# Patient Record
Sex: FEMALE | Race: WHITE | NOT HISPANIC OR LATINO | URBAN - METROPOLITAN AREA
[De-identification: names, ages, dates, MRNs, and addresses within clinical notes are randomized per-mention and may not be internally consistent; named-entity substitution may affect disease eponyms.]

---

## 2017-01-03 ENCOUNTER — HOSPITAL ENCOUNTER (OUTPATIENT)
Dept: PREADMISSION TESTING | Facility: HOSPITAL | Age: 57
Discharge: HOME | End: 2017-01-03
Attending: PHYSICAL MEDICINE & REHABILITATION
Payer: COMMERCIAL

## 2017-01-03 VITALS
DIASTOLIC BLOOD PRESSURE: 84 MMHG | OXYGEN SATURATION: 98 % | HEART RATE: 62 BPM | SYSTOLIC BLOOD PRESSURE: 123 MMHG | RESPIRATION RATE: 16 BRPM | HEIGHT: 63 IN | BODY MASS INDEX: 19.18 KG/M2 | TEMPERATURE: 97 F | WEIGHT: 108.25 LBS

## 2017-01-03 DIAGNOSIS — Z01.818 PRE-OP TESTING: ICD-10-CM

## 2017-01-03 DIAGNOSIS — M48.02 CERVICAL SPINAL STENOSIS: Primary | ICD-10-CM

## 2017-01-03 LAB
BASOPHILS # BLD AUTO: 0.03 K/UL
BASOPHILS NFR BLD: 0.6 %
DIFFERENTIAL METHOD: ABNORMAL
EOSINOPHIL # BLD AUTO: 0 K/UL
EOSINOPHIL NFR BLD: 0.8 %
ERYTHROCYTE [DISTWIDTH] IN BLOOD BY AUTOMATED COUNT: 13.4 %
HCT VFR BLD AUTO: 37.2 %
HGB BLD-MCNC: 12.4 G/DL
LYMPHOCYTES # BLD AUTO: 1.8 K/UL
LYMPHOCYTES NFR BLD: 35 %
MCH RBC QN AUTO: 30 PG
MCHC RBC AUTO-ENTMCNC: 33.3 %
MCV RBC AUTO: 90 FL
MONOCYTES # BLD AUTO: 0.5 K/UL
MONOCYTES NFR BLD: 8.7 %
NEUTROPHILS # BLD AUTO: 2.8 K/UL
NEUTROPHILS NFR BLD: 54.9 %
PLATELET # BLD AUTO: 270 K/UL
PMV BLD AUTO: 8.8 FL
RBC # BLD AUTO: 4.13 M/UL
WBC # BLD AUTO: 5.17 K/UL

## 2017-01-03 PROCEDURE — 36415 COLL VENOUS BLD VENIPUNCTURE: CPT

## 2017-01-03 PROCEDURE — 85025 COMPLETE CBC W/AUTO DIFF WBC: CPT

## 2017-01-03 NOTE — IP AVS SNAPSHOT
[place] [address] [place] [address]  Phone: [phone]           Patient Discharge Instructions    Our goal is to set you up for success. This packet includes information on your condition, medications, and your home care. It will help you to care for yourself so you don't get sicker.     Please ask your nurse if you have any questions.        There are many details to remember when preparing for your surgery. Here is what you will need to do, please ask your nurse if there are more specific instructions and if you have any questions:    1. 24 hours before procedure Do not smoke or drink alcoholic beverages 24 hours prior to your procedure    2. Eating before procedure Do not eat or drink anything 8 hours before your procedure - this includes gum, mints, and candy.     3. Day of procedure Please remove all jewelry for the procedure. If you wear contact lenses, dentures, hearing aids or glasses, bring a container to put them in during your surgery and give to a family member for safekeeping.  If your doctor has scheduled you for an overnight stay, bring a small overnight bag with any personal items that you need.    4. After procedure Make arrangements in advance for transportation home by a responsible adult. It is not safe to drive a vehicle during the 24 hours following surgery.     PLEASE NOTE: You may be contacted the day before your surgery to confirm your surgery date and arrival time. The Surgery schedule has many variables which may affect the time of your surgery case. Family members should be available if your surgery time changes.                ** Verify the list of medication(s) below is accurate and up to date. Carry this with you in case of emergency. If your medications have changed, please notify your healthcare provider.             Medication List      TAKE these medications        Additional Info                      buPROPion 150 MG TB24 tablet   Commonly  known as:  WELLBUTRIN XL   Refills:  0   Dose:  150 mg    Instructions:  Take 150 mg by mouth 2 (two) times daily.     Begin Date    AM    Noon    PM    Bedtime       meloxicam 15 MG tablet   Commonly known as:  MOBIC   Refills:  0   Dose:  15 mg    Instructions:  Take 15 mg by mouth once daily. Instructed to stop until after procedure 1-9-17.     Begin Date    AM    Noon    PM    Bedtime       sertraline 100 MG tablet   Commonly known as:  ZOLOFT   Refills:  0   Dose:  100 mg    Instructions:  Take 100 mg by mouth once daily.     Begin Date    AM    Noon    PM    Bedtime                  Please bring to all follow up appointments:    1. A copy of your discharge instructions.  2. All medicines you are currently taking in their original bottles.  3. Identification and insurance card.    Please arrive 15 minutes ahead of scheduled appointment time.    Please call 24 hours in advance if you must reschedule your appointment and/or time.        Your Future Surgeries/Procedures     Jan 09, 2017   Surgery with Gutierrez Estevez MD   [facility] ([facility])    [address] [place], [place] [address] [id number] [phone]                  Discharge Instructions         Your surgery is scheduled for _Monday Jan. 9, 2017__.    Call [phone] between 2 p.m. and 5 p.m. on   __Friday__ to find out your arrival time for the day of your surgery.      Please report to SAME DAY SURGERY UNIT on the 2nd FLOOR at _______ a.m.  Use front door entrance. The doors open at 0530 am.        INSTRUCTIONS IMPORTANT!!!  ¨ Do not eat or drink after 12 midnight-including water. OK to brush teeth, no   gum, candy or mints!    ¨ Take only these medicines with a small swallow of water-morning of surgery.  May take Wellbutrin and Zoloft am of surgery with swallow of water.      __x__  Prep instructions   SHOWER     __x__  Please shower using Hibiclens soap the night before AND  the morning of  your surgery/procedure. Do not use  Hibiclens on your face or genitals         x      If your surgery is around your belly button (Navel) be sure to wash inside your  belly button also. Rinse hibiclens off completely.  __x__  No shaving of procedural area at least 4-5 days before surgery due to  increased risk of skin irritation and/or possible infection.  __x__  Do not wear makeup, including mascara. WEARING EYE MAKEUP MAY LEAD TO SERIOUS EYE INJURY during surgery.  __x__  No powder, lotions or creams to surgical area.  __x__  You may wear only deodorant on the day of surgery.  __x__  Please remove all jewelry, including piercings and leave at home.  __x__  No money or valuables needed. Please leave at home.  You may bring your           cell phone.  ____  Please bring any documents given by your doctor.  __x__  If going home the same day, arrange for a ride home. You will not be able to   drive if Anesthesia was used.  ____  Children under 18 years require a parent / guardian present the entire time   they are in surgery / recovery.  __x__  Wear loose fitting clothing. Allow for dressings, bandages.  __x__  Stop Aspirin, Ibuprofen, Motrin, Meloxicam and Aleve at least 3-5 days before surgery, unless otherwise instructed by your doctor, or the nurse.        x      You MAY use Tylenol/acetaminophen until day of surgery.  ____  If you take diabetic medication, do not take am of surgery unless instructed by   Doctor.  _x___  Call MD for temperature above 101 degrees.        _x___ Stop taking any Fish Oil supplement or any Vitamins that contain Vitamin  E at least 5 days prior to surgery.          I have read or had read and explained to me, and understand the above information.  Additional comments or instructions:Please call   [phone] if you have any questions regarding the instructions above.                 Admission Information     Date & Time Provider Department [facility]    1/3/2017  4:00 PM Gutierrez Estevez MD [facility] [id number]     "  Care Providers     Provider Role Specialty Primary office phone    Gutierrez Estevez MD Attending Provider Pain Medicine [phone]      Your Vitals Were     BP Pulse Temp Resp Height Weight    123/84 (BP Location: Left arm, Patient Position: Sitting, BP Method: Automatic) 62 97.3 °F (36.3 °C) (Oral) 16 5' 3" (1.6 m) 49.1 kg (108 lb 3.9 oz)    SpO2 BMI             98% 19.17 kg/m2         Recent Lab Values     No lab values to display.      Allergies as of 1/3/2017     No Known Allergies      [facility]     [facility] Nurse Care [facility] - 24/7 Assistance  Unless otherwise directed by your provider, please contact [facility], our nurse care line that is available for 24/7 assistance.     Registered nurses in the [facility] provide clinical advisement, health education, appointment booking, and other advisory services.  Call for this free service at [phone].        Advance Directives     An advance directive is a document which, in the event you are no longer able to make decisions for yourself, tells your healthcare team what kind of treatment you do or do not want to receive, or who you would like to make those decisions for you.  If you do not currently have an advance directive, [facility] encourages you to create one.  For more information call:  [phone]), [phone] ([phone]),  or log on to [email or web address].        Smoking Cessation     If you would like to quit smoking:   You may be eligible for free services if you are a [place] resident and started smoking cigarettes before September 1, 1988.  Call the [employer]oking [employer]essation [employer] ([employer]) toll free at (750) 034-3301 or (719) 644-7433.   Call 7-820-QUIT-NOW if you do not meet the above criteria.            Language Assistance Services     ATTENTION: Language assistance services are available, free of charge. Please call [phone].      ATENCIÓN: Si habla español, tiene a bolton disposición servicios " andieos de asistencia lingüística. Don barton [phone].     ROSALES Ý: N?u b?n nói Ti?ng Vi?t, có các d?ch v? h? tr? ngôn ng? mi?n phí dành cho b?n. G?i s? [phone].         [facility] complies with applicable Federal civil rights laws and does not discriminate on the basis of race, color, national origin, age, disability, or sex.

## 2017-01-03 NOTE — IP AVS SNAPSHOT
[address] [place] [address]  Phone: [phone]           I have received a copy of my After Visit Summary and discharge instructions from [facility].    INSTRUCTIONS RECEIVED AND UNDERSTOOD BY:                     Patient/Patient Representative: ________________________________________________________________     Date/Time: ________________________________________________________________                     Instructions Given By: ________________________________________________________________     Date/Time: ________________________________________________________________

## 2017-01-03 NOTE — PLAN OF CARE
Pre-operative instructions, medication directives and pain scales reviewed with patient. All questions the patient had  were answered. Re-assurance about surgical procedure and day of surgery routine given as needed. The patient verbalized understanding of the pre-op instructions.

## 2017-01-03 NOTE — DISCHARGE INSTRUCTIONS
Your surgery is scheduled for _Monday Jan. 9, 2017__.    Call [phone] between 2 p.m. and 5 p.m. on   __Friday__ to find out your arrival time for the day of your surgery.      Please report to SAME DAY SURGERY UNIT on the 2nd FLOOR at _______ a.m.  Use front door entrance. The doors open at 0530 am.        INSTRUCTIONS IMPORTANT!!!  ¨ Do not eat or drink after 12 midnight-including water. OK to brush teeth, no   gum, candy or mints!    ¨ Take only these medicines with a small swallow of water-morning of surgery.  May take Wellbutrin and Zoloft am of surgery with swallow of water.      __x__  Prep instructions   SHOWER     __x__  Please shower using Hibiclens soap the night before AND  the morning of  your surgery/procedure. Do not use Hibiclens on your face or genitals         x      If your surgery is around your belly button (Navel) be sure to wash inside your  belly button also. Rinse hibiclens off completely.  __x__  No shaving of procedural area at least 4-5 days before surgery due to  increased risk of skin irritation and/or possible infection.  __x__  Do not wear makeup, including mascara. WEARING EYE MAKEUP MAY LEAD TO SERIOUS EYE INJURY during surgery.  __x__  No powder, lotions or creams to surgical area.  __x__  You may wear only deodorant on the day of surgery.  __x__  Please remove all jewelry, including piercings and leave at home.  __x__  No money or valuables needed. Please leave at home.  You may bring your           cell phone.  ____  Please bring any documents given by your doctor.  __x__  If going home the same day, arrange for a ride home. You will not be able to   drive if Anesthesia was used.  ____  Children under 18 years require a parent / guardian present the entire time   they are in surgery / recovery.  __x__  Wear loose fitting clothing. Allow for dressings, bandages.  __x__  Stop Aspirin, Ibuprofen, Motrin, Meloxicam and Aleve at least 3-5 days before surgery, unless otherwise  instructed by your doctor, or the nurse.        x      You MAY use Tylenol/acetaminophen until day of surgery.  ____  If you take diabetic medication, do not take am of surgery unless instructed by   Doctor.  _x___  Call MD for temperature above 101 degrees.        _x___ Stop taking any Fish Oil supplement or any Vitamins that contain Vitamin  E at least 5 days prior to surgery.          I have read or had read and explained to me, and understand the above information.  Additional comments or instructions:Please call   [phone] if you have any questions regarding the instructions above.

## 2017-01-08 ENCOUNTER — ANESTHESIA EVENT (OUTPATIENT)
Dept: SURGERY | Facility: HOSPITAL | Age: 57
End: 2017-01-08
Payer: COMMERCIAL

## 2017-01-09 ENCOUNTER — ANESTHESIA (OUTPATIENT)
Dept: SURGERY | Facility: HOSPITAL | Age: 57
End: 2017-01-09
Payer: COMMERCIAL

## 2017-01-09 PROBLEM — M48.02 CERVICAL SPINAL STENOSIS: Status: ACTIVE | Noted: 2017-01-09

## 2017-01-09 PROCEDURE — D9220A PRA ANESTHESIA: Mod: CRNA,,, | Performed by: REGISTERED NURSE

## 2017-01-09 PROCEDURE — 63600175 PHARM REV CODE 636 W HCPCS: Performed by: REGISTERED NURSE

## 2017-01-09 PROCEDURE — 25000003 PHARM REV CODE 250: Performed by: REGISTERED NURSE

## 2017-01-09 PROCEDURE — D9220A PRA ANESTHESIA: Mod: ANES,,, | Performed by: ANESTHESIOLOGY

## 2017-01-09 RX ORDER — LIDOCAINE HCL/PF 100 MG/5ML
SYRINGE (ML) INTRAVENOUS
Status: DISCONTINUED | OUTPATIENT
Start: 2017-01-09 | End: 2017-01-09

## 2017-01-09 RX ORDER — PROPOFOL 10 MG/ML
VIAL (ML) INTRAVENOUS
Status: DISCONTINUED | OUTPATIENT
Start: 2017-01-09 | End: 2017-01-09

## 2017-01-09 RX ORDER — MIDAZOLAM HYDROCHLORIDE 1 MG/ML
INJECTION, SOLUTION INTRAMUSCULAR; INTRAVENOUS
Status: DISCONTINUED | OUTPATIENT
Start: 2017-01-09 | End: 2017-01-09

## 2017-01-09 RX ADMIN — LIDOCAINE HYDROCHLORIDE 100 MG: 20 INJECTION, SOLUTION INTRAVENOUS at 11:01

## 2017-01-09 RX ADMIN — PROPOFOL 50 MG: 10 INJECTION, EMULSION INTRAVENOUS at 11:01

## 2017-01-09 RX ADMIN — PROPOFOL 20 MG: 10 INJECTION, EMULSION INTRAVENOUS at 11:01

## 2017-01-09 RX ADMIN — MIDAZOLAM HYDROCHLORIDE 2 MG: 1 INJECTION, SOLUTION INTRAMUSCULAR; INTRAVENOUS at 11:01

## 2017-01-09 NOTE — ANESTHESIA PREPROCEDURE EVALUATION
01/09/2017  Marilee Townsend is a 56 y.o., female.    [facility] Anesthesia Evaluation    I have reviewed the Patient Summary Reports.     I have reviewed the Medications.     Review of Systems  Anesthesia Hx:  No problems with previous Anesthesia Denies Hx of Anesthetic complications (POnausea)  History of prior surgery of interest to airway management or planning: Denies Family Hx of Anesthesia complications.   Denies Personal Hx of Anesthesia complications.   Social:  Non-Smoker    Cardiovascular:  Cardiovascular Normal Exercise tolerance: good     Pulmonary:  Pulmonary Normal    Renal/:  Renal/ Normal     Hepatic/GI:  Hepatic/GI Normal    Neurological:   Denies CVA. Neuromuscular Disease,  Denies Seizures. Fibromyalgia  Tingling pain shooting down right arm from neck  Chronic Pain Syndrome   Endocrine:  Endocrine Normal    Psych:   Psychiatric History depression          Physical Exam  General:  Well nourished    Airway/Jaw/Neck:  Airway Findings: Mouth Opening: Normal Tongue: Normal  General Airway Assessment: Adult, Average  Mallampati: II  TM Distance: Normal, at least 6 cm  Jaw/Neck Findings:  Neck ROM: Normal ROM  Good mouth opening  M2  Denies loose dentition  FROM    Dental:  Dental Findings: In tact   Chest/Lungs:  Chest/Lungs Findings: Normal Respiratory Rate, Clear to auscultation     Heart/Vascular:  Heart Findings: Rate: Normal  Rhythm: Regular Rhythm  Sounds: Normal        Mental Status:  Mental Status Findings: Normal        Anesthesia Plan  Type of Anesthesia, risks & benefits discussed:  Anesthesia Type:  MAC  Patient's Preference:   Intra-op Monitoring Plan:   Intra-op Monitoring Plan Comments:   Post Op Pain Control Plan:   Post Op Pain Control Plan Comments:   Induction:   IV  Beta Blocker:  Patient is not currently on a Beta-Blocker (No further documentation required).       Informed  Consent: Patient understands risks and agrees with Anesthesia plan.  Questions answered. Anesthesia consent signed with patient.  ASA Score: 2     Day of Surgery Review of History & Physical:    H&P update referred to the provider.         Ready For Surgery From Anesthesia Perspective.

## 2017-01-09 NOTE — ANESTHESIA POSTPROCEDURE EVALUATION
"Anesthesia Post Evaluation    Patient: Marilee Townsend    Procedure(s) Performed: Procedure(s) (LRB):  INJECTION-STEROID-EPIDURAL-INTERLAMINAR C7-T1  (C-ARM) (N/A)    Final Anesthesia Type: MAC  Patient location during evaluation: [facility]  Patient participation: Yes- Able to Participate  Level of consciousness: awake  Post-procedure vital signs: reviewed and stable  Pain management: adequate  Airway patency: patent  PONV status at discharge: No PONV  Anesthetic complications: no      Cardiovascular status: stable  Respiratory status: unassisted  Hydration status: euvolemic  Follow-up not needed.        Visit Vitals    /71    Pulse (!) 56    Temp 36.5 °C (97.7 °F) (Oral)    Resp 14    Ht 5' 3" (1.6 m)    Wt 49.1 kg (108 lb 3.9 oz)    SpO2 99%    Breastfeeding No    BMI 19.17 kg/m2       Pain/Danielle Score: Presence of Pain: complains of pain/discomfort (1/9/2017  9:55 AM)      "

## 2017-01-09 NOTE — TRANSFER OF CARE
"Anesthesia Transfer of Care Note    Patient: Marilee Townsend    Procedure(s) Performed: Procedure(s) (LRB):  INJECTION-STEROID-EPIDURAL-INTERLAMINAR C7-T1  (C-ARM) (N/A)    Patient location: PACU    Anesthesia Type: MAC    Transport from OR: Transported from OR on room air with adequate spontaneous ventilation    Post pain: adequate analgesia    Post assessment: tolerated procedure well    Post vital signs: stable    Level of consciousness: sedated    Nausea/Vomiting: no nausea/vomiting    Complications: none          Last vitals:   Visit Vitals    /71    Pulse (!) 56    Temp 36.5 °C (97.7 °F) (Oral)    Resp 14    Ht 5' 3" (1.6 m)    Wt 49.1 kg (108 lb 3.9 oz)    SpO2 99%    Breastfeeding No    BMI 19.17 kg/m2     "

## 2017-02-10 ENCOUNTER — HOSPITAL ENCOUNTER (EMERGENCY)
Facility: HOSPITAL | Age: 57
Discharge: HOME | End: 2017-02-10
Attending: EMERGENCY MEDICINE
Payer: COMMERCIAL

## 2017-02-10 VITALS
HEART RATE: 86 BPM | DIASTOLIC BLOOD PRESSURE: 77 MMHG | HEIGHT: 63 IN | OXYGEN SATURATION: 95 % | SYSTOLIC BLOOD PRESSURE: 131 MMHG | WEIGHT: 108 LBS | BODY MASS INDEX: 19.14 KG/M2 | TEMPERATURE: 98 F | RESPIRATION RATE: 18 BRPM

## 2017-02-10 DIAGNOSIS — K62.3 RECTAL PROLAPSE: Primary | ICD-10-CM

## 2017-02-10 PROCEDURE — 45900 REDUCTION OF RECTAL PROLAPSE: CPT

## 2017-02-10 PROCEDURE — 99284 EMERGENCY DEPT VISIT MOD MDM: CPT | Mod: 25

## 2017-02-10 PROCEDURE — 96374 THER/PROPH/DIAG INJ IV PUSH: CPT

## 2017-02-10 PROCEDURE — 63600175 PHARM REV CODE 636 W HCPCS: Performed by: EMERGENCY MEDICINE

## 2017-02-10 PROCEDURE — 96375 TX/PRO/DX INJ NEW DRUG ADDON: CPT

## 2017-02-10 RX ORDER — HYDROCODONE BITARTRATE AND ACETAMINOPHEN 5; 325 MG/1; MG/1
1 TABLET ORAL EVERY 8 HOURS PRN
Qty: 12 TABLET | Refills: 0 | Status: SHIPPED | OUTPATIENT
Start: 2017-02-10 | End: 2017-02-16

## 2017-02-10 RX ORDER — ONDANSETRON 2 MG/ML
4 INJECTION INTRAMUSCULAR; INTRAVENOUS
Status: COMPLETED | OUTPATIENT
Start: 2017-02-10 | End: 2017-02-10

## 2017-02-10 RX ORDER — MORPHINE SULFATE 10 MG/ML
4 INJECTION INTRAMUSCULAR; INTRAVENOUS; SUBCUTANEOUS
Status: COMPLETED | OUTPATIENT
Start: 2017-02-10 | End: 2017-02-10

## 2017-02-10 RX ORDER — MIDAZOLAM HYDROCHLORIDE 1 MG/ML
2 INJECTION INTRAMUSCULAR; INTRAVENOUS
Status: COMPLETED | OUTPATIENT
Start: 2017-02-10 | End: 2017-02-10

## 2017-02-10 RX ADMIN — MIDAZOLAM HYDROCHLORIDE 2 MG: 1 INJECTION, SOLUTION INTRAMUSCULAR; INTRAVENOUS at 09:02

## 2017-02-10 RX ADMIN — MORPHINE SULFATE 4 MG: 10 INJECTION INTRAVENOUS at 09:02

## 2017-02-10 RX ADMIN — ONDANSETRON 4 MG: 2 INJECTION INTRAMUSCULAR; INTRAVENOUS at 09:02

## 2017-02-10 NOTE — ED AVS SNAPSHOT
[facility]  [place] [address]               Marilee Townsend   2/10/2017  8:14 PM   ED    Description:  Female : 1960   Department:  [facility]           Your Care was Coordinated By:     Provider Role From To    Hawk Moser III, MD Attending Provider 02/10/17 2020 --      Reason for Visit     RECTAL PROLAPSE           Diagnoses this Visit        Comments    Rectal prolapse    -  Primary       ED Disposition     None           To Do List           Follow-up Information     Follow up with Dheeraj Harris III, MD In 1 week.    Specialty:  Surgery    Contact information:    [address] [place] [address] [place] [address]  [phone]         These Medications        Disp Refills Start End    hydrocodone-acetaminophen 5-325mg (NORCO) 5-325 mg per tablet 12 tablet 0 2/10/2017 2017    Take 1 tablet by mouth every 8 (eight) hours as needed for Pain. - Oral    Pharmacy: [facility]/pharmacy #[id number] - [place] - [address] [facility] Ph #: [phone]         [facility]     [facility] -  Assistance  Registered nurses in the [facility] provide clinical advisement, health education, appointment booking, and other advisory services.  Call for this free service at [phone].             Medications           Message regarding Medications     Verify the changes and/or additions to your medication regime listed below are the same as discussed with your clinician today.  If any of these changes or additions are incorrect, please notify your healthcare provider.        START taking these NEW medications        Refills    hydrocodone-acetaminophen 5-325mg (NORCO) 5-325 mg per tablet 0    Sig: Take 1 tablet by mouth every 8 (eight) hours as needed for Pain.    Class: Print    Route: Oral      These medications were administered today        Dose Freq    midazolam injection 2 mg 2 mg ED 1 Time    Sig: Inject 2  "mLs (2 mg total) into the vein ED 1 Time.    Class: Normal    Route: Intravenous    morphine injection 4 mg 4 mg ED 1 Time    Sig: Inject 0.4 mLs (4 mg total) into the vein ED 1 Time.    Class: Normal    Route: Intravenous    ondansetron injection 4 mg 4 mg ED 1 Time    Sig: Inject 4 mg into the vein ED 1 Time.    Class: Normal    Route: Intravenous           Verify that the below list of medications is an accurate representation of the medications you are currently taking.  If none reported, the list may be blank. If incorrect, please contact your healthcare provider. Carry this list with you in case of emergency.           Current Medications     buPROPion (WELLBUTRIN XL) 150 MG TB24 tablet Take 150 mg by mouth 2 (two) times daily.     meloxicam (MOBIC) 15 MG tablet Take 15 mg by mouth once daily. Instructed to stop until after procedure 1-9-17.    sertraline (ZOLOFT) 100 MG tablet Take 100 mg by mouth once daily.    hydrocodone-acetaminophen 5-325mg (NORCO) 5-325 mg per tablet Take 1 tablet by mouth every 8 (eight) hours as needed for Pain.           Clinical Reference Information           Your Vitals Were     BP Pulse Temp Resp Height Weight    131/77 86 98.1 °F (36.7 °C) (Oral) 18 5' 3" (1.6 m) 49 kg (108 lb)    SpO2 BMI             95% 19.13 kg/m2         Allergies as of 2/10/2017     No Known Allergies      Immunizations Administered on Date of Encounter - 2/10/2017     None      ED Micro, Lab, POCT     None      ED Imaging Orders     None        Discharge Instructions       Return to the emergency department if you develop severe abdominal pain, fever higher than 100.4°, vomiting, or for any new or worsening symptoms.  As discussed it is imperative that you avoid activities that increase the pressure in your abdomen, which means activities that require you to flex your abdominal or pelvic muscles are strongly, such as bending at the waist, squatting, laughing strongly, coughing strongly, straining to have a " bowel movement, etc.         Rectal Prolapse  Rectal prolapse means the rectum has fallen out of position. In many cases, rectal tissue sags out of the anus. This happens when the rectum becomes stretched out, pushes down, and sags out through the anus. A reddish mass of tissue may be seen or felt at the opening of the anus. The tissue may stick out beyond the anus. This may happen following a bowel movement, and then go away for a time. Or it may be present all the time. Stool or mucus may also leak out of the anus.  Rectal prolapse happens when the structures and muscles in the pelvis and anus are weakened. Although it is not common, it is more so in elderly women, but it can happen in both men and women of all ages. Pregnancy and childbirth can make it more likely. So can repeated straining to have a bowel movement.  Often people have a hemorrhoid, which they think is a prolapse, as they see or feel something abnormal, and many of the symptoms are the same.  The following are signs and symptoms of a rectal prolapse:  · Diarrhea or constipation  · Stool leaking out  · Mucus or blood in the stool  · Incomplete bowel movements  · Abdominal discomfort  To return the rectum back into place, apply gentle pressure. But it will likely prolapse again. For long-term treatment, you may need surgery. You will likely be referred to a specialist who can examine you and tell you about your options.    Home care  Straining during bowel movements is often a cause of prolapse. To help ease and prevent constipation, take these steps:  · Laxatives, stool softeners, or bulking agents may be prescribed. Take these as directed. You may need them daily.  · Add more fruits and vegetables and whole grains to your diet. Eat less high-fat foods and processed foods (like packaged snack foods).  · Do not ignore the urge to have a bowel movement. Once a day, set aside time after a meal for an undisturbed visit to the toilet.  · Do not strain  or push hard to pass stool.  · Do not spend more than a few minutes on the toilet to have a bowel movement.  · If you smoke, quit.  If you have a prolapse:  To return a prolapsed rectum back into place, first wash your hands well. Wet a soft cloth with warm water. Then, lie on your side with your knees to your chest. Hold the cloth to the anus and use gentle pressure to push the rectum back into place. When youre done, call the doctor. If you are unable to push the prolapse back or are uncomfortable doing so, call your healthcare provider or go to the emergency department.  Follow-up care  Follow up with your healthcare provider, or as advised. If you have been referred to a specialist, do not put off making the appointment.  Call 911  Call 911 if any of the following occur:  · Heavy bleeding  · Severe abdominal pain or swelling  · Severe rectal pain  · Fainting or loss of consciousness  · Rapid heart rate  When to seek medical advice  Call your healthcare provider right away if any of these occur:  · Return of the prolapse  · Fever over 100.4°F (38°C)  · Blood in stool  · Abdominal pain or swelling  · Repeated vomiting  · You cannot have a bowel movement or cannot control bowel movements  Date Last Reviewed: 6/1/2016  © 8348-5419 [employer]. [address] [place] [address], [place] [address]. All rights reserved. This information is not intended as a substitute for professional medical care. Always follow your healthcare professional's instructions.          Smoking Cessation     If you would like to quit smoking:   You may be eligible for free services if you are a [place] resident and started smoking cigarettes before September 1, 1988.  Call the Smoking Cessation Trust (SCT) toll free at (638) 605-1745 or (537) 126-0277.   Call 7-800-QUIT-NOW if you do not meet the above criteria.             [facility] complies with applicable Federal civil rights laws and does not discriminate on  the basis of race, color, national origin, age, disability, or sex.        Language Assistance Services     ATTENTION: Language assistance services are available, free of charge. Please call [phone].      ATENCIÓN: Si jennifer her, tiene a bolton disposición servicios gratuitos de asistencia lingüística. Llame al [phone].     CHÚ Ý: N?u b?n nói Ti?ng Vi?t, có các d?ch v? h? tr? ngôn ng? mi?n phí dành cho b?n. G?i s? [phone].

## 2017-02-11 NOTE — ED TRIAGE NOTES
"Pt reports to ED via EMS with c/o rectal prolapse and rectal pain 10/10; pt went to urinate on the toilet and states that her rectum "came out"; pt unable to stand up; pt reports that this happened 1 time before but states that her rectum went right back in; pt AAOx4  "

## 2017-02-11 NOTE — DISCHARGE INSTRUCTIONS
Return to the emergency department if you develop severe abdominal pain, fever higher than 100.4°, vomiting, or for any new or worsening symptoms.  As discussed it is imperative that you avoid activities that increase the pressure in your abdomen, which means activities that require you to flex your abdominal or pelvic muscles are strongly, such as bending at the waist, squatting, laughing strongly, coughing strongly, straining to have a bowel movement, etc.         Rectal Prolapse  Rectal prolapse means the rectum has fallen out of position. In many cases, rectal tissue sags out of the anus. This happens when the rectum becomes stretched out, pushes down, and sags out through the anus. A reddish mass of tissue may be seen or felt at the opening of the anus. The tissue may stick out beyond the anus. This may happen following a bowel movement, and then go away for a time. Or it may be present all the time. Stool or mucus may also leak out of the anus.  Rectal prolapse happens when the structures and muscles in the pelvis and anus are weakened. Although it is not common, it is more so in elderly women, but it can happen in both men and women of all ages. Pregnancy and childbirth can make it more likely. So can repeated straining to have a bowel movement.  Often people have a hemorrhoid, which they think is a prolapse, as they see or feel something abnormal, and many of the symptoms are the same.  The following are signs and symptoms of a rectal prolapse:  · Diarrhea or constipation  · Stool leaking out  · Mucus or blood in the stool  · Incomplete bowel movements  · Abdominal discomfort  To return the rectum back into place, apply gentle pressure. But it will likely prolapse again. For long-term treatment, you may need surgery. You will likely be referred to a specialist who can examine you and tell you about your options.    Home care  Straining during bowel movements is often a cause of prolapse. To help ease and  prevent constipation, take these steps:  · Laxatives, stool softeners, or bulking agents may be prescribed. Take these as directed. You may need them daily.  · Add more fruits and vegetables and whole grains to your diet. Eat less high-fat foods and processed foods (like packaged snack foods).  · Do not ignore the urge to have a bowel movement. Once a day, set aside time after a meal for an undisturbed visit to the toilet.  · Do not strain or push hard to pass stool.  · Do not spend more than a few minutes on the toilet to have a bowel movement.  · If you smoke, quit.  If you have a prolapse:  To return a prolapsed rectum back into place, first wash your hands well. Wet a soft cloth with warm water. Then, lie on your side with your knees to your chest. Hold the cloth to the anus and use gentle pressure to push the rectum back into place. When youre done, call the doctor. If you are unable to push the prolapse back or are uncomfortable doing so, call your healthcare provider or go to the emergency department.  Follow-up care  Follow up with your healthcare provider, or as advised. If you have been referred to a specialist, do not put off making the appointment.  Call 911  Call 911 if any of the following occur:  · Heavy bleeding  · Severe abdominal pain or swelling  · Severe rectal pain  · Fainting or loss of consciousness  · Rapid heart rate  When to seek medical advice  Call your healthcare provider right away if any of these occur:  · Return of the prolapse  · Fever over 100.4°F (38°C)  · Blood in stool  · Abdominal pain or swelling  · Repeated vomiting  · You cannot have a bowel movement or cannot control bowel movements  Date Last Reviewed: 6/1/2016  © 4865-4864 "[employer]. [address] [place] [address], [place] [address]. All rights reserved. This information is not intended as a substitute for professional medical care. Always follow your healthcare professional's instructions.

## 2017-02-11 NOTE — ED PROVIDER NOTES
"Encounter Date: 2/10/2017    SCRIBE #1 NOTE: I, Adali Hernandez, am scribing for, and in the presence of,  Hawk Moser III, MD. I have scribed the following portions of the note - Other sections scribed: HPI and ROS.       History     Chief Complaint   Patient presents with    RECTAL PROLAPSE     PT STATES SHE WAS USING THE BATHROOM AND HAD A "POP," PER EMS APPROX 2IN PROLAPSE OF RECTUM.     Review of patient's allergies indicates:  No Known Allergies  HPI Comments: CC: Rectal Prolapse    HPI: This 56 y.o. female with medical history of depression, encounter for blood transfusion and rectal prolapse presents to the ED c/o rectal prolapse with associated rectal pain that occurred today. Per nursing note, pt reports that she was attempting to urinate when her rectum "came out". Pt states, "feels like its dead, kind of like it won't ever move". Symptoms are acute, constant and severe (10/10). Pt reports that she is normally constipated, noting use of MiraLax daily. She states that she has experienced these symptoms 1x in the past (10 years ago) and notes that at the time the prolapse resolved on its own. No other associated symptoms. No attempted treatment reported. No alleviating factors.         The history is provided by the patient. No  was used.     Past Medical History   Diagnosis Date    Arthritis     Depression     Encounter for blood transfusion      as a child    Neck pain     Rectal prolapse     S/P epidural steroid injection 11/2016     Past Medical History Pertinent Negatives   Diagnosis Date Noted    Anticoagulant long-term use 5/12/2016    Transfusion reaction 5/12/2016     Past Surgical History   Procedure Laterality Date    Hysterectomy      Tonsillectomy      Breast surgery       augmentation    Hernia repair       umbilical     History reviewed. No pertinent family history.  Social History   Substance Use Topics    Smoking status: Former Smoker     Packs/day: 1.00 " "    Types: Cigarettes     Start date: 5/12/2014    Smokeless tobacco: Never Used    Alcohol use Yes      Comment: rarely     Review of Systems   Constitutional: Negative for fever.   HENT: Negative for sore throat.    Eyes: Negative for pain.   Respiratory: Negative for shortness of breath.    Cardiovascular: Negative for chest pain.   Gastrointestinal: Positive for constipation (daily) and rectal pain. Negative for nausea.        (+) rectal prolapse   Genitourinary: Negative for dysuria.   Musculoskeletal: Negative for back pain.   Skin: Negative for rash.   Neurological: Negative for weakness.       Physical Exam   Initial Vitals   BP Pulse Resp Temp SpO2   02/10/17 1950 02/10/17 1950 02/10/17 1950 02/10/17 1950 02/10/17 1950   130/78 110 18 98.1 °F (36.7 °C) 98 %     Physical Exam    Constitutional: She appears well-developed and well-nourished. She is not diaphoretic. No distress.   HENT:   Head: Normocephalic and atraumatic.   Right Ear: External ear normal.   Left Ear: External ear normal.   Eyes: Conjunctivae and EOM are normal. Pupils are equal, round, and reactive to light.   Neck: Normal range of motion.   Cardiovascular: Normal rate and regular rhythm.   Pulmonary/Chest: No respiratory distress.   Abdominal: She exhibits no distension.   Approximately 3 cm of rectal prolapse, good color   Musculoskeletal: She exhibits no edema.   Neurological: She is alert. GCS eye subscore is 4. GCS verbal subscore is 5. GCS motor subscore is 6.   Skin: Skin is warm and dry.   Psychiatric: She has a normal mood and affect. Thought content normal.         ED Course   Procedures  Labs Reviewed - No data to display          Medical Decision Making:   Initial Assessment:   56-year-old female with prior episode of rectal prolapse "years ago" presents with rectal prolapse that began when using the bathroom.  Reports chronic constipation.  Physical examination does confirm rectal prolapse without any evidence of vascular " compromise.  Plan for reduction.  ED Management:  PROCEDURE--reduction of rectal prolapse.  Patient was pretreated with IV Versed and IV morphine.  This was not conscious sedation.  Small amount of sugar was sprinkled on the external part of the rectum, followed by slow, steady, gentle pressure.  Rectum reduced quickly and easily.  Digital rectal exam subsequently performed to assure complete internal reduction.  Patient had immediate relief of symptoms.    Will discharge with recommendation of follow-up with general surgery and with precautions to prevent further episodes.             Scribe Attestation:   Scribe #1: I performed the above scribed service and the documentation accurately describes the services I performed. I attest to the accuracy of the note.    Attending Attestation:           Physician Attestation for Scribe:  Physician Attestation Statement for Scribe #1: I, Hawk Moser III, MD, reviewed documentation, as scribed by Adali Hernandez in my presence, and it is both accurate and complete.                 ED Course     Clinical Impression:   The encounter diagnosis was Rectal prolapse.          Hawk Moser III, MD  02/10/17 4589

## 2017-05-22 ENCOUNTER — OFFICE VISIT (OUTPATIENT)
Dept: SURGERY | Facility: CLINIC | Age: 57
End: 2017-05-22
Payer: COMMERCIAL

## 2017-05-22 VITALS
BODY MASS INDEX: 18.78 KG/M2 | DIASTOLIC BLOOD PRESSURE: 93 MMHG | WEIGHT: 106.06 LBS | SYSTOLIC BLOOD PRESSURE: 159 MMHG

## 2017-05-22 DIAGNOSIS — K62.3 RECTAL PROLAPSE: Primary | ICD-10-CM

## 2017-05-22 PROCEDURE — 99203 OFFICE O/P NEW LOW 30 MIN: CPT | Mod: S$GLB,,, | Performed by: COLON & RECTAL SURGERY

## 2017-05-22 PROCEDURE — 1160F RVW MEDS BY RX/DR IN RCRD: CPT | Mod: S$GLB,,, | Performed by: COLON & RECTAL SURGERY

## 2017-05-22 PROCEDURE — 99999 PR PBB SHADOW E&M-EST. PATIENT-LVL II: CPT | Mod: PBBFAC,,, | Performed by: COLON & RECTAL SURGERY

## 2017-05-22 NOTE — PROGRESS NOTES
CRS Office Visit    SUBJECTIVE:     Chief Complaint: rectal prolapse    History of Present Illness:  Patient is a 57 y.o. female presents with a long history of rectal prolapse most recently seen in the ED due to the inability to reduce the prolapse.  Patient reports a long history of constipation, treated with miralax daily.  Last colonscopy was >10 years prior and patient cancelled upcomming scope due to concern for worsening prolapse.  She reports pain when prolapse occurs and states it spontaneously reduces on own.    Prior abdominal surgeries: Epigastric hernia repair, partial hysterectomy     Review of patient's allergies indicates:  No Known Allergies    Past Medical History:   Diagnosis Date    Arthritis     Depression     Encounter for blood transfusion     as a child    Neck pain     Rectal prolapse     S/P epidural steroid injection 11/2016     Past Surgical History:   Procedure Laterality Date    BREAST SURGERY      augmentation    HERNIA REPAIR      umbilical    HYSTERECTOMY      TONSILLECTOMY       No family history on file.  Social History   Substance Use Topics    Smoking status: Former Smoker     Packs/day: 1.00     Types: Cigarettes     Start date: 5/12/2014    Smokeless tobacco: Never Used    Alcohol use Yes      Comment: rarely        Review of Systems:  Constitutional: no fever or chills  Eyes: no visual changes  ENT: no nasal congestion or sore throat  Respiratory: no cough or shortness of breath  Cardiovascular: no chest pain or palpitations  Gastrointestinal: no nausea or vomiting, tolerating diet  Genitourinary: no hematuria or dysuria    OBJECTIVE:     Vital Signs (Most Recent)  BP: (!) 159/93 (05/22/17 0955)    Physical Exam:  General: White female in NAD sitting in chair in clinic  Neuro: aaox4 maex4 perrl  Respiratory: resps even unlabored  Cardiac: cap refill <2 sec  Abdomen: Normal, benign.  Extremities: Warm dry and intact  Anorectal: Full thickness rectal prolapse  appreciated with straining on commode     ASSESSMENT/PLAN:       Rectal prolapse    Discussed options, risks and benefits of rectal prolapse treatment  Patient will follow up with colonoscopy prior to prolapse repair  She will bring CSP report to follow up CRS appointment     Todd Pillai MD  Colon and Rectal Surgery Fellow  [phone]    I have interviewed and examined the patient, reviewed the notes and assessments, and/or personally supervised the procedure(s) performed by Dr. Pillai, and I concur with her/his documentation of Marilee Townsend.  See below addendum for my evaluation and additional findings.    56 yo +F with chronic constipation and rectal prolapse.  +Chronic constipation - takes Miralax daily with good results.  Seen in ED in Feb with c/o not being able to reduce it, ED was able to reduce it.  Since then, she's been able to reduce it every time it prolapses.  No prolapse as long as she doesn't strain.  Last colonoscopy >10 yrs ago.  No family hx of CRC.    Exam as noted above.  +Full-thickness prolapse able to be appreciated while she was straining, seated on commode    IMPRESSION:  Rectal prolapse    RECOMMENDATIONS:  Discussed abdominal vs perineal repair.  She is opting for abdominal rectopexy.  She needs colonoscopy prior to surgery - wants to do it at [facility].  RTO after colonoscopy for scheduling/pre-op visit.      Bill Lopez MD, FACS, FASCRS

## 2017-05-22 NOTE — LETTER
May 22, 2017      Dheeraj Harris III, MD  [address] [place] [id number]           [facility]  [address]  [facility] [address]  Phone: [phone]          Patient: Marilee Townsend   MR Number: [id number]   YOB: 1960   Date of Visit: 5/22/2017       Dear Dr. Dheeraj Harris III:    Thank you for referring Marilee Townsend to me for evaluation. Attached you will find relevant portions of my assessment and plan of care.    If you have questions, please do not hesitate to call me. I look forward to following Marilee Townsend along with you.    Sincerely,    Bill Lopez MD    Enclosure  CC:  MD Jose Angel Thornton MD    If you would like to receive this communication electronically, please contact [email or web address] or [phone] to request more information on [facility] Link access.    For providers and/or their staff who would like to refer a patient to [facility], please contact us through our one-stop-shop provider referral line, [facility], at [phone].    If you feel you have received this communication in error or would no longer like to receive these types of communications, please e-mail [email or web address]

## 2017-06-26 ENCOUNTER — OFFICE VISIT (OUTPATIENT)
Dept: SURGERY | Facility: CLINIC | Age: 57
End: 2017-06-26
Payer: COMMERCIAL

## 2017-06-26 ENCOUNTER — HOSPITAL ENCOUNTER (OUTPATIENT)
Dept: CARDIOLOGY | Facility: CLINIC | Age: 57
Discharge: HOME | End: 2017-06-26
Payer: COMMERCIAL

## 2017-06-26 VITALS
SYSTOLIC BLOOD PRESSURE: 174 MMHG | WEIGHT: 107.13 LBS | DIASTOLIC BLOOD PRESSURE: 99 MMHG | BODY MASS INDEX: 18.98 KG/M2 | HEART RATE: 67 BPM

## 2017-06-26 DIAGNOSIS — K62.3 RECTAL PROLAPSE: Primary | ICD-10-CM

## 2017-06-26 DIAGNOSIS — K59.00 CONSTIPATION, UNSPECIFIED CONSTIPATION TYPE: ICD-10-CM

## 2017-06-26 DIAGNOSIS — K62.3 RECTAL PROLAPSE: ICD-10-CM

## 2017-06-26 PROCEDURE — 99999 PR PBB SHADOW E&M-EST. PATIENT-LVL III: CPT | Mod: PBBFAC,,, | Performed by: COLON & RECTAL SURGERY

## 2017-06-26 PROCEDURE — 93000 ELECTROCARDIOGRAM COMPLETE: CPT | Mod: S$GLB,,, | Performed by: INTERNAL MEDICINE

## 2017-06-26 PROCEDURE — 99213 OFFICE O/P EST LOW 20 MIN: CPT | Mod: S$GLB,,, | Performed by: COLON & RECTAL SURGERY

## 2017-06-26 RX ORDER — METRONIDAZOLE 500 MG/1
500 TABLET ORAL 3 TIMES DAILY
Qty: 3 TABLET | Refills: 0 | Status: SHIPPED | OUTPATIENT
Start: 2017-06-26 | End: 2017-06-27

## 2017-06-26 RX ORDER — NEOMYCIN SULFATE 500 MG/1
1000 TABLET ORAL 3 TIMES DAILY
Qty: 6 TABLET | Refills: 0 | Status: SHIPPED | OUTPATIENT
Start: 2017-06-26 | End: 2017-06-27

## 2017-06-26 NOTE — LETTER
June 29, 2017        Simón Fields MD  [address] [place] [address] [address]vd  [address]  [place] [address]             [facility]  [address]  [facility] [address]  Phone: [phone]   Patient: Marilee Townsend   MR Number: [id number]   YOB: 1960   Date of Visit: 6/26/2017       Dear Dr. Fields:    Thank you for referring Marilee Townsend to me for evaluation. Attached you will find relevant portions of my assessment and plan of care.    If you have questions, please do not hesitate to call me. I look forward to following Marilee Townsend along with you.    Sincerely,      Bill Lopez MD            CC  MD Lloyd Lopezosure

## 2017-06-27 PROBLEM — K62.3 RECTAL PROLAPSE: Status: ACTIVE | Noted: 2017-06-27

## 2017-07-03 ENCOUNTER — ANESTHESIA EVENT (OUTPATIENT)
Dept: SURGERY | Facility: HOSPITAL | Age: 57
DRG: 331 | End: 2017-07-03
Payer: COMMERCIAL

## 2017-07-03 DIAGNOSIS — Z01.818 PREOP TESTING: Primary | ICD-10-CM

## 2017-07-03 NOTE — ANESTHESIA PREPROCEDURE EVALUATION
Anesthesia Assessment: Preoperative EQUATION    Planned Procedure: Procedure(s) (LRB):  RECTOPEXY ABDOMINAL APPROACH WITH SIGMOID COLON RESECTION (N/A)  Requested Anesthesia Type:General  Surgeon: Bill Lopez MD  Service: Colon and Rectal  Known or anticipated Date of Surgery:7/26/2017    Surgeon notes: reviewed and Rectal prolapse    Electronic QUestionnaire Assessment completed via nurse interview with patient.        Marilee Townsend [[id number]] - 57 y.o. Female     Providers Outside of [facility]     Flowsheet Row Pre-admit from 7/26/2017 in [facility]   Has outside PCP Yes   Surgical Risk Level     Surgical Risk Level:  3       caRDScore (Clinical Anesthesia Rapid Decision Score)     Very Low   Total Score: 8    8 Sum of Clinical Scores   caRDScores (Grouped)     caRDScore - Ane:  5            caRDScore - CVD:  0            caRDScore - Pul:  2            caRDScore - Met:  1            caRDScore - Phy:  0       caRDScore Items     Flowsheet Row Pre-admit from 7/26/2017 in [facility] Pre-Admit Testing Visit from 1/3/2017 in [facility] Pre-Admit Testing Visit from 11/16/2016 in [facility]   Anesthesia   Has gum/periodontal disease Yes [s/p gum sx-x2 yrs ago] No data No data   Has degenerative arthritis causing severely limited neck movement Yes No data No data   Has painful neck extension Yes No data No data   Has large neck size >40cm (15.7in., large male shirt size, large male collar size >16) No data No No   Has/has had bowel disease of small or large intestine Yes [Rectal prolapse] No data No data   Has chronic anxiety Yes No data No data   CVD   Activity similar to best ability for maximal activity or exercise METS 4 No data No data   Not taking BP medication but supposed to be No data No No   Pulmonary   Total smoking adds up to 10 - 20 years Yes No data No data   During most of those years, smoked 1 pack per day Yes No data No  data   Metabolic   Is on Rx for depression or bipolar disease Yes No data No data   Physiologic   Flags     Red Flag Score:  0            Yellow Flag Score:  8       Red Flags     Flowsheet Row Pre-Admit Testing Visit from 1/3/2017 in [facility] Pre-Admit Testing Visit from 11/16/2016 in [facility]   Not taking BP medication but supposed to be No No   Yellow Flags     Flowsheet Row Pre-admit from 7/26/2017 in [facility]   Has had steroids in the last year Yes [Steroid injection to neck-1/9/17]   Takes herbal medications or vitamin supplements Yes [will stop x7 days prior to sx]   Is or has been a Smoker Yes   NSAID Yes [Mobic-will stop x7 days prior to sx]   Has gum/periodontal disease Yes [s/p gum sx-x2 yrs ago]   Has degenerative arthritis causing severely limited neck movement Yes   Has painful neck extension Yes   Has pain Yes   PONV Risk Score (assumes periop narcotic use = +1, Max=4)     PONV Risk Score:  3       PONV Risk Factors   Total Score: 2            Sleep Apnea   Total Score: 0    KAM STOP-Bang Risk Factors (Max=8)   Total Score: 1    1 Age >50   KAM Risk Level - 1 (Low), 2 (Moderate), 3 (High)     KAM Risk Level:  1       RCRI (Revised Cardiac Risk Indices of ACC/AHA guidelines, Max=6)   Total Score: 1    1 Patient is having an intra-abdominal thoracic or major vascular case   CAD Risk Factors   Total Score: 3                CHADS Score if applicable (history of atrial fib/flutter, Max=6)   Total Score: 0    Maximal Exercise Capacity     Flowsheet Row Pre-admit from 7/26/2017 in [facility]   Maximal Exercise Capacity METS 4   Summary of Dependence   Total Score: 1    1 Is totally independent of others for activities of daily living   Phone Fraility Score (Max = 17)   Total Score: 0    Pain Factors     Flowsheet Row Pre-admit from 7/26/2017 in [facility]   Has pain Yes   Location and description of pain  -- [neck & shoulders-6-ache]   Depends on strong Rx (opioids, adjunctive meds) Yes   Uses one of the following medications: -- [Mobic]   Risk Triggers (Evidence-Based Risk Triggers)     Pulmonary Risk Triggers   Total Score: 1        Renal Risk Triggers   Total Score: 0    Delirium Risk Triggers   Total Score: 0    Urologic Risk Triggers   Total Score: 0    Logistics     Pre-op Clinic Logistics   Total Score: 9                                [facility] Logistics   Total Score: 1        Discharge Logistics   Total Score: 0    Discharge Planning     Flowsheet Row Pre-admit from 7/26/2017 in [facility]   Discharge Planning   Will assist patient 24/7, if needed -- [[phone]]   Anes <For office use only>     Total Score: 8      Surgical Risk Level Assessment             Triage considerations:     The patient has no apparent active cardiac condition (No unstable coronary Syndrome such as severe unstable angina or recent [<1 month] myocardial infarction, decompensated CHF, severe valvular   disease or significant arrhythmia)    Previous anesthesia records:1/9/17- Injection -Steroid-Epidural-Interlaminar C7-T1(C-Arm)-MAC    Airway/Jaw/Neck:  Airway Findings: Mouth Opening: Normal Tongue: Normal  General Airway Assessment: Adult, Average  Mallampati: II  TM Distance: Normal, at least 6 cm  Jaw/Neck Findings:  Neck ROM: Normal ROM  Good mouth opening M2  Denies loose dentition-No PONV-tolerated procedure well      -Anesthesia Hx:  No problems with previous Anesthesia Denies Hx of Anesthetic complications (POnausea)  History of prior surgery of interest to airway management or planning: Denies Family Hx of Anesthesia complications.   Denies Personal Hx of Anesthesia complications.     Patient stated she has had postop nausea & Vomiting from Morphine    Last PCP note: 3-6 months ago , outside [facility] , Annual Exam  Subspecialty notes: Pain Management    Other important co-morbidities:   Past  Medical History:   Diagnosis Date    Arthritis      Depression      Encounter for blood transfusion       as a child    Neck pain      Rectal prolapse      S/P epidural steroid injection 11/2016          Tests already available:  Results have been reviewed.Labs-6/26/17-CMP/CBC/  EKG-6/26/17            Instructions given. (See in Nurse's note)    Optimization:  Anesthesia Preop Clinic Assessment  Indicated        Plan:    Testing:  T&S   Pre-anesthesia  visit       Visit focus: position other than supine, acute or chronic anxiety concerns, neck pain chronic/limited neck motion-degenerative disc problem       Patient  has previously scheduled Medical Appointment:None    Navigation: Tests Scheduled. Lab-T&S on 7/19/17 @ 9:45a             Consults scheduled.POC on 7/19/17 @ 10a           Results will be tracked by Preop Clinic.           Cee Murray RN  7/3/17                                                                                                              07/03/2017  Marilee Townsend is a 57 y.o., female.    Anesthesia Evaluation    I have reviewed the Patient Summary Reports.    I have reviewed the Nursing Notes.   I have reviewed the Medications.     Review of Systems  Anesthesia Hx:  No problems with previous Anesthesia  History of prior surgery of interest to airway management or planning: Previous anesthesia: MAC, General, Nerve Block ORIF Arm 5/2016 with general anesthesia.  Procedure performed at an [facility]. for ORIF Arm 5/2016.   Cervical AZEB 1/2017 with MAC.  Procedure performed at an [facility] Facility. Airway issues documented on chart review include laryngeal mask airway used   Denies Personal Hx of Anesthesia complications.   Social:  Former Smoker Quit 3 yrs ago; 1 ppd x 30 yrs   Hematology/Oncology:  Hematology Normal   Oncology Normal     EENT/Dental:   glasses   Cardiovascular:   Denies Hypertension.   Functional Capacity good / => 4 METS, gardening, cuts grass, walking  35 min. daily; climb 1 FOS; denies CP; SOB    Pulmonary:   Denies Asthma.  Denies Sleep Apnea.    Renal/:  Renal/ Normal     Hepatic/GI:   Denies GERD. Rectal prolapse   Musculoskeletal:   Arthritis   Spine Disorders: (cervical stenosis; s/p AZEB 1/2017) cervical Degenerative disease and Disc disease    Neurological:   Denies CVA. Neuromuscular Disease,  Denies Seizures. Fibromyalgia  Cervical radiculitis Pain , onset is chronic , location of neck , quality of aching/dull, sharp, burning , severity is a 4    Endocrine:   Denies Diabetes.    Psych:   Psychiatric History depression        Patient Active Problem List   Diagnosis    Traumatic closed displaced Colles' fracture of right radius    Cervical radiculitis    Cervical spinal stenosis    Rectal prolapse     Past Medical History:   Diagnosis Date    Arthritis     Depression     Encounter for blood transfusion     as a child    Fibromyalgia     Neck pain     Rectal prolapse      Past Surgical History:   Procedure Laterality Date    BREAST SURGERY Bilateral     augmentation-Implants    epidural steroid injection  01/09/2017    twice-11/2017  & 1/9/17 to neck    HERNIA REPAIR      umbilical    HYSTERECTOMY      TONSILLECTOMY      WRIST FRACTURE SURGERY Right     with hardware placed         Physical Exam  General:  Well nourished    Airway/Jaw/Neck:  Airway Findings: Mouth Opening: Normal Tongue: Normal  General Airway Assessment: Adult  Mallampati: II  TM Distance: Normal, at least 6 cm  Jaw/Neck Findings:     Neck ROM: Normal ROM      Dental:  Dental Findings: molar caps   Chest/Lungs:  Chest/Lungs Findings: Clear to auscultation, Normal Respiratory Rate     Heart/Vascular:  Heart Findings: Rate: Normal  Rhythm: Regular Rhythm  Sounds: Normal        Mental Status:  Mental Status Findings:  Cooperative, Alert and Oriented       Pt was seen in POC 7/19/17/Brooke Reeves RN        Anesthesia Plan  Type of Anesthesia, risks & benefits  discussed:  Anesthesia Type:  general  Patient's Preference: general  Intra-op Monitoring Plan: standard ASA monitors  Intra-op Monitoring Plan Comments:   Post Op Pain Control Plan: per primary service following discharge from PACU  Post Op Pain Control Plan Comments:   Induction:   IV  Beta Blocker:  Patient is not currently on a Beta-Blocker (No further documentation required).       Informed Consent: Patient understands risks and agrees with Anesthesia plan.  Questions answered. Anesthesia consent signed with patient.  ASA Score: 2     Day of Surgery Review of History & Physical:    H&P update referred to the surgeon.         Ready For Surgery From Anesthesia Perspective.

## 2017-07-03 NOTE — PRE ADMISSION SCREENING
Anesthesia Assessment: Preoperative EQUATION    Planned Procedure: Procedure(s) (LRB):  RECTOPEXY ABDOMINAL APPROACH WITH SIGMOID COLON RESECTION (N/A)  Requested Anesthesia Type:General  Surgeon: Bill Lopez MD  Service: Colon and Rectal  Known or anticipated Date of Surgery:7/26/2017    Surgeon notes: reviewed and Rectal prolapse    Electronic QUestionnaire Assessment completed via nurse interview with patient.        Marilee Townsend [[id number]] - 57 y.o. Female     Providers Outside of [facility]     Flowsheet Row Pre-admit from 7/26/2017 in [facility]   Has outside PCP Yes   Surgical Risk Level     Surgical Risk Level:  3       caRDScore (Clinical Anesthesia Rapid Decision Score)     Very Low   Total Score: 8    8 Sum of Clinical Scores   caRDScores (Grouped)     caRDScore - Ane:  5            caRDScore - CVD:  0            caRDScore - Pul:  2            caRDScore - Met:  1            caRDScore - Phy:  0       caRDScore Items     Flowsheet Row Pre-admit from 7/26/2017 in [facility] Pre-Admit Testing Visit from 1/3/2017 in [facility] Pre-Admit Testing Visit from 11/16/2016 in [facility]   Anesthesia   Has gum/periodontal disease Yes [s/p gum sx-x2 yrs ago] No data No data   Has degenerative arthritis causing severely limited neck movement Yes No data No data   Has painful neck extension Yes No data No data   Has large neck size >40cm (15.7in., large male shirt size, large male collar size >16) No data No No   Has/has had bowel disease of small or large intestine Yes [Rectal prolapse] No data No data   Has chronic anxiety Yes No data No data   CVD   Activity similar to best ability for maximal activity or exercise METS 4 No data No data   Not taking BP medication but supposed to be No data No No   Pulmonary   Total smoking adds up to 10 - 20 years Yes No data No data   During most of those years, smoked 1 pack per day Yes No data No  data   Metabolic   Is on Rx for depression or bipolar disease Yes No data No data   Physiologic   Flags     Red Flag Score:  0            Yellow Flag Score:  8       Red Flags     Flowsheet Row Pre-Admit Testing Visit from 1/3/2017 in [facility] Pre-Admit Testing Visit from 11/16/2016 in [facility]   Not taking BP medication but supposed to be No No   Yellow Flags     Flowsheet Row Pre-admit from 7/26/2017 in [facility]   Has had steroids in the last year Yes [Steroid injection to neck-1/9/17]   Takes herbal medications or vitamin supplements Yes [will stop x7 days prior to sx]   Is or has been a Smoker Yes   NSAID Yes [Mobic-will stop x7 days prior to sx]   Has gum/periodontal disease Yes [s/p gum sx-x2 yrs ago]   Has degenerative arthritis causing severely limited neck movement Yes   Has painful neck extension Yes   Has pain Yes   PONV Risk Score (assumes periop narcotic use = +1, Max=4)     PONV Risk Score:  3       PONV Risk Factors   Total Score: 2            Sleep Apnea   Total Score: 0    KAM STOP-Bang Risk Factors (Max=8)   Total Score: 1    1 Age >50   KAM Risk Level - 1 (Low), 2 (Moderate), 3 (High)     KAM Risk Level:  1       RCRI (Revised Cardiac Risk Indices of ACC/AHA guidelines, Max=6)   Total Score: 1    1 Patient is having an intra-abdominal thoracic or major vascular case   CAD Risk Factors   Total Score: 3                CHADS Score if applicable (history of atrial fib/flutter, Max=6)   Total Score: 0    Maximal Exercise Capacity     Flowsheet Row Pre-admit from 7/26/2017 in [facility]   Maximal Exercise Capacity METS 4   Summary of Dependence   Total Score: 1    1 Is totally independent of others for activities of daily living   Phone Fraility Score (Max = 17)   Total Score: 0    Pain Factors     Flowsheet Row Pre-admit from 7/26/2017 in [facility]   Has pain Yes   Location and description of pain  -- [neck & shoulders-6-ache]   Depends on strong Rx (opioids, adjunctive meds) Yes   Uses one of the following medications: -- [Mobic]   Risk Triggers (Evidence-Based Risk Triggers)     Pulmonary Risk Triggers   Total Score: 1        Renal Risk Triggers   Total Score: 0    Delirium Risk Triggers   Total Score: 0    Urologic Risk Triggers   Total Score: 0    Logistics     Pre-op Clinic Logistics   Total Score: 9                                [facility]OSC Logistics   Total Score: 1        Discharge Logistics   Total Score: 0    Discharge Planning     Flowsheet Row Pre-admit from 7/26/2017 in [facility]   Discharge Planning   Will assist patient 24/7, if needed -- [[id number]]   Anes <For office use only>     Total Score: 8      Surgical Risk Level Assessment             Triage considerations:     The patient has no apparent active cardiac condition (No unstable coronary Syndrome such as severe unstable angina or recent [<1 month] myocardial infarction, decompensated CHF, severe valvular   disease or significant arrhythmia)    Previous anesthesia records:1/9/17- Injection -Steroid-Epidural-Interlaminar C7-T1(C-Arm)-MAC    Airway/Jaw/Neck:  Airway Findings: Mouth Opening: Normal Tongue: Normal  General Airway Assessment: Adult, Average  Mallampati: II  TM Distance: Normal, at least 6 cm  Jaw/Neck Findings:  Neck ROM: Normal ROM  Good mouth opening M2  Denies loose dentition-No PONV-tolerated procedure well      -Anesthesia Hx:  No problems with previous Anesthesia Denies Hx of Anesthetic complications (POnausea)  History of prior surgery of interest to airway management or planning: Denies Family Hx of Anesthesia complications.   Denies Personal Hx of Anesthesia complications.    Last PCP note: 3-6 months ago , outside [facility] , Annual Exam  Subspecialty notes: Pain Management    Other important co-morbidities:   Past Medical History:   Diagnosis Date    Arthritis      Depression       Encounter for blood transfusion       as a child    Neck pain      Rectal prolapse      S/P epidural steroid injection 11/2016          Tests already available:  Results have been reviewed.Labs-6/26/17-CMP/CBC/  EKG-6/26/17            Instructions given. (See in Nurse's note)    Optimization:  Anesthesia Preop Clinic Assessment  Indicated        Plan:    Testing:  T&S   Pre-anesthesia  visit       Visit focus: position other than supine, acute or chronic anxiety concerns, neck pain chronic/limited neck motion-degenerative disc problem       Patient  has previously scheduled Medical Appointment:None    Navigation: Tests Scheduled. Lab-T&S on 7/19/17 @ 9:45a             Consults scheduled.POC on 7/19/17 @ 10a           Results will be tracked by Preop Clinic.           Cee Murray RN  7/3/17

## 2017-07-19 ENCOUNTER — HOSPITAL ENCOUNTER (OUTPATIENT)
Dept: PREADMISSION TESTING | Facility: HOSPITAL | Age: 57
Discharge: HOME | End: 2017-07-19
Attending: ANESTHESIOLOGY
Payer: COMMERCIAL

## 2017-07-19 VITALS
RESPIRATION RATE: 18 BRPM | HEART RATE: 62 BPM | WEIGHT: 105.81 LBS | TEMPERATURE: 99 F | SYSTOLIC BLOOD PRESSURE: 143 MMHG | BODY MASS INDEX: 19.47 KG/M2 | OXYGEN SATURATION: 97 % | HEIGHT: 62 IN | DIASTOLIC BLOOD PRESSURE: 80 MMHG

## 2017-07-19 NOTE — DISCHARGE INSTRUCTIONS
Your surgery has been scheduled for:__________________________________________    You should report to:  ____[facility], located on the [place] side of the first floor of the           [facility] ([phone])  ____[facility], located on the [facility] side of the            Second floor of the [facility] ([phone])  ____3rd Floor [facility] located on the [facility] side of the [facility] [phone]  Please Note   - Tell your doctor if you take Aspirin, products containing Aspirin, herbal medications  or blood thinners, such as Coumadin, Ticlid, or Plavix.  (Consult your provider regarding holding or stopping before surgery).  - Arrange for someone to drive you home following surgery.  You will not be allowed to leave the surgical facility alone or drive yourself home following sedation and anesthesia.  Before Surgery  - Stop taking all herbal medications 14days prior to surgery  - No Motrin/Advil (Ibuprofen) 7 days before surgery  - No Aleve (Naproxen) 7 days before surgery  - Stop Taking Asprin, products containing Asprin _____days before surgery  - Stop taking blood thinners_______days before surgery  - Refrain from drinking alcoholic beverages for 24hours before and after surgery  - Stop or limit smoking _________days before surgery  Night before Surgery  - DO NOT EAT OR DRINK ANYTHING AFTER MIDNIGHT, INCLUDING GUM, HARD CANDY, MINTS, OR CHEWING TOBACCO.  - Take a shower or bath (shower is recommended).  Bathe with Hibiclens soap or an antibacterial soap from the neck down.  If not supplied by your surgeon, hibiclens soap will need to be purchased over the counter in pharmacy.  Rinse soap off thoroughly.  - Shampoo your hair with your regular shampoo  The Day of Surgery  - Take another bath or shower with hibiclens or any antibacterial soap, to reduce the chance of infection.  - Take heart and blood  pressure medications with a small sip of water, as advised by the perioperative team.  - Do not take fluid pills  - You may brush your teeth and rinse your mouth, but do not swall any additional water.   - Do not apply perfumes, powder, body lotions or deodorant on the day of surgery.  - Nail polish should be removed.  - Do not wear makeup or moisturizer  - Wear comfortable clothes, such as a button front shirt and loose fitting pants.  - Leave all jewelry, including body piercings, and valuables at home.    - Bring any devices you will neeed after surgery such as crutches or canes.  - If you have sleep apnea, please bring your CPAP machine  In the event that your physical condition changes including the onset of a cold or respiratory illness, or if you have to delay or cancel your surgery, please notify your surgeon.Anesthesia: General Anesthesia  Youre due to have surgery. During surgery, youll be given medication called anesthesia. (It is also called anesthetic.) This will keep you comfortable and pain-free. Your anesthesia provider will use general anesthesia. This sheet tells you more about it.  What is general anesthesia?     You are watched continuously during your procedure by the anesthesia provider   General anesthesia puts you into a state like deep sleep. It goes into the bloodstream (IV anesthetics), into the lungs (gas anesthetics), or both. You feel nothing during the procedure. You will not remember it. During the procedure, the anesthesia provider monitors you continuously. He or she checks your heart rate and rhythm, blood pressure, breathing, and blood oxygen.  · IV Anesthetics. IV anesthetics are given through an IV line in your arm. Theyre often given first. This is so you are asleep before a gas anesthetic is started. Some kinds of IV anesthetics relieve pain. Others relax you. Your doctor will decide which kind is best in your case.  · Gas Anesthetics. Gas anesthetics are breathed into the  lungs. They are often used to keep you asleep. They can be given through a facemask or a tube placed in your larynx or trachea (breathing tube).  ? If you have a facemask, your anesthesia provider will most likely place it over your nose and mouth while youre still awake. Youll breathe oxygen through the mask as your IV anesthetic is started. Gas anesthetic may be added through the mask.  ? If you have a tube in the larynx or trachea, it will be inserted into your throat after youre asleep.  Anesthesia tools and medications  You will likely have:  · IV anesthetics. These are put into an IV line into your bloodstream.  · Gas anesthetics. You breathe these anesthetics into your lungs, where they pass into your bloodstream.  · Pulse oximeter. This is a small clip that is attached to the end of your finger. This measures your blood oxygen level.  · Electrocardiography leads (electrodes). These are small sticky pads that are placed on your chest. They record your heart rate and rhythm.  · Blood pressure cuff. This reads your blood pressure.  Risks and possible complications  General anesthesia has some risks. These include:  · Breathing problems  · Nausea and vomiting  · Sore throat or hoarseness (usually temporary)  · Allergic reaction to the anesthetic  · Irregular heartbeat (rare)  · Cardiac arrest (rare)   Anesthesia safety  · Follow all instructions you are given for how long not to eat or drink before your procedure.  · Be sure your doctor knows what medications and drugs you take. This includes over-the-counter medications, herbs, supplements, alcohol or other drugs. You will be asked when those were last taken.  · Have an adult family member or friend drive you home after the procedure.  · For the first 24 hours after your surgery:  ? Do not drive or use heavy equipment.  ? Have a trusted family member or spouse make important decisions or sign documents.  ? Avoid alcohol.  ? Have a responsible adult stay with  you. He or she can watch for problems and help keep you safe.  Date Last Reviewed: 10/16/2014  © 0847-3850 [employer]. [address] [place] [address], [place] [address]. All rights reserved. This information is not intended as a substitute for professional medical care. Always follow your healthcare professional's instructions.

## 2017-07-26 ENCOUNTER — HOSPITAL ENCOUNTER (INPATIENT)
Facility: HOSPITAL | Age: 57
LOS: 2 days | Discharge: HOME | DRG: 331 | End: 2017-07-28
Attending: COLON & RECTAL SURGERY | Admitting: COLON & RECTAL SURGERY
Payer: COMMERCIAL

## 2017-07-26 ENCOUNTER — SURGERY (OUTPATIENT)
Age: 57
End: 2017-07-26

## 2017-07-26 ENCOUNTER — ANESTHESIA (OUTPATIENT)
Dept: SURGERY | Facility: HOSPITAL | Age: 57
DRG: 331 | End: 2017-07-26
Payer: COMMERCIAL

## 2017-07-26 DIAGNOSIS — K62.3 RECTAL PROLAPSE: Primary | ICD-10-CM

## 2017-07-26 PROCEDURE — 63600175 PHARM REV CODE 636 W HCPCS: Performed by: COLON & RECTAL SURGERY

## 2017-07-26 PROCEDURE — 63600175 PHARM REV CODE 636 W HCPCS: Performed by: NURSE ANESTHETIST, CERTIFIED REGISTERED

## 2017-07-26 PROCEDURE — 25000003 PHARM REV CODE 250: Performed by: SURGERY

## 2017-07-26 PROCEDURE — 88307 TISSUE EXAM BY PATHOLOGIST: CPT | Mod: 26,,, | Performed by: PATHOLOGY

## 2017-07-26 PROCEDURE — 25000003 PHARM REV CODE 250: Performed by: COLON & RECTAL SURGERY

## 2017-07-26 PROCEDURE — 37000009 HC ANESTHESIA EA ADD 15 MINS: Performed by: COLON & RECTAL SURGERY

## 2017-07-26 PROCEDURE — D9220A PRA ANESTHESIA: Mod: ANES,,, | Performed by: ANESTHESIOLOGY

## 2017-07-26 PROCEDURE — 63600175 PHARM REV CODE 636 W HCPCS: Performed by: NURSE PRACTITIONER

## 2017-07-26 PROCEDURE — 25000003 PHARM REV CODE 250: Performed by: NURSE PRACTITIONER

## 2017-07-26 PROCEDURE — 71000039 HC RECOVERY, EACH ADD'L HOUR: Performed by: COLON & RECTAL SURGERY

## 2017-07-26 PROCEDURE — 94760 N-INVAS EAR/PLS OXIMETRY 1: CPT

## 2017-07-26 PROCEDURE — S0030 INJECTION, METRONIDAZOLE: HCPCS | Performed by: NURSE PRACTITIONER

## 2017-07-26 PROCEDURE — 63600175 PHARM REV CODE 636 W HCPCS: Performed by: SURGERY

## 2017-07-26 PROCEDURE — 36000709 HC OR TIME LEV III EA ADD 15 MIN: Performed by: COLON & RECTAL SURGERY

## 2017-07-26 PROCEDURE — 63600175 PHARM REV CODE 636 W HCPCS: Performed by: ANESTHESIOLOGY

## 2017-07-26 PROCEDURE — A4216 STERILE WATER/SALINE, 10 ML: HCPCS | Performed by: SURGERY

## 2017-07-26 PROCEDURE — 45550 REPAIR RECTUM/REMOVE SIGMOID: CPT | Mod: ,,, | Performed by: COLON & RECTAL SURGERY

## 2017-07-26 PROCEDURE — 37000008 HC ANESTHESIA 1ST 15 MINUTES: Performed by: COLON & RECTAL SURGERY

## 2017-07-26 PROCEDURE — 88302 TISSUE EXAM BY PATHOLOGIST: CPT | Mod: 26,,, | Performed by: PATHOLOGY

## 2017-07-26 PROCEDURE — 36000708 HC OR TIME LEV III 1ST 15 MIN: Performed by: COLON & RECTAL SURGERY

## 2017-07-26 PROCEDURE — 27000221 HC OXYGEN, UP TO 24 HOURS

## 2017-07-26 PROCEDURE — 25000003 PHARM REV CODE 250

## 2017-07-26 PROCEDURE — 88307 TISSUE EXAM BY PATHOLOGIST: CPT | Performed by: PATHOLOGY

## 2017-07-26 PROCEDURE — 27100025 HC TUBING, SET FLUID WARMER: Performed by: NURSE ANESTHETIST, CERTIFIED REGISTERED

## 2017-07-26 PROCEDURE — D9220A PRA ANESTHESIA: Mod: CRNA,,, | Performed by: NURSE ANESTHETIST, CERTIFIED REGISTERED

## 2017-07-26 PROCEDURE — C9290 INJ, BUPIVACAINE LIPOSOME: HCPCS | Performed by: COLON & RECTAL SURGERY

## 2017-07-26 PROCEDURE — 63600175 PHARM REV CODE 636 W HCPCS

## 2017-07-26 PROCEDURE — 25000003 PHARM REV CODE 250: Performed by: NURSE ANESTHETIST, CERTIFIED REGISTERED

## 2017-07-26 PROCEDURE — 0DTN0ZZ RESECTION OF SIGMOID COLON, OPEN APPROACH: ICD-10-PCS | Performed by: COLON & RECTAL SURGERY

## 2017-07-26 PROCEDURE — 0DQP0ZZ REPAIR RECTUM, OPEN APPROACH: ICD-10-PCS | Performed by: COLON & RECTAL SURGERY

## 2017-07-26 PROCEDURE — 0DJD8ZZ INSPECTION OF LOWER INTESTINAL TRACT, VIA NATURAL OR ARTIFICIAL OPENING ENDOSCOPIC: ICD-10-PCS | Performed by: COLON & RECTAL SURGERY

## 2017-07-26 PROCEDURE — 27201423 OPTIME MED/SURG SUP & DEVICES STERILE SUPPLY: Performed by: COLON & RECTAL SURGERY

## 2017-07-26 PROCEDURE — 71000033 HC RECOVERY, INTIAL HOUR: Performed by: COLON & RECTAL SURGERY

## 2017-07-26 PROCEDURE — 20600001 HC STEP DOWN PRIVATE ROOM

## 2017-07-26 RX ORDER — SODIUM CHLORIDE 9 MG/ML
INJECTION, SOLUTION INTRAVENOUS CONTINUOUS
Status: DISCONTINUED | OUTPATIENT
Start: 2017-07-26 | End: 2017-07-27

## 2017-07-26 RX ORDER — ENOXAPARIN SODIUM 100 MG/ML
30 INJECTION SUBCUTANEOUS EVERY 24 HOURS
Status: DISCONTINUED | OUTPATIENT
Start: 2017-07-27 | End: 2017-07-28 | Stop reason: HOSPADM

## 2017-07-26 RX ORDER — METRONIDAZOLE 500 MG/100ML
500 INJECTION, SOLUTION INTRAVENOUS
Status: COMPLETED | OUTPATIENT
Start: 2017-07-26 | End: 2017-07-26

## 2017-07-26 RX ORDER — BUPROPION HYDROCHLORIDE 150 MG/1
150 TABLET ORAL 2 TIMES DAILY
Status: DISCONTINUED | OUTPATIENT
Start: 2017-07-26 | End: 2017-07-27

## 2017-07-26 RX ORDER — ROCURONIUM BROMIDE 10 MG/ML
INJECTION, SOLUTION INTRAVENOUS
Status: DISCONTINUED | OUTPATIENT
Start: 2017-07-26 | End: 2017-07-26

## 2017-07-26 RX ORDER — POLYETHYLENE GLYCOL 3350 17 G/17G
17 POWDER, FOR SOLUTION ORAL DAILY
Status: ON HOLD | COMMUNITY
End: 2017-07-28 | Stop reason: HOSPADM

## 2017-07-26 RX ORDER — NEOSTIGMINE METHYLSULFATE 1 MG/ML
INJECTION, SOLUTION INTRAVENOUS
Status: DISCONTINUED | OUTPATIENT
Start: 2017-07-26 | End: 2017-07-26

## 2017-07-26 RX ORDER — LORAZEPAM 2 MG/ML
INJECTION INTRAMUSCULAR
Status: COMPLETED
Start: 2017-07-26 | End: 2017-07-26

## 2017-07-26 RX ORDER — ONDANSETRON 2 MG/ML
4 INJECTION INTRAMUSCULAR; INTRAVENOUS EVERY 6 HOURS PRN
Status: DISCONTINUED | OUTPATIENT
Start: 2017-07-26 | End: 2017-07-28 | Stop reason: HOSPADM

## 2017-07-26 RX ORDER — SODIUM CHLORIDE 9 MG/ML
INJECTION, SOLUTION INTRAVENOUS CONTINUOUS
Status: DISCONTINUED | OUTPATIENT
Start: 2017-07-26 | End: 2017-07-26

## 2017-07-26 RX ORDER — OXYCODONE HYDROCHLORIDE 5 MG/1
5 TABLET ORAL EVERY 4 HOURS PRN
Status: DISCONTINUED | OUTPATIENT
Start: 2017-07-26 | End: 2017-07-27

## 2017-07-26 RX ORDER — SODIUM CHLORIDE 0.9 % (FLUSH) 0.9 %
3 SYRINGE (ML) INJECTION
Status: DISCONTINUED | OUTPATIENT
Start: 2017-07-26 | End: 2017-07-26 | Stop reason: HOSPADM

## 2017-07-26 RX ORDER — HYDROMORPHONE HYDROCHLORIDE 1 MG/ML
0.5 INJECTION, SOLUTION INTRAMUSCULAR; INTRAVENOUS; SUBCUTANEOUS EVERY 4 HOURS PRN
Status: DISCONTINUED | OUTPATIENT
Start: 2017-07-26 | End: 2017-07-27

## 2017-07-26 RX ORDER — LORAZEPAM 2 MG/ML
0.5 INJECTION INTRAMUSCULAR
Status: DISCONTINUED | OUTPATIENT
Start: 2017-07-26 | End: 2017-07-28 | Stop reason: HOSPADM

## 2017-07-26 RX ORDER — LIDOCAINE HYDROCHLORIDE 10 MG/ML
1 INJECTION, SOLUTION EPIDURAL; INFILTRATION; INTRACAUDAL; PERINEURAL ONCE
Status: COMPLETED | OUTPATIENT
Start: 2017-07-26 | End: 2017-07-26

## 2017-07-26 RX ORDER — SODIUM CHLORIDE 0.9 % (FLUSH) 0.9 %
3 SYRINGE (ML) INJECTION EVERY 8 HOURS
Status: DISCONTINUED | OUTPATIENT
Start: 2017-07-26 | End: 2017-07-28 | Stop reason: HOSPADM

## 2017-07-26 RX ORDER — FENTANYL CITRATE 50 UG/ML
INJECTION, SOLUTION INTRAMUSCULAR; INTRAVENOUS
Status: DISCONTINUED | OUTPATIENT
Start: 2017-07-26 | End: 2017-07-26

## 2017-07-26 RX ORDER — MIDAZOLAM HYDROCHLORIDE 1 MG/ML
INJECTION, SOLUTION INTRAMUSCULAR; INTRAVENOUS
Status: DISCONTINUED | OUTPATIENT
Start: 2017-07-26 | End: 2017-07-26

## 2017-07-26 RX ORDER — HYDROMORPHONE HYDROCHLORIDE 1 MG/ML
INJECTION, SOLUTION INTRAMUSCULAR; INTRAVENOUS; SUBCUTANEOUS
Status: COMPLETED
Start: 2017-07-26 | End: 2017-07-26

## 2017-07-26 RX ORDER — DEXAMETHASONE SODIUM PHOSPHATE 4 MG/ML
INJECTION, SOLUTION INTRA-ARTICULAR; INTRALESIONAL; INTRAMUSCULAR; INTRAVENOUS; SOFT TISSUE
Status: DISCONTINUED | OUTPATIENT
Start: 2017-07-26 | End: 2017-07-26

## 2017-07-26 RX ORDER — METOCLOPRAMIDE HYDROCHLORIDE 5 MG/ML
10 INJECTION INTRAMUSCULAR; INTRAVENOUS EVERY 10 MIN PRN
Status: DISCONTINUED | OUTPATIENT
Start: 2017-07-26 | End: 2017-07-26 | Stop reason: HOSPADM

## 2017-07-26 RX ORDER — HYDROMORPHONE HYDROCHLORIDE 1 MG/ML
0.5 INJECTION, SOLUTION INTRAMUSCULAR; INTRAVENOUS; SUBCUTANEOUS ONCE
Status: COMPLETED | OUTPATIENT
Start: 2017-07-26 | End: 2017-07-26

## 2017-07-26 RX ORDER — ONDANSETRON 2 MG/ML
INJECTION INTRAMUSCULAR; INTRAVENOUS
Status: DISCONTINUED | OUTPATIENT
Start: 2017-07-26 | End: 2017-07-26

## 2017-07-26 RX ORDER — OXYCODONE HYDROCHLORIDE 5 MG/1
TABLET ORAL
Status: COMPLETED
Start: 2017-07-26 | End: 2017-07-26

## 2017-07-26 RX ORDER — ACETAMINOPHEN 10 MG/ML
1000 INJECTION, SOLUTION INTRAVENOUS EVERY 8 HOURS
Status: DISPENSED | OUTPATIENT
Start: 2017-07-26 | End: 2017-07-27

## 2017-07-26 RX ORDER — GLYCOPYRROLATE 0.2 MG/ML
INJECTION INTRAMUSCULAR; INTRAVENOUS
Status: DISCONTINUED | OUTPATIENT
Start: 2017-07-26 | End: 2017-07-26

## 2017-07-26 RX ORDER — HYDROMORPHONE HYDROCHLORIDE 1 MG/ML
0.2 INJECTION, SOLUTION INTRAMUSCULAR; INTRAVENOUS; SUBCUTANEOUS
Status: DISCONTINUED | OUTPATIENT
Start: 2017-07-26 | End: 2017-07-26

## 2017-07-26 RX ORDER — BUPIVACAINE HYDROCHLORIDE 2.5 MG/ML
INJECTION, SOLUTION EPIDURAL; INFILTRATION; INTRACAUDAL
Status: DISCONTINUED | OUTPATIENT
Start: 2017-07-26 | End: 2017-07-26 | Stop reason: HOSPADM

## 2017-07-26 RX ORDER — LIDOCAINE HCL/PF 100 MG/5ML
SYRINGE (ML) INTRAVENOUS
Status: DISCONTINUED | OUTPATIENT
Start: 2017-07-26 | End: 2017-07-26

## 2017-07-26 RX ORDER — HYDROMORPHONE HYDROCHLORIDE 1 MG/ML
0.5 INJECTION, SOLUTION INTRAMUSCULAR; INTRAVENOUS; SUBCUTANEOUS EVERY 4 HOURS PRN
Status: DISCONTINUED | OUTPATIENT
Start: 2017-07-26 | End: 2017-07-26

## 2017-07-26 RX ORDER — ACETAMINOPHEN 10 MG/ML
1000 INJECTION, SOLUTION INTRAVENOUS
Status: COMPLETED | OUTPATIENT
Start: 2017-07-26 | End: 2017-07-26

## 2017-07-26 RX ORDER — PROPOFOL 10 MG/ML
INJECTION, EMULSION INTRAVENOUS
Status: DISCONTINUED | OUTPATIENT
Start: 2017-07-26 | End: 2017-07-26

## 2017-07-26 RX ORDER — DIPHENHYDRAMINE HYDROCHLORIDE 50 MG/ML
12.5 INJECTION INTRAMUSCULAR; INTRAVENOUS EVERY 6 HOURS PRN
Status: DISCONTINUED | OUTPATIENT
Start: 2017-07-26 | End: 2017-07-28 | Stop reason: HOSPADM

## 2017-07-26 RX ORDER — SERTRALINE HYDROCHLORIDE 100 MG/1
100 TABLET, FILM COATED ORAL EVERY MORNING
Status: DISCONTINUED | OUTPATIENT
Start: 2017-07-27 | End: 2017-07-28 | Stop reason: HOSPADM

## 2017-07-26 RX ORDER — HYDROMORPHONE HYDROCHLORIDE 1 MG/ML
0.2 INJECTION, SOLUTION INTRAMUSCULAR; INTRAVENOUS; SUBCUTANEOUS EVERY 5 MIN PRN
Status: DISCONTINUED | OUTPATIENT
Start: 2017-07-26 | End: 2017-07-26 | Stop reason: HOSPADM

## 2017-07-26 RX ADMIN — OXYCODONE HYDROCHLORIDE 5 MG: 5 TABLET ORAL at 11:07

## 2017-07-26 RX ADMIN — EPHEDRINE SULFATE 5 MG: 50 INJECTION, SOLUTION INTRAMUSCULAR; INTRAVENOUS; SUBCUTANEOUS at 10:07

## 2017-07-26 RX ADMIN — HYDROMORPHONE HYDROCHLORIDE 0.2 MG: 1 INJECTION, SOLUTION INTRAMUSCULAR; INTRAVENOUS; SUBCUTANEOUS at 11:07

## 2017-07-26 RX ADMIN — DEXAMETHASONE SODIUM PHOSPHATE 4 MG: 4 INJECTION, SOLUTION INTRAMUSCULAR; INTRAVENOUS at 08:07

## 2017-07-26 RX ADMIN — HYDROMORPHONE HYDROCHLORIDE 0.2 MG: 1 INJECTION, SOLUTION INTRAMUSCULAR; INTRAVENOUS; SUBCUTANEOUS at 12:07

## 2017-07-26 RX ADMIN — IBUPROFEN 800 MG: 800 INJECTION INTRAVENOUS at 09:07

## 2017-07-26 RX ADMIN — BUPIVACAINE 20 ML: 13.3 INJECTION, SUSPENSION, LIPOSOMAL INFILTRATION at 10:07

## 2017-07-26 RX ADMIN — GLYCOPYRROLATE 0.2 MG: 0.2 INJECTION, SOLUTION INTRAMUSCULAR; INTRAVENOUS at 08:07

## 2017-07-26 RX ADMIN — SODIUM CHLORIDE, SODIUM GLUCONATE, SODIUM ACETATE, POTASSIUM CHLORIDE, MAGNESIUM CHLORIDE, SODIUM PHOSPHATE, DIBASIC, AND POTASSIUM PHOSPHATE: .53; .5; .37; .037; .03; .012; .00082 INJECTION, SOLUTION INTRAVENOUS at 08:07

## 2017-07-26 RX ADMIN — PROPOFOL 160 MG: 10 INJECTION, EMULSION INTRAVENOUS at 08:07

## 2017-07-26 RX ADMIN — EPHEDRINE SULFATE 5 MG: 50 INJECTION, SOLUTION INTRAMUSCULAR; INTRAVENOUS; SUBCUTANEOUS at 08:07

## 2017-07-26 RX ADMIN — SODIUM CHLORIDE: 0.9 INJECTION, SOLUTION INTRAVENOUS at 06:07

## 2017-07-26 RX ADMIN — GLYCOPYRROLATE 0.4 MG: 0.2 INJECTION, SOLUTION INTRAMUSCULAR; INTRAVENOUS at 10:07

## 2017-07-26 RX ADMIN — HYDROMORPHONE HYDROCHLORIDE 0.5 MG: 1 INJECTION, SOLUTION INTRAMUSCULAR; INTRAVENOUS; SUBCUTANEOUS at 06:07

## 2017-07-26 RX ADMIN — ACETAMINOPHEN 1000 MG: 10 INJECTION, SOLUTION INTRAVENOUS at 01:07

## 2017-07-26 RX ADMIN — ROCURONIUM BROMIDE 10 MG: 10 INJECTION, SOLUTION INTRAVENOUS at 09:07

## 2017-07-26 RX ADMIN — LORAZEPAM 0.5 MG: 2 INJECTION INTRAMUSCULAR; INTRAVENOUS at 12:07

## 2017-07-26 RX ADMIN — ACETAMINOPHEN 1000 MG: 10 INJECTION, SOLUTION INTRAVENOUS at 06:07

## 2017-07-26 RX ADMIN — FENTANYL CITRATE 150 MCG: 50 INJECTION, SOLUTION INTRAMUSCULAR; INTRAVENOUS at 08:07

## 2017-07-26 RX ADMIN — ONDANSETRON 4 MG: 2 INJECTION INTRAMUSCULAR; INTRAVENOUS at 10:07

## 2017-07-26 RX ADMIN — IBUPROFEN 800 MG: 800 INJECTION INTRAVENOUS at 01:07

## 2017-07-26 RX ADMIN — LORAZEPAM 0.5 MG: 2 INJECTION INTRAMUSCULAR at 12:07

## 2017-07-26 RX ADMIN — HYDROMORPHONE HYDROCHLORIDE 0.5 MG: 1 INJECTION, SOLUTION INTRAMUSCULAR; INTRAVENOUS; SUBCUTANEOUS at 09:07

## 2017-07-26 RX ADMIN — ACETAMINOPHEN 1000 MG: 10 INJECTION, SOLUTION INTRAVENOUS at 09:07

## 2017-07-26 RX ADMIN — METRONIDAZOLE 500 MG: 500 SOLUTION INTRAVENOUS at 08:07

## 2017-07-26 RX ADMIN — MIDAZOLAM HYDROCHLORIDE 2 MG: 1 INJECTION, SOLUTION INTRAMUSCULAR; INTRAVENOUS at 07:07

## 2017-07-26 RX ADMIN — ROCURONIUM BROMIDE 40 MG: 10 INJECTION, SOLUTION INTRAVENOUS at 08:07

## 2017-07-26 RX ADMIN — BUPIVACAINE HYDROCHLORIDE 30 ML: 2.5 INJECTION, SOLUTION EPIDURAL; INFILTRATION; INTRACAUDAL; PERINEURAL at 10:07

## 2017-07-26 RX ADMIN — FENTANYL CITRATE 25 MCG: 50 INJECTION, SOLUTION INTRAMUSCULAR; INTRAVENOUS at 10:07

## 2017-07-26 RX ADMIN — NEOSTIGMINE METHYLSULFATE 3 MG: 1 INJECTION INTRAVENOUS at 10:07

## 2017-07-26 RX ADMIN — SODIUM CHLORIDE, PRESERVATIVE FREE 3 ML: 5 INJECTION INTRAVENOUS at 09:07

## 2017-07-26 RX ADMIN — SODIUM CHLORIDE: 0.9 INJECTION, SOLUTION INTRAVENOUS at 11:07

## 2017-07-26 RX ADMIN — SODIUM CHLORIDE, PRESERVATIVE FREE 3 ML: 5 INJECTION INTRAVENOUS at 02:07

## 2017-07-26 RX ADMIN — LIDOCAINE HYDROCHLORIDE 20 MG: 10 INJECTION, SOLUTION EPIDURAL; INFILTRATION; INTRACAUDAL; PERINEURAL at 06:07

## 2017-07-26 RX ADMIN — HYDROMORPHONE HYDROCHLORIDE 0.5 MG: 1 INJECTION, SOLUTION INTRAMUSCULAR; INTRAVENOUS; SUBCUTANEOUS at 04:07

## 2017-07-26 RX ADMIN — FENTANYL CITRATE 50 MCG: 50 INJECTION, SOLUTION INTRAMUSCULAR; INTRAVENOUS at 10:07

## 2017-07-26 RX ADMIN — IBUPROFEN 400 MG: 100 INJECTION INTRAVENOUS at 07:07

## 2017-07-26 RX ADMIN — LIDOCAINE HYDROCHLORIDE 100 MG: 20 INJECTION, SOLUTION INTRAVENOUS at 08:07

## 2017-07-26 RX ADMIN — CEFTRIAXONE 2 G: 1 INJECTION, SOLUTION INTRAVENOUS at 08:07

## 2017-07-26 NOTE — NURSING TRANSFER
Nursing Transfer Note      7/26/2017     Transfer To: 632 a from PACU    Transfer via stretcher    Transfer with IV pump    Transported by PCT    Medicines sent: none    Chart send with patient: Yes    Notified: niece    Patient reassessed at: 7/26/17     Upon arrival to floor:

## 2017-07-26 NOTE — H&P (VIEW-ONLY)
CRS Office Visit     SUBJECTIVE:      Chief Complaint: rectal prolapse     History of Present Illness:  Patient is a 57 y.o. female presents with a long history of rectal prolapse and constipation, treated with miralax daily. She presents today to schedule surgical treatment of her rectal prolapse. She notes that she has had less issues since using miralax more regularly and refusing to strain during bowel movements. However, she notes that she had one episode of fecal incontinence at work in the past several weeks, which has never happened before. She otherwise denies accidents or episodes of fecal incontinence.  The patient recently completed a colonoscopy that was normal. She noted that she prolapsed during the preparation process but her prolapse reduced spontaneously. She reports pain when prolapse occurs and states it typically spontaneously reduces on own. She otherwise states that she is in her usual health. She denies f/c/n/v.     Prior abdominal surgeries: Epigastric hernia repair, partial hysterectomy      Review of patient's allergies indicates:  No Known Allergies          Past Medical History:   Diagnosis Date    Arthritis      Depression      Encounter for blood transfusion       as a child    Neck pain      Rectal prolapse      S/P epidural steroid injection 11/2016            Past Surgical History:   Procedure Laterality Date    BREAST SURGERY         augmentation    HERNIA REPAIR         umbilical    HYSTERECTOMY        TONSILLECTOMY          No family history on file.  Social History   Substance Use Topics    Smoking status: Former Smoker       Packs/day: 1.00       Types: Cigarettes       Start date: 5/12/2014    Smokeless tobacco: Never Used    Alcohol use Yes          Comment: rarely         Review of Systems:  Constitutional: no fever or chills  Eyes: no visual changes  ENT: no nasal congestion or sore throat  Respiratory: no cough or shortness of breath  Cardiovascular: no chest  pain or palpitations  Gastrointestinal: no nausea or vomiting, tolerating diet  Genitourinary: no hematuria or dysuria     OBJECTIVE:      Vital Signs (Most Recent)  BP: (!) 159/93 (05/22/17 0955)     Physical Exam:  General: White female in NAD sitting in chair in clinic  Neuro: aaox4 maex4 perrl  Respiratory: resps even unlabored  Cardiac: cap refill <2 sec  Abdomen: Normal, benign.  Extremities: Warm dry and intact  Anorectal: Full thickness rectal prolapse appreciated with straining on commode      ASSESSMENT/PLAN:         Rectal prolapse     Given her her long history of constipation and her persistent rectal prolapse the patient was offered resection rectopexy. After discussion of the p/r/b/i/a the patient agreed to proceed.    Preop teaching done to include:          Details of planned surgery reviewed:  · Review of procedure  · Expected LOS.    · Preop process- application of WENDIE hose and SCD's, IV and IVF  · Different methods of post op pain control (IV and oral)    · Use of and importance of IS and other prophylaxis  · Expected early ambulation  · Slow advancement of diet      Goals that need to be met before discharge:  · No fever  · Functional status at baseline    · Tolerating low fiber diet    · Positive bowel function  · Adequate pain control with oral meds  · Vital signs and labs stable      Home instructions begun during this preop visit:    · May shower (no tub bath),    · No heavy lifting, nothing greater then 5 pounds,  · Small frequent meals, low residue,    · Ostomy care if needed,    · Call for fever above 101, nausea/vomiting, no bm and any increase in pain       All questions answered to pt and family satisfaction.    Admission paperwork was accomplished including operative consent and consent for blood and blood product administration.      The procedure was explained to the patient including indications, risks and alternatives. The patient verbalized understanding and has given informed  consent.     Gerardo Gilbert MD  Colorectal Surgery Fellow  [phone]      I have interviewed and examined the patient, reviewed the notes and assessments, and/or personally supervised the procedure(s) performed by Dr. Gilbert, and I concur with her/his documentation of Marilee Townsend.  See below addendum for my evaluation and additional findings.    Recent colonoscopy normal.  She is ready to proceed with surgery.  Discussed perineal repair vs rectoepxy - she opts for the latter  Given her issues with chronic constipation and potential for worsening post-op, I recommended concomitant sigmoid resction, which she agrees to.     I have discussed the procedure at length with Marilee Townsend.  We discussed the rationale, risks, benefits, and alternatives in depth.  We discussed the expected outcomes and potential complications including but not limited to bleeding, infection, anastomotic leak, recurrence, prolonged pain, need for further procedures, altered continence and incisional hernia.  She verbalized her understanding of the procedure and wishes to proceed.  Written consent was obtained.    Scheduled for 7/26/17.    Bill Lopez MD, FACS, FASCRS

## 2017-07-26 NOTE — INTERVAL H&P NOTE
The patient has been examined and the H&P has been reviewed:    I concur with the findings and no changes have occurred since H&P was written.    Anesthesia/Surgery risks, benefits and alternative options discussed and understood by patient/family.          Active Hospital Problems    Diagnosis  POA    Rectal prolapse [K62.3]  Yes      Resolved Hospital Problems    Diagnosis Date Resolved POA   No resolved problems to display.

## 2017-07-26 NOTE — BRIEF OP NOTE
[facility]  Brief Operative Note    SUMMARY     Surgery Date: 7/26/2017     Surgeon(s) and Role:     * Phyllis Saavedra MD - Resident - Assisting     * Bill Lopez MD - Primary        Pre-op Diagnosis:  Rectal prolapse [K62.3]    Post-op Diagnosis:  Post-Op Diagnosis Codes:     * Rectal prolapse [K62.3]    Procedure(s) (LRB):  RECTOPEXY ABDOMINAL APPROACH WITH SIGMOID COLON RESECTION (N/A)    Anesthesia: General    Description of Procedure: Open Sigmoidectomy, Stapled colorectal anastomosis and rectopexy.    Description of the findings of the procedure: Redundant sigmoid colon.    Estimated Blood Loss: * No values recorded between 7/26/2017  8:52 AM and 7/26/2017 10:59 AM *         Specimens:   Specimen (12h ago through future)    Start     Ordered    07/26/17 1013  Specimen to Pathology - Surgery  Once     Comments:  Specimen to Pathology: 1)  Sigmoid colon - permanent , no preservatives , to refrigerator2) distal and proximal donuts -permanent, formalin,  to refrigerator      07/26/17 1031

## 2017-07-26 NOTE — TRANSFER OF CARE
"Anesthesia Transfer of Care Note    Patient: Marilee Townsend    Procedure(s) Performed: Procedure(s) (LRB):  RECTOPEXY ABDOMINAL APPROACH WITH SIGMOID COLON RESECTION (N/A)    Patient location: PACU    Anesthesia Type: general    Transport from OR: Transported from OR on 6-10 L/min O2 by face mask with adequate spontaneous ventilation    Post pain: adequate analgesia    Post assessment: no apparent anesthetic complications and tolerated procedure well    Post vital signs: stable    Level of consciousness: awake, alert and oriented    Nausea/Vomiting: no nausea/vomiting    Complications: none    Transfer of care protocol was followed      Last vitals:   Visit Vitals  /64 (BP Location: Left arm, Patient Position: Lying, BP Method: Automatic)   Pulse 77   Temp 36.6 °C (97.8 °F) (Oral)   Resp 18   Ht 5' 2" (1.575 m)   Wt 47.6 kg (105 lb)   SpO2 100%   Breastfeeding? No   BMI 19.20 kg/m²     "

## 2017-07-27 LAB
ANION GAP SERPL CALC-SCNC: 8 MMOL/L
BASOPHILS # BLD AUTO: 0.01 K/UL
BASOPHILS NFR BLD: 0.1 %
BUN SERPL-MCNC: 7 MG/DL
CALCIUM SERPL-MCNC: 8.1 MG/DL
CHLORIDE SERPL-SCNC: 109 MMOL/L
CO2 SERPL-SCNC: 21 MMOL/L
CREAT SERPL-MCNC: 0.8 MG/DL
DIFFERENTIAL METHOD: ABNORMAL
EOSINOPHIL # BLD AUTO: 0 K/UL
EOSINOPHIL NFR BLD: 0.4 %
ERYTHROCYTE [DISTWIDTH] IN BLOOD BY AUTOMATED COUNT: 13.4 %
EST. GFR  (AFRICAN AMERICAN): >60 ML/MIN/1.73 M^2
EST. GFR  (NON AFRICAN AMERICAN): >60 ML/MIN/1.73 M^2
GLUCOSE SERPL-MCNC: 118 MG/DL
HCT VFR BLD AUTO: 33.4 %
HGB BLD-MCNC: 11.3 G/DL
LYMPHOCYTES # BLD AUTO: 1.9 K/UL
LYMPHOCYTES NFR BLD: 27.8 %
MAGNESIUM SERPL-MCNC: 1.8 MG/DL
MCH RBC QN AUTO: 30.5 PG
MCHC RBC AUTO-ENTMCNC: 33.8 G/DL
MCV RBC AUTO: 90 FL
MONOCYTES # BLD AUTO: 0.5 K/UL
MONOCYTES NFR BLD: 7.2 %
NEUTROPHILS # BLD AUTO: 4.3 K/UL
NEUTROPHILS NFR BLD: 64.4 %
PHOSPHATE SERPL-MCNC: 2.7 MG/DL
PLATELET # BLD AUTO: 249 K/UL
PMV BLD AUTO: 8.9 FL
POTASSIUM SERPL-SCNC: 3.4 MMOL/L
RBC # BLD AUTO: 3.7 M/UL
SODIUM SERPL-SCNC: 138 MMOL/L
WBC # BLD AUTO: 6.69 K/UL

## 2017-07-27 PROCEDURE — 20600001 HC STEP DOWN PRIVATE ROOM

## 2017-07-27 PROCEDURE — 80048 BASIC METABOLIC PNL TOTAL CA: CPT

## 2017-07-27 PROCEDURE — 83735 ASSAY OF MAGNESIUM: CPT

## 2017-07-27 PROCEDURE — 63600175 PHARM REV CODE 636 W HCPCS: Performed by: SURGERY

## 2017-07-27 PROCEDURE — 25000003 PHARM REV CODE 250: Performed by: STUDENT IN AN ORGANIZED HEALTH CARE EDUCATION/TRAINING PROGRAM

## 2017-07-27 PROCEDURE — 85025 COMPLETE CBC W/AUTO DIFF WBC: CPT

## 2017-07-27 PROCEDURE — 25000003 PHARM REV CODE 250: Performed by: SURGERY

## 2017-07-27 PROCEDURE — 36415 COLL VENOUS BLD VENIPUNCTURE: CPT

## 2017-07-27 PROCEDURE — A4216 STERILE WATER/SALINE, 10 ML: HCPCS | Performed by: SURGERY

## 2017-07-27 PROCEDURE — 84100 ASSAY OF PHOSPHORUS: CPT

## 2017-07-27 RX ORDER — BUPROPION HYDROCHLORIDE 150 MG/1
300 TABLET ORAL DAILY
Status: DISCONTINUED | OUTPATIENT
Start: 2017-07-27 | End: 2017-07-28 | Stop reason: HOSPADM

## 2017-07-27 RX ORDER — OXYCODONE AND ACETAMINOPHEN 5; 325 MG/1; MG/1
1 TABLET ORAL EVERY 4 HOURS PRN
Status: DISCONTINUED | OUTPATIENT
Start: 2017-07-27 | End: 2017-07-28 | Stop reason: HOSPADM

## 2017-07-27 RX ORDER — SODIUM,POTASSIUM PHOSPHATES 280-250MG
2 POWDER IN PACKET (EA) ORAL ONCE
Status: COMPLETED | OUTPATIENT
Start: 2017-07-27 | End: 2017-07-27

## 2017-07-27 RX ORDER — OXYCODONE AND ACETAMINOPHEN 5; 325 MG/1; MG/1
2 TABLET ORAL EVERY 4 HOURS PRN
Status: DISCONTINUED | OUTPATIENT
Start: 2017-07-27 | End: 2017-07-28 | Stop reason: HOSPADM

## 2017-07-27 RX ORDER — HYDROMORPHONE HYDROCHLORIDE 1 MG/ML
0.5 INJECTION, SOLUTION INTRAMUSCULAR; INTRAVENOUS; SUBCUTANEOUS EVERY 4 HOURS PRN
Status: DISCONTINUED | OUTPATIENT
Start: 2017-07-27 | End: 2017-07-28 | Stop reason: HOSPADM

## 2017-07-27 RX ADMIN — BUPROPION HYDROCHLORIDE 300 MG: 150 TABLET, FILM COATED, EXTENDED RELEASE ORAL at 08:07

## 2017-07-27 RX ADMIN — OXYCODONE HYDROCHLORIDE AND ACETAMINOPHEN 2 TABLET: 5; 325 TABLET ORAL at 11:07

## 2017-07-27 RX ADMIN — HYDROMORPHONE HYDROCHLORIDE 0.5 MG: 1 INJECTION, SOLUTION INTRAMUSCULAR; INTRAVENOUS; SUBCUTANEOUS at 01:07

## 2017-07-27 RX ADMIN — OXYCODONE HYDROCHLORIDE AND ACETAMINOPHEN 2 TABLET: 5; 325 TABLET ORAL at 08:07

## 2017-07-27 RX ADMIN — HYDROMORPHONE HYDROCHLORIDE 0.5 MG: 1 INJECTION, SOLUTION INTRAMUSCULAR; INTRAVENOUS; SUBCUTANEOUS at 09:07

## 2017-07-27 RX ADMIN — SERTRALINE HYDROCHLORIDE 100 MG: 100 TABLET, FILM COATED ORAL at 06:07

## 2017-07-27 RX ADMIN — POTASSIUM & SODIUM PHOSPHATES POWDER PACK 280-160-250 MG 2 PACKET: 280-160-250 PACK at 01:07

## 2017-07-27 RX ADMIN — HYDROMORPHONE HYDROCHLORIDE 0.5 MG: 1 INJECTION, SOLUTION INTRAMUSCULAR; INTRAVENOUS; SUBCUTANEOUS at 06:07

## 2017-07-27 RX ADMIN — IBUPROFEN 800 MG: 800 INJECTION INTRAVENOUS at 01:07

## 2017-07-27 RX ADMIN — ENOXAPARIN SODIUM 30 MG: 100 INJECTION SUBCUTANEOUS at 08:07

## 2017-07-27 RX ADMIN — SODIUM CHLORIDE, PRESERVATIVE FREE 3 ML: 5 INJECTION INTRAVENOUS at 02:07

## 2017-07-27 RX ADMIN — HYDROMORPHONE HYDROCHLORIDE 0.5 MG: 1 INJECTION, SOLUTION INTRAMUSCULAR; INTRAVENOUS; SUBCUTANEOUS at 05:07

## 2017-07-27 RX ADMIN — IBUPROFEN 800 MG: 800 INJECTION INTRAVENOUS at 05:07

## 2017-07-27 RX ADMIN — SODIUM CHLORIDE, PRESERVATIVE FREE 3 ML: 5 INJECTION INTRAVENOUS at 06:07

## 2017-07-27 RX ADMIN — HYDROMORPHONE HYDROCHLORIDE 0.5 MG: 1 INJECTION, SOLUTION INTRAMUSCULAR; INTRAVENOUS; SUBCUTANEOUS at 10:07

## 2017-07-27 RX ADMIN — SODIUM CHLORIDE, PRESERVATIVE FREE 3 ML: 5 INJECTION INTRAVENOUS at 10:07

## 2017-07-27 RX ADMIN — OXYCODONE HYDROCHLORIDE 5 MG: 5 TABLET ORAL at 08:07

## 2017-07-27 RX ADMIN — IBUPROFEN 800 MG: 800 INJECTION INTRAVENOUS at 10:07

## 2017-07-27 RX ADMIN — OXYCODONE HYDROCHLORIDE AND ACETAMINOPHEN 2 TABLET: 5; 325 TABLET ORAL at 04:07

## 2017-07-27 NOTE — SUBJECTIVE & OBJECTIVE
PTA Medications   Medication Sig    buPROPion (WELLBUTRIN XL) 150 MG TB24 tablet Take 150 mg by mouth 2 (two) times daily.     polyethylene glycol (GLYCOLAX) 17 gram/dose powder Take 17 g by mouth once daily.    sertraline (ZOLOFT) 100 MG tablet Take 100 mg by mouth every morning.     meloxicam (MOBIC) 15 MG tablet Take 15 mg by mouth once daily. Instructed to stop until after procedure 1-9-17.       Review of patient's allergies indicates:   Allergen Reactions    Morphine Nausea And Vomiting       Past Medical History:   Diagnosis Date    Arthritis     Depression     Encounter for blood transfusion     as a child    Fibromyalgia     Neck pain     Rectal prolapse      Past Surgical History:   Procedure Laterality Date    BREAST SURGERY Bilateral     augmentation-Implants    epidural steroid injection  01/09/2017    twice-11/2017  & 1/9/17 to neck    HERNIA REPAIR      umbilical    HYSTERECTOMY      TONSILLECTOMY      WRIST FRACTURE SURGERY Right     with hardware placed     Family History     None        Social History Main Topics    Smoking status: Former Smoker     Packs/day: 1.00     Years: 20.00     Types: Cigarettes     Start date: 5/12/2014    Smokeless tobacco: Never Used    Alcohol use Yes      Comment: rarely    Drug use: No    Sexual activity: Yes     Review of Systems   Respiratory: Negative for chest tightness and shortness of breath.    Cardiovascular: Negative for chest pain.   Gastrointestinal: Positive for constipation and rectal pain. Negative for blood in stool, diarrhea, nausea and vomiting.   Skin: Negative for color change and rash.   Neurological: Negative for dizziness.     Objective:     Vital Signs (Most Recent):  Temp: 98.2 °F (36.8 °C) (07/27/17 0318)  Pulse: 63 (07/27/17 0318)  Resp: 16 (07/27/17 0318)  BP: 113/65 (07/27/17 0318)  SpO2: 95 % (07/27/17 0318) Vital Signs (24h Range):  Temp:  [97.6 °F (36.4 °C)-98.8 °F (37.1 °C)] 98.2 °F (36.8 °C)  Pulse:  [58-86]  63  Resp:  [14-20] 16  SpO2:  [91 %-100 %] 95 %  BP: ()/(54-82) 113/65     Weight: 47.6 kg (105 lb)  Body mass index is 19.2 kg/m².    Physical Exam   Constitutional: She is oriented to person, place, and time. She appears well-developed.   HENT:   Head: Normocephalic and atraumatic.   Pulmonary/Chest: Effort normal.   Abdominal: Soft. There is tenderness. There is no rebound and no guarding.   Neurological: She is alert and oriented to person, place, and time.   Skin: Skin is warm and dry.       Significant Labs:    Recent Labs  Lab 07/27/17  0518   WBC 6.69   RBC 3.70*   HGB 11.3*   HCT 33.4*      MCV 90   MCH 30.5   MCHC 33.8     BMP  Lab Results   Component Value Date     07/27/2017    K 3.4 (L) 07/27/2017     07/27/2017    CO2 21 (L) 07/27/2017    BUN 7 07/27/2017    CREATININE 0.8 07/27/2017    CALCIUM 8.1 (L) 07/27/2017    ANIONGAP 8 07/27/2017    ESTGFRAFRICA >60.0 07/27/2017    EGFRNONAA >60.0 07/27/2017         Significant Diagnostics:  None

## 2017-07-27 NOTE — PLAN OF CARE
Problem: Patient Care Overview  Goal: Plan of Care Review  Outcome: Ongoing (interventions implemented as appropriate)  POC reviewed with patient and family. Pt AAOx4, VSS, Afebrile, SpO2> 92% on rm air. Pt complains of frequent pain, moderately controlled with PRN meds. Transverse abdominal incision with dermabond CDI. Fluids continued. Pt tolerating a clear liquid diet, no complaints of nausea/vomiting. Pt urinating adequately per hat/ toilet. Pt up with standby assist, remains free from falls and injuries, will continue to monitor.

## 2017-07-27 NOTE — PLAN OF CARE
Pt is postop day #1, AA&Ox4, resting in bed.    obtained assessment information from patient.  Educated pt on goals of the day, pt voiced understanding.  Anticipated discharge plan is for home with family support.  CM will continue to follow.    Pt's PCP is Simón Fields MD    Pharmacy [facility]   [facility]/pharmacy #[id number] - [place] - [address]  [address] [place] [address]  Phone: [phone] Fax: [phone]    Payor: [employer] / Plan: [employer] OPEN ACCESS [employer]US / Product Type: Commercial /         07/27/17 0913   Discharge Assessment   Assessment Type Discharge Planning Assessment   Confirmed/corrected address and phone number on facesheet? Yes   Assessment information obtained from? Patient;Medical Record   Expected Length of Stay (days) 3   Communicated expected length of stay with patient/caregiver yes   Prior to hospitilization cognitive status: Alert/Oriented   Prior to hospitalization functional status: Independent   Current cognitive status: Alert/Oriented   Current Functional Status: Needs Assistance   Arrived From home or self-care   Lives With alone   Able to Return to Prior Arrangements yes   Is patient able to care for self after discharge? Yes   How many people do you have in your home that can help with your care after discharge? 1   Who are your caregiver(s) and their phone number(s)? Brother, Rivera Christine,  [phone]   Patient's perception of discharge disposition home or selfcare  (Pt has family to stay with her for first few days at home)   Readmission Within The Last 30 Days no previous admission in last 30 days   Patient currently receives home health services? No   Does the patient currently use HME? No   Equipment Currently Used at Home none   Do you have any problems affording any of your prescribed medications? No   Is the patient taking medications as prescribed? yes   Do you have any financial concerns preventing you from receiving the healthcare you need?  No   Does the patient have transportation to healthcare appointments? Yes   On Dialysis? No   Does the patient receive services at the Coumadin Clinic? No   Discharge Plan A Home with family   Discharge Plan B Home with family;Home Health   Patient/Family In Agreement With Plan yes

## 2017-07-27 NOTE — SUBJECTIVE & OBJECTIVE
Subjective:     Interval History: No acute events overnight. Passing gas. Pain well controlled on PRN pain medications. Up and ambulating. Voiding. Tolerating clear liquid diet.     Post-Op Info:  Procedure(s) (LRB):  RECTOPEXY ABDOMINAL APPROACH WITH SIGMOID COLON RESECTION (N/A)   1 Day Post-Op      Medications:  Continuous Infusions:   sodium chloride 0.9% 75 mL/hr at 07/26/17 1123    sodium chloride 0.9%       Scheduled Meds:   acetaminophen  1,000 mg Intravenous Q8H    buPROPion  300 mg Oral Daily    enoxaparin  30 mg Subcutaneous Daily    ibuprofen  800 mg Intravenous Q8H    sertraline  100 mg Oral QAM    sodium chloride 0.9%  3 mL Intravenous Q8H     PRN Meds:   diphenhydrAMINE    HYDROmorphone    lorazepam    ondansetron    oxycodone        Objective:     Vital Signs (Most Recent):  Temp: 98.8 °F (37.1 °C) (07/27/17 0809)  Pulse: 64 (07/27/17 0809)  Resp: 18 (07/27/17 0809)  BP: 107/70 (07/27/17 0809)  SpO2: 97 % (07/27/17 0809) Vital Signs (24h Range):  Temp:  [97.6 °F (36.4 °C)-98.8 °F (37.1 °C)] 98.8 °F (37.1 °C)  Pulse:  [63-86] 64  Resp:  [14-20] 18  SpO2:  [91 %-100 %] 97 %  BP: ()/(54-77) 107/70     Intake/Output - Last 3 Shifts       07/25 0700 - 07/26 0659 07/26 0700 - 07/27 0659 07/27 0700 - 07/28 0659    P.O.  590     I.V. (mL/kg)  3212 (67.5)     IV Piggyback  350     Total Intake(mL/kg)  4152 (87.2)     Urine (mL/kg/hr)  1935 (1.7)     Stool  0 (0)     Total Output   1935      Net   +2217             Stool Occurrence  0 x           Physical Exam   Constitutional: She is oriented to person, place, and time. She appears well-developed.   Pulmonary/Chest: Effort normal.   Abdominal: Soft. She exhibits no distension. There is tenderness. There is no rebound and no guarding. No hernia.   Neurological: She is alert and oriented to person, place, and time.   Skin: Skin is warm and dry.       Significant Labs:  CBC (Last 3 Results):   Recent Labs  Lab 07/27/17  0518   WBC 6.69   RBC  3.70*   HGB 11.3*   HCT 33.4*      MCV 90   MCH 30.5   MCHC 33.8     CMP (Last 3 Results):   Recent Labs  Lab 07/27/17  0517   *   CALCIUM 8.1*      K 3.4*   CO2 21*      BUN 7   CREATININE 0.8       Significant Diagnostics:  None

## 2017-07-27 NOTE — PLAN OF CARE
No d/c needs identified at this time. Sw will continue to follow.      Mirela Schroeder, KESHA [id number]

## 2017-07-27 NOTE — ASSESSMENT & PLAN NOTE
POD#1 rectopexy with sigmoid colon resection  - advance diet to full liquids  - switch to PO pain meds  - check daily labs  - PT consult today for strengthening  - discontinue IV fluids- hep lock

## 2017-07-27 NOTE — HPI
Patient is a 57 y.o. female presents with a long history of rectal prolapse and constipation, treated with miralax daily. She presents today to schedule surgical treatment of her rectal prolapse. She notes that she has had less issues since using miralax more regularly and refusing to strain during bowel movements. However, she notes that she had one episode of fecal incontinence at work in the past several weeks, which has never happened before. She otherwise denies accidents or episodes of fecal incontinence.  The patient recently completed a colonoscopy that was normal. She noted that she prolapsed during the preparation process but her prolapse reduced spontaneously. She reports pain when prolapse occurs and states it typically spontaneously reduces on own. She otherwise states that she is in her usual health. She denies f/c/n/v.     Prior abdominal surgeries: Epigastric hernia repair, partial hysterectomy

## 2017-07-27 NOTE — H&P
[facility]  Colorectal Surgery  History & Physical    Patient Name: Marilee Townsend  MRN: [id number]  Admission Date: 7/26/2017  Attending Physician: Bill Lopez MD   Primary Care Provider: Simón Fields MD    Subjective:     Chief Complaint/Reason for Admission: Rectal prolapse    History of Present Illness:  Patient is a 57 y.o. female presents with a long history of rectal prolapse and constipation, treated with miralax daily. She presents today to schedule surgical treatment of her rectal prolapse. She notes that she has had less issues since using miralax more regularly and refusing to strain during bowel movements. However, she notes that she had one episode of fecal incontinence at work in the past several weeks, which has never happened before. She otherwise denies accidents or episodes of fecal incontinence.  The patient recently completed a colonoscopy that was normal. She noted that she prolapsed during the preparation process but her prolapse reduced spontaneously. She reports pain when prolapse occurs and states it typically spontaneously reduces on own. She otherwise states that she is in her usual health. She denies f/c/n/v.     Prior abdominal surgeries: Epigastric hernia repair, partial hysterectomy     PTA Medications   Medication Sig    buPROPion (WELLBUTRIN XL) 150 MG TB24 tablet Take 150 mg by mouth 2 (two) times daily.     polyethylene glycol (GLYCOLAX) 17 gram/dose powder Take 17 g by mouth once daily.    sertraline (ZOLOFT) 100 MG tablet Take 100 mg by mouth every morning.     meloxicam (MOBIC) 15 MG tablet Take 15 mg by mouth once daily. Instructed to stop until after procedure 1-9-17.       Review of patient's allergies indicates:   Allergen Reactions    Morphine Nausea And Vomiting       Past Medical History:   Diagnosis Date    Arthritis     Depression     Encounter for blood transfusion     as a child    Fibromyalgia     Neck pain     Rectal prolapse       Past Surgical History:   Procedure Laterality Date    BREAST SURGERY Bilateral     augmentation-Implants    epidural steroid injection  01/09/2017    twice-11/2017  & 1/9/17 to neck    HERNIA REPAIR      umbilical    HYSTERECTOMY      TONSILLECTOMY      WRIST FRACTURE SURGERY Right     with hardware placed     Family History     None        Social History Main Topics    Smoking status: Former Smoker     Packs/day: 1.00     Years: 20.00     Types: Cigarettes     Start date: 5/12/2014    Smokeless tobacco: Never Used    Alcohol use Yes      Comment: rarely    Drug use: No    Sexual activity: Yes     Review of Systems   Respiratory: Negative for chest tightness and shortness of breath.    Cardiovascular: Negative for chest pain.   Gastrointestinal: Positive for constipation and rectal pain. Negative for blood in stool, diarrhea, nausea and vomiting.   Skin: Negative for color change and rash.   Neurological: Negative for dizziness.     Objective:     Vital Signs (Most Recent):  Temp: 98.2 °F (36.8 °C) (07/27/17 0318)  Pulse: 63 (07/27/17 0318)  Resp: 16 (07/27/17 0318)  BP: 113/65 (07/27/17 0318)  SpO2: 95 % (07/27/17 0318) Vital Signs (24h Range):  Temp:  [97.6 °F (36.4 °C)-98.8 °F (37.1 °C)] 98.2 °F (36.8 °C)  Pulse:  [58-86] 63  Resp:  [14-20] 16  SpO2:  [91 %-100 %] 95 %  BP: ()/(54-82) 113/65     Weight: 47.6 kg (105 lb)  Body mass index is 19.2 kg/m².    Physical Exam   Constitutional: She is oriented to person, place, and time. She appears well-developed.   HENT:   Head: Normocephalic and atraumatic.   Pulmonary/Chest: Effort normal.   Abdominal: Soft. There is tenderness. There is no rebound and no guarding.   Neurological: She is alert and oriented to person, place, and time.   Skin: Skin is warm and dry.       Significant Labs:    Recent Labs  Lab 07/27/17  0518   WBC 6.69   RBC 3.70*   HGB 11.3*   HCT 33.4*      MCV 90   MCH 30.5   MCHC 33.8     BMP  Lab Results   Component Value  Date     07/27/2017    K 3.4 (L) 07/27/2017     07/27/2017    CO2 21 (L) 07/27/2017    BUN 7 07/27/2017    CREATININE 0.8 07/27/2017    CALCIUM 8.1 (L) 07/27/2017    ANIONGAP 8 07/27/2017    ESTGFRAFRICA >60.0 07/27/2017    EGFRNONAA >60.0 07/27/2017         Significant Diagnostics:  None    Assessment/Plan:     1. Rectal prolapse s/p rectopexy with sigmoid colon resection  - follow daily labs  - PRN pain meds  - clear liquid diet, advance as tolerated  - up and ambualte  - discharge home in 24-48 hours    Lata Boogie MD  PGY-1 Colorectal Surgery  [facility]

## 2017-07-27 NOTE — NURSING
Warner catheter removed intact; 175 ml clear yellow urine drained from bladder prior to removal. Pt tolerated well. Due to void at 0100. Rolling walker ordered to assist pt to bathroom. Pain tolerable at this time. WCTM pt.

## 2017-07-27 NOTE — PROGRESS NOTES
[facility]  Colorectal Surgery  Progress Note    Patient Name: Marilee Townsend  MRN: [id number]  Admission Date: 7/26/2017  Hospital Length of Stay: 1 days  Attending Physician: Bill Lopez MD    Subjective:     Interval History: No acute events overnight. Passing gas. Pain well controlled on PRN pain medications. Up and ambulating. Voiding. Tolerating clear liquid diet.     Post-Op Info:  Procedure(s) (LRB):  RECTOPEXY ABDOMINAL APPROACH WITH SIGMOID COLON RESECTION (N/A)   1 Day Post-Op      Medications:  Continuous Infusions:   sodium chloride 0.9% 75 mL/hr at 07/26/17 1123    sodium chloride 0.9%       Scheduled Meds:   acetaminophen  1,000 mg Intravenous Q8H    buPROPion  300 mg Oral Daily    enoxaparin  30 mg Subcutaneous Daily    ibuprofen  800 mg Intravenous Q8H    sertraline  100 mg Oral QAM    sodium chloride 0.9%  3 mL Intravenous Q8H     PRN Meds:   diphenhydrAMINE    HYDROmorphone    lorazepam    ondansetron    oxycodone        Objective:     Vital Signs (Most Recent):  Temp: 98.8 °F (37.1 °C) (07/27/17 0809)  Pulse: 64 (07/27/17 0809)  Resp: 18 (07/27/17 0809)  BP: 107/70 (07/27/17 0809)  SpO2: 97 % (07/27/17 0809) Vital Signs (24h Range):  Temp:  [97.6 °F (36.4 °C)-98.8 °F (37.1 °C)] 98.8 °F (37.1 °C)  Pulse:  [63-86] 64  Resp:  [14-20] 18  SpO2:  [91 %-100 %] 97 %  BP: ()/(54-77) 107/70     Intake/Output - Last 3 Shifts       07/25 0700 - 07/26 0659 07/26 0700 - 07/27 0659 07/27 0700 - 07/28 0659    P.O.  590     I.V. (mL/kg)  3212 (67.5)     IV Piggyback  350     Total Intake(mL/kg)  4152 (87.2)     Urine (mL/kg/hr)  1935 (1.7)     Stool  0 (0)     Total Output   1935      Net   +2217             Stool Occurrence  0 x           Physical Exam   Constitutional: She is oriented to person, place, and time. She appears well-developed.   Pulmonary/Chest: Effort normal.   Abdominal: Soft. She exhibits no distension. There is tenderness. There is no rebound and no  guarding. No hernia.   Neurological: She is alert and oriented to person, place, and time.   Skin: Skin is warm and dry.       Significant Labs:  CBC (Last 3 Results):   Recent Labs  Lab 07/27/17  0518   WBC 6.69   RBC 3.70*   HGB 11.3*   HCT 33.4*      MCV 90   MCH 30.5   MCHC 33.8     CMP (Last 3 Results):   Recent Labs  Lab 07/27/17  0517   *   CALCIUM 8.1*      K 3.4*   CO2 21*      BUN 7   CREATININE 0.8       Significant Diagnostics:  None    Assessment/Plan:     * Rectal prolapse    POD#1 rectopexy with sigmoid colon resection  - advance diet to low residue  - switch to PO pain meds  - check daily labs  - PT consult today for strengthening  - discontinue IV fluids- hep lock             Lata Boogie MD  PGY-1 Colorectal Surgery  [facility]

## 2017-07-27 NOTE — PLAN OF CARE
Problem: Patient Care Overview  Goal: Plan of Care Review  Outcome: Ongoing (interventions implemented as appropriate)  Plan of care reviewed; pt verbalized understanding. Pain controlled with percocet 2 tabs and IV dilaudid for breakthrough pain. Pt up ad mildred and walking in hallway independently. Abdominal incision remains intact without drainage. Pt tolerating low fiber diet without n/v. VSS. No falls or injury. WCTM pt.

## 2017-07-28 VITALS
OXYGEN SATURATION: 95 % | HEIGHT: 62 IN | WEIGHT: 105 LBS | DIASTOLIC BLOOD PRESSURE: 83 MMHG | HEART RATE: 67 BPM | RESPIRATION RATE: 18 BRPM | SYSTOLIC BLOOD PRESSURE: 139 MMHG | TEMPERATURE: 98 F | BODY MASS INDEX: 19.32 KG/M2

## 2017-07-28 LAB
ANION GAP SERPL CALC-SCNC: 7 MMOL/L
BASOPHILS # BLD AUTO: 0.02 K/UL
BASOPHILS NFR BLD: 0.4 %
BUN SERPL-MCNC: 7 MG/DL
CALCIUM SERPL-MCNC: 8.2 MG/DL
CHLORIDE SERPL-SCNC: 111 MMOL/L
CO2 SERPL-SCNC: 23 MMOL/L
CREAT SERPL-MCNC: 0.8 MG/DL
DIFFERENTIAL METHOD: ABNORMAL
EOSINOPHIL # BLD AUTO: 0.1 K/UL
EOSINOPHIL NFR BLD: 1.5 %
ERYTHROCYTE [DISTWIDTH] IN BLOOD BY AUTOMATED COUNT: 13.2 %
EST. GFR  (AFRICAN AMERICAN): >60 ML/MIN/1.73 M^2
EST. GFR  (NON AFRICAN AMERICAN): >60 ML/MIN/1.73 M^2
GLUCOSE SERPL-MCNC: 97 MG/DL
HCT VFR BLD AUTO: 32.4 %
HGB BLD-MCNC: 10.8 G/DL
LYMPHOCYTES # BLD AUTO: 2.2 K/UL
LYMPHOCYTES NFR BLD: 41.4 %
MAGNESIUM SERPL-MCNC: 1.7 MG/DL
MCH RBC QN AUTO: 30.1 PG
MCHC RBC AUTO-ENTMCNC: 33.3 G/DL
MCV RBC AUTO: 90 FL
MONOCYTES # BLD AUTO: 0.3 K/UL
MONOCYTES NFR BLD: 6.5 %
NEUTROPHILS # BLD AUTO: 2.6 K/UL
NEUTROPHILS NFR BLD: 50.2 %
PHOSPHATE SERPL-MCNC: 3.2 MG/DL
PLATELET # BLD AUTO: 227 K/UL
PMV BLD AUTO: 9 FL
POTASSIUM SERPL-SCNC: 3.6 MMOL/L
RBC # BLD AUTO: 3.59 M/UL
SODIUM SERPL-SCNC: 141 MMOL/L
WBC # BLD AUTO: 5.22 K/UL

## 2017-07-28 PROCEDURE — A4216 STERILE WATER/SALINE, 10 ML: HCPCS | Performed by: SURGERY

## 2017-07-28 PROCEDURE — 63600175 PHARM REV CODE 636 W HCPCS: Performed by: SURGERY

## 2017-07-28 PROCEDURE — 25000003 PHARM REV CODE 250: Performed by: SURGERY

## 2017-07-28 PROCEDURE — 85025 COMPLETE CBC W/AUTO DIFF WBC: CPT

## 2017-07-28 PROCEDURE — 83735 ASSAY OF MAGNESIUM: CPT

## 2017-07-28 PROCEDURE — 97161 PT EVAL LOW COMPLEX 20 MIN: CPT

## 2017-07-28 PROCEDURE — 36415 COLL VENOUS BLD VENIPUNCTURE: CPT

## 2017-07-28 PROCEDURE — 25000003 PHARM REV CODE 250: Performed by: STUDENT IN AN ORGANIZED HEALTH CARE EDUCATION/TRAINING PROGRAM

## 2017-07-28 PROCEDURE — 80048 BASIC METABOLIC PNL TOTAL CA: CPT

## 2017-07-28 PROCEDURE — 84100 ASSAY OF PHOSPHORUS: CPT

## 2017-07-28 RX ORDER — OXYCODONE AND ACETAMINOPHEN 5; 325 MG/1; MG/1
1 TABLET ORAL EVERY 4 HOURS PRN
Qty: 31 TABLET | Refills: 0 | Status: SHIPPED | OUTPATIENT
Start: 2017-07-28 | End: 2017-07-28

## 2017-07-28 RX ORDER — POTASSIUM CHLORIDE 20 MEQ/1
40 TABLET, EXTENDED RELEASE ORAL ONCE
Status: COMPLETED | OUTPATIENT
Start: 2017-07-28 | End: 2017-07-28

## 2017-07-28 RX ORDER — OXYCODONE AND ACETAMINOPHEN 5; 325 MG/1; MG/1
1 TABLET ORAL EVERY 4 HOURS PRN
Qty: 31 TABLET | Refills: 0 | Status: SHIPPED | OUTPATIENT
Start: 2017-07-28 | End: 2017-08-01 | Stop reason: SDUPTHER

## 2017-07-28 RX ORDER — IBUPROFEN 800 MG/1
800 TABLET ORAL EVERY 6 HOURS PRN
Qty: 60 TABLET | Refills: 0 | Status: SHIPPED | OUTPATIENT
Start: 2017-07-28 | End: 2023-03-07

## 2017-07-28 RX ADMIN — POTASSIUM CHLORIDE 40 MEQ: 1500 TABLET, EXTENDED RELEASE ORAL at 08:07

## 2017-07-28 RX ADMIN — SODIUM CHLORIDE, PRESERVATIVE FREE 3 ML: 5 INJECTION INTRAVENOUS at 06:07

## 2017-07-28 RX ADMIN — OXYCODONE HYDROCHLORIDE AND ACETAMINOPHEN 2 TABLET: 5; 325 TABLET ORAL at 12:07

## 2017-07-28 RX ADMIN — OXYCODONE HYDROCHLORIDE AND ACETAMINOPHEN 2 TABLET: 5; 325 TABLET ORAL at 03:07

## 2017-07-28 RX ADMIN — BUPROPION HYDROCHLORIDE 300 MG: 150 TABLET, FILM COATED, EXTENDED RELEASE ORAL at 08:07

## 2017-07-28 RX ADMIN — OXYCODONE HYDROCHLORIDE AND ACETAMINOPHEN 2 TABLET: 5; 325 TABLET ORAL at 08:07

## 2017-07-28 RX ADMIN — ENOXAPARIN SODIUM 30 MG: 100 INJECTION SUBCUTANEOUS at 08:07

## 2017-07-28 RX ADMIN — SERTRALINE HYDROCHLORIDE 100 MG: 100 TABLET, FILM COATED ORAL at 06:07

## 2017-07-28 RX ADMIN — IBUPROFEN 800 MG: 800 INJECTION INTRAVENOUS at 06:07

## 2017-07-28 RX ADMIN — HYDROMORPHONE HYDROCHLORIDE 0.5 MG: 1 INJECTION, SOLUTION INTRAMUSCULAR; INTRAVENOUS; SUBCUTANEOUS at 02:07

## 2017-07-28 NOTE — PROGRESS NOTES
[facility]  Colorectal Surgery  Progress Note    Patient Name: Marilee Townsend  MRN: [id number]  Admission Date: 7/26/2017  Hospital Length of Stay: 2 days  Attending Physician: Bill Lopez MD    Subjective:     Interval History: No acute events overnight. No nausea, vomiting. Tolerating low residue diet. Up and ambulating. Pain well controlled on PRN pain meds. Passing gas, 2 BM overnight. Voiding, output adequate at 2100mL.    Post-Op Info:  Procedure(s) (LRB):  RECTOPEXY ABDOMINAL APPROACH WITH SIGMOID COLON RESECTION (N/A)  COLECTOMY-SIGMOID   2 Days Post-Op      Medications:  Continuous Infusions:   Scheduled Meds:   buPROPion  300 mg Oral Daily    enoxaparin  30 mg Subcutaneous Daily    ibuprofen  800 mg Intravenous Q8H    sertraline  100 mg Oral QAM    sodium chloride 0.9%  3 mL Intravenous Q8H     PRN Meds:   diphenhydrAMINE    HYDROmorphone    lorazepam    ondansetron    oxycodone-acetaminophen    oxycodone-acetaminophen        Objective:     Vital Signs (Most Recent):  Temp: 98.2 °F (36.8 °C) (07/28/17 0500)  Pulse: 62 (07/28/17 0500)  Resp: 16 (07/28/17 0500)  BP: 107/78 (07/28/17 0500)  SpO2: (!) 94 % (07/28/17 0500) Vital Signs (24h Range):  Temp:  [98.1 °F (36.7 °C)-98.8 °F (37.1 °C)] 98.2 °F (36.8 °C)  Pulse:  [60-70] 62  Resp:  [16-19] 16  SpO2:  [94 %-97 %] 94 %  BP: (107-139)/(57-78) 107/78     Intake/Output - Last 3 Shifts       07/26 0700 - 07/27 0659 07/27 0700 - 07/28 0659    P.O. 590 1280    I.V. (mL/kg) 3212 (67.5)     IV Piggyback 350 1100    Total Intake(mL/kg) 4152 (87.2) 2380 (50)    Urine (mL/kg/hr) 1935 (1.7) 2375 (2.1)    Stool 0 (0) 0 (0)    Total Output 1935 2375    Net +2217 +5          Urine Occurrence  1 x    Stool Occurrence 0 x 2 x          Physical Exam   Constitutional: She is oriented to person, place, and time.   HENT:   Head: Normocephalic and atraumatic.   Pulmonary/Chest: Effort normal.   Abdominal: Soft. She exhibits no distension. There  is no tenderness. There is no rebound and no guarding.   Neurological: She is alert and oriented to person, place, and time.   Skin: Skin is warm and dry.       Significant Labs:  CBC (Last 3 Results):   Recent Labs  Lab 07/27/17  0518 07/28/17  0510   WBC 6.69 5.22   RBC 3.70* 3.59*   HGB 11.3* 10.8*   HCT 33.4* 32.4*    227   MCV 90 90   MCH 30.5 30.1   MCHC 33.8 33.3     CMP (Last 3 Results):   Recent Labs  Lab 07/27/17  0517 07/28/17  0510   * 97   CALCIUM 8.1* 8.2*    141   K 3.4* 3.6   CO2 21* 23    111*   BUN 7 7   CREATININE 0.8 0.8       Significant Diagnostics:  None    Assessment/Plan:     * Rectal prolapse    POD#2 rectopexy with sigmoid colon resection  - tolerating low residue diet, no nausea, vomiting  - pain well controlled on PO pain meds  - up and ambulating  - discharge to home today            Lata Boogie MD  PGY-1 Colorectal Surgery  [facility]

## 2017-07-28 NOTE — SUBJECTIVE & OBJECTIVE
Subjective:     Interval History: No acute events overnight. No nausea, vomiting. Tolerating low residue diet. Up and ambulating. Pain well controlled on PRN pain meds. Passing gas, 2 BM overnight. Voiding, output adequate at 2100mL.    Post-Op Info:  Procedure(s) (LRB):  RECTOPEXY ABDOMINAL APPROACH WITH SIGMOID COLON RESECTION (N/A)  COLECTOMY-SIGMOID   2 Days Post-Op      Medications:  Continuous Infusions:   Scheduled Meds:   buPROPion  300 mg Oral Daily    enoxaparin  30 mg Subcutaneous Daily    ibuprofen  800 mg Intravenous Q8H    sertraline  100 mg Oral QAM    sodium chloride 0.9%  3 mL Intravenous Q8H     PRN Meds:   diphenhydrAMINE    HYDROmorphone    lorazepam    ondansetron    oxycodone-acetaminophen    oxycodone-acetaminophen        Objective:     Vital Signs (Most Recent):  Temp: 98.2 °F (36.8 °C) (07/28/17 0500)  Pulse: 62 (07/28/17 0500)  Resp: 16 (07/28/17 0500)  BP: 107/78 (07/28/17 0500)  SpO2: (!) 94 % (07/28/17 0500) Vital Signs (24h Range):  Temp:  [98.1 °F (36.7 °C)-98.8 °F (37.1 °C)] 98.2 °F (36.8 °C)  Pulse:  [60-70] 62  Resp:  [16-19] 16  SpO2:  [94 %-97 %] 94 %  BP: (107-139)/(57-78) 107/78     Intake/Output - Last 3 Shifts       07/26 0700 - 07/27 0659 07/27 0700 - 07/28 0659    P.O. 590 1280    I.V. (mL/kg) 3212 (67.5)     IV Piggyback 350 1100    Total Intake(mL/kg) 4152 (87.2) 2380 (50)    Urine (mL/kg/hr) 1935 (1.7) 2375 (2.1)    Stool 0 (0) 0 (0)    Total Output 1935 2375    Net +2217 +5          Urine Occurrence  1 x    Stool Occurrence 0 x 2 x          Physical Exam   Constitutional: She is oriented to person, place, and time.   HENT:   Head: Normocephalic and atraumatic.   Pulmonary/Chest: Effort normal.   Abdominal: Soft. She exhibits no distension. There is no tenderness. There is no rebound and no guarding.   Neurological: She is alert and oriented to person, place, and time.   Skin: Skin is warm and dry.       Significant Labs:  CBC (Last 3 Results):   Recent  Labs  Lab 07/27/17  0518 07/28/17  0510   WBC 6.69 5.22   RBC 3.70* 3.59*   HGB 11.3* 10.8*   HCT 33.4* 32.4*    227   MCV 90 90   MCH 30.5 30.1   MCHC 33.8 33.3     CMP (Last 3 Results):   Recent Labs  Lab 07/27/17  0517 07/28/17  0510   * 97   CALCIUM 8.1* 8.2*    141   K 3.4* 3.6   CO2 21* 23    111*   BUN 7 7   CREATININE 0.8 0.8       Significant Diagnostics:  None

## 2017-07-28 NOTE — PT/OT/SLP EVAL
Physical Therapy  Evaluation/Discharge    Marilee Townsend   MRN: [id number]   Admitting Diagnosis: Rectal prolapse    PT Received On: 07/28/17  PT Start Time: 1040     PT Stop Time: 1054    PT Total Time (min): 14 min       Billable Minutes:  Evaluation 14    Diagnosis: Rectal prolapse   s/p RECTOPEXY ABDOMINAL APPROACH WITH SIGMOID COLON RESECTION      Past Medical History:   Diagnosis Date    Arthritis     Depression     Encounter for blood transfusion     as a child    Fibromyalgia     Neck pain     Rectal prolapse       Past Surgical History:   Procedure Laterality Date    BREAST SURGERY Bilateral     augmentation-Implants    epidural steroid injection  01/09/2017    twice-11/2017  & 1/9/17 to neck    HERNIA REPAIR      umbilical    HYSTERECTOMY      TONSILLECTOMY      WRIST FRACTURE SURGERY Right     with hardware placed       Referring physician: Lata Boogie MD  Date referred to PT: 7/27/2017    General Precautions: Standard, fall  Orthopedic Precautions: N/A   Braces:              Patient History:  Lives With: alone  Living Arrangements: house  Living Environment Comment: Pt.'s sister will be available to assist pt. upon discharge  Equipment Currently Used at Home: none      Previous Level of Function:  Ambulation Skills: independent  Transfer Skills: independent  ADL Skills: independent  Work/Leisure Activity: independent    Subjective:  Communicated with nursing prior to session.    Chief Complaint: abd. discomfort  Patient goals: to go home    Pain/Comfort  Pain Rating 1: 7/10  Location - Orientation 1: generalized  Location 1: abdomen  Pain Addressed 1: Reposition, Cessation of Activity, Pre-medicate for activity  Pain Rating Post-Intervention 1: 7/10      Objective:   Patient found with: peripheral IV     Cognitive Exam:  Oriented to: Person, Place, Time and Situation    Follows Commands/attention: Follows multistep  commands  Communication: clear/fluent  Safety awareness/insight to  disability: intact    Physical Exam:  Postural examination/scapula alignment: No postural abnormalities identified    Skin integrity: Visible skin intact  Edema: None noted     Sensation:   Intact    Upper Extremity Range of Motion:  Right Upper Extremity: WFL  Left Upper Extremity: WFL    Upper Extremity Strength:  Right Upper Extremity: WFL  Left Upper Extremity: WFL    Lower Extremity Range of Motion:  Right Lower Extremity: WFL  Left Lower Extremity: WFL    Lower Extremity Strength:  Right Lower Extremity: WFL  Left Lower Extremity: WFL     Fine motor coordination:  Intact    Gross motor coordination: WFL    Functional Mobility:  Bed Mobility:  Rolling/Turning to Left: Modified independent  Rolling/Turning Right: Modified independent  Scooting/Bridging: Modified Independent  Supine to Sit: Modified Independent  Sit to Supine: Modified Independent    Transfers:  Sit <> Stand Assistance: Modified Independent  Sit <> Stand Assistive Device: No Assistive Device  Bed <> Chair Technique: Stand Pivot  Bed <> Chair Assistance: Modified Independent  Bed <> Chair Assistive Device: No Assistive Device    Gait:   Gait Distance: 400'  Assistance 1: Modified Independent  Gait Assistive Device: No device  Gait Pattern: 2-point gait  Gait Deviation(s):  (no deviations noted)    Stairs:      Balance:   Static Sit: GOOD: Takes MODERATE challenges from all directions  Dynamic Sit: GOOD: Maintains balance through MODERATE excursions of active trunk movement  Static Stand: GOOD: Takes MODERATE challenges from all directions  Dynamic stand: GOOD: Needs SUPERVISION only during gait and able to self right with moderate     Therapeutic Activities and Exercises:  Discussed PT POC.    AM-PAC 6 CLICK MOBILITY  How much help from another person does this patient currently need?   1 = Unable, Total/Dependent Assistance  2 = A lot, Maximum/Moderate Assistance  3 = A little, Minimum/Contact Guard/Supervision  4 = None, Modified  [place]/Independent    Turning over in bed (including adjusting bedclothes, sheets and blankets)?: 4  Sitting down on and standing up from a chair with arms (e.g., wheelchair, bedside commode, etc.): 4  Moving from lying on back to sitting on the side of the bed?: 4  Moving to and from a bed to a chair (including a wheelchair)?: 4  Need to walk in hospital room?: 4  Climbing 3-5 steps with a railing?: 4  Total Score: 24     AM-PAC Raw Score CMS G-Code Modifier Level of Impairment Assistance   6 % Total / Unable   7 - 9 CM 80 - 100% Maximal Assist   10 - 14 CL 60 - 80% Moderate Assist   15 - 19 CK 40 - 60% Moderate Assist   20 - 22 CJ 20 - 40% Minimal Assist   23 CI 1-20% SBA / CGA   24 CH 0% Independent/ Mod I     Patient left seated on EOB with all lines intact and call button in reach.    Assessment:   Marilee Townsend is a 57 y.o. female with a medical diagnosis of Rectal prolapse and presents with good mobility and has no goals for acute PT.    Rehab identified problem list/impairments:      Rehab potential is good.    Activity tolerance: Good    Discharge recommendations: Discharge Facility/Level Of Care Needs: home     Barriers to discharge: Barriers to Discharge: None    Equipment recommendations: Equipment Needed After Discharge: none     GOALS:    Physical Therapy Goals     Not on file          Multidisciplinary Problems (Resolved)        Problem: Physical Therapy Goal    Goal Priority Disciplines Outcome Goal Variances Interventions   Physical Therapy Goal   (Resolved)     PT/OT, PT Outcome(s) achieved                     PLAN:      (Discontinue acute PT)     Plan of Care reviewed with: patient          Wesley Rodríguez, PT  07/28/2017

## 2017-07-28 NOTE — PLAN OF CARE
Problem: Patient Care Overview  Goal: Plan of Care Review  Outcome: Ongoing (interventions implemented as appropriate)  POC reviewed with patient and family. Pt AAOx4, VSS, Afebrile, SpO2> 92% on rm air. Pt complains of frequent pain, well controlled with PRN meds. Transverse abdominal incision with dermabond CDI. Pt tolerating a low fiber / low residue diet, no complaints of nausea/vomiting. Pt urinating adequately per hat/ toilet. BM x 1 during shift. Pt up independently, remains free from falls and injuries, will continue to monitor.

## 2017-07-28 NOTE — DISCHARGE SUMMARY
[facility]  Colorectal Surgery  Discharge Summary      Patient Name: Marilee Townsend  MRN: [id number]  Admission Date: 7/26/2017  Hospital Length of Stay: 2 days  Discharge Date and Time:  07/28/2017 7:55 AM  Attending Physician: Bill Lopez MD   Discharging Provider: Lata Boogie MD  Primary Care Provider: Simón Fields MD     HPI: Patient is a 57 y.o. female presents with a long history of rectal prolapse and constipation, treated with miralax daily. She presents today to schedule surgical treatment of her rectal prolapse. She notes that she has had less issues since using miralax more regularly and refusing to strain during bowel movements. However, she notes that she had one episode of fecal incontinence at work in the past several weeks, which has never happened before. She otherwise denies accidents or episodes of fecal incontinence.  The patient recently completed a colonoscopy that was normal. She noted that she prolapsed during the preparation process but her prolapse reduced spontaneously. She reports pain when prolapse occurs and states it typically spontaneously reduces on own. She otherwise states that she is in her usual health. She denies f/c/n/v.     Prior abdominal surgeries: Epigastric hernia repair, partial hysterectomy     Procedure(s) (LRB):  RECTOPEXY ABDOMINAL APPROACH WITH SIGMOID COLON RESECTION (N/A)  COLECTOMY-SIGMOID     Hospital Course: Marilee Townsend underwent the above procedure on 7/26/2017 as treatment for rectal prolapse. She tolerated the procedure well and her post-op course was uncomplicated. Prior to discharge home on 7/21/2017  her pain was well controlled on oral medications, tolerated diet, ambulated, spontaneously voided and experienced return of bowel function. She was discharged home in good condition on POD#2.    Significant Diagnostic Studies: Labs:   CMP   Recent Labs  Lab 07/27/17  0517 07/28/17  0510    141   K 3.4* 3.6     111*   CO2 21* 23   * 97   BUN 7 7   CREATININE 0.8 0.8   CALCIUM 8.1* 8.2*   ANIONGAP 8 7*   ESTGFRAFRICA >60.0 >60.0   EGFRNONAA >60.0 >60.0    and CBC   Recent Labs  Lab 07/27/17  0518 07/28/17  0510   WBC 6.69 5.22   HGB 11.3* 10.8*   HCT 33.4* 32.4*    227       Pending Diagnostic Studies:     None        Final Active Diagnoses:    Diagnosis Date Noted POA    PRINCIPAL PROBLEM:  Rectal prolapse [K62.3] 06/27/2017 Yes      Problems Resolved During this Admission:    Diagnosis Date Noted Date Resolved POA      Discharged Condition: good    Disposition: Home or Self Care    Follow Up:  Follow-up Information     Bill Lopez MD In 2 weeks.    Specialty:  Colon and Rectal Surgery  Why:  Post-op  Contact information:  [address]  [facility] [address]  [phone]                 Patient Instructions:     Diet general   Order Comments: Low residue diet.     Activity as tolerated   Order Comments: Do not lift anything heavier than 10lbs for six weeks.  Shower after 48 hours after surgery, do not submerge incisions for 6 weeks.  Do not strain while stooling.     Call MD for:  temperature >100.4     Call MD for:  persistent nausea and vomiting or diarrhea     Call MD for:  severe uncontrolled pain     Call MD for:  redness, tenderness, or signs of infection (pain, swelling, redness, odor or green/yellow discharge around incision site)     No dressing needed   Order Comments: Keep incision covered with gauze only to protect clothing.       Medications:  Reconciled Home Medications:   Current Discharge Medication List      START taking these medications    Details   ibuprofen (ADVIL,MOTRIN) 800 MG tablet Take 1 tablet (800 mg total) by mouth every 6 (six) hours as needed for Pain.  Qty: 60 tablet, Refills: 0      oxycodone-acetaminophen (PERCOCET) 5-325 mg per tablet Take 1 tablet by mouth every 4 (four) hours as needed for Pain (Do not drive while taking pain medications.).  Qty: 31 tablet,  Refills: 0         CONTINUE these medications which have NOT CHANGED    Details   buPROPion (WELLBUTRIN XL) 150 MG TB24 tablet Take 150 mg by mouth 2 (two) times daily.       sertraline (ZOLOFT) 100 MG tablet Take 100 mg by mouth every morning.       meloxicam (MOBIC) 15 MG tablet Take 15 mg by mouth once daily. Instructed to stop until after procedure 1-9-17.         STOP taking these medications       polyethylene glycol (GLYCOLAX) 17 gram/dose powder Comments:   Reason for Stopping:             Lata Boogie MD  PGY-1 Colorectal Surgery  [facility]

## 2017-07-28 NOTE — OP NOTE
DATE OF PROCEDURE:  07/26/2017    PREOPERATIVE DIAGNOSIS:  Full-thickness rectal prolapse with severe   constipation.    POSTOPERATIVE DIAGNOSIS:  Full-thickness rectal prolapse with severe   constipation.    PROCEDURE:  Sigmoid colectomy with sutured rectopexy.    SURGEON:  Bill Lopez M.D.    ASSISTANT:  Phyllis Saavedra M.D. (RES).    ANESTHESIA:  General endotracheal.    IV FLUIDS:  2 L of crystalloid.    BLOOD LOSS:  50 mL.    URINE OUTPUT:  85 mL.    DRAINS:  Warner catheter.    SPECIMENS:  Sigmoid colon to Pathology.    COMPLICATIONS:  None.    OPERATIVE FINDINGS:  Markedly redundant sigmoid colon, deep pelvic cul-de-sac.    INDICATIONS:  The patient is a 57-year-old female who presented to the office   with complaints of intermittent full-thickness rectal prolapse, which at time   requires manual reduction.  She also has severe constipation.  After discussing   her options, she has elected to proceed with a suture rectopexy.  We decided to   perform a concomitant sigmoid colectomy as well, due to severe preexisting   constipation.    DESCRIPTION OF PROCEDURE:  The patient was identified, brought to the Operating   Room, and placed on the table in supine position after obtaining informed   consent.  Venous sequential stockings were placed.  After induction of general   endotracheal anesthesia, a Warner catheter was placed, and she was repositioned   in a modified lithotomy position using Yellofin stirrups.  She was given   preoperative intravenous antibiotics, as well as subcutaneous heparin.  The   abdomen and perineum were then prepped and draped in the usual sterile fashion.    An 8 cm Pfannenstiel incision was then made 2 fingerbreadths above the pubic   tubercle.  This was carried down through subcutaneous tissue.  The fascia was   incised transversely and fascial flaps were raised off the underlying rectus   abdominis muscles.  Rectus muscles were split in the midline.  The peritoneum   was divided sharply  to gain access to the peritoneal cavity and opened along its   entire length vertically with electrocautery.  An Navid wound protector was   placed.    We then divided some lateral attachments of the sigmoid colon to the pelvic   sidewall, at which point, we were able to deliver a significant length of   redundant sigmoid colon up through the incision.  The small bowel was packed up   out of the way the pelvis.  We divided the sigmoid colon at a point where the   proximal end would reach the sacral promontory without any difficulty.  The   bowel was divided with a SALAS stapler.  We then performed division of the   mesentery using EnSeal device down to the level of the inferior mesenteric   artery.  A low ligation of the inferior mesenteric artery was performed,   identifying and protecting the left ureter and gonadal vessels.  We then entered   into the retrorectal space between the presacral fascia and the mesorectal   fascia.  A full posterior mobilization of the mesorectum was performed down to   the level of the pelvic floor.  The lateral attachments also were divided, as   well.  Once we had fully mobilized the rectum down to the pelvic floor, we   cleared away the mesorectum just below the rectosigmoid junction, and the upper   rectum was stapled with a TA 45 stapler and divided proximal to the staple line.    The resected length of sigmoid colon was measured approximately 20 cm in   length.  It was then passed from the field.    We then identified the divided distal end of the colon.  A Furness clamp was   placed across the colon at this point, and a Monosof suture was placed through   the Furness clamp to create a pursestring suture.  The distal staple line was   sharply excised and the anvil from a 29 mm EEA stapler was inserted and secured   with the pursestring suture.  The 29 mm EEA stapler was then advanced   transanally to the level of the rectal staple line.  The spike was advanced   through the wall  of the rectum adjacent to the staple line and engaged with the   anvil in the distal end of the colon.  The stapler was closed down and fired.    Upon removing the stapler, anastomotic rings were inspected and noted to be   intact circumferentially.  We then performed a leak test with a flexible   colonoscope.  There was no extravasation of air from the staple line.    Endoscopically, the staple line appeared intact circumferentially and   hemostatic.    I then performed a suture rectopexy using 2-0 Prolene suture to tack the lateral   rectal stalks to the periosteum at the level of the sacral promontory on either   side of the rectum.    Pelvis was irrigated.  Hemostasis was noted to be intact.  The wound protector   was removed and the incision was closed first with running 3-0 Vicryl to   reapproximate the peritoneum, followed by interrupted 3-0 Vicryl sutures to   reapproximate the rectus muscles in the midline.  Prior to closing the   peritoneum, a TAP block was performed using a combination of Exparel and 0.25%   Marcaine.  The fascia was closed transversely with a running #1 PDS suture.    Subcutaneous tissues were irrigated with sterile saline.  Skin was closed with a   running 4-0 Monocryl subcuticular closure.  Dermabond was placed as a dressing.    The patient tolerated the procedure well with no complications.  She was   extubated in the Operating Room and taken to Recovery in satisfactory condition.    All the needle, instrument and sponge counts were correct at the end of the   case.  I was present throughout the entire procedure.      MONICA  dd: 07/28/2017 05:36:08 (CDSINAN)  td: 07/28/2017 06:47:11 (SOLITARIO)  Doc ID   #[id number]  Job ID #[id number]    CC:

## 2017-07-28 NOTE — PLAN OF CARE
Problem: Physical Therapy Goal  Goal: Physical Therapy Goal  Outcome: Outcome(s) achieved Date Met: 07/28/17  No goals set

## 2017-07-28 NOTE — ASSESSMENT & PLAN NOTE
POD#2 rectopexy with sigmoid colon resection  - tolerating low residue diet, no nausea, vomiting  - pain well controlled on PO pain meds  - up and ambulating  - discharge to home today

## 2017-07-30 ENCOUNTER — NURSE TRIAGE (OUTPATIENT)
Dept: ADMINISTRATIVE | Facility: CLINIC | Age: 57
End: 2017-07-30

## 2017-07-31 ENCOUNTER — PATIENT OUTREACH (OUTPATIENT)
Dept: ADMINISTRATIVE | Facility: CLINIC | Age: 57
End: 2017-07-31

## 2017-07-31 RX ORDER — ZINC OXIDE 200 MG/G
OINTMENT TOPICAL
COMMUNITY
End: 2023-05-10

## 2017-07-31 NOTE — ANESTHESIA POSTPROCEDURE EVALUATION
"Anesthesia Post Evaluation    Patient: Marilee Townsend    Procedure(s) Performed: Procedure(s) (LRB):  RECTOPEXY ABDOMINAL APPROACH WITH SIGMOID COLON RESECTION (N/A)  COLECTOMY-SIGMOID    Final Anesthesia Type: general  Patient location during evaluation: PACU  Patient participation: Yes- Able to Participate  Level of consciousness: awake and alert and oriented  Post-procedure vital signs: reviewed and stable  Pain management: adequate  Airway patency: patent  PONV status at discharge: No PONV  Anesthetic complications: no      Cardiovascular status: hemodynamically stable  Respiratory status: unassisted, spontaneous ventilation and room air  Hydration status: euvolemic  Follow-up not needed.        Visit Vitals  /83 (BP Location: Right arm, Patient Position: Lying, BP Method: Automatic)   Pulse 67   Temp 36.8 °C (98.3 °F) (Oral)   Resp 18   Ht 5' 2" (1.575 m)   Wt 47.6 kg (105 lb)   SpO2 95%   Breastfeeding? No   BMI 19.20 kg/m²       Pain/Danielle Score: No Data Recorded      "

## 2017-07-31 NOTE — TELEPHONE ENCOUNTER
Reason for Disposition   Information only question and nurse able to answer    Protocols used: ST NO PROTOCOL CALL: INFORMATION ONLY-A-OH    Pt had surgery for rectal prolapse and was released on Friday. Pt states she has some redness around her anus and it feels and looks like a diaper rash. Pt has been wearing disposable underwear with a pad because she does leak stool and drainage from her anus from time to time. Pt's incision from her surgery is on her abdomen. Pt advised to used something over the counter for diaper rash

## 2017-07-31 NOTE — PATIENT INSTRUCTIONS
Rectal Prolapse  Rectal prolapse means the rectum has fallen out of position. In many cases, rectal tissue sags out of the anus. This happens when the rectum becomes stretched out, pushes down, and sags out through the anus. A reddish mass of tissue may be seen or felt at the opening of the anus. The tissue may stick out beyond the anus. This may happen following a bowel movement, and then go away for a time. Or it may be present all the time. Stool or mucus may also leak out of the anus.  Rectal prolapse happens when the structures and muscles in the pelvis and anus are weakened. Although it is not common, it is more so in elderly women, but it can happen in both men and women of all ages. Pregnancy and childbirth can make it more likely. So can repeated straining to have a bowel movement.  Often people have a hemorrhoid, which they think is a prolapse, as they see or feel something abnormal, and many of the symptoms are the same.  The following are signs and symptoms of a rectal prolapse:  · Diarrhea or constipation  · Stool leaking out  · Mucus or blood in the stool  · Incomplete bowel movements  · Abdominal discomfort  To return the rectum back into place, apply gentle pressure. But it will likely prolapse again. For long-term treatment, you may need surgery. You will likely be referred to a specialist who can examine you and tell you about your options.    Home care  Straining during bowel movements is often a cause of prolapse. To help ease and prevent constipation, take these steps:  · Laxatives, stool softeners, or bulking agents may be prescribed. Take these as directed. You may need them daily.  · Add more fruits and vegetables and whole grains to your diet. Eat less high-fat foods and processed foods (like packaged snack foods).  · Do not ignore the urge to have a bowel movement. Once a day, set aside time after a meal for an undisturbed visit to the toilet.  · Do not strain or push hard to pass  stool.  · Do not spend more than a few minutes on the toilet to have a bowel movement.  · If you smoke, quit.  If you have a prolapse:  To return a prolapsed rectum back into place, first wash your hands well. Wet a soft cloth with warm water. Then, lie on your side with your knees to your chest. Hold the cloth to the anus and use gentle pressure to push the rectum back into place. When youre done, call the doctor. If you are unable to push the prolapse back or are uncomfortable doing so, call your healthcare provider or go to the emergency department.  Follow-up care  Follow up with your healthcare provider, or as advised. If you have been referred to a specialist, do not put off making the appointment.  Call 911  Call 911 if any of the following occur:  · Heavy bleeding  · Severe abdominal pain or swelling  · Severe rectal pain  · Fainting or loss of consciousness  · Rapid heart rate  When to seek medical advice  Call your healthcare provider right away if any of these occur:  · Return of the prolapse  · Fever over 100.4°F (38°C)  · Blood in stool  · Abdominal pain or swelling  · Repeated vomiting  · You cannot have a bowel movement or cannot control bowel movements  Date Last Reviewed: 6/1/2016  © 1743-3441 [employer]. [address] [place] [address], [place] [address]. All rights reserved. This information is not intended as a substitute for professional medical care. Always follow your healthcare professional's instructions.        Wound Check After Surgery, No Complication  Surgery involves cutting through layers of skin, fatty tissue, muscle, and sometimes bone and cartilage. Sutures (stitches) or staples are used to close all layers of the wound. The sutures on the inside will dissolve in about 2 to 3 weeks. Any sutures or staples used on the outside need to be removed in about 7 to 14 days, depending on the location.  It is normal to have some clear or bloody discharge on the wound covering or  bandage (dressing) for the first few days after surgery. If your wound was sutured (sewn) closed, you should not have to change the dressing more than twice a day in the first few days. Bleeding or discharge requiring more frequent dressing changes can be a sign of a problem.  It is normal to feel pain at the incision site. The pain decreases as the wound heals. Most of the pain and soreness from the skin incision should go away by the time the sutures or staples are removed. Soreness and pain from deeper tissues may last another week or two.  Pain that continues more than a few weeks after surgery or pain that worsens anytime after surgery can be a sign of a problem, such as:  · Infection  · Separation of wound edges  · Collection of blood or other below the skin  Home care  Different types of surgery require different types of care and dressing changes. It is important to follow all instructions and advice from your surgeon, as well as other members of your healthcare team.  Wound care  · Keep the wound clean, as directed by your healthcare provider.  · Change the dressing as directed. Change the dressing sooner if it becomes wet or stained with blood or fluid from the wound.  · Bathe with a sponge (no shower or tub baths) for the first few days after surgery, or until there is no more drainage from the wound. Unless you received different instructions from your surgeon, you can then shower. Do not soak the area in water (no baths or swimming) until the tape, sutures, or staples are removed and any wound opening has dried out and healed.  Changing the dressing  · Wash your hands before changing the dressings.  · Carefully remove the dressing and tape; dont just yank it off. If it sticks to the wound, you may need to wet it a little to remove it, unless your healthcare provider told you not to wet it.  · Wash your hands again before putting on a new, clean dressing.  · Gently clean the wound with clean water (or  saline) using gauze or a clean washcloth. Do not rub it or pick at it.  · Do not use soap, alcohol, hydrogen peroxide, or any other cleanser.  · If you were told to dry the wound before putting on a new dressing, gently pat it dry. Do not rub.  · Throw out the old dressing. Do not reuse it!  · Wash your hands again when you are done.  Types of dressings  Your healthcare team will tell you what type of dressing to put on your wound. Follow your healthcare teams instructions carefully, and contact them if you have any questions. Two common types of dressings are described below. You may have one of these or another type.  · Dry dressing. Use dry gauze. If the wound is still draining, use a nonadherent dressing, which shouldnt stick to the wound.  · Wet-to-dry dressing. Wet the gauze, and squeeze out the excess water (or saline), before putting it on. Then, cover this with a dry pad.  Medicines  · If you were given antibiotics, take them until they are used up or your healthcare provider tells you to stop. It is important to finish the antibiotics even though you feel better, to make sure the infection has cleared.  · You can take acetaminophen or ibuprofen for pain, unless you were given a different pain medicine to use. (Note: If you have chronic liver or kidney disease, or have ever had a stomach ulcer or gastrointestinal bleeding, or are taking blood thinner medicines, talk with your healthcare provider before using these medicines.)  · Aspirin should never be used in anyone under 18 years of age who is ill with a fever. It may cause severe liver damage.  Follow-up care  Follow up with your healthcare provider, or as advised, for your next wound check or removal of your sutures, staples, or tape.  · If a culture was done, you will be notified if the results will affect your treatment. You can call as directed for the results.  · If imaging tests, such as X-rays, an ultrasound, or CT scan were done, they will be  reviewed by a specialist. You will be notified of the results, especially if they affect treatment.  Call 911  Call emergency services right away if any of these occur:  · Trouble breathing or swallowing, wheezing  · Hoarse voice or trouble speaking  · Extreme confusion  · Extreme drowsiness or trouble awakening  · Fainting or loss of consciousness  · Rapid heart rate or very slow heart rate  · Vomiting blood, or large amounts of blood in stool  · Discomfort in the center of the chest that feels like pressure, squeezing, a sense of fullness, or pain.  · Discomfort or pain in other upper body areas, such as the back, one or both arms, neck, jaw, or stomach  · Stroke symptoms (spot a stroke FAST)  ¨ F: Face drooping. One side of the face is numb or droops.  ¨ A: Arm weakness. One arm feels weak or numb.  ¨ S: Speech difficulty: Speech is slurred, or the person is unable to speak.  ¨ T: Time to call 911. Even if symptoms go away, call 911.  When to seek medical advice  Call your healthcare provider right away if any of the following occur:  · Increasing pain at the site of surgery  · Fever over 100.4º F (38º C)  · Redness around the wound  · Fluid, pus, or blood draining from the wound  · Vomiting, constipation, or diarrhea  Date Last Reviewed: 9/27/2015 © 2000-2016 [employer]. [address] [place] [address], [place] [address]. All rights reserved. This information is not intended as a substitute for professional medical care. Always follow your healthcare professional's instructions.

## 2017-08-01 ENCOUNTER — TELEPHONE (OUTPATIENT)
Dept: SURGERY | Facility: CLINIC | Age: 57
End: 2017-08-01

## 2017-08-01 RX ORDER — OXYCODONE AND ACETAMINOPHEN 5; 325 MG/1; MG/1
1 TABLET ORAL EVERY 4 HOURS PRN
Qty: 35 TABLET | Refills: 0 | Status: SHIPPED | OUTPATIENT
Start: 2017-08-01 | End: 2023-03-07

## 2017-08-01 NOTE — TELEPHONE ENCOUNTER
Told pt a new rx will be left at the front dest for her to . I an unable to move th post op appt due to Dr Lopez being out of town.

## 2017-08-07 ENCOUNTER — NURSE TRIAGE (OUTPATIENT)
Dept: ADMINISTRATIVE | Facility: CLINIC | Age: 57
End: 2017-08-07

## 2017-08-08 NOTE — TELEPHONE ENCOUNTER
"  Reason for Disposition   Caller has URGENT question and triager unable to answer question    Answer Assessment - Initial Assessment Questions  1. SYMPTOM: "What's the main symptom you're concerned about?" (e.g., pain, fever, vomiting)      Question about stool softener  2. ONSET: "When did ________  start?"      today  3. SURGERY: "What surgery was performed?"      Repair of rectal prolapse  4. DATE of SURGERY: "When was surgery performed?"       7/26/17  5. ANESTHESIA: " What type of anesthesia did you have?" (e.g., general, spinal, epidural, local)      gener  6. PAIN: "Is there any pain?" If so, ask: "How bad is it?"  (Scale 1-10; or mild, moderate, severe)      Manageable and improving    Protocols used: ST POST-OP SYMPTOMS AND [id number]  had rectal prolapse repair/ on pain meds and doing better/ taking less and less med/ inquiring aobut taking some type of stool softener as has stool today is small and hard/ also has questions about her diet    Referred to on call for inquiry    Jasmin Coelho RN  "

## 2017-08-14 ENCOUNTER — OFFICE VISIT (OUTPATIENT)
Dept: SURGERY | Facility: CLINIC | Age: 57
End: 2017-08-14
Payer: COMMERCIAL

## 2017-08-14 VITALS
DIASTOLIC BLOOD PRESSURE: 84 MMHG | WEIGHT: 103.38 LBS | HEART RATE: 81 BPM | HEIGHT: 62 IN | BODY MASS INDEX: 19.02 KG/M2 | SYSTOLIC BLOOD PRESSURE: 116 MMHG

## 2017-08-14 DIAGNOSIS — K59.00 CONSTIPATION, UNSPECIFIED CONSTIPATION TYPE: ICD-10-CM

## 2017-08-14 DIAGNOSIS — K62.3 RECTAL PROLAPSE: Primary | ICD-10-CM

## 2017-08-14 PROCEDURE — 99024 POSTOP FOLLOW-UP VISIT: CPT | Mod: S$GLB,,, | Performed by: COLON & RECTAL SURGERY

## 2017-08-14 PROCEDURE — 99999 PR PBB SHADOW E&M-EST. PATIENT-LVL III: CPT | Mod: PBBFAC,,, | Performed by: COLON & RECTAL SURGERY

## 2017-08-14 NOTE — LETTER
August 14, 2017        Simón Fields MD  [address] [place] [address] [address]vd  [address]  [place] [address]             [facility]  [address]  [facility] [address]  Phone: [phone]   Patient: Marilee Townsend   MR Number: [id number]   YOB: 1960   Date of Visit: 8/14/2017       Dear Dr. Fields:    Thank you for referring Marilee Townsend to me for evaluation. Attached you will find relevant portions of my assessment and plan of care.    If you have questions, please do not hesitate to call me. I look forward to following Marilee Townsend along with you.    Sincerely,      Bill Lopez MD            CC  MD Lloyd Lopezosure

## 2017-08-14 NOTE — PROGRESS NOTES
CRS Post-operative visit    Visit Info:     Procedure: Sigmoid colectomy with sutured rectopexy.    Date of Procedure: July 26, 2017    Indication: 56 yo F with intermittent full-thickness rectal prolapse and severe long-standing constipation.      Date of Discharge: July 28, 2017    Current Status: C/o constipation/bloating, episodes of leakage of liquid stool.  No N/V, no F/C. Still on a low-fiber diet, not taking stool softeners.   Pain reasonably controlled - she had hoped to go back to work this week but doesn't feel ready.    Pathology:   1. SIGMOID COLON  -Segment of colon with diverticulosis and epithelial reactive changes.  -Negative for dysplasia and malignancy.  2. PROXIMAL AND DISTAL DONUTS, RESECTION:  -Small segments of colon with no specific histopathologic findings, consistent with surgical donuts.    Physical Exam:  General: White female in NAD   Neuro: aaox4   Respiratory: resps even unlabored  Abdomen: soft, NT/ND, well-healed Pfannenstiel incision  Extremities: Warm dry and intact    Assessment:  Rectal prolapse, severe constipation - s/p sigmoid resection/rectopexy    Plan:  Suspect overflow incontinence due to constipation.  Increase fiber/fluid intake.  Mag citrate 1/2 bottle today  Colace 100 mg bid  Miralax as needed  Hold on RTW until I see her back  RTO 3 weeks.    Bill Lopez MD, FACS, FASCRS

## 2017-08-23 ENCOUNTER — TELEPHONE (OUTPATIENT)
Dept: SURGERY | Facility: CLINIC | Age: 57
End: 2017-08-23

## 2017-08-23 NOTE — TELEPHONE ENCOUNTER
Marilee is c/o constipation. She has increased rectal pressure but is unable to have a bm. When straining she passes mucous only. She has only had 3 bm sing last visit. Will talk to Dr Lopez and get back with her.

## 2017-08-23 NOTE — TELEPHONE ENCOUNTER
----- Message from Lakia Guerra sent at 8/21/2017  2:08 PM CDT -----  Contact: Pt# [phone]  Pt states she was calling to speak to the nurse about some pain/ issues with her stool softenerand other issues  and would like a phone call back# [phone]

## 2017-09-05 ENCOUNTER — NURSE TRIAGE (OUTPATIENT)
Dept: ADMINISTRATIVE | Facility: CLINIC | Age: 57
End: 2017-09-05

## 2017-09-05 ENCOUNTER — OFFICE VISIT (OUTPATIENT)
Dept: SURGERY | Facility: CLINIC | Age: 57
End: 2017-09-05
Payer: COMMERCIAL

## 2017-09-05 VITALS
HEART RATE: 78 BPM | BODY MASS INDEX: 19.63 KG/M2 | SYSTOLIC BLOOD PRESSURE: 123 MMHG | HEIGHT: 62 IN | WEIGHT: 106.69 LBS | DIASTOLIC BLOOD PRESSURE: 85 MMHG

## 2017-09-05 DIAGNOSIS — K62.3 RECTAL PROLAPSE: Primary | ICD-10-CM

## 2017-09-05 DIAGNOSIS — K59.00 CONSTIPATION, UNSPECIFIED CONSTIPATION TYPE: ICD-10-CM

## 2017-09-05 PROCEDURE — 99024 POSTOP FOLLOW-UP VISIT: CPT | Mod: S$GLB,,, | Performed by: COLON & RECTAL SURGERY

## 2017-09-05 PROCEDURE — 99999 PR PBB SHADOW E&M-EST. PATIENT-LVL III: CPT | Mod: PBBFAC,,, | Performed by: COLON & RECTAL SURGERY

## 2017-09-05 NOTE — LETTER
September 11, 2017        Simón Fields MD  [address] [place] [address] [address]  [address]  [place] [address]             [facility]  [address]  [facility] [address]  Phone: [phone]   Patient: Marilee Townsend   MR Number: [id number]   YOB: 1960   Date of Visit: 9/5/2017       Dear Dr. Fields:    Thank you for referring Marilee Townsend to me for evaluation. Attached you will find relevant portions of my assessment and plan of care.    If you have questions, please do not hesitate to call me. I look forward to following Marilee Towsnend along with you.    Sincerely,      Bill Lopez MD            CC  MD Lloyd Lopezosure

## 2017-09-06 ENCOUNTER — TELEPHONE (OUTPATIENT)
Dept: SURGERY | Facility: CLINIC | Age: 57
End: 2017-09-06

## 2017-09-06 NOTE — TELEPHONE ENCOUNTER
Reason for Disposition   [1] Caller requesting NON-URGENT health information AND [2] PCP's office is the best resource    Protocols used: ST INFORMATION ONLY CALL-A-ZENY    Marilee called to request message be sent to Dr Bill Lopez, who did her surgical procedure, for purpose of getting a letter stating she can return to work.  She said when she was in clinic with him today for follow up, she did not realize this would be requested of her by her employer, but it is.  Message to Dr Lopez. Of note, she does not have internet service at present, so she is unable to access her patient portal.  Please contact caller directly with any additional care advice.

## 2017-09-06 NOTE — TELEPHONE ENCOUNTER
----- Message from Faustina Streeter sent at 9/6/2017  3:33 PM CDT -----  Contact: pt#[phone]  She states that they did not receive return to work slip   New fax#[phone]  Attn:Davonte

## 2017-09-11 NOTE — PROGRESS NOTES
CRS Post-operative visit    Visit Info:     Procedure: Sigmoid colectomy with sutured rectopexy.    Date of Procedure: July 26, 2017    Indication: 56 yo F with intermittent full-thickness rectal prolapse and severe long-standing constipation.      Date of Discharge: July 28, 2017    Current Status: At 1st POV 8/14/17 she c/o constipation/bloating, episodes of leakage of liquid stool.  Today she reports constipation isnow better, though she is having some urgency and fecal seepage. She is taking Colace bid and Miralax daily.  No N/V, no F/C.     Pathology:   1. SIGMOID COLON  -Segment of colon with diverticulosis and epithelial reactive changes.  -Negative for dysplasia and malignancy.  2. PROXIMAL AND DISTAL DONUTS, RESECTION:  -Small segments of colon with no specific histopathologic findings, consistent with surgical donuts.    Physical Exam:  General: White female in NAD   Neuro: aaox4   Respiratory: resps even unlabored  Abdomen: soft, NT/ND, well-healed Pfannenstiel incision  Extremities: Warm dry and intact    Assessment:  Rectal prolapse, severe constipation - s/p sigmoid resection/rectopexy    Plan:  Continue Increase fiber/fluid intake.  Colace/Miralax as needed  OK to RTW   RTO 3 prn.    Bill Lopez MD, FACS, FASCRS

## 2018-06-06 NOTE — PLAN OF CARE
CHIEF COMPLAINT:   Chief Complaint   Patient presents with     Surgical Followup     final refraction       Type of surgery cataract    Date of surgery 5- od eye                             4- os eye    Patient states os eye flashes of bright light  X 2 days- has improved/no curtain or change in vision.    Cielo Peres, Optometric Assistant, A.B.O.C.     Drops reviewed.    OBJECTIVE:     See ophthalmology exam    ASSESSMENT:         ICD-10-CM    1. Pseudophakia of both eyes Z96.1 POST-OP FOLLOW-UP VISIT   2. PVD (posterior vitreous detachment), left H43.812      PLAN:      Patient Instructions   Eyeglass prescription given.    The signs of a retinal detachment are flashes of light or a curtain covering the vision.  If you should notice any of these changes let me know right away.  If I am not available you should be seen immediately for an eye evaluation.  [facility] retina specialty.  [phone].    If no new changes or improved symptoms return in 3-4 weeks for a recheck.    John Mirza, OD                     VSS throughout pacu stay. Pt medicated for pain with good relief. Patient able to fall asleep unstimulated and rested quietly. Incision to lower abdomen CDI. No drainage noted.  Pt denies any nausea, tolerating clear liquids.  Patient transferred to room 632 by PCT.

## 2019-06-04 NOTE — PLAN OF CARE
No response back from pt. Added note to upcoming OV note 6/25.    Patient Discharging Home today, no  care or HME needs.    Postop follow-up:   Future Appointments  Date Time Provider Department Center   8/14/2017 10:00 AM Bill Lopez MD [facility]          07/28/17 1011   Final Note   Assessment Type Final Discharge Note   Discharge Disposition Home   Discharge planning education complete? Yes   Hospital Follow Up  Appt(s) scheduled? Yes   Discharge plans and expectations educations in teach back method with documentation complete? Yes   Offered [facility] -- Bedside Delivery? n/a   Discharge/Hospital Encounter Summary to (non-[facility]) PCP n/a   Referral to Outpatient Case Management complete? n/a   Referral to / orders for Home Health Complete? n/a   30 day supply of medicines given at discharge, if documented non-compliance / non-adherence? n/a   Any social issues identified prior to discharge? No   Did you assess the readiness or willingness of the family or caregiver to support self management of care? No

## 2023-03-07 ENCOUNTER — OFFICE VISIT (OUTPATIENT)
Dept: FAMILY MEDICINE | Facility: CLINIC | Age: 63
End: 2023-03-07
Payer: MEDICARE

## 2023-03-07 VITALS
WEIGHT: 101.06 LBS | TEMPERATURE: 99 F | SYSTOLIC BLOOD PRESSURE: 150 MMHG | HEIGHT: 62 IN | BODY MASS INDEX: 18.6 KG/M2 | DIASTOLIC BLOOD PRESSURE: 88 MMHG | OXYGEN SATURATION: 98 % | HEART RATE: 75 BPM

## 2023-03-07 DIAGNOSIS — E07.9 THYROID DISEASE: ICD-10-CM

## 2023-03-07 DIAGNOSIS — Z00.00 ENCOUNTER FOR MEDICAL EXAMINATION TO ESTABLISH CARE: Primary | ICD-10-CM

## 2023-03-07 DIAGNOSIS — M46.06 SPINAL ENTHESOPATHY, LUMBAR REGION: ICD-10-CM

## 2023-03-07 DIAGNOSIS — M81.0 AGE RELATED OSTEOPOROSIS, UNSPECIFIED PATHOLOGICAL FRACTURE PRESENCE: ICD-10-CM

## 2023-03-07 DIAGNOSIS — Z12.31 ENCOUNTER FOR SCREENING MAMMOGRAM FOR MALIGNANT NEOPLASM OF BREAST: ICD-10-CM

## 2023-03-07 DIAGNOSIS — Z12.11 COLON CANCER SCREENING: ICD-10-CM

## 2023-03-07 DIAGNOSIS — F32.9 CURRENT EPISODE OF MAJOR DEPRESSIVE DISORDER WITHOUT PRIOR EPISODE, UNSPECIFIED DEPRESSION EPISODE SEVERITY: ICD-10-CM

## 2023-03-07 DIAGNOSIS — I10 ESSENTIAL HYPERTENSION: ICD-10-CM

## 2023-03-07 DIAGNOSIS — Z00.00 ANNUAL PHYSICAL EXAM: ICD-10-CM

## 2023-03-07 DIAGNOSIS — Z23 PNEUMOCOCCAL VACCINE ADMINISTERED: ICD-10-CM

## 2023-03-07 DIAGNOSIS — E78.5 HYPERLIPIDEMIA, UNSPECIFIED HYPERLIPIDEMIA TYPE: ICD-10-CM

## 2023-03-07 DIAGNOSIS — M15.9 OSTEOARTHRITIS OF MULTIPLE JOINTS, UNSPECIFIED OSTEOARTHRITIS TYPE: ICD-10-CM

## 2023-03-07 PROCEDURE — 1101F PR PT FALLS ASSESS DOC 0-1 FALLS W/OUT INJ PAST YR: ICD-10-PCS | Mod: CPTII,S$GLB,, | Performed by: FAMILY MEDICINE

## 2023-03-07 PROCEDURE — 90677 PNEUMOCOCCAL CONJUGATE VACCINE 20-VALENT: ICD-10-PCS | Mod: S$GLB,,, | Performed by: FAMILY MEDICINE

## 2023-03-07 PROCEDURE — 3077F SYST BP >= 140 MM HG: CPT | Mod: CPTII,S$GLB,, | Performed by: FAMILY MEDICINE

## 2023-03-07 PROCEDURE — 4010F ACE/ARB THERAPY RXD/TAKEN: CPT | Mod: CPTII,S$GLB,, | Performed by: FAMILY MEDICINE

## 2023-03-07 PROCEDURE — 99215 OFFICE O/P EST HI 40 MIN: CPT | Mod: S$GLB,,, | Performed by: FAMILY MEDICINE

## 2023-03-07 PROCEDURE — 3288F PR FALLS RISK ASSESSMENT DOCUMENTED: ICD-10-PCS | Mod: CPTII,S$GLB,, | Performed by: FAMILY MEDICINE

## 2023-03-07 PROCEDURE — 1101F PT FALLS ASSESS-DOCD LE1/YR: CPT | Mod: CPTII,S$GLB,, | Performed by: FAMILY MEDICINE

## 2023-03-07 PROCEDURE — 3079F DIAST BP 80-89 MM HG: CPT | Mod: CPTII,S$GLB,, | Performed by: FAMILY MEDICINE

## 2023-03-07 PROCEDURE — 1160F PR REVIEW ALL MEDS BY PRESCRIBER/CLIN PHARMACIST DOCUMENTED: ICD-10-PCS | Mod: CPTII,S$GLB,, | Performed by: FAMILY MEDICINE

## 2023-03-07 PROCEDURE — 3008F BODY MASS INDEX DOCD: CPT | Mod: CPTII,S$GLB,, | Performed by: FAMILY MEDICINE

## 2023-03-07 PROCEDURE — 1159F PR MEDICATION LIST DOCUMENTED IN MEDICAL RECORD: ICD-10-PCS | Mod: CPTII,S$GLB,, | Performed by: FAMILY MEDICINE

## 2023-03-07 PROCEDURE — G0009 ADMIN PNEUMOCOCCAL VACCINE: HCPCS | Mod: S$GLB,,, | Performed by: FAMILY MEDICINE

## 2023-03-07 PROCEDURE — 3079F PR MOST RECENT DIASTOLIC BLOOD PRESSURE 80-89 MM HG: ICD-10-PCS | Mod: CPTII,S$GLB,, | Performed by: FAMILY MEDICINE

## 2023-03-07 PROCEDURE — 1160F RVW MEDS BY RX/DR IN RCRD: CPT | Mod: CPTII,S$GLB,, | Performed by: FAMILY MEDICINE

## 2023-03-07 PROCEDURE — 3008F PR BODY MASS INDEX (BMI) DOCUMENTED: ICD-10-PCS | Mod: CPTII,S$GLB,, | Performed by: FAMILY MEDICINE

## 2023-03-07 PROCEDURE — 3288F FALL RISK ASSESSMENT DOCD: CPT | Mod: CPTII,S$GLB,, | Performed by: FAMILY MEDICINE

## 2023-03-07 PROCEDURE — 4010F PR ACE/ARB THEARPY RXD/TAKEN: ICD-10-PCS | Mod: CPTII,S$GLB,, | Performed by: FAMILY MEDICINE

## 2023-03-07 PROCEDURE — 90677 PCV20 VACCINE IM: CPT | Mod: S$GLB,,, | Performed by: FAMILY MEDICINE

## 2023-03-07 PROCEDURE — 1159F MED LIST DOCD IN RCRD: CPT | Mod: CPTII,S$GLB,, | Performed by: FAMILY MEDICINE

## 2023-03-07 PROCEDURE — 3077F PR MOST RECENT SYSTOLIC BLOOD PRESSURE >= 140 MM HG: ICD-10-PCS | Mod: CPTII,S$GLB,, | Performed by: FAMILY MEDICINE

## 2023-03-07 PROCEDURE — 99215 PR OFFICE/OUTPT VISIT, EST, LEVL V, 40-54 MIN: ICD-10-PCS | Mod: S$GLB,,, | Performed by: FAMILY MEDICINE

## 2023-03-07 PROCEDURE — 99999 PR PBB SHADOW E&M-NEW PATIENT-LVL V: ICD-10-PCS | Mod: PBBFAC,,, | Performed by: FAMILY MEDICINE

## 2023-03-07 PROCEDURE — G0009 PNEUMOCOCCAL CONJUGATE VACCINE 20-VALENT: ICD-10-PCS | Mod: S$GLB,,, | Performed by: FAMILY MEDICINE

## 2023-03-07 PROCEDURE — 99999 PR PBB SHADOW E&M-NEW PATIENT-LVL V: CPT | Mod: PBBFAC,,, | Performed by: FAMILY MEDICINE

## 2023-03-07 RX ORDER — LISINOPRIL AND HYDROCHLOROTHIAZIDE 10; 12.5 MG/1; MG/1
1 TABLET ORAL
Status: ON HOLD | COMMUNITY
Start: 2023-01-28 | End: 2023-07-26 | Stop reason: HOSPADM

## 2023-03-07 RX ORDER — ERGOCALCIFEROL 1.25 MG/1
50000 CAPSULE ORAL
Qty: 12 CAPSULE | Refills: 3 | Status: SHIPPED | OUTPATIENT
Start: 2023-03-07 | End: 2023-06-01 | Stop reason: SDUPTHER

## 2023-03-07 RX ORDER — BUPROPION HYDROCHLORIDE 150 MG/1
150 TABLET, EXTENDED RELEASE ORAL 2 TIMES DAILY
COMMUNITY
Start: 2023-01-28 | End: 2023-11-16 | Stop reason: SDUPTHER

## 2023-03-07 RX ORDER — RALOXIFENE HYDROCHLORIDE 60 MG/1
60 TABLET, FILM COATED ORAL DAILY
COMMUNITY
Start: 2023-02-03 | End: 2023-11-16 | Stop reason: SDUPTHER

## 2023-03-07 RX ORDER — AMLODIPINE BESYLATE 5 MG/1
TABLET ORAL DAILY
COMMUNITY
Start: 2022-11-08 | End: 2024-01-03 | Stop reason: SDUPTHER

## 2023-03-07 NOTE — PROGRESS NOTES
Assessment & Plan  Encounter for medical examination to establish care    Annual physical exam  -     CBC Auto Differential; Future; Expected date: 03/07/2023  -     Comprehensive Metabolic Panel; Future; Expected date: 03/07/2023  -     Hemoglobin A1C; Future; Expected date: 03/07/2023  -     Lipid Panel; Future; Expected date: 03/07/2023  -     Hepatitis C Antibody; Future; Expected date: 03/07/2023  -     HIV 1/2 Ag/Ab (4th Gen); Future; Expected date: 03/07/2023  -     TSH; Future; Expected date: 03/07/2023  -     T4, Free; Future; Expected date: 03/07/2023     Routine fasting labs to be scheduled tomorrow  morning.    Essential hypertension  -     CBC Auto Differential; Future; Expected date: 03/07/2023  -     Hemoglobin A1C; Future; Expected date: 03/07/2023  -     Lipid Panel; Future; Expected date: 03/07/2023     Blood pressure is elevated because patient forgot to take her antihypertensives this morning. Continue current therapy.    Current episode of major depressive disorder without prior episode, unspecified depression episode severity  Controlled. Continue current therapy.     Hyperlipidemia, unspecified hyperlipidemia type  -     Hemoglobin A1C; Future; Expected date: 03/07/2023  -     Lipid Panel; Future; Expected date: 03/07/2023    Thyroid disease  -     TSH; Future; Expected date: 03/07/2023  -     T4, Free; Future; Expected date: 03/07/2023     Patient mentions unspecified abnormal thyroid labs in the past, will add to routine blood work above.    Osteoarthritis of multiple joints, unspecified osteoarthritis type  Spinal enthesopathy, lumbar region   Continue management per specialist care.    Age related osteoporosis, unspecified pathological fracture presence  -     DXA Bone Density Axial Skeleton 1 or more sites; Future; Expected date: 03/07/2023  -     ergocalciferol (ERGOCALCIFEROL) 50,000 unit Cap; Take 1 capsule (50,000 Units total) by mouth every Sunday.  Dispense: 12 capsule; Refill:  3      Patient is taking raloxifene.  Advised once weekly high-dose vitamin-D supplement, calcium 1200 mg daily, and weight-bearing or resistance exercises daily as tolerated.    Encounter for screening mammogram for malignant neoplasm of breast  -     Mammo Digital Screening Bilat; Future; Expected date: 03/07/2023    Colon cancer screening  -     Fecal Immunochemical Test (iFOBT); Future; Expected date: 03/07/2023    Pneumococcal vaccine administered  -     (In Office Administered) Pneumococcal Conjugate Vaccine (20 Valent) (IM)      Health maintenance reviewed and addressed above.  -Recommended to have tetanus vaccine at pharmacy due to cost.       Follow-up: No follow-ups on file.  ______________________________________________________________________    Chief Complaint  Chief Complaint   Patient presents with    Establish Care       HPI  Marilee Townsend is a 63 y.o. female with medical diagnoses as listed in the medical history and problem list that presents to the office to establish care. She is in her usual state of health today.     Health Maintenance         Date Due Completion Date    Hepatitis C Screening Never done ---    Lipid Panel Never done ---    HIV Screening Never done ---    TETANUS VACCINE Never done ---    Mammogram Never done ---    DEXA Scan Never done ---    Colorectal Cancer Screening Never done ---    Pneumococcal Vaccines (Age 65+) (1 - PCV) Never done ---              PAST MEDICAL HISTORY:  Past Medical History:   Diagnosis Date    Arthritis     Depression     Encounter for blood transfusion     as a child    Fibromyalgia     Neck pain     Rectal prolapse        PAST SURGICAL HISTORY:  Past Surgical History:   Procedure Laterality Date    BREAST SURGERY Bilateral     augmentation-Implants    epidural steroid injection  01/09/2017    twice-11/2017  & 1/9/17 to neck    HERNIA REPAIR      umbilical    HYSTERECTOMY      TONSILLECTOMY      WRIST FRACTURE SURGERY Right     with hardware  placed       SOCIAL HISTORY:  Social History     Socioeconomic History    Marital status:    Tobacco Use    Smoking status: Former     Packs/day: 1.00     Years: 20.00     Pack years: 20.00     Types: Cigarettes     Start date: 5/12/2014    Smokeless tobacco: Never   Substance and Sexual Activity    Alcohol use: Yes     Comment: rarely    Drug use: No    Sexual activity: Yes       FAMILY HISTORY:  Family History   Problem Relation Age of Onset    Anesthesia problems Neg Hx        ALLERGIES AND MEDICATIONS: updated and reviewed.  Review of patient's allergies indicates:   Allergen Reactions    Morphine Nausea And Vomiting     Current Outpatient Medications   Medication Sig Dispense Refill    amLODIPine (NORVASC) 5 MG tablet       buPROPion (WELLBUTRIN SR) 150 MG TBSR 12 hr tablet Take 150 mg by mouth 2 (two) times daily.      lisinopriL-hydrochlorothiazide (PRINZIDE,ZESTORETIC) 10-12.5 mg per tablet Take 1 tablet by mouth.      meloxicam (MOBIC) 15 MG tablet Take 15 mg by mouth once daily. Instructed to stop until after procedure 1-9-17.      raloxifene (EVISTA) 60 mg tablet Take 60 mg by mouth.      sertraline (ZOLOFT) 100 MG tablet Take 100 mg by mouth every morning.       ergocalciferol (ERGOCALCIFEROL) 50,000 unit Cap Take 1 capsule (50,000 Units total) by mouth every Sunday. 12 capsule 3    liver oil-zinc oxide (DESITIN) ointment Apply topically as needed for Dry Skin.       No current facility-administered medications for this visit.         ROS  Review of Systems   Constitutional:  Positive for unexpected weight change (loss). Negative for activity change, appetite change and fever.   HENT:  Negative for congestion and sore throat.    Eyes:  Negative for photophobia and visual disturbance.   Respiratory:  Negative for cough and shortness of breath.    Cardiovascular:  Negative for chest pain and leg swelling.   Gastrointestinal:  Negative for abdominal pain, constipation, diarrhea, nausea and vomiting.  "  Endocrine: Negative for polydipsia, polyphagia and polyuria.   Genitourinary:  Negative for dysuria and urgency.   Musculoskeletal:  Positive for arthralgias (chronic) and back pain (chronic).   Skin:  Negative for color change.   Neurological:  Negative for dizziness, weakness and headaches.   Psychiatric/Behavioral:  Negative for dysphoric mood and sleep disturbance. The patient is not nervous/anxious.          Physical Exam  Vitals:    03/07/23 0948   BP: (!) 150/88   BP Location: Right arm   Patient Position: Sitting   BP Method: Medium (Manual)   Pulse: 75   Temp: 98.5 °F (36.9 °C)   TempSrc: Oral   SpO2: 98%   Weight: 45.8 kg (101 lb 1.3 oz)   Height: 5' 2.01" (1.575 m)    Body mass index is 18.48 kg/m².  Weight: 45.8 kg (101 lb 1.3 oz)   Height: 5' 2.01" (157.5 cm)   Physical Exam  Constitutional:       General: She is not in acute distress.     Appearance: She is underweight.   HENT:      Head: Normocephalic and atraumatic.   Neck:      Thyroid: No thyromegaly.      Vascular: No carotid bruit.   Cardiovascular:      Rate and Rhythm: Normal rate and regular rhythm.      Pulses: Normal pulses.      Heart sounds: Normal heart sounds.   Pulmonary:      Effort: Pulmonary effort is normal. No respiratory distress.      Breath sounds: Normal breath sounds.   Musculoskeletal:      Cervical back: Neck supple.      Right lower leg: No edema.      Left lower leg: No edema.   Lymphadenopathy:      Cervical: No cervical adenopathy.   Skin:     General: Skin is warm and dry.      Findings: No rash.   Neurological:      General: No focal deficit present.      Mental Status: She is alert and oriented to person, place, and time.   Psychiatric:         Mood and Affect: Mood normal.         Behavior: Behavior normal.         Thought Content: Thought content normal.           "

## 2023-03-08 ENCOUNTER — LAB VISIT (OUTPATIENT)
Dept: LAB | Facility: HOSPITAL | Age: 63
End: 2023-03-08
Attending: FAMILY MEDICINE
Payer: MEDICARE

## 2023-03-08 DIAGNOSIS — E07.9 THYROID DISEASE: ICD-10-CM

## 2023-03-08 DIAGNOSIS — E78.5 HYPERLIPIDEMIA, UNSPECIFIED HYPERLIPIDEMIA TYPE: ICD-10-CM

## 2023-03-08 DIAGNOSIS — I10 ESSENTIAL HYPERTENSION: ICD-10-CM

## 2023-03-08 DIAGNOSIS — Z00.00 ANNUAL PHYSICAL EXAM: ICD-10-CM

## 2023-03-08 LAB
ALBUMIN SERPL BCP-MCNC: 4 G/DL (ref 3.5–5.2)
ALP SERPL-CCNC: 72 U/L (ref 55–135)
ALT SERPL W/O P-5'-P-CCNC: 12 U/L (ref 10–44)
ANION GAP SERPL CALC-SCNC: 10 MMOL/L (ref 8–16)
AST SERPL-CCNC: 17 U/L (ref 10–40)
BASOPHILS # BLD AUTO: 0.04 K/UL (ref 0–0.2)
BASOPHILS NFR BLD: 0.7 % (ref 0–1.9)
BILIRUB SERPL-MCNC: 0.4 MG/DL (ref 0.1–1)
BUN SERPL-MCNC: 11 MG/DL (ref 8–23)
CALCIUM SERPL-MCNC: 9.4 MG/DL (ref 8.7–10.5)
CHLORIDE SERPL-SCNC: 103 MMOL/L (ref 95–110)
CHOLEST SERPL-MCNC: 266 MG/DL (ref 120–199)
CHOLEST/HDLC SERPL: 3.4 {RATIO} (ref 2–5)
CO2 SERPL-SCNC: 24 MMOL/L (ref 23–29)
CREAT SERPL-MCNC: 0.8 MG/DL (ref 0.5–1.4)
DIFFERENTIAL METHOD: ABNORMAL
EOSINOPHIL # BLD AUTO: 0.1 K/UL (ref 0–0.5)
EOSINOPHIL NFR BLD: 1.2 % (ref 0–8)
ERYTHROCYTE [DISTWIDTH] IN BLOOD BY AUTOMATED COUNT: 14.2 % (ref 11.5–14.5)
EST. GFR  (NO RACE VARIABLE): >60 ML/MIN/1.73 M^2
ESTIMATED AVG GLUCOSE: 114 MG/DL (ref 68–131)
GLUCOSE SERPL-MCNC: 94 MG/DL (ref 70–110)
HBA1C MFR BLD: 5.6 % (ref 4–5.6)
HCT VFR BLD AUTO: 41.8 % (ref 37–48.5)
HCV AB SERPL QL IA: NORMAL
HDLC SERPL-MCNC: 78 MG/DL (ref 40–75)
HDLC SERPL: 29.3 % (ref 20–50)
HGB BLD-MCNC: 13.7 G/DL (ref 12–16)
HIV 1+2 AB+HIV1 P24 AG SERPL QL IA: NORMAL
IMM GRANULOCYTES # BLD AUTO: 0.02 K/UL (ref 0–0.04)
IMM GRANULOCYTES NFR BLD AUTO: 0.3 % (ref 0–0.5)
LDLC SERPL CALC-MCNC: 172.8 MG/DL (ref 63–159)
LYMPHOCYTES # BLD AUTO: 2.2 K/UL (ref 1–4.8)
LYMPHOCYTES NFR BLD: 38.6 % (ref 18–48)
MCH RBC QN AUTO: 30.2 PG (ref 27–31)
MCHC RBC AUTO-ENTMCNC: 32.8 G/DL (ref 32–36)
MCV RBC AUTO: 92 FL (ref 82–98)
MONOCYTES # BLD AUTO: 0.4 K/UL (ref 0.3–1)
MONOCYTES NFR BLD: 7.6 % (ref 4–15)
NEUTROPHILS # BLD AUTO: 3 K/UL (ref 1.8–7.7)
NEUTROPHILS NFR BLD: 51.6 % (ref 38–73)
NONHDLC SERPL-MCNC: 188 MG/DL
NRBC BLD-RTO: 0 /100 WBC
PLATELET # BLD AUTO: 358 K/UL (ref 150–450)
PMV BLD AUTO: 8.8 FL (ref 9.2–12.9)
POTASSIUM SERPL-SCNC: 4.2 MMOL/L (ref 3.5–5.1)
PROT SERPL-MCNC: 7.1 G/DL (ref 6–8.4)
RBC # BLD AUTO: 4.53 M/UL (ref 4–5.4)
SODIUM SERPL-SCNC: 137 MMOL/L (ref 136–145)
T4 FREE SERPL-MCNC: 0.79 NG/DL (ref 0.71–1.51)
TRIGL SERPL-MCNC: 76 MG/DL (ref 30–150)
TSH SERPL DL<=0.005 MIU/L-ACNC: 3.4 UIU/ML (ref 0.4–4)
WBC # BLD AUTO: 5.81 K/UL (ref 3.9–12.7)

## 2023-03-08 PROCEDURE — 80061 LIPID PANEL: CPT | Performed by: FAMILY MEDICINE

## 2023-03-08 PROCEDURE — 84439 ASSAY OF FREE THYROXINE: CPT | Performed by: FAMILY MEDICINE

## 2023-03-08 PROCEDURE — 83036 HEMOGLOBIN GLYCOSYLATED A1C: CPT | Performed by: FAMILY MEDICINE

## 2023-03-08 PROCEDURE — 84443 ASSAY THYROID STIM HORMONE: CPT | Performed by: FAMILY MEDICINE

## 2023-03-08 PROCEDURE — 36415 COLL VENOUS BLD VENIPUNCTURE: CPT | Mod: PO | Performed by: FAMILY MEDICINE

## 2023-03-08 PROCEDURE — 86803 HEPATITIS C AB TEST: CPT | Performed by: FAMILY MEDICINE

## 2023-03-08 PROCEDURE — 85025 COMPLETE CBC W/AUTO DIFF WBC: CPT | Performed by: FAMILY MEDICINE

## 2023-03-08 PROCEDURE — 87389 HIV-1 AG W/HIV-1&-2 AB AG IA: CPT | Performed by: FAMILY MEDICINE

## 2023-03-08 PROCEDURE — 80053 COMPREHEN METABOLIC PANEL: CPT | Performed by: FAMILY MEDICINE

## 2023-03-09 ENCOUNTER — PATIENT MESSAGE (OUTPATIENT)
Dept: FAMILY MEDICINE | Facility: CLINIC | Age: 63
End: 2023-03-09
Payer: MEDICARE

## 2023-03-09 DIAGNOSIS — E78.5 HYPERLIPIDEMIA, UNSPECIFIED HYPERLIPIDEMIA TYPE: Primary | ICD-10-CM

## 2023-03-09 DIAGNOSIS — E55.9 HYPOVITAMINOSIS D: ICD-10-CM

## 2023-03-09 DIAGNOSIS — Z86.39 H/O HYPERLIPIDEMIA: ICD-10-CM

## 2023-03-09 DIAGNOSIS — I10 ESSENTIAL HYPERTENSION: ICD-10-CM

## 2023-05-10 ENCOUNTER — OFFICE VISIT (OUTPATIENT)
Dept: FAMILY MEDICINE | Facility: CLINIC | Age: 63
End: 2023-05-10
Payer: MEDICARE

## 2023-05-10 VITALS
WEIGHT: 101.44 LBS | SYSTOLIC BLOOD PRESSURE: 112 MMHG | HEIGHT: 62 IN | HEART RATE: 80 BPM | TEMPERATURE: 98 F | BODY MASS INDEX: 18.67 KG/M2 | DIASTOLIC BLOOD PRESSURE: 78 MMHG | OXYGEN SATURATION: 97 %

## 2023-05-10 VITALS
TEMPERATURE: 98 F | SYSTOLIC BLOOD PRESSURE: 112 MMHG | DIASTOLIC BLOOD PRESSURE: 78 MMHG | HEART RATE: 80 BPM | HEIGHT: 62 IN | BODY MASS INDEX: 18.64 KG/M2 | OXYGEN SATURATION: 97 % | WEIGHT: 101.31 LBS

## 2023-05-10 DIAGNOSIS — M15.9 OSTEOARTHRITIS OF MULTIPLE JOINTS, UNSPECIFIED OSTEOARTHRITIS TYPE: ICD-10-CM

## 2023-05-10 DIAGNOSIS — Z98.82 HISTORY OF BILATERAL BREAST IMPLANTS: Primary | ICD-10-CM

## 2023-05-10 DIAGNOSIS — E78.5 HYPERLIPIDEMIA, UNSPECIFIED HYPERLIPIDEMIA TYPE: ICD-10-CM

## 2023-05-10 DIAGNOSIS — I10 ESSENTIAL HYPERTENSION: ICD-10-CM

## 2023-05-10 DIAGNOSIS — Z87.891 HISTORY OF SMOKING: ICD-10-CM

## 2023-05-10 DIAGNOSIS — Z00.00 ENCOUNTER FOR PREVENTIVE HEALTH EXAMINATION: Primary | ICD-10-CM

## 2023-05-10 DIAGNOSIS — K62.3 RECTAL PROLAPSE: ICD-10-CM

## 2023-05-10 DIAGNOSIS — M46.06 SPINAL ENTHESOPATHY, LUMBAR REGION: ICD-10-CM

## 2023-05-10 PROCEDURE — 1159F PR MEDICATION LIST DOCUMENTED IN MEDICAL RECORD: ICD-10-PCS | Mod: CPTII,S$GLB,,

## 2023-05-10 PROCEDURE — 4010F PR ACE/ARB THEARPY RXD/TAKEN: ICD-10-PCS | Mod: CPTII,S$GLB,, | Performed by: NURSE PRACTITIONER

## 2023-05-10 PROCEDURE — 3078F PR MOST RECENT DIASTOLIC BLOOD PRESSURE < 80 MM HG: ICD-10-PCS | Mod: CPTII,S$GLB,, | Performed by: NURSE PRACTITIONER

## 2023-05-10 PROCEDURE — 99213 OFFICE O/P EST LOW 20 MIN: CPT | Mod: S$GLB,,,

## 2023-05-10 PROCEDURE — G0402 INITIAL PREVENTIVE EXAM: HCPCS | Mod: S$GLB,,, | Performed by: NURSE PRACTITIONER

## 2023-05-10 PROCEDURE — 3074F PR MOST RECENT SYSTOLIC BLOOD PRESSURE < 130 MM HG: ICD-10-PCS | Mod: CPTII,S$GLB,, | Performed by: NURSE PRACTITIONER

## 2023-05-10 PROCEDURE — 99999 PR PBB SHADOW E&M-EST. PATIENT-LVL IV: CPT | Mod: PBBFAC,,, | Performed by: NURSE PRACTITIONER

## 2023-05-10 PROCEDURE — 3288F PR FALLS RISK ASSESSMENT DOCUMENTED: ICD-10-PCS | Mod: CPTII,S$GLB,, | Performed by: NURSE PRACTITIONER

## 2023-05-10 PROCEDURE — 3008F PR BODY MASS INDEX (BMI) DOCUMENTED: ICD-10-PCS | Mod: CPTII,S$GLB,,

## 2023-05-10 PROCEDURE — 4010F ACE/ARB THERAPY RXD/TAKEN: CPT | Mod: CPTII,S$GLB,, | Performed by: NURSE PRACTITIONER

## 2023-05-10 PROCEDURE — 4010F PR ACE/ARB THEARPY RXD/TAKEN: ICD-10-PCS | Mod: CPTII,S$GLB,,

## 2023-05-10 PROCEDURE — G0402 PR WELCOME MEDICARE PREVENTIVE VISIT NEW ENROLLEE: ICD-10-PCS | Mod: S$GLB,,, | Performed by: NURSE PRACTITIONER

## 2023-05-10 PROCEDURE — 1159F MED LIST DOCD IN RCRD: CPT | Mod: CPTII,S$GLB,,

## 2023-05-10 PROCEDURE — 1159F MED LIST DOCD IN RCRD: CPT | Mod: CPTII,S$GLB,, | Performed by: NURSE PRACTITIONER

## 2023-05-10 PROCEDURE — 1101F PT FALLS ASSESS-DOCD LE1/YR: CPT | Mod: CPTII,S$GLB,,

## 2023-05-10 PROCEDURE — 3044F HG A1C LEVEL LT 7.0%: CPT | Mod: CPTII,S$GLB,, | Performed by: NURSE PRACTITIONER

## 2023-05-10 PROCEDURE — 3078F DIAST BP <80 MM HG: CPT | Mod: CPTII,S$GLB,,

## 2023-05-10 PROCEDURE — 1101F PR PT FALLS ASSESS DOC 0-1 FALLS W/OUT INJ PAST YR: ICD-10-PCS | Mod: CPTII,S$GLB,,

## 2023-05-10 PROCEDURE — 99999 PR PBB SHADOW E&M-EST. PATIENT-LVL IV: ICD-10-PCS | Mod: PBBFAC,,, | Performed by: NURSE PRACTITIONER

## 2023-05-10 PROCEDURE — 3008F BODY MASS INDEX DOCD: CPT | Mod: CPTII,S$GLB,,

## 2023-05-10 PROCEDURE — 3044F HG A1C LEVEL LT 7.0%: CPT | Mod: CPTII,S$GLB,,

## 2023-05-10 PROCEDURE — 1159F PR MEDICATION LIST DOCUMENTED IN MEDICAL RECORD: ICD-10-PCS | Mod: CPTII,S$GLB,, | Performed by: NURSE PRACTITIONER

## 2023-05-10 PROCEDURE — 3074F SYST BP LT 130 MM HG: CPT | Mod: CPTII,S$GLB,, | Performed by: NURSE PRACTITIONER

## 2023-05-10 PROCEDURE — 3074F PR MOST RECENT SYSTOLIC BLOOD PRESSURE < 130 MM HG: ICD-10-PCS | Mod: CPTII,S$GLB,,

## 2023-05-10 PROCEDURE — 99213 PR OFFICE/OUTPT VISIT, EST, LEVL III, 20-29 MIN: ICD-10-PCS | Mod: S$GLB,,,

## 2023-05-10 PROCEDURE — 1160F PR REVIEW ALL MEDS BY PRESCRIBER/CLIN PHARMACIST DOCUMENTED: ICD-10-PCS | Mod: CPTII,S$GLB,, | Performed by: NURSE PRACTITIONER

## 2023-05-10 PROCEDURE — 3008F PR BODY MASS INDEX (BMI) DOCUMENTED: ICD-10-PCS | Mod: CPTII,S$GLB,, | Performed by: NURSE PRACTITIONER

## 2023-05-10 PROCEDURE — 1101F PT FALLS ASSESS-DOCD LE1/YR: CPT | Mod: CPTII,S$GLB,, | Performed by: NURSE PRACTITIONER

## 2023-05-10 PROCEDURE — 3288F FALL RISK ASSESSMENT DOCD: CPT | Mod: CPTII,S$GLB,,

## 2023-05-10 PROCEDURE — 3288F FALL RISK ASSESSMENT DOCD: CPT | Mod: CPTII,S$GLB,, | Performed by: NURSE PRACTITIONER

## 2023-05-10 PROCEDURE — 1160F RVW MEDS BY RX/DR IN RCRD: CPT | Mod: CPTII,S$GLB,, | Performed by: NURSE PRACTITIONER

## 2023-05-10 PROCEDURE — 1101F PR PT FALLS ASSESS DOC 0-1 FALLS W/OUT INJ PAST YR: ICD-10-PCS | Mod: CPTII,S$GLB,, | Performed by: NURSE PRACTITIONER

## 2023-05-10 PROCEDURE — 3288F PR FALLS RISK ASSESSMENT DOCUMENTED: ICD-10-PCS | Mod: CPTII,S$GLB,,

## 2023-05-10 PROCEDURE — 99999 PR PBB SHADOW E&M-EST. PATIENT-LVL V: CPT | Mod: PBBFAC,,,

## 2023-05-10 PROCEDURE — 3044F PR MOST RECENT HEMOGLOBIN A1C LEVEL <7.0%: ICD-10-PCS | Mod: CPTII,S$GLB,, | Performed by: NURSE PRACTITIONER

## 2023-05-10 PROCEDURE — 3008F BODY MASS INDEX DOCD: CPT | Mod: CPTII,S$GLB,, | Performed by: NURSE PRACTITIONER

## 2023-05-10 PROCEDURE — 3078F PR MOST RECENT DIASTOLIC BLOOD PRESSURE < 80 MM HG: ICD-10-PCS | Mod: CPTII,S$GLB,,

## 2023-05-10 PROCEDURE — 3074F SYST BP LT 130 MM HG: CPT | Mod: CPTII,S$GLB,,

## 2023-05-10 PROCEDURE — 99999 PR PBB SHADOW E&M-EST. PATIENT-LVL V: ICD-10-PCS | Mod: PBBFAC,,,

## 2023-05-10 PROCEDURE — 3044F PR MOST RECENT HEMOGLOBIN A1C LEVEL <7.0%: ICD-10-PCS | Mod: CPTII,S$GLB,,

## 2023-05-10 PROCEDURE — 4010F ACE/ARB THERAPY RXD/TAKEN: CPT | Mod: CPTII,S$GLB,,

## 2023-05-10 PROCEDURE — 3078F DIAST BP <80 MM HG: CPT | Mod: CPTII,S$GLB,, | Performed by: NURSE PRACTITIONER

## 2023-05-10 RX ORDER — OXYCODONE AND ACETAMINOPHEN 7.5; 325 MG/1; MG/1
1 TABLET ORAL EVERY 8 HOURS PRN
COMMUNITY
Start: 2023-04-11 | End: 2023-05-10

## 2023-05-10 RX ORDER — OXYCODONE AND ACETAMINOPHEN 10; 325 MG/1; MG/1
1 TABLET ORAL EVERY 6 HOURS PRN
COMMUNITY
Start: 2023-04-11 | End: 2023-05-10

## 2023-05-10 RX ORDER — CEPHALEXIN 500 MG/1
500 CAPSULE ORAL 3 TIMES DAILY
COMMUNITY
Start: 2023-04-11 | End: 2023-05-10

## 2023-05-10 RX ORDER — HYDROCODONE BITARTRATE AND ACETAMINOPHEN 10; 325 MG/1; MG/1
1 TABLET ORAL EVERY 6 HOURS PRN
Status: ON HOLD | COMMUNITY
Start: 2023-04-28 | End: 2023-07-26 | Stop reason: HOSPADM

## 2023-05-10 RX ORDER — OXYCODONE AND ACETAMINOPHEN 5; 325 MG/1; MG/1
1 TABLET ORAL EVERY 8 HOURS PRN
COMMUNITY
Start: 2023-04-11 | End: 2023-05-10

## 2023-05-10 NOTE — PROGRESS NOTES
"  Marilee Townsend presented for a  Medicare AWV and comprehensive Health Risk Assessment today. The following components were reviewed and updated:    Medical history  Family History  Social history  Allergies and Current Medications  Health Risk Assessment  Health Maintenance  Care Team       ** See Completed Assessments for Annual Wellness Visit within the encounter summary.**       The following assessments were completed:  Living Situation  CAGE  Depression Screening  Timed Get Up and Go  Whisper Test  Cognitive Function Screening  Nutrition Screening  ADL Screening  PAQ Screening          Vitals:    05/10/23 1445   BP: 112/78   Pulse: 80   Temp: 97.8 °F (36.6 °C)   SpO2: 97%   Weight: 46 kg (101 lb 6.6 oz)   Height: 5' 2" (1.575 m)     Body mass index is 18.55 kg/m².  Physical Exam  Vitals and nursing note reviewed.   Constitutional:       Appearance: Normal appearance.   Cardiovascular:      Rate and Rhythm: Normal rate.      Pulses: Normal pulses.      Heart sounds: Normal heart sounds.   Pulmonary:      Effort: Pulmonary effort is normal.      Breath sounds: Normal breath sounds.   Musculoskeletal:         General: Normal range of motion.   Neurological:      Mental Status: She is alert and oriented to person, place, and time.   Psychiatric:         Mood and Affect: Mood normal.         Behavior: Behavior normal.           Diagnoses and health risks identified today and associated recommendations/orders:    1. Encounter for preventive health examination  Pt was seen today for an Annual Wellness visit. Healthcare maintenance and screening recommendations were discussed and updated as indicated. Return in one year for AWV.    Review current opioid prescriptions:yes  Screen for potential Substance Use Disorders:yes  Patient is aware of non-narcotic pain options  Followed by prescribing provider    2. Spinal enthesopathy, lumbar region  The current medical regimen is effective;  continue present plan and " medications.  Followed by Neurology with the [facility].    3. Essential hypertension  Stable. The current medical regimen is effective;  continue present plan and medications.    4. Hyperlipidemia, unspecified hyperlipidemia type  The current medical regimen is effective;  continue present plan and medications.        Provided Marilee with a 5-10 year written screening schedule and personal prevention plan. Recommendations were developed using the USPSTF age appropriate recommendations. Education, counseling, and referrals were provided as needed. After Visit Summary printed and given to patient which includes a list of additional screenings\tests needed.    Follow up in about 1 year (around 5/10/2024).    CALLIE Sawyer  I offered to discuss advanced care planning, including how to pick a person who would make decisions for you if you were unable to make them for yourself, called a health care power of , and what kind of decisions you might make such as use of life sustaining treatments such as ventilators and tube feeding when faced with a life limiting illness recorded on a living will that they will need to know. (How you want to be cared for as you near the end of your natural life)     X Patient is interested in learning more about how to make advanced directives.  I provided them paperwork and offered to discuss this with them.

## 2023-05-10 NOTE — PROGRESS NOTES
HPI     Chief Complaint:  No chief complaint on file.      Marilee Townsend is a 63 y.o. female with multiple medical diagnoses as listed in the medical history and problem list that presents for referral.  Pt is new to me but seen in clinic with last appointment 3/7/2023.      HPI    Old silicone breast implants, they are getting old and calcified and concerned that they are popping out.  Placed 35 years ago. No pain, redness, discharge, fever or systemic signs of infection.     Right shoulder replacement with bone and joint, surgery was March 21. 5cm healing scar noted to left anterior shoulder.     Recurrent rectal prolapse. Able to reduce but has problems with incontience. Pt not ready to deal with that. Will f/u with colon/rectal when she is ready.     Requesting scheduling of DEXA and MMG. Delayed MMG since she just had shoulder surgery.   Assessment & Plan       Problem List Items Addressed This Visit          GI    Rectal prolapse  -Declines referral to colon/rectal at this time  - wants to address other shoulder surgery and breast concerns first     Other Visit Diagnoses       History of bilateral breast implants    -  Primary  -referral to plastic surgery placed  -no s/s of infection  -return precautions given    Relevant Orders    Ambulatory referral/consult to Plastic Surgery    Osteoarthritis of multiple joints, unspecified osteoarthritis type      The current medical regimen is effective;  continue present plan and medications.      History of smoking      Only smokes occasional, once in a blue moon  -Declines smoking cessation            --------------------------------------------      Health Maintenance:  Health Maintenance         Date Due Completion Date    TETANUS VACCINE Never done ---    Mammogram Never done ---    DEXA Scan Never done ---    Colorectal Cancer Screening 03/08/2024 3/8/2023    Lipid Panel 03/08/2028 3/8/2023            Health maintenance reviewed    Follow Up:  No  follow-ups on file.    Discussed DDx, condition, and treatment.   Education sent to patient portal/included in after visit summary.  ED precautions given.   Notify provider if symptoms do not resolve or increase in severity.   Patient verbalizes understanding and agrees with plan of care.    Exam     Review of Systems:  (as noted above)  Review of Systems   Constitutional:  Negative for activity change, appetite change, chills, diaphoresis, fatigue, fever and unexpected weight change.   Respiratory:  Negative for cough, shortness of breath and wheezing.    Cardiovascular:  Negative for chest pain, palpitations and leg swelling.   Gastrointestinal:  Positive for rectal pain.   Skin:  Negative for color change, pallor, rash and wound.   Neurological:  Negative for dizziness, seizures, speech difficulty and light-headedness.   Hematological:  Negative for adenopathy. Does not bruise/bleed easily.   Psychiatric/Behavioral:  Negative for confusion and decreased concentration. The patient is not nervous/anxious.      Physical Exam:   Physical Exam  Constitutional:       Appearance: Normal appearance. She is normal weight.   HENT:      Head: Normocephalic and atraumatic.      Nose: Nose normal.   Eyes:      Pupils: Pupils are equal, round, and reactive to light.   Cardiovascular:      Rate and Rhythm: Normal rate and regular rhythm.      Pulses: Normal pulses.      Heart sounds: Normal heart sounds. No murmur heard.  Pulmonary:      Effort: Pulmonary effort is normal. No tachypnea, bradypnea, accessory muscle usage, prolonged expiration, respiratory distress or retractions.      Breath sounds: Decreased breath sounds present. No wheezing.   Chest:   Breasts:     Breasts are asymmetrical.      Right: No swelling, bleeding, inverted nipple, mass, nipple discharge, skin change or tenderness.      Left: No swelling, bleeding, inverted nipple, mass, nipple discharge, skin change or tenderness.          Comments: Induration  "noted from implants    No dimpling or masses.   Abdominal:      General: Abdomen is flat.      Palpations: Abdomen is soft.   Musculoskeletal:         General: Normal range of motion.      Cervical back: No rigidity.   Skin:     General: Skin is warm and dry.      Capillary Refill: Capillary refill takes less than 2 seconds.      Coloration: Skin is not jaundiced or pale.      Findings: No bruising, erythema, lesion or rash.   Neurological:      General: No focal deficit present.      Mental Status: She is alert and oriented to person, place, and time.   Psychiatric:         Mood and Affect: Mood normal.     Vitals:    05/10/23 1329   BP: 112/78   BP Location: Left arm   Patient Position: Sitting   BP Method: Medium (Automatic)   Pulse: 80   Temp: 97.8 °F (36.6 °C)   TempSrc: Oral   SpO2: 97%   Weight: 45.9 kg (101 lb 4.8 oz)   Height: 5' 2.01" (1.575 m)      Body mass index is 18.52 kg/m².        History     Past Medical History:  Past Medical History:   Diagnosis Date    Arthritis     Depression     Encounter for blood transfusion     as a child    Fibromyalgia     Neck pain     Rectal prolapse        Past Surgical History:  Past Surgical History:   Procedure Laterality Date    BREAST SURGERY Bilateral     augmentation-Implants    epidural steroid injection  01/09/2017    twice-11/2017  & 1/9/17 to neck    HERNIA REPAIR      umbilical    HYSTERECTOMY      TONSILLECTOMY      WRIST FRACTURE SURGERY Right     with hardware placed       Social History:  Social History     Socioeconomic History    Marital status:    Tobacco Use    Smoking status: Former     Packs/day: 1.00     Years: 20.00     Pack years: 20.00     Types: Cigarettes     Start date: 5/12/2014    Smokeless tobacco: Never   Substance and Sexual Activity    Alcohol use: Yes     Comment: rarely    Drug use: No    Sexual activity: Not Currently       Family History:  Family History   Problem Relation Age of Onset    Anesthesia problems Neg Hx  "       Allergies and Medications: (updated and reviewed)  Review of patient's allergies indicates:   Allergen Reactions    Morphine Nausea And Vomiting     Current Outpatient Medications   Medication Sig Dispense Refill    amLODIPine (NORVASC) 5 MG tablet       buPROPion (WELLBUTRIN SR) 150 MG TBSR 12 hr tablet Take 150 mg by mouth 2 (two) times daily.      ergocalciferol (ERGOCALCIFEROL) 50,000 unit Cap Take 1 capsule (50,000 Units total) by mouth every Sunday. 12 capsule 3    HYDROcodone-acetaminophen (NORCO)  mg per tablet Take 1 tablet by mouth every 6 (six) hours as needed.      lisinopriL-hydrochlorothiazide (PRINZIDE,ZESTORETIC) 10-12.5 mg per tablet Take 1 tablet by mouth.      meloxicam (MOBIC) 15 MG tablet Take 15 mg by mouth once daily. Instructed to stop until after procedure 1-9-17.      raloxifene (EVISTA) 60 mg tablet Take 60 mg by mouth.      sertraline (ZOLOFT) 100 MG tablet Take 100 mg by mouth every morning.       cephALEXin (KEFLEX) 500 MG capsule Take 500 mg by mouth 3 (three) times daily.      liver oil-zinc oxide (DESITIN) ointment Apply topically as needed for Dry Skin.      oxyCODONE-acetaminophen (PERCOCET)  mg per tablet Take 1 tablet by mouth every 6 (six) hours as needed.      oxyCODONE-acetaminophen (PERCOCET) 5-325 mg per tablet Take 1 tablet by mouth every 8 (eight) hours as needed.      oxyCODONE-acetaminophen (PERCOCET) 7.5-325 mg per tablet Take 1 tablet by mouth every 8 (eight) hours as needed.       No current facility-administered medications for this visit.       Patient Care Team:  Claudia Hopper DO as PCP - General (Family Medicine)         - The patient is given an After Visit Summary that lists all medications with directions, allergies, education, orders placed during this encounter and follow-up instructions.      - I have reviewed the patient's medical information including past medical, family, and social history sections including the medications and  allergies.      - We discussed the patient's current medications.     This note was created by combination of typed  and MModal dictation.  Transcription errors may be present.  If there are any questions, please contact me.

## 2023-05-10 NOTE — PATIENT INSTRUCTIONS
//////////PHONE NUMBERS//////////    [facility]--[phone]  Imaging, Xray, CT, MRI, Ultrasound---[phone]  Bariatrics---[phone]  Breast Surgery---[phone]  Case Management---[phone]  Colonoscopy---[phone]  DME---[phone]  Infectious Disease---[phone]  Interventional Radiology---[phone]  Medical Records---[phone]  [facility]---[phone]  Optometry/Ophthalmology---[phone]  [facility]---[phone]  Physical Therapy---[phone]  Psychiatry---[phone] or [phone]  Plastic Surgery---[phone]  Recovery--[phone] option 2, or [phone].  Sleep Study---[phone]  Smoking Cessation---[phone]  Wound Care---[phone]  Referral Desk---[phone]    /////////////////////////////////////////////////

## 2023-06-01 DIAGNOSIS — M81.0 AGE RELATED OSTEOPOROSIS, UNSPECIFIED PATHOLOGICAL FRACTURE PRESENCE: ICD-10-CM

## 2023-06-01 RX ORDER — ERGOCALCIFEROL 1.25 MG/1
50000 CAPSULE ORAL
Qty: 12 CAPSULE | Refills: 3 | Status: SHIPPED | OUTPATIENT
Start: 2023-06-04 | End: 2023-11-16 | Stop reason: SDUPTHER

## 2023-07-10 ENCOUNTER — HOSPITAL ENCOUNTER (OUTPATIENT)
Dept: RADIOLOGY | Facility: CLINIC | Age: 63
Discharge: HOME | End: 2023-07-10
Attending: FAMILY MEDICINE
Payer: MEDICARE

## 2023-07-10 DIAGNOSIS — M81.0 AGE RELATED OSTEOPOROSIS, UNSPECIFIED PATHOLOGICAL FRACTURE PRESENCE: ICD-10-CM

## 2023-07-10 PROCEDURE — 77080 DXA BONE DENSITY AXIAL: CPT | Mod: 26,,, | Performed by: INTERNAL MEDICINE

## 2023-07-10 PROCEDURE — 77080 DXA BONE DENSITY AXIAL SKELETON 1 OR MORE SITES: ICD-10-PCS | Mod: 26,,, | Performed by: INTERNAL MEDICINE

## 2023-07-10 PROCEDURE — 77080 DXA BONE DENSITY AXIAL: CPT | Mod: TC,PO

## 2023-07-11 ENCOUNTER — PATIENT MESSAGE (OUTPATIENT)
Dept: FAMILY MEDICINE | Facility: CLINIC | Age: 63
End: 2023-07-11
Payer: MEDICARE

## 2023-07-11 DIAGNOSIS — Q79.9: Primary | ICD-10-CM

## 2023-07-11 NOTE — TELEPHONE ENCOUNTER
Uncertain if pt uses [employer] account.     Please inform her that her bone density scan is showing stable osteoporosis. Continue Evista treatments, calcium 1200 mg daily, and her weekly vitamin D supplement. She has increased density in the L2 vertebrae in her lower spine, which could indicate a compression fracture. We should follow this up with an x-ray to make sure she did not get a fracture.

## 2023-07-14 ENCOUNTER — HOSPITAL ENCOUNTER (INPATIENT)
Facility: HOSPITAL | Age: 63
LOS: 12 days | Discharge: HOME HEALTH | DRG: 329 | End: 2023-07-26
Attending: EMERGENCY MEDICINE | Admitting: EMERGENCY MEDICINE
Payer: MEDICARE

## 2023-07-14 DIAGNOSIS — D62 ACUTE BLOOD LOSS ANEMIA: Primary | ICD-10-CM

## 2023-07-14 DIAGNOSIS — R07.9 CHEST PAIN: ICD-10-CM

## 2023-07-14 DIAGNOSIS — R94.31 QT PROLONGATION: ICD-10-CM

## 2023-07-14 DIAGNOSIS — K56.609 SMALL BOWEL OBSTRUCTION: ICD-10-CM

## 2023-07-14 DIAGNOSIS — B99.9 INTRA-ABDOMINAL INFECTION: ICD-10-CM

## 2023-07-14 DIAGNOSIS — Z01.89 ENCOUNTER FOR IMAGING STUDY TO CONFIRM NASOGASTRIC (NG) TUBE PLACEMENT: ICD-10-CM

## 2023-07-14 PROBLEM — F32.A DEPRESSION: Status: ACTIVE | Noted: 2023-07-14

## 2023-07-14 PROBLEM — D72.829 LEUKOCYTOSIS: Status: ACTIVE | Noted: 2023-07-14

## 2023-07-14 PROBLEM — I10 HTN (HYPERTENSION): Status: ACTIVE | Noted: 2023-07-14

## 2023-07-14 PROBLEM — M81.0 OSTEOPOROSIS: Status: ACTIVE | Noted: 2023-07-14

## 2023-07-14 LAB
ALBUMIN SERPL BCP-MCNC: 3.7 G/DL (ref 3.5–5.2)
ALP SERPL-CCNC: 69 U/L (ref 55–135)
ALT SERPL W/O P-5'-P-CCNC: 11 U/L (ref 10–44)
ANION GAP SERPL CALC-SCNC: 9 MMOL/L (ref 8–16)
AST SERPL-CCNC: 16 U/L (ref 10–40)
BACTERIA #/AREA URNS HPF: NORMAL /HPF
BASOPHILS # BLD AUTO: 0.03 K/UL (ref 0–0.2)
BASOPHILS NFR BLD: 0.2 % (ref 0–1.9)
BILIRUB SERPL-MCNC: 0.6 MG/DL (ref 0.1–1)
BILIRUB UR QL STRIP: NEGATIVE
BUN SERPL-MCNC: 25 MG/DL (ref 8–23)
CALCIUM SERPL-MCNC: 9.2 MG/DL (ref 8.7–10.5)
CHLORIDE SERPL-SCNC: 105 MMOL/L (ref 95–110)
CLARITY UR: CLEAR
CO2 SERPL-SCNC: 23 MMOL/L (ref 23–29)
COLOR UR: YELLOW
CREAT SERPL-MCNC: 0.7 MG/DL (ref 0.5–1.4)
DIFFERENTIAL METHOD: ABNORMAL
EOSINOPHIL # BLD AUTO: 0 K/UL (ref 0–0.5)
EOSINOPHIL NFR BLD: 0 % (ref 0–8)
ERYTHROCYTE [DISTWIDTH] IN BLOOD BY AUTOMATED COUNT: 13.5 % (ref 11.5–14.5)
EST. GFR  (NO RACE VARIABLE): >60 ML/MIN/1.73 M^2
GLUCOSE SERPL-MCNC: 162 MG/DL (ref 70–110)
GLUCOSE UR QL STRIP: NEGATIVE
HCT VFR BLD AUTO: 37.9 % (ref 37–48.5)
HGB BLD-MCNC: 12.9 G/DL (ref 12–16)
HGB UR QL STRIP: ABNORMAL
HYALINE CASTS #/AREA URNS LPF: 0 /LPF
IMM GRANULOCYTES # BLD AUTO: 0.08 K/UL (ref 0–0.04)
IMM GRANULOCYTES NFR BLD AUTO: 0.4 % (ref 0–0.5)
KETONES UR QL STRIP: NEGATIVE
LACTATE SERPL-SCNC: 1 MMOL/L (ref 0.5–2.2)
LACTATE SERPL-SCNC: 1.6 MMOL/L (ref 0.5–2.2)
LEUKOCYTE ESTERASE UR QL STRIP: NEGATIVE
LIPASE SERPL-CCNC: 4 U/L (ref 4–60)
LYMPHOCYTES # BLD AUTO: 0.8 K/UL (ref 1–4.8)
LYMPHOCYTES NFR BLD: 4.2 % (ref 18–48)
MCH RBC QN AUTO: 30.1 PG (ref 27–31)
MCHC RBC AUTO-ENTMCNC: 34 G/DL (ref 32–36)
MCV RBC AUTO: 89 FL (ref 82–98)
MICROSCOPIC COMMENT: NORMAL
MONOCYTES # BLD AUTO: 0.8 K/UL (ref 0.3–1)
MONOCYTES NFR BLD: 4.3 % (ref 4–15)
NEUTROPHILS # BLD AUTO: 16.7 K/UL (ref 1.8–7.7)
NEUTROPHILS NFR BLD: 90.9 % (ref 38–73)
NITRITE UR QL STRIP: NEGATIVE
NRBC BLD-RTO: 0 /100 WBC
PH UR STRIP: 7 [PH] (ref 5–8)
PLATELET # BLD AUTO: 294 K/UL (ref 150–450)
PMV BLD AUTO: 8.6 FL (ref 9.2–12.9)
POCT GLUCOSE: 110 MG/DL (ref 70–110)
POTASSIUM SERPL-SCNC: 3.7 MMOL/L (ref 3.5–5.1)
PROCALCITONIN SERPL IA-MCNC: 0.29 NG/ML
PROT SERPL-MCNC: 7 G/DL (ref 6–8.4)
PROT UR QL STRIP: ABNORMAL
RBC # BLD AUTO: 4.28 M/UL (ref 4–5.4)
RBC #/AREA URNS HPF: 4 /HPF (ref 0–4)
SODIUM SERPL-SCNC: 137 MMOL/L (ref 136–145)
SP GR UR STRIP: >1.03 (ref 1–1.03)
SQUAMOUS #/AREA URNS HPF: 3 /HPF
URN SPEC COLLECT METH UR: ABNORMAL
UROBILINOGEN UR STRIP-ACNC: NEGATIVE EU/DL
WBC # BLD AUTO: 18.39 K/UL (ref 3.9–12.7)
WBC #/AREA URNS HPF: 0 /HPF (ref 0–5)

## 2023-07-14 PROCEDURE — 96374 THER/PROPH/DIAG INJ IV PUSH: CPT

## 2023-07-14 PROCEDURE — 84145 PROCALCITONIN (PCT): CPT | Performed by: STUDENT IN AN ORGANIZED HEALTH CARE EDUCATION/TRAINING PROGRAM

## 2023-07-14 PROCEDURE — 63600175 PHARM REV CODE 636 W HCPCS: Performed by: EMERGENCY MEDICINE

## 2023-07-14 PROCEDURE — 63600175 PHARM REV CODE 636 W HCPCS: Performed by: SURGERY

## 2023-07-14 PROCEDURE — 81000 URINALYSIS NONAUTO W/SCOPE: CPT | Performed by: EMERGENCY MEDICINE

## 2023-07-14 PROCEDURE — 80053 COMPREHEN METABOLIC PANEL: CPT | Performed by: EMERGENCY MEDICINE

## 2023-07-14 PROCEDURE — 25000003 PHARM REV CODE 250: Performed by: EMERGENCY MEDICINE

## 2023-07-14 PROCEDURE — 99285 EMERGENCY DEPT VISIT HI MDM: CPT | Mod: 25

## 2023-07-14 PROCEDURE — 63600175 PHARM REV CODE 636 W HCPCS: Performed by: STUDENT IN AN ORGANIZED HEALTH CARE EDUCATION/TRAINING PROGRAM

## 2023-07-14 PROCEDURE — 99223 PR INITIAL HOSPITAL CARE,LEVL III: ICD-10-PCS | Mod: GC,,, | Performed by: SURGERY

## 2023-07-14 PROCEDURE — 99223 1ST HOSP IP/OBS HIGH 75: CPT | Mod: GC,,, | Performed by: SURGERY

## 2023-07-14 PROCEDURE — 96375 TX/PRO/DX INJ NEW DRUG ADDON: CPT

## 2023-07-14 PROCEDURE — 11000001 HC ACUTE MED/SURG PRIVATE ROOM

## 2023-07-14 PROCEDURE — 87040 BLOOD CULTURE FOR BACTERIA: CPT | Performed by: STUDENT IN AN ORGANIZED HEALTH CARE EDUCATION/TRAINING PROGRAM

## 2023-07-14 PROCEDURE — 96376 TX/PRO/DX INJ SAME DRUG ADON: CPT

## 2023-07-14 PROCEDURE — 85025 COMPLETE CBC W/AUTO DIFF WBC: CPT | Performed by: EMERGENCY MEDICINE

## 2023-07-14 PROCEDURE — 83605 ASSAY OF LACTIC ACID: CPT | Performed by: EMERGENCY MEDICINE

## 2023-07-14 PROCEDURE — 25500020 PHARM REV CODE 255: Performed by: EMERGENCY MEDICINE

## 2023-07-14 PROCEDURE — 83690 ASSAY OF LIPASE: CPT | Performed by: EMERGENCY MEDICINE

## 2023-07-14 PROCEDURE — 83605 ASSAY OF LACTIC ACID: CPT | Mod: 91 | Performed by: STUDENT IN AN ORGANIZED HEALTH CARE EDUCATION/TRAINING PROGRAM

## 2023-07-14 RX ORDER — SODIUM CHLORIDE 9 MG/ML
1000 INJECTION, SOLUTION INTRAVENOUS
Status: COMPLETED | OUTPATIENT
Start: 2023-07-14 | End: 2023-07-14

## 2023-07-14 RX ORDER — ACETAMINOPHEN 325 MG/1
650 TABLET ORAL EVERY 8 HOURS PRN
Status: DISCONTINUED | OUTPATIENT
Start: 2023-07-14 | End: 2023-07-26 | Stop reason: HOSPADM

## 2023-07-14 RX ORDER — IBUPROFEN 200 MG
24 TABLET ORAL
Status: DISCONTINUED | OUTPATIENT
Start: 2023-07-14 | End: 2023-07-26 | Stop reason: HOSPADM

## 2023-07-14 RX ORDER — ACETAMINOPHEN 325 MG/1
650 TABLET ORAL EVERY 4 HOURS PRN
Status: DISCONTINUED | OUTPATIENT
Start: 2023-07-14 | End: 2023-07-26 | Stop reason: HOSPADM

## 2023-07-14 RX ORDER — GLUCAGON 1 MG
1 KIT INJECTION
Status: DISCONTINUED | OUTPATIENT
Start: 2023-07-14 | End: 2023-07-26 | Stop reason: HOSPADM

## 2023-07-14 RX ORDER — NALOXONE HCL 0.4 MG/ML
0.02 VIAL (ML) INJECTION
Status: DISCONTINUED | OUTPATIENT
Start: 2023-07-14 | End: 2023-07-26 | Stop reason: HOSPADM

## 2023-07-14 RX ORDER — SERTRALINE HYDROCHLORIDE 50 MG/1
100 TABLET, FILM COATED ORAL EVERY MORNING
Status: DISCONTINUED | OUTPATIENT
Start: 2023-07-14 | End: 2023-07-26 | Stop reason: HOSPADM

## 2023-07-14 RX ORDER — AMLODIPINE BESYLATE 5 MG/1
5 TABLET ORAL DAILY
Status: DISCONTINUED | OUTPATIENT
Start: 2023-07-15 | End: 2023-07-26 | Stop reason: HOSPADM

## 2023-07-14 RX ORDER — HYDROMORPHONE HYDROCHLORIDE 1 MG/ML
0.5 INJECTION, SOLUTION INTRAMUSCULAR; INTRAVENOUS; SUBCUTANEOUS
Status: DISCONTINUED | OUTPATIENT
Start: 2023-07-14 | End: 2023-07-26 | Stop reason: HOSPADM

## 2023-07-14 RX ORDER — HYDROMORPHONE HYDROCHLORIDE 1 MG/ML
0.5 INJECTION, SOLUTION INTRAMUSCULAR; INTRAVENOUS; SUBCUTANEOUS EVERY 6 HOURS PRN
Status: DISCONTINUED | OUTPATIENT
Start: 2023-07-14 | End: 2023-07-14

## 2023-07-14 RX ORDER — IBUPROFEN 200 MG
16 TABLET ORAL
Status: DISCONTINUED | OUTPATIENT
Start: 2023-07-14 | End: 2023-07-26 | Stop reason: HOSPADM

## 2023-07-14 RX ORDER — HYDROMORPHONE HYDROCHLORIDE 1 MG/ML
1 INJECTION, SOLUTION INTRAMUSCULAR; INTRAVENOUS; SUBCUTANEOUS
Status: DISCONTINUED | OUTPATIENT
Start: 2023-07-14 | End: 2023-07-26 | Stop reason: HOSPADM

## 2023-07-14 RX ORDER — MORPHINE SULFATE 4 MG/ML
6 INJECTION, SOLUTION INTRAMUSCULAR; INTRAVENOUS
Status: COMPLETED | OUTPATIENT
Start: 2023-07-14 | End: 2023-07-14

## 2023-07-14 RX ORDER — ONDANSETRON 2 MG/ML
4 INJECTION INTRAMUSCULAR; INTRAVENOUS
Status: COMPLETED | OUTPATIENT
Start: 2023-07-14 | End: 2023-07-14

## 2023-07-14 RX ORDER — INSULIN ASPART 100 [IU]/ML
0-5 INJECTION, SOLUTION INTRAVENOUS; SUBCUTANEOUS
Status: DISCONTINUED | OUTPATIENT
Start: 2023-07-14 | End: 2023-07-17

## 2023-07-14 RX ORDER — SODIUM CHLORIDE 0.9 % (FLUSH) 0.9 %
10 SYRINGE (ML) INJECTION
Status: DISCONTINUED | OUTPATIENT
Start: 2023-07-14 | End: 2023-07-26 | Stop reason: HOSPADM

## 2023-07-14 RX ORDER — ONDANSETRON 2 MG/ML
4 INJECTION INTRAMUSCULAR; INTRAVENOUS EVERY 8 HOURS PRN
Status: DISCONTINUED | OUTPATIENT
Start: 2023-07-14 | End: 2023-07-26 | Stop reason: HOSPADM

## 2023-07-14 RX ORDER — SODIUM CHLORIDE, SODIUM LACTATE, POTASSIUM CHLORIDE, CALCIUM CHLORIDE 600; 310; 30; 20 MG/100ML; MG/100ML; MG/100ML; MG/100ML
INJECTION, SOLUTION INTRAVENOUS CONTINUOUS
Status: DISCONTINUED | OUTPATIENT
Start: 2023-07-14 | End: 2023-07-19

## 2023-07-14 RX ORDER — MORPHINE SULFATE 4 MG/ML
4 INJECTION, SOLUTION INTRAMUSCULAR; INTRAVENOUS
Status: COMPLETED | OUTPATIENT
Start: 2023-07-14 | End: 2023-07-14

## 2023-07-14 RX ORDER — BUPROPION HYDROCHLORIDE 150 MG/1
150 TABLET, EXTENDED RELEASE ORAL 2 TIMES DAILY
Status: DISCONTINUED | OUTPATIENT
Start: 2023-07-14 | End: 2023-07-26 | Stop reason: HOSPADM

## 2023-07-14 RX ORDER — HEPARIN SODIUM 5000 [USP'U]/ML
5000 INJECTION, SOLUTION INTRAVENOUS; SUBCUTANEOUS EVERY 8 HOURS
Status: DISCONTINUED | OUTPATIENT
Start: 2023-07-15 | End: 2023-07-26 | Stop reason: HOSPADM

## 2023-07-14 RX ORDER — HYDROMORPHONE HYDROCHLORIDE 1 MG/ML
1 INJECTION, SOLUTION INTRAMUSCULAR; INTRAVENOUS; SUBCUTANEOUS EVERY 6 HOURS PRN
Status: DISCONTINUED | OUTPATIENT
Start: 2023-07-14 | End: 2023-07-14

## 2023-07-14 RX ORDER — RALOXIFENE HYDROCHLORIDE 60 MG/1
60 TABLET, FILM COATED ORAL DAILY
Status: DISCONTINUED | OUTPATIENT
Start: 2023-07-15 | End: 2023-07-26 | Stop reason: HOSPADM

## 2023-07-14 RX ADMIN — SODIUM CHLORIDE 1000 ML: 9 INJECTION, SOLUTION INTRAVENOUS at 12:07

## 2023-07-14 RX ADMIN — MORPHINE SULFATE 4 MG: 4 INJECTION INTRAVENOUS at 10:07

## 2023-07-14 RX ADMIN — ONDANSETRON 4 MG: 2 INJECTION INTRAMUSCULAR; INTRAVENOUS at 12:07

## 2023-07-14 RX ADMIN — IOHEXOL 75 ML: 350 INJECTION, SOLUTION INTRAVENOUS at 11:07

## 2023-07-14 RX ADMIN — HYDROMORPHONE HYDROCHLORIDE 1 MG: 1 INJECTION, SOLUTION INTRAMUSCULAR; INTRAVENOUS; SUBCUTANEOUS at 02:07

## 2023-07-14 RX ADMIN — MORPHINE SULFATE 6 MG: 4 INJECTION INTRAVENOUS at 12:07

## 2023-07-14 RX ADMIN — HYDROMORPHONE HYDROCHLORIDE 1 MG: 1 INJECTION, SOLUTION INTRAMUSCULAR; INTRAVENOUS; SUBCUTANEOUS at 09:07

## 2023-07-14 RX ADMIN — SODIUM CHLORIDE, POTASSIUM CHLORIDE, SODIUM LACTATE AND CALCIUM CHLORIDE: 600; 310; 30; 20 INJECTION, SOLUTION INTRAVENOUS at 04:07

## 2023-07-14 RX ADMIN — SODIUM CHLORIDE, POTASSIUM CHLORIDE, SODIUM LACTATE AND CALCIUM CHLORIDE 1000 ML: 600; 310; 30; 20 INJECTION, SOLUTION INTRAVENOUS at 02:07

## 2023-07-14 RX ADMIN — HYDROMORPHONE HYDROCHLORIDE 1 MG: 1 INJECTION, SOLUTION INTRAMUSCULAR; INTRAVENOUS; SUBCUTANEOUS at 06:07

## 2023-07-14 RX ADMIN — ONDANSETRON 4 MG: 2 INJECTION INTRAMUSCULAR; INTRAVENOUS at 10:07

## 2023-07-14 NOTE — CONSULTS
[facility] Emergency Dept  General Surgery  Consult Note    Patient Name: Marilee Townsend  MRN: [id number]  Code Status: Full Code  Admission Date: 7/14/2023  Hospital Length of Stay: 0 days  Attending Physician: Wesley Vargas III, MD  Primary Care Provider: Claudia Hopper DO    Patient information was obtained from patient.     Inpatient consult to General Surgery  Consult performed by: Manuel Wilson MD  Consult ordered by: Wesley Vargas III, MD        Subjective:     Principal Problem: <principal problem not specified>    History of Present Illness: Marilee Townsend is a 63yoF with a history of hypertension, rectal prolapse s/p rectopexy who presents to [facility] with complaints of nausea, vomiting and worsening abdominal pain. Of note, she presented to an outside hospital with the same complaints yesterday and was discharged with instructions to follow up with her primary care provider. The patient describes a three-day history of abdominal pain, nausea, vomiting and PO intolerance. This has persistently worsened throughout this time, which prompted both presentations. She states that she did have a small bowel movement yesterday, described as hard pellets. On work-up, her vital signs are stable. She has an elevated leukocytosis to 18. Lactate normal at 1. Repeat ordered. Her CT scan demonstrates dilated small bowel consistent with a small bowel obstruction. There is normal caliber small intestine distally. She is admitted to the hospital medicine service. General surgery consulted for evaluation.     Of note, her WBC has increased from 12.8 to 18 over the last 24hrs. Imaging at the outside hospital, per the official read, did not demonstrate any degree of obstruction, which is a rather burk contrast from her CT scan at our facility. Plan for NG tube insertion with low threshold to proceed to operating room for diagnostic laparoscopy. Discussed this plan with the patient.       No current  facility-administered medications on file prior to encounter.     Current Outpatient Medications on File Prior to Encounter   Medication Sig    amLODIPine (NORVASC) 5 MG tablet     buPROPion (WELLBUTRIN SR) 150 MG TBSR 12 hr tablet Take 150 mg by mouth 2 (two) times daily.    lisinopriL-hydrochlorothiazide (PRINZIDE,ZESTORETIC) 10-12.5 mg per tablet Take 1 tablet by mouth.    raloxifene (EVISTA) 60 mg tablet Take 60 mg by mouth.    ergocalciferol (ERGOCALCIFEROL) 50,000 unit Cap Take 1 capsule (50,000 Units total) by mouth every Sunday.    HYDROcodone-acetaminophen (NORCO)  mg per tablet Take 1 tablet by mouth every 6 (six) hours as needed.    meloxicam (MOBIC) 15 MG tablet Take 15 mg by mouth once daily. Instructed to stop until after procedure 1-9-17.    sertraline (ZOLOFT) 100 MG tablet Take 100 mg by mouth every morning.        Review of patient's allergies indicates:   Allergen Reactions    Morphine Nausea And Vomiting       Past Medical History:   Diagnosis Date    Arthritis     Depression     Encounter for blood transfusion     as a child    Fibromyalgia     Neck pain     Rectal prolapse      Past Surgical History:   Procedure Laterality Date    BREAST SURGERY Bilateral     augmentation-Implants    epidural steroid injection  01/09/2017    twice-11/2017  & 1/9/17 to neck    HERNIA REPAIR      umbilical    HYSTERECTOMY      TONSILLECTOMY      WRIST FRACTURE SURGERY Right     with hardware placed     Family History    None       Tobacco Use    Smoking status: Former     Packs/day: 1.00     Years: 20.00     Pack years: 20.00     Types: Cigarettes     Start date: 5/12/2014    Smokeless tobacco: Never    Tobacco comments:     Stopped smoking greater than 5 years   Substance and Sexual Activity    Alcohol use: Yes     Comment: rarely    Drug use: Yes     Types: Hydromorphone     Comment: 3 - 4 tabs daily as needed    Sexual activity: Not Currently     Review of Systems    Constitutional:  Positive for activity change, appetite change, chills, diaphoresis and fatigue. Negative for fever.   HENT: Negative.     Respiratory:  Negative for cough, chest tightness and shortness of breath.    Cardiovascular:  Negative for chest pain.   Gastrointestinal:  Positive for abdominal distention, abdominal pain, constipation, nausea and vomiting.   Genitourinary: Negative.    Skin: Negative.    Objective:     Vital Signs (Most Recent):  Temp: 97.9 °F (36.6 °C) (07/14/23 0952)  Pulse: 79 (07/14/23 1232)  Resp: 16 (07/14/23 1229)  BP: 131/73 (07/14/23 1232)  SpO2: 98 % (07/14/23 1232) Vital Signs (24h Range):  Temp:  [97.9 °F (36.6 °C)] 97.9 °F (36.6 °C)  Pulse:  [79-83] 79  Resp:  [16-18] 16  SpO2:  [97 %-99 %] 98 %  BP: (111-131)/(72-77) 131/73     Weight: 45.4 kg (100 lb)  Body mass index is 18.29 kg/m².     Physical Exam  Vitals and nursing note reviewed.   Constitutional:       Appearance: She is ill-appearing.   HENT:      Nose: Nose normal.      Mouth/Throat:      Mouth: Mucous membranes are moist.   Cardiovascular:      Rate and Rhythm: Normal rate and regular rhythm.   Pulmonary:      Effort: Pulmonary effort is normal. No respiratory distress.   Abdominal:      Comments: Abdomen moderately distended  Exquisitely tender to palpation throughout the abdomen, mainly centrally  Well healed supra-umbilical incision   Musculoskeletal:         General: Normal range of motion.   Skin:     General: Skin is warm.   Neurological:      General: No focal deficit present.      Mental Status: She is alert.          I have reviewed all pertinent lab results within the past 24 hours.  CBC:   Recent Labs   Lab 07/14/23  1019   WBC 18.39*   RBC 4.28   HGB 12.9   HCT 37.9      MCV 89   MCH 30.1   MCHC 34.0     CMP:   Recent Labs   Lab 07/14/23  1019   *   CALCIUM 9.2   ALBUMIN 3.7   PROT 7.0      K 3.7   CO2 23      BUN 25*   CREATININE 0.7   ALKPHOS 69   ALT 11   AST 16   BILITOT 0.6        Significant Diagnostics:  I have reviewed all pertinent imaging results/findings within the past 24 hours.      Assessment/Plan:     Small bowel obstruction  Marilee Townsend is a 63yoF with a history of HTN and rectal prolapse who presents with a several day history of abdominal pain, nausea, vomiting, and PO intolerance. Her work-up is consistent with a small bowel obstruction. General surgery consulted for evaluation.     - patient seen and examined  - labs and imaging reviewed  - NG tube insertion for decompression  - repeat lactate ordered  - bolus ordered   - serial abdominal exams  - given how tender she was on my exam, there is a low threshold to proceed with diagnostic laparoscopy  - will continue to follow      VTE Risk Mitigation (From admission, onward)         Ordered     IP VTE LOW RISK PATIENT  Once         07/14/23 1236     Place sequential compression device  Until discontinued         07/14/23 1236                Thank you for your consult. I will follow-up with patient. Please contact us if you have any additional questions.    Manuel Wilson MD  General Surgery  [facility] - Emergency Dept

## 2023-07-14 NOTE — PROGRESS NOTES
NGT to LIWS per MD order.    Quality 111:Pneumonia Vaccination Status For Older Adults: Pneumococcal vaccine administered on or after patient’s 60th birthday and before the end of the measurement period Detail Level: Detailed Quality 110: Preventive Care And Screening: Influenza Immunization: Influenza Immunization Administered during Influenza season

## 2023-07-14 NOTE — SUBJECTIVE & OBJECTIVE
No current facility-administered medications on file prior to encounter.     Current Outpatient Medications on File Prior to Encounter   Medication Sig    amLODIPine (NORVASC) 5 MG tablet     buPROPion (WELLBUTRIN SR) 150 MG TBSR 12 hr tablet Take 150 mg by mouth 2 (two) times daily.    lisinopriL-hydrochlorothiazide (PRINZIDE,ZESTORETIC) 10-12.5 mg per tablet Take 1 tablet by mouth.    raloxifene (EVISTA) 60 mg tablet Take 60 mg by mouth.    ergocalciferol (ERGOCALCIFEROL) 50,000 unit Cap Take 1 capsule (50,000 Units total) by mouth every Sunday.    HYDROcodone-acetaminophen (NORCO)  mg per tablet Take 1 tablet by mouth every 6 (six) hours as needed.    meloxicam (MOBIC) 15 MG tablet Take 15 mg by mouth once daily. Instructed to stop until after procedure 1-9-17.    sertraline (ZOLOFT) 100 MG tablet Take 100 mg by mouth every morning.        Review of patient's allergies indicates:   Allergen Reactions    Morphine Nausea And Vomiting       Past Medical History:   Diagnosis Date    Arthritis     Depression     Encounter for blood transfusion     as a child    Fibromyalgia     Neck pain     Rectal prolapse      Past Surgical History:   Procedure Laterality Date    BREAST SURGERY Bilateral     augmentation-Implants    epidural steroid injection  01/09/2017    twice-11/2017  & 1/9/17 to neck    HERNIA REPAIR      umbilical    HYSTERECTOMY      TONSILLECTOMY      WRIST FRACTURE SURGERY Right     with hardware placed     Family History    None       Tobacco Use    Smoking status: Former     Packs/day: 1.00     Years: 20.00     Pack years: 20.00     Types: Cigarettes     Start date: 5/12/2014    Smokeless tobacco: Never    Tobacco comments:     Stopped smoking greater than 5 years   Substance and Sexual Activity    Alcohol use: Yes     Comment: rarely    Drug use: Yes     Types: Hydromorphone     Comment: 3 - 4 tabs daily as needed    Sexual activity: Not Currently     Review of Systems   Constitutional:  Positive  for activity change, appetite change, chills, diaphoresis and fatigue. Negative for fever.   HENT: Negative.     Respiratory:  Negative for cough, chest tightness and shortness of breath.    Cardiovascular:  Negative for chest pain.   Gastrointestinal:  Positive for abdominal distention, abdominal pain, constipation, nausea and vomiting.   Genitourinary: Negative.    Skin: Negative.    Objective:     Vital Signs (Most Recent):  Temp: 97.9 °F (36.6 °C) (07/14/23 0952)  Pulse: 79 (07/14/23 1232)  Resp: 16 (07/14/23 1229)  BP: 131/73 (07/14/23 1232)  SpO2: 98 % (07/14/23 1232) Vital Signs (24h Range):  Temp:  [97.9 °F (36.6 °C)] 97.9 °F (36.6 °C)  Pulse:  [79-83] 79  Resp:  [16-18] 16  SpO2:  [97 %-99 %] 98 %  BP: (111-131)/(72-77) 131/73     Weight: 45.4 kg (100 lb)  Body mass index is 18.29 kg/m².     Physical Exam  Vitals and nursing note reviewed.   Constitutional:       Appearance: She is ill-appearing.   HENT:      Nose: Nose normal.      Mouth/Throat:      Mouth: Mucous membranes are moist.   Cardiovascular:      Rate and Rhythm: Normal rate and regular rhythm.   Pulmonary:      Effort: Pulmonary effort is normal. No respiratory distress.   Abdominal:      Comments: Abdomen moderately distended  Exquisitely tender to palpation throughout the abdomen, mainly centrally  Well healed supra-umbilical incision   Musculoskeletal:         General: Normal range of motion.   Skin:     General: Skin is warm.   Neurological:      General: No focal deficit present.      Mental Status: She is alert.          I have reviewed all pertinent lab results within the past 24 hours.  CBC:   Recent Labs   Lab 07/14/23  1019   WBC 18.39*   RBC 4.28   HGB 12.9   HCT 37.9      MCV 89   MCH 30.1   MCHC 34.0     CMP:   Recent Labs   Lab 07/14/23  1019   *   CALCIUM 9.2   ALBUMIN 3.7   PROT 7.0      K 3.7   CO2 23      BUN 25*   CREATININE 0.7   ALKPHOS 69   ALT 11   AST 16   BILITOT 0.6       Significant  Diagnostics:  I have reviewed all pertinent imaging results/findings within the past 24 hours.

## 2023-07-14 NOTE — H&P
[facility] Emergency Dept  [facility] Medicine  History & Physical    Patient Name: Marilee Townsend  MRN: [id number]  Patient Class: IP- Inpatient  Admission Date: 7/14/2023  Attending Physician: Wesley Vargas III, MD   Primary Care Provider: Claudia Hopper DO         Patient information was obtained from patient and ER records.     Subjective:     Principal Problem:Small bowel obstruction    Chief Complaint:   Chief Complaint   Patient presents with    Abdominal Pain     EMS called to 64yo female that has been having abdominal pain x3-4 days. Patient had a hernia repair several years ago and pain is located in the incision site. Was seen yesterday at [facility] for same symptoms but no diagnosis. Reports some nausea but no vomiting and that she has chronic constipation but is able to have small bowel movements and nothing has changed with those.         HPI: 63F with pmh of depression, fibromyalgia, htn, osteoporosis who presents due to several day h/o abd pain and nausea with anorexia. Pt was evaluated at outside emergency department patient was seen in the day prompting the ED for evaluation.  Patient denies any recent flatulence or bowel movements unclear on the last date.  Patient states pain is diffuse in the with minimal improvement with pain medications given in the ED. CT scan in the emergency department with demonstration of small bowel obstruction and surgery consulted who had NG tube placed.  Patient denies chest pain, shortness a breath, fever, chills.  Patient states she had a shoulder surgery about 3 weeks prior to arrival and tolerated the anesthesia well.  Patient is a former smoker, but denies alcohol or drug use.      Past Medical History:   Diagnosis Date    Arthritis     Depression     Encounter for blood transfusion     as a child    Fibromyalgia     Neck pain     Rectal prolapse        Past Surgical History:   Procedure Laterality Date    BREAST SURGERY Bilateral      augmentation-Implants    epidural steroid injection  01/09/2017    twice-11/2017  & 1/9/17 to neck    HERNIA REPAIR      umbilical    HYSTERECTOMY      ROTATOR CUFF REPAIR Bilateral     TONSILLECTOMY      TOTAL SHOULDER ARTHROPLASTY Bilateral     WRIST FRACTURE SURGERY Right     with hardware placed       Review of patient's allergies indicates:   Allergen Reactions    Morphine Nausea And Vomiting       No current facility-administered medications on file prior to encounter.     Current Outpatient Medications on File Prior to Encounter   Medication Sig    amLODIPine (NORVASC) 5 MG tablet     buPROPion (WELLBUTRIN SR) 150 MG TBSR 12 hr tablet Take 150 mg by mouth 2 (two) times daily.    lisinopriL-hydrochlorothiazide (PRINZIDE,ZESTORETIC) 10-12.5 mg per tablet Take 1 tablet by mouth.    raloxifene (EVISTA) 60 mg tablet Take 60 mg by mouth.    ergocalciferol (ERGOCALCIFEROL) 50,000 unit Cap Take 1 capsule (50,000 Units total) by mouth every Sunday.    HYDROcodone-acetaminophen (NORCO)  mg per tablet Take 1 tablet by mouth every 6 (six) hours as needed.    meloxicam (MOBIC) 15 MG tablet Take 15 mg by mouth once daily. Instructed to stop until after procedure 1-9-17.    sertraline (ZOLOFT) 100 MG tablet Take 100 mg by mouth every morning.      Family History    None       Tobacco Use    Smoking status: Former     Packs/day: 1.00     Years: 20.00     Pack years: 20.00     Types: Cigarettes     Start date: 5/12/2014    Smokeless tobacco: Never    Tobacco comments:     Stopped smoking greater than 5 years   Substance and Sexual Activity    Alcohol use: Yes     Comment: rarely    Drug use: Not Currently     Types: Hydromorphone     Comment: 3 - 4 tabs daily as needed    Sexual activity: Not Currently     Review of Systems  Objective:     Vital Signs (Most Recent):  Temp: 97.9 °F (36.6 °C) (07/14/23 0952)  Pulse: 79 (07/14/23 1232)  Resp: 16 (07/14/23 1229)  BP: 131/73 (07/14/23 1232)  SpO2: 98 %  (07/14/23 1232) Vital Signs (24h Range):  Temp:  [97.9 °F (36.6 °C)] 97.9 °F (36.6 °C)  Pulse:  [79-83] 79  Resp:  [16-18] 16  SpO2:  [97 %-99 %] 98 %  BP: (111-131)/(72-77) 131/73     Weight: 45.4 kg (100 lb)  Body mass index is 18.29 kg/m².     Physical Exam  Vitals reviewed.   Constitutional:       General: She is in acute distress (moderate distress).   HENT:      Head: Normocephalic and atraumatic.      Mouth/Throat:      Mouth: Mucous membranes are dry.      Pharynx: Oropharynx is clear.   Eyes:      General: No scleral icterus.     Extraocular Movements: Extraocular movements intact.   Cardiovascular:      Rate and Rhythm: Normal rate and regular rhythm.   Pulmonary:      Effort: Pulmonary effort is normal. No respiratory distress.   Abdominal:      Palpations: Abdomen is soft.      Tenderness: There is abdominal tenderness (diffuse ttp).   Musculoskeletal:         General: No swelling or tenderness.      Cervical back: Neck supple. No rigidity.   Skin:     General: Skin is warm and dry.   Neurological:      General: No focal deficit present.      Mental Status: She is alert and oriented to person, place, and time.   Psychiatric:         Mood and Affect: Mood normal.         Behavior: Behavior normal.              Significant Labs: All pertinent labs within the past 24 hours have been reviewed.    Significant Imaging: I have reviewed all pertinent imaging results/findings within the past 24 hours.    Assessment/Plan:     * Small bowel obstruction  63F presenting with several days of worsening abd pain, anorexia, and nausea.  CT abd/pelvis with Abnormal fluid, air distension small bowel, termination point at or near ileocecal valve, not well-defined, associated less distension proximal small bowel with some bowel wall thickening.  Adhesion etiology of obstructive process favored.    -NG tube in place to LIWS  -npo diet  -general surgery consulted, appreciate management of SBO  -Analgesics ordered prn        Osteoporosis  Continue home medications      Depression  Restart home medications as indicated        Leukocytosis  In setting of acute small-bowel obstruction and no definitive findings of infection appears likely reactive, but low threshold for starting antibiotics. LA negative x2.    -procal pending  -ua pending  -tx as stated above for sbo      HTN (hypertension)  Currently controlled.  Will continue to monitor        VTE Risk Mitigation (From admission, onward)         Ordered     IP VTE LOW RISK PATIENT  Once         07/14/23 1236     Place sequential compression device  Until discontinued         07/14/23 1236                           Wesley Vargas III, MD  Department of Hospital Medicine  [facility] - Emergency Dept

## 2023-07-14 NOTE — ASSESSMENT & PLAN NOTE
Marilee Townsend is a 63yoF with a history of HTN and rectal prolapse who presents with a several day history of abdominal pain, nausea, vomiting, and PO intolerance. Her work-up is consistent with a small bowel obstruction. General surgery consulted for evaluation.     - patient seen and examined  - labs and imaging reviewed  - NG tube insertion for decompression  - repeat lactate ordered  - bolus ordered   - serial abdominal exams  - given how tender she was on my exam, there is a low threshold to proceed with diagnostic laparoscopy  - will continue to follow

## 2023-07-14 NOTE — ASSESSMENT & PLAN NOTE
In setting of acute small-bowel obstruction and no definitive findings of infection appears likely reactive, but low threshold for starting antibiotics. LA negative x2.    -procal pending  -ua pending  -tx as stated above for sbo

## 2023-07-14 NOTE — SUBJECTIVE & OBJECTIVE
"Donya Gabriel CMA      " Past Medical History:   Diagnosis Date    Arthritis     Depression     Encounter for blood transfusion     as a child    Fibromyalgia     Neck pain     Rectal prolapse        Past Surgical History:   Procedure Laterality Date    BREAST SURGERY Bilateral     augmentation-Implants    epidural steroid injection  01/09/2017    twice-11/2017  & 1/9/17 to neck    HERNIA REPAIR      umbilical    HYSTERECTOMY      ROTATOR CUFF REPAIR Bilateral     TONSILLECTOMY      TOTAL SHOULDER ARTHROPLASTY Bilateral     WRIST FRACTURE SURGERY Right     with hardware placed       Review of patient's allergies indicates:   Allergen Reactions    Morphine Nausea And Vomiting       No current facility-administered medications on file prior to encounter.     Current Outpatient Medications on File Prior to Encounter   Medication Sig    amLODIPine (NORVASC) 5 MG tablet     buPROPion (WELLBUTRIN SR) 150 MG TBSR 12 hr tablet Take 150 mg by mouth 2 (two) times daily.    lisinopriL-hydrochlorothiazide (PRINZIDE,ZESTORETIC) 10-12.5 mg per tablet Take 1 tablet by mouth.    raloxifene (EVISTA) 60 mg tablet Take 60 mg by mouth.    ergocalciferol (ERGOCALCIFEROL) 50,000 unit Cap Take 1 capsule (50,000 Units total) by mouth every Sunday.    HYDROcodone-acetaminophen (NORCO)  mg per tablet Take 1 tablet by mouth every 6 (six) hours as needed.    meloxicam (MOBIC) 15 MG tablet Take 15 mg by mouth once daily. Instructed to stop until after procedure 1-9-17.    sertraline (ZOLOFT) 100 MG tablet Take 100 mg by mouth every morning.      Family History    None       Tobacco Use    Smoking status: Former     Packs/day: 1.00     Years: 20.00     Pack years: 20.00     Types: Cigarettes     Start date: 5/12/2014    Smokeless tobacco: Never    Tobacco comments:     Stopped smoking greater than 5 years   Substance and Sexual Activity    Alcohol use: Yes     Comment: rarely    Drug use: Not Currently     Types: Hydromorphone     Comment: 3 - 4 tabs daily as  needed    Sexual activity: Not Currently     Review of Systems  Objective:     Vital Signs (Most Recent):  Temp: 97.9 °F (36.6 °C) (07/14/23 0952)  Pulse: 79 (07/14/23 1232)  Resp: 16 (07/14/23 1229)  BP: 131/73 (07/14/23 1232)  SpO2: 98 % (07/14/23 1232) Vital Signs (24h Range):  Temp:  [97.9 °F (36.6 °C)] 97.9 °F (36.6 °C)  Pulse:  [79-83] 79  Resp:  [16-18] 16  SpO2:  [97 %-99 %] 98 %  BP: (111-131)/(72-77) 131/73     Weight: 45.4 kg (100 lb)  Body mass index is 18.29 kg/m².     Physical Exam  Vitals reviewed.   Constitutional:       General: She is in acute distress (moderate distress).   HENT:      Head: Normocephalic and atraumatic.      Mouth/Throat:      Mouth: Mucous membranes are dry.      Pharynx: Oropharynx is clear.   Eyes:      General: No scleral icterus.     Extraocular Movements: Extraocular movements intact.   Cardiovascular:      Rate and Rhythm: Normal rate and regular rhythm.   Pulmonary:      Effort: Pulmonary effort is normal. No respiratory distress.   Abdominal:      Palpations: Abdomen is soft.      Tenderness: There is abdominal tenderness (diffuse ttp).   Musculoskeletal:         General: No swelling or tenderness.      Cervical back: Neck supple. No rigidity.   Skin:     General: Skin is warm and dry.   Neurological:      General: No focal deficit present.      Mental Status: She is alert and oriented to person, place, and time.   Psychiatric:         Mood and Affect: Mood normal.         Behavior: Behavior normal.              Significant Labs: All pertinent labs within the past 24 hours have been reviewed.    Significant Imaging: I have reviewed all pertinent imaging results/findings within the past 24 hours.

## 2023-07-14 NOTE — ED TRIAGE NOTES
Pt reports to ED for abdominal pain x3-4 days. Patient had a hernia repair several years ago and pain is located near the incision site. Was seen yesterday at [facility] for same symptoms but no diagnosis. Reports some nausea but no vomiting and that she has chronic constipation but is able to have small bowel movements and nothing has changed with those.   Pt states denies bladder issues, HA, CP, SOB, or any other symptoms.   Pt [id number]

## 2023-07-14 NOTE — ASSESSMENT & PLAN NOTE
63F presenting with several days of worsening abd pain, anorexia, and nausea.  CT abd/pelvis with Abnormal fluid, air distension small bowel, termination point at or near ileocecal valve, not well-defined, associated less distension proximal small bowel with some bowel wall thickening.  Adhesion etiology of obstructive process favored.    -NG tube in place to LIWS  -npo diet  -general surgery consulted, appreciate management of SBO  -Analgesics ordered prn

## 2023-07-14 NOTE — ED PROVIDER NOTES
Encounter Date: 7/14/2023       History     Chief Complaint   Patient presents with    Abdominal Pain     EMS called to 64yo female that has been having abdominal pain x3-4 days. Patient had a hernia repair several years ago and pain is located in the incision site. Was seen yesterday at [facility] for same symptoms but no diagnosis. Reports some nausea but no vomiting and that she has chronic constipation but is able to have small bowel movements and nothing has changed with those.      63-year-old female with a history constipation, fibromyalgia presenting with abdominal pain.  Patient reports symptoms started 3-4 days ago.  Was seen at [facility].  Reports symptoms have since worsened.  Reports decreased appetite.  States she is not passing gas.  Denies diarrhea, fevers, chills, chest pain, shortness of breath.  Patient states her umbilicus is exquisitely tender to touch.  Denies history of similar pain.  Previously in usual state of health.    Review of patient's allergies indicates:   Allergen Reactions    Morphine Nausea And Vomiting     Past Medical History:   Diagnosis Date    Arthritis     Depression     Encounter for blood transfusion     as a child    Fibromyalgia     Neck pain     Rectal prolapse      Past Surgical History:   Procedure Laterality Date    BREAST SURGERY Bilateral     augmentation-Implants    epidural steroid injection  01/09/2017    twice-11/2017  & 1/9/17 to neck    HERNIA REPAIR      umbilical    HYSTERECTOMY      ROTATOR CUFF REPAIR Bilateral     TONSILLECTOMY      TOTAL SHOULDER ARTHROPLASTY Bilateral     WRIST FRACTURE SURGERY Right     with hardware placed     Family History   Problem Relation Age of Onset    Anesthesia problems Neg Hx      Social History     Tobacco Use    Smoking status: Former     Packs/day: 1.00     Years: 20.00     Pack years: 20.00     Types: Cigarettes     Start date: 5/12/2014    Smokeless tobacco: Never    Tobacco comments:     Stopped smoking greater than 5  years   Substance Use Topics    Alcohol use: Yes     Comment: rarely    Drug use: Not Currently     Types: Hydromorphone     Comment: 3 - 4 tabs daily as needed     Review of Systems   Constitutional:  Negative for chills and fever.   HENT:  Negative for sore throat.    Respiratory:  Negative for shortness of breath.    Cardiovascular:  Negative for chest pain.   Gastrointestinal:  Positive for abdominal pain, constipation and nausea. Negative for blood in stool, diarrhea and vomiting.   Genitourinary:  Negative for dysuria.   Musculoskeletal:  Negative for back pain.   Skin:  Negative for rash.   Neurological:  Negative for weakness.     Physical Exam     Initial Vitals [07/14/23 0952]   BP Pulse Resp Temp SpO2   130/72 82 18 97.9 °F (36.6 °C) 98 %      MAP       --         Physical Exam    Nursing note and vitals reviewed.  Constitutional: She appears well-developed and well-nourished. She is not diaphoretic. No distress.   HENT:   Head: Normocephalic and atraumatic.   Nose: Nose normal.   Eyes: EOM are normal. Pupils are equal, round, and reactive to light. No scleral icterus.   Neck: Neck supple.   Normal range of motion.  Cardiovascular:  Normal rate, regular rhythm, normal heart sounds and intact distal pulses.     Exam reveals no gallop and no friction rub.       No murmur heard.  Pulmonary/Chest: Breath sounds normal. No stridor. No respiratory distress. She has no wheezes. She has no rhonchi. She has no rales.   Abdominal: Abdomen is soft. She exhibits no distension. There is abdominal tenderness. There is guarding. There is no rebound.   Musculoskeletal:         General: No tenderness or edema. Normal range of motion.      Cervical back: Normal range of motion and neck supple.     Neurological: She is alert and oriented to person, place, and time. No cranial nerve deficit. GCS score is 15. GCS eye subscore is 4. GCS verbal subscore is 5. GCS motor subscore is 6.   Skin: Skin is warm and dry. No rash noted.    Psychiatric: She has a normal mood and affect. Her behavior is normal.       ED Course   Procedures  Labs Reviewed   CBC W/ AUTO DIFFERENTIAL - Abnormal; Notable for the following components:       Result Value    WBC 18.39 (*)     MPV 8.6 (*)     Gran # (ANC) 16.7 (*)     Immature Grans (Abs) 0.08 (*)     Lymph # 0.8 (*)     Gran % 90.9 (*)     Lymph % 4.2 (*)     All other components within normal limits   COMPREHENSIVE METABOLIC PANEL - Abnormal; Notable for the following components:    Glucose 162 (*)     BUN 25 (*)     All other components within normal limits   PROCALCITONIN - Abnormal; Notable for the following components:    Procalcitonin 0.29 (*)     All other components within normal limits   LIPASE   LACTIC ACID, PLASMA   LACTIC ACID, PLASMA   POCT GLUCOSE, HAND-HELD DEVICE          Imaging Results              X-Ray Abdomen AP 1 View (Final result)  Result time 07/14/23 15:04:30      Final result by Manuel Rey MD (07/14/23 15:04:30)                   Impression:      As above      Electronically signed by: Manuel Rey MD  Date:    07/14/2023  Time:    15:04               Narrative:    EXAMINATION:  XR ABDOMEN AP 1 VIEW    CLINICAL HISTORY:  ng tube eval;    TECHNIQUE:  Single AP View of the abdomen was performed.    COMPARISON:  07/14/2023 at 14:06    FINDINGS:  NG tube has been advanced, and now in good position with the tip and proximal port both subdiaphragmatic.    Otherwise stable exam, noting a few dilated loops of small bowel.  No obvious free air.                                       X-Ray Abdomen AP 1 View (KUB) (Final result)  Result time 07/14/23 14:15:20      Final result by Jose Rosales MD (07/14/23 14:15:20)                   Impression:      Recommended advancement of nasogastric tube.      Electronically signed by: Jose Rosales MD  Date:    07/14/2023  Time:    14:15               Narrative:    EXAMINATION:  XR ABDOMEN AP 1 VIEW    TECHNIQUE:  XR ABDOMEN AP 1  VIEW    FINDINGS:  The bowel gas pattern is non-specific.    No airspace consolidation at the lung bases.No acute bony abnormality.No abnormal soft tissue masses.No abnormal calcific densities.Contrast present within the kidneys.  Postoperative changes lower lumbar spine.  Nasogastric tube present with side hole at the level of the GE junction can be advanced.                                        CT Abdomen Pelvis With Contrast (Final result)  Result time 07/14/23 12:01:03      Final result by Sulaiman Ford MD (07/14/23 12:01:03)                   Impression:      Status post hysterectomy and sigmoid surgical therapy.    Abnormal fluid, air distension small bowel, termination point at or near ileocecal valve, not well-defined, associated less distension proximal small bowel with some bowel wall thickening.  Adhesion etiology of obstructive process favored.    Posterior pelvic ascites, mesenteric edema.  Addition remote findings above.    This report was flagged in Epic as abnormal.      Electronically signed by: Sulaiman Ford MD  Date:    07/14/2023  Time:    12:01               Narrative:    EXAMINATION:  CT ABDOMEN PELVIS WITH CONTRAST    CLINICAL HISTORY:  RLQ abdominal pain (Age >= 14y);Bowel obstruction suspected;    TECHNIQUE:  Low dose axial images, sagittal and coronal reformations were obtained from the lung bases to the pubic symphysis following the IV administration of 85 mL of Omnipaque 350 and the oral administration of 15 mL of Omnipaque 300.    COMPARISON:  None.    FINDINGS:  Postop posterior spinal fusion, with pedicular screws and vertical stabilization bars and laminectomy L5-S1, placement of interbody disc spacer, degenerative disc spondylosis with chronic sclerotic erosive changes T11-T12 L1-L2, L2-L3, L 4 and L5 disc levels, primary anterior subluxation L3 on L2, L4 on L5, L5 on S1.    Status of bilateral breast prosthetic surgery with calcification of capsule of prosthesis.   Focal tiny ossification anterior to linea alba above level of umbilicus.  Some diastasis of linea alba above and at and below umbilicus.  Subcutaneous edema inferior, medial, posterior buttocks.    Calcified plaquing aorta major branches particularly distal aorta and common iliac arteries.    Liver, gallbladder, spleen, adrenal glands, kidneys, pancreas biliary tree normal.    Retroaortic left renal vein.  No adenopathy.  Mild amount of ascites posterior pelvis presacral particularly on right, 6.2 x 4.8 cm.  Urinary bladder small.  7 mm midline calcification at level of introitus of uncertain etiology.    Mild distension gastric cardia with fluid, distal stomach, intact.  Minimal mild distension duodenal with air.  Minimal mild distension proximal small bowel jejunum left flank with suggestion of some bowel wall thickening.  Moderate severe distension mid distal small bowel, iliac loop 3.3 diameter central pelvis.  No nondistended terminal ileum confirmed, nondistended large bowel, suture line postop bowel resection sigmoid at left lateral central pelvic region.  Appendix not identified.    Postop hysterectomy.  No adnexal mass.                                       Medications   amLODIPine tablet 5 mg (5 mg Oral Given 7/16/23 0858)   raloxifene tablet 60 mg (60 mg Oral Given 7/16/23 0858)   sertraline tablet 100 mg (0 mg Oral Hold 7/16/23 0700)   buPROPion TBSR 12 hr tablet 150 mg (150 mg Oral Given 7/16/23 0857)   sodium chloride 0.9% flush 10 mL (has no administration in time range)   acetaminophen tablet 650 mg (has no administration in time range)   acetaminophen tablet 650 mg (has no administration in time range)   naloxone 0.4 mg/mL injection 0.02 mg (has no administration in time range)   glucose chewable tablet 16 g (has no administration in time range)   glucose chewable tablet 24 g (has no administration in time range)   dextrose 50% injection 12.5 g (has no administration in time range)   dextrose 50%  injection 25 g (has no administration in time range)   glucagon (human recombinant) injection 1 mg (has no administration in time range)   ondansetron injection 4 mg (4 mg Intravenous Given 7/15/23 1728)   insulin aspart U-100 pen 0-5 Units (has no administration in time range)   heparin (porcine) injection 5,000 Units (5,000 Units Subcutaneous Given 7/16/23 0523)   lactated ringers infusion ( Intravenous New Bag 7/15/23 1418)   HYDROmorphone injection 1 mg (1 mg Intravenous Given 7/16/23 0911)   HYDROmorphone injection 0.5 mg (has no administration in time range)   piperacillin-tazobactam (ZOSYN) 4.5 g in dextrose 5 % in water (D5W) 5 % 100 mL IVPB (MB+) (0 g Intravenous Stopped 7/16/23 0945)   acetaminophen 1,000 mg/100 mL (10 mg/mL) injection 1,000 mg (0 mg Intravenous Stopped 7/16/23 0537)     Followed by   acetaminophen tablet 1,000 mg (has no administration in time range)   ibuprofen 800 mg in dextrose 5 % 250 mL (Vial-Mate) (800 mg Intravenous Not Given 7/16/23 1001)     Followed by   ibuprofen tablet 800 mg (has no administration in time range)   diatrizoate meglumineand-diatrizoate sodium (GASTROVIEW) solution 50 mL (has no administration in time range)   morphine injection 4 mg (4 mg Intravenous Given 7/14/23 1038)   ondansetron injection 4 mg (4 mg Intravenous Given 7/14/23 1037)   iohexoL (OMNIPAQUE 350) injection 75 mL (75 mLs Intravenous Given 7/14/23 1128)   morphine injection 6 mg (6 mg Intravenous Given 7/14/23 1229)   0.9%  NaCl infusion (1,000 mLs Intravenous New Bag 7/14/23 1227)   ondansetron injection 4 mg (4 mg Intravenous Given 7/14/23 1227)   lactated ringers bolus 1,000 mL (0 mLs Intravenous Stopped 7/14/23 1538)   diatrizoate meglumineand-diatrizoate sodium (GASTROGRAFFIN) solution 50 mL (50 mLs Oral Given 7/15/23 1815)   methocarbamoL (ROBAXIN) 500 mg in dextrose 5 % (D5W) 100 mL IVPB (0 mg Intravenous Stopped 7/16/23 0613)     Medical Decision Making:   Initial Assessment:   63-year-old  female presenting with abdominal pain.  Abdomen exquisitely tender to touch.  Pain seems to be centered around umbilicus.  Vitals normal.  Hypoactive bowel sounds.  History of hernia repair.  No history of cholecystectomy or appendectomy.  Patient with some guarding of her abdomen.  Differential includes surgical cause such as appendicitis, cholecystitis, obstruction, diverticulitis.  Will get labs, provide pain control, get imaging, reassess.  ED Management:  CT scan confirms SBO. Will admit to medicine for further evaluation and treatment. Gen surgery contacted and will evaluate the patient as well, no immediate orders.                         Clinical Impression:   Final diagnoses:  [K56.609] Small bowel obstruction  [Z01.89] Encounter for imaging study to confirm nasogastric (NG) tube placement        ED Disposition Condition    Admit                 Gonzales Ronquillo MD  07/16/23 1128

## 2023-07-14 NOTE — HPI
Marilee Townsend is a 63yoF with a history of hypertension, rectal prolapse s/p rectopexy who presents to [facility] with complaints of nausea, vomiting and worsening abdominal pain. Of note, she presented to an outside hospital with the same complaints yesterday and was discharged with instructions to follow up with her primary care provider. The patient describes a three-day history of abdominal pain, nausea, vomiting and PO intolerance. This has persistently worsened throughout this time, which prompted both presentations. She states that she did have a small bowel movement yesterday, described as hard pellets. On work-up, her vital signs are stable. She has an elevated leukocytosis to 18. Lactate normal at 1. Repeat ordered. Her CT scan demonstrates dilated small bowel consistent with a small bowel obstruction. There is normal caliber small intestine distally. She is admitted to the hospital medicine service. General surgery consulted for evaluation.     Of note, her WBC has increased from 12.8 to 18 over the last 24hrs. Imaging at the outside hospital, per the official read, did not demonstrate any degree of obstruction, which is a rather burk contrast from her CT scan at our facility. Plan for NG tube insertion with low threshold to proceed to operating room for diagnostic laparoscopy. Discussed this plan with the patient.

## 2023-07-14 NOTE — HPI
63F with pmh of depression, fibromyalgia, htn, osteoporosis who presents due to several day h/o abd pain and nausea with anorexia. Pt was evaluated at outside emergency department patient was seen in the day prompting the ED for evaluation.  Patient denies any recent flatulence or bowel movements unclear on the last date.  Patient states pain is diffuse in the with minimal improvement with pain medications given in the ED. CT scan in the emergency department with demonstration of small bowel obstruction and surgery consulted who had NG tube placed.  Patient denies chest pain, shortness a breath, fever, chills.  Patient states she had a shoulder surgery about 3 weeks prior to arrival and tolerated the anesthesia well.  Patient is a former smoker, but denies alcohol or drug use.

## 2023-07-14 NOTE — PROGRESS NOTES
Pt arrived to unit from ED. Awake and alert. NGT R Nare clamped. Pt would like to stay in black nighty top at this time. Bed in low position. Call light in reach. HOB elevated. SR up x2. Call light in hand. No distress noted. Will continue to monitor.

## 2023-07-14 NOTE — PROGRESS NOTES
[facility] [place]  Nurses Note -- 4 Eyes      7/14/2023       Skin assessed on: Admit      [x] No Pressure Injuries Present    []Prevention Measures Documented    [] Yes LDA  for Pressure Injury Previously documented     [] Yes New Pressure Injury Discovered   [] LDA for New Pressure Injury Added      Attending RN:  Anaya Paulino, RN     Second RN:  Marleny Howard

## 2023-07-14 NOTE — PLAN OF CARE
Case Management Assessment     PCP: Claudia Hopper  Pharmacy: [facility] on [address]    Patient Arrived From: home  Existing Help at Home: none    Barriers to Discharge: none    Discharge Plan:    A. Home    B. Home       SW completed initial assessment and discussed discharge planning with patient at her bedside. Patient stated that she lives alone, but her sister Malini and her neighbor Nel serve as support for her. Patient's sister will provide transportation for her to get home when discharge from the hospital.      07/14/23 9892   Discharge Assessment   Assessment Type Discharge Planning Assessment   Confirmed/corrected address, phone number and insurance Yes   Confirmed Demographics Correct on Facesheet   Source of Information patient   When was your last doctors appointment?   (Patient stated that she visited her pcp about 3 weeks ago)   Communicated WIL with patient/caregiver Date not available/Unable to determine   Reason For Admission Small Bowel Obstruction   People in Home alone   Do you expect to return to your current living situation? Yes   Do you have help at home or someone to help you manage your care at home? No   Prior to hospitilization cognitive status: Alert/Oriented   Current cognitive status: Alert/Oriented   Equipment Currently Used at Home none   Readmission within 30 days? No   Patient currently being followed by outpatient case management? No   Do you currently have service(s) that help you manage your care at home? No   Do you take prescription medications? Yes   Do you have prescription coverage? Yes   Coverage PHN   Do you have any problems affording any of your prescribed medications? No   Is the patient taking medications as prescribed? yes   Who is going to help you get home at discharge? sister Malini and Neighbor Nel   How do you get to doctors appointments? family or friend will provide;car, drives self   Are you on dialysis? No   Do you take coumadin? No    Discharge Plan A Home   Discharge Plan B Home   Discharge Plan discussed with: Patient   Transition of Care Barriers Nursing Home rejection;None

## 2023-07-14 NOTE — Clinical Note
Right: Back.   Scrubbed with Chlorhexidine/Alcohol.    Hair: N/A.  Skin prep dry before draping.  Prepped by: Rivera Manjarrez, RT 7/21/2023 1:56 PM.

## 2023-07-15 LAB
ALBUMIN SERPL BCP-MCNC: 2.9 G/DL (ref 3.5–5.2)
ALP SERPL-CCNC: 59 U/L (ref 55–135)
ALT SERPL W/O P-5'-P-CCNC: 9 U/L (ref 10–44)
ANION GAP SERPL CALC-SCNC: 9 MMOL/L (ref 8–16)
AST SERPL-CCNC: 15 U/L (ref 10–40)
BASOPHILS # BLD AUTO: 0.02 K/UL (ref 0–0.2)
BASOPHILS NFR BLD: 0.1 % (ref 0–1.9)
BILIRUB SERPL-MCNC: 0.5 MG/DL (ref 0.1–1)
BUN SERPL-MCNC: 23 MG/DL (ref 8–23)
CALCIUM SERPL-MCNC: 8.6 MG/DL (ref 8.7–10.5)
CHLORIDE SERPL-SCNC: 106 MMOL/L (ref 95–110)
CO2 SERPL-SCNC: 22 MMOL/L (ref 23–29)
CREAT SERPL-MCNC: 0.7 MG/DL (ref 0.5–1.4)
DIFFERENTIAL METHOD: ABNORMAL
EOSINOPHIL # BLD AUTO: 0 K/UL (ref 0–0.5)
EOSINOPHIL NFR BLD: 0 % (ref 0–8)
ERYTHROCYTE [DISTWIDTH] IN BLOOD BY AUTOMATED COUNT: 13.6 % (ref 11.5–14.5)
EST. GFR  (NO RACE VARIABLE): >60 ML/MIN/1.73 M^2
GLUCOSE SERPL-MCNC: 116 MG/DL (ref 70–110)
HCT VFR BLD AUTO: 34.7 % (ref 37–48.5)
HGB BLD-MCNC: 11.3 G/DL (ref 12–16)
IMM GRANULOCYTES # BLD AUTO: 0.08 K/UL (ref 0–0.04)
IMM GRANULOCYTES NFR BLD AUTO: 0.5 % (ref 0–0.5)
LACTATE SERPL-SCNC: 1 MMOL/L (ref 0.5–2.2)
LYMPHOCYTES # BLD AUTO: 0.8 K/UL (ref 1–4.8)
LYMPHOCYTES NFR BLD: 4.7 % (ref 18–48)
MAGNESIUM SERPL-MCNC: 1.8 MG/DL (ref 1.6–2.6)
MCH RBC QN AUTO: 29.9 PG (ref 27–31)
MCHC RBC AUTO-ENTMCNC: 32.6 G/DL (ref 32–36)
MCV RBC AUTO: 92 FL (ref 82–98)
MONOCYTES # BLD AUTO: 0.9 K/UL (ref 0.3–1)
MONOCYTES NFR BLD: 5.8 % (ref 4–15)
NEUTROPHILS # BLD AUTO: 14.5 K/UL (ref 1.8–7.7)
NEUTROPHILS NFR BLD: 88.9 % (ref 38–73)
NRBC BLD-RTO: 0 /100 WBC
PHOSPHATE SERPL-MCNC: 2.2 MG/DL (ref 2.7–4.5)
PLATELET # BLD AUTO: 245 K/UL (ref 150–450)
PMV BLD AUTO: 8.7 FL (ref 9.2–12.9)
POCT GLUCOSE: 115 MG/DL (ref 70–110)
POCT GLUCOSE: 119 MG/DL (ref 70–110)
POCT GLUCOSE: 127 MG/DL (ref 70–110)
POCT GLUCOSE: 144 MG/DL (ref 70–110)
POCT GLUCOSE: 155 MG/DL (ref 70–110)
POTASSIUM SERPL-SCNC: 3.7 MMOL/L (ref 3.5–5.1)
PROT SERPL-MCNC: 6 G/DL (ref 6–8.4)
RBC # BLD AUTO: 3.78 M/UL (ref 4–5.4)
SODIUM SERPL-SCNC: 137 MMOL/L (ref 136–145)
WBC # BLD AUTO: 16.31 K/UL (ref 3.9–12.7)

## 2023-07-15 PROCEDURE — 25000003 PHARM REV CODE 250: Performed by: STUDENT IN AN ORGANIZED HEALTH CARE EDUCATION/TRAINING PROGRAM

## 2023-07-15 PROCEDURE — 99233 SBSQ HOSP IP/OBS HIGH 50: CPT | Mod: ,,, | Performed by: SURGERY

## 2023-07-15 PROCEDURE — 36415 COLL VENOUS BLD VENIPUNCTURE: CPT | Performed by: STUDENT IN AN ORGANIZED HEALTH CARE EDUCATION/TRAINING PROGRAM

## 2023-07-15 PROCEDURE — 83605 ASSAY OF LACTIC ACID: CPT | Performed by: STUDENT IN AN ORGANIZED HEALTH CARE EDUCATION/TRAINING PROGRAM

## 2023-07-15 PROCEDURE — 85025 COMPLETE CBC W/AUTO DIFF WBC: CPT | Performed by: STUDENT IN AN ORGANIZED HEALTH CARE EDUCATION/TRAINING PROGRAM

## 2023-07-15 PROCEDURE — 83735 ASSAY OF MAGNESIUM: CPT | Performed by: STUDENT IN AN ORGANIZED HEALTH CARE EDUCATION/TRAINING PROGRAM

## 2023-07-15 PROCEDURE — 63600175 PHARM REV CODE 636 W HCPCS: Performed by: SURGERY

## 2023-07-15 PROCEDURE — 84100 ASSAY OF PHOSPHORUS: CPT | Performed by: STUDENT IN AN ORGANIZED HEALTH CARE EDUCATION/TRAINING PROGRAM

## 2023-07-15 PROCEDURE — 63600175 PHARM REV CODE 636 W HCPCS: Performed by: STUDENT IN AN ORGANIZED HEALTH CARE EDUCATION/TRAINING PROGRAM

## 2023-07-15 PROCEDURE — 99233 PR SUBSEQUENT HOSPITAL CARE,LEVL III: ICD-10-PCS | Mod: ,,, | Performed by: SURGERY

## 2023-07-15 PROCEDURE — 80053 COMPREHEN METABOLIC PANEL: CPT | Performed by: STUDENT IN AN ORGANIZED HEALTH CARE EDUCATION/TRAINING PROGRAM

## 2023-07-15 PROCEDURE — 25500020 PHARM REV CODE 255: Performed by: STUDENT IN AN ORGANIZED HEALTH CARE EDUCATION/TRAINING PROGRAM

## 2023-07-15 PROCEDURE — 11000001 HC ACUTE MED/SURG PRIVATE ROOM

## 2023-07-15 RX ORDER — IBUPROFEN 400 MG/1
800 TABLET ORAL EVERY 8 HOURS
Status: DISCONTINUED | OUTPATIENT
Start: 2023-07-16 | End: 2023-07-21

## 2023-07-15 RX ORDER — ACETAMINOPHEN 500 MG
1000 TABLET ORAL EVERY 8 HOURS
Status: DISCONTINUED | OUTPATIENT
Start: 2023-07-16 | End: 2023-07-26 | Stop reason: HOSPADM

## 2023-07-15 RX ORDER — ACETAMINOPHEN 10 MG/ML
1000 INJECTION, SOLUTION INTRAVENOUS EVERY 8 HOURS
Status: COMPLETED | OUTPATIENT
Start: 2023-07-15 | End: 2023-07-16

## 2023-07-15 RX ORDER — IBUPROFEN 400 MG/1
800 TABLET ORAL EVERY 8 HOURS
Status: DISCONTINUED | OUTPATIENT
Start: 2023-07-16 | End: 2023-07-15

## 2023-07-15 RX ADMIN — BUPROPION HYDROCHLORIDE 150 MG: 150 TABLET, EXTENDED RELEASE ORAL at 12:07

## 2023-07-15 RX ADMIN — PIPERACILLIN AND TAZOBACTAM 4.5 G: 4; .5 INJECTION, POWDER, LYOPHILIZED, FOR SOLUTION INTRAVENOUS; PARENTERAL at 05:07

## 2023-07-15 RX ADMIN — METHOCARBAMOL 500 MG: 100 INJECTION, SOLUTION INTRAMUSCULAR; INTRAVENOUS at 10:07

## 2023-07-15 RX ADMIN — IBUPROFEN 800 MG: 800 INJECTION INTRAVENOUS at 09:07

## 2023-07-15 RX ADMIN — ACETAMINOPHEN 1000 MG: 10 INJECTION INTRAVENOUS at 02:07

## 2023-07-15 RX ADMIN — METHOCARBAMOL 500 MG: 100 INJECTION, SOLUTION INTRAMUSCULAR; INTRAVENOUS at 02:07

## 2023-07-15 RX ADMIN — IBUPROFEN 800 MG: 800 INJECTION INTRAVENOUS at 05:07

## 2023-07-15 RX ADMIN — ACETAMINOPHEN 1000 MG: 10 INJECTION INTRAVENOUS at 09:07

## 2023-07-15 RX ADMIN — PIPERACILLIN AND TAZOBACTAM 4.5 G: 4; .5 INJECTION, POWDER, LYOPHILIZED, FOR SOLUTION INTRAVENOUS; PARENTERAL at 09:07

## 2023-07-15 RX ADMIN — HYDROMORPHONE HYDROCHLORIDE 1 MG: 1 INJECTION, SOLUTION INTRAMUSCULAR; INTRAVENOUS; SUBCUTANEOUS at 04:07

## 2023-07-15 RX ADMIN — SODIUM CHLORIDE, POTASSIUM CHLORIDE, SODIUM LACTATE AND CALCIUM CHLORIDE: 600; 310; 30; 20 INJECTION, SOLUTION INTRAVENOUS at 02:07

## 2023-07-15 RX ADMIN — HYDROMORPHONE HYDROCHLORIDE 1 MG: 1 INJECTION, SOLUTION INTRAMUSCULAR; INTRAVENOUS; SUBCUTANEOUS at 12:07

## 2023-07-15 RX ADMIN — HYDROMORPHONE HYDROCHLORIDE 1 MG: 1 INJECTION, SOLUTION INTRAMUSCULAR; INTRAVENOUS; SUBCUTANEOUS at 08:07

## 2023-07-15 RX ADMIN — HEPARIN SODIUM 5000 UNITS: 5000 INJECTION INTRAVENOUS; SUBCUTANEOUS at 02:07

## 2023-07-15 RX ADMIN — HEPARIN SODIUM 5000 UNITS: 5000 INJECTION INTRAVENOUS; SUBCUTANEOUS at 09:07

## 2023-07-15 RX ADMIN — DIATRIZOATE MEGLUMINE AND DIATRIZOATE SODIUM 50 ML: 660; 100 LIQUID ORAL; RECTAL at 06:07

## 2023-07-15 RX ADMIN — RALOXIFENE HYDROCHLORIDE 60 MG: 60 TABLET, FILM COATED ORAL at 08:07

## 2023-07-15 RX ADMIN — HYDROMORPHONE HYDROCHLORIDE 1 MG: 1 INJECTION, SOLUTION INTRAMUSCULAR; INTRAVENOUS; SUBCUTANEOUS at 09:07

## 2023-07-15 RX ADMIN — ONDANSETRON 4 MG: 2 INJECTION INTRAMUSCULAR; INTRAVENOUS at 05:07

## 2023-07-15 RX ADMIN — AMLODIPINE BESYLATE 5 MG: 5 TABLET ORAL at 08:07

## 2023-07-15 RX ADMIN — BUPROPION HYDROCHLORIDE 150 MG: 150 TABLET, EXTENDED RELEASE ORAL at 08:07

## 2023-07-15 RX ADMIN — HYDROMORPHONE HYDROCHLORIDE 1 MG: 1 INJECTION, SOLUTION INTRAMUSCULAR; INTRAVENOUS; SUBCUTANEOUS at 05:07

## 2023-07-15 RX ADMIN — HYDROMORPHONE HYDROCHLORIDE 1 MG: 1 INJECTION, SOLUTION INTRAMUSCULAR; INTRAVENOUS; SUBCUTANEOUS at 11:07

## 2023-07-15 RX ADMIN — HYDROMORPHONE HYDROCHLORIDE 1 MG: 1 INJECTION, SOLUTION INTRAMUSCULAR; INTRAVENOUS; SUBCUTANEOUS at 01:07

## 2023-07-15 RX ADMIN — SERTRALINE HYDROCHLORIDE 100 MG: 50 TABLET ORAL at 06:07

## 2023-07-15 NOTE — PLAN OF CARE
Problem: Skin Injury Risk Increased  Goal: Skin Health and Integrity  Intervention: Optimize Skin Protection  Flowsheets (Taken 7/15/2023 0314)  Pressure Reduction Techniques:   frequent weight shift encouraged   pressure points protected   rest period provided between sit times  Skin Protection:   incontinence pads utilized   silicone foam dressing in place  Intervention: Promote and Optimize Oral Intake  Flowsheets (Taken 7/15/2023 0314)  Oral Nutrition Promotion:   physical activity promoted   medicated

## 2023-07-15 NOTE — PROGRESS NOTES
[facility] Surg  General Surgery  Progress Note    Subjective:     History of Present Illness:  Marilee Townsend is a 63yoF with a history of hypertension, rectal prolapse s/p rectopexy who presents to [facility] with complaints of nausea, vomiting and worsening abdominal pain. Of note, she presented to an outside hospital with the same complaints yesterday and was discharged with instructions to follow up with her primary care provider. The patient describes a three-day history of abdominal pain, nausea, vomiting and PO intolerance. This has persistently worsened throughout this time, which prompted both presentations. She states that she did have a small bowel movement yesterday, described as hard pellets. On work-up, her vital signs are stable. She has an elevated leukocytosis to 18. Lactate normal at 1. Repeat ordered. Her CT scan demonstrates dilated small bowel consistent with a small bowel obstruction. There is normal caliber small intestine distally. She is admitted to the hospital medicine service. General surgery consulted for evaluation.     Of note, her WBC has increased from 12.8 to 18 over the last 24hrs. Imaging at the outside hospital, per the official read, did not demonstrate any degree of obstruction, which is a rather burk contrast from her CT scan at our facility. Plan for NG tube insertion with low threshold to proceed to operating room for diagnostic laparoscopy. Discussed this plan with the patient.       Post-Op Info:  * No surgery found *         Interval History: Patient seen and examined. NG tube with minimal output. Remains tender to central abdomen. WBC downtrending, lactate normal. Plan for gastrograffin challenge today.     Medications:  Continuous Infusions:   lactated ringers 100 mL/hr at 07/14/23 1652     Scheduled Meds:   amLODIPine  5 mg Oral Daily    buPROPion  150 mg Oral BID    heparin (porcine)  5,000 Units Subcutaneous Q8H    piperacillin-tazobactam (Zosyn) IV (PEDS  and ADULTS) (extended infusion is not appropriate)  4.5 g Intravenous Q8H    raloxifene  60 mg Oral Daily    sertraline  100 mg Oral QAM     PRN Meds:acetaminophen, acetaminophen, dextrose 50%, dextrose 50%, diatrizoate meglumineand-diatrizoate sodium, glucagon (human recombinant), glucose, glucose, HYDROmorphone, HYDROmorphone, insulin aspart U-100, naloxone, ondansetron, sodium chloride 0.9%     Review of patient's allergies indicates:   Allergen Reactions    Morphine Nausea And Vomiting     Objective:     Vital Signs (Most Recent):  Temp: 98.6 °F (37 °C) (07/15/23 0707)  Pulse: 101 (07/15/23 0707)  Resp: 18 (07/15/23 0901)  BP: 122/78 (07/15/23 0707)  SpO2: 95 % (07/15/23 0707) Vital Signs (24h Range):  Temp:  [97.4 °F (36.3 °C)-98.9 °F (37.2 °C)] 98.6 °F (37 °C)  Pulse:  [] 101  Resp:  [16-22] 18  SpO2:  [95 %-99 %] 95 %  BP: (111-141)/(70-78) 122/78     Weight: 45.4 kg (100 lb)  Body mass index is 18.29 kg/m².    Intake/Output - Last 3 Shifts         07/13 0700  07/14 0659 07/14 0700  07/15 0659 07/15 0700  07/16 0659    P.O.  120     I.V. (mL/kg)  200 (4.4)     NG/GT  0     Total Intake(mL/kg)  320 (7)     Urine (mL/kg/hr)  550     Drains  0     Total Output  550     Net  -230                     Physical Exam  Vitals and nursing note reviewed.   Constitutional:       General: She is in acute distress.   HENT:      Nose: Nose normal.      Comments: NG tube in place, minimal output     Mouth/Throat:      Mouth: Mucous membranes are moist.   Cardiovascular:      Rate and Rhythm: Normal rate and regular rhythm.   Pulmonary:      Effort: Pulmonary effort is normal. No respiratory distress.   Abdominal:      Comments: Abdomen moderately distended  Exquisitely tender to palpation throughout the abdomen, mainly centrally  Well healed supra-umbilical incision   Musculoskeletal:         General: Normal range of motion.   Skin:     General: Skin is warm.   Neurological:      General: No focal deficit present.       Mental Status: She is alert.        Significant Labs:  I have reviewed all pertinent lab results within the past 24 hours.  CBC:   Recent Labs   Lab 07/15/23  0455   WBC 16.31*   RBC 3.78*   HGB 11.3*   HCT 34.7*      MCV 92   MCH 29.9   MCHC 32.6     CMP:   Recent Labs   Lab 07/15/23  0455   *   CALCIUM 8.6*   ALBUMIN 2.9*   PROT 6.0      K 3.7   CO2 22*      BUN 23   CREATININE 0.7   ALKPHOS 59   ALT 9*   AST 15   BILITOT 0.5       Significant Diagnostics:  I have reviewed all pertinent imaging results/findings within the past 24 hours.    Assessment/Plan:     * Small bowel obstruction  Marilee Townsend is a 63yoF with a history of HTN and rectal prolapse who presents with a several day history of abdominal pain, nausea, vomiting, and PO intolerance. Her work-up is consistent with a small bowel obstruction. General surgery consulted for evaluation.     - patient seen and examined  - labs and imaging reviewed   - WBC downtrending, lactate normal  - NG tube in place with minimal output; continue to LIWS  - gastrograffin challenge today  - serial abdominal exams  - remains exquisitely tender to central abdomen, there is a low threshold to proceed with diagnostic laparoscopy  - will continue to follow        Manuel Wilson MD  General Surgery  [facility]

## 2023-07-15 NOTE — SUBJECTIVE & OBJECTIVE
Interval History: Pt states she is still having pretty significant abd pain. No n/v with ng tube in place.    Review of Systems  Objective:     Vital Signs (Most Recent):  Temp: 98.2 °F (36.8 °C) (07/15/23 1106)  Pulse: 96 (07/15/23 1106)  Resp: 18 (07/15/23 1217)  BP: 128/75 (07/15/23 1106)  SpO2: (!) 93 % (07/15/23 1106) Vital Signs (24h Range):  Temp:  [97.4 °F (36.3 °C)-98.9 °F (37.2 °C)] 98.2 °F (36.8 °C)  Pulse:  [] 96  Resp:  [16-22] 18  SpO2:  [93 %-99 %] 93 %  BP: (122-141)/(70-78) 128/75     Weight: 45.4 kg (100 lb)  Body mass index is 18.29 kg/m².    Intake/Output Summary (Last 24 hours) at 7/15/2023 1223  Last data filed at 7/15/2023 1034  Gross per 24 hour   Intake 320 ml   Output 750 ml   Net -430 ml         Physical Exam      Vitals reviewed.   Constitutional:       General: She is in acute distress (moderate distress).   HENT:      Head: Normocephalic and atraumatic.      Mouth/Throat:      Mouth: Mucous membranes are dry.      Pharynx: Oropharynx is clear.   Eyes:      General: No scleral icterus.     Extraocular Movements: Extraocular movements intact.   Cardiovascular:      Rate and Rhythm: Normal rate and regular rhythm.   Pulmonary:      Effort: Pulmonary effort is normal. No respiratory distress.   Abdominal:      Palpations: Abdomen is soft.      Tenderness: There is abdominal tenderness (diffuse ttp).   Musculoskeletal:         General: No swelling or tenderness.      Cervical back: Neck supple. No rigidity.   Skin:     General: Skin is warm and dry.   Neurological:      General: No focal deficit present.      Mental Status: She is alert and oriented to person, place, and time.   Psychiatric:         Mood and Affect: Mood normal.         Behavior: Behavior normal.   Significant Labs: All pertinent labs within the past 24 hours have been reviewed.    Significant Imaging: I have reviewed all pertinent imaging results/findings within the past 24 hours.

## 2023-07-15 NOTE — ASSESSMENT & PLAN NOTE
Marilee Townsend is a 63yoF with a history of HTN and rectal prolapse who presents with a several day history of abdominal pain, nausea, vomiting, and PO intolerance. Her work-up is consistent with a small bowel obstruction. General surgery consulted for evaluation.     - patient seen and examined  - labs and imaging reviewed   - WBC downtrending, lactate normal  - NG tube in place with minimal output; continue to LIWS  - gastrograffin challenge today  - serial abdominal exams  - remains exquisitely tender to central abdomen, there is a low threshold to proceed with diagnostic laparoscopy  - will continue to follow

## 2023-07-15 NOTE — ASSESSMENT & PLAN NOTE
In setting of acute small-bowel obstruction and no definitive findings of infection appears likely reactive, but low threshold for starting antibiotics. LA negative x2.  -procal mildly elevated and leukocytosis persistent    -start on zosyn for now  -tx as stated above for sbo

## 2023-07-15 NOTE — NURSING
[facility]  Nurses Note -- 4 Eyes      7/14/2023       Skin assessed on: Q Shift      [x] No Pressure Injuries Present    []Prevention Measures Documented    [] Yes LDA  for Pressure Injury Previously documented     [] Yes New Pressure Injury Discovered   [] LDA for New Pressure Injury Added      Attending RN:  Magdi Paez RN     Second RN:  azul babcock

## 2023-07-15 NOTE — PROGRESS NOTES
[facility] Medicine  Progress Note    Patient Name: Marilee Townsend  MRN: [id number]  Patient Class: IP- Inpatient   Admission Date: 7/14/2023  Length of Stay: 1 days  Attending Physician: Wesley Vargas III, MD  Primary Care Provider: Claudia Hopper DO        Subjective:     Principal Problem:Small bowel obstruction        HPI:  63F with pmh of depression, fibromyalgia, htn, osteoporosis who presents due to several day h/o abd pain and nausea with anorexia. Pt was evaluated at outside emergency department patient was seen in the day prompting the ED for evaluation.  Patient denies any recent flatulence or bowel movements unclear on the last date.  Patient states pain is diffuse in the with minimal improvement with pain medications given in the ED. CT scan in the emergency department with demonstration of small bowel obstruction and surgery consulted who had NG tube placed.  Patient denies chest pain, shortness a breath, fever, chills.  Patient states she had a shoulder surgery about 3 weeks prior to arrival and tolerated the anesthesia well.  Patient is a former smoker, but denies alcohol or drug use.      Overview/Hospital Course:  No notes on file    Interval History: Pt states she is still having pretty significant abd pain. No n/v with ng tube in place.    Review of Systems  Objective:     Vital Signs (Most Recent):  Temp: 98.2 °F (36.8 °C) (07/15/23 1106)  Pulse: 96 (07/15/23 1106)  Resp: 18 (07/15/23 1217)  BP: 128/75 (07/15/23 1106)  SpO2: (!) 93 % (07/15/23 1106) Vital Signs (24h Range):  Temp:  [97.4 °F (36.3 °C)-98.9 °F (37.2 °C)] 98.2 °F (36.8 °C)  Pulse:  [] 96  Resp:  [16-22] 18  SpO2:  [93 %-99 %] 93 %  BP: (122-141)/(70-78) 128/75     Weight: 45.4 kg (100 lb)  Body mass index is 18.29 kg/m².    Intake/Output Summary (Last 24 hours) at 7/15/2023 1223  Last data filed at 7/15/2023 1034  Gross per 24 hour   Intake 320 ml   Output 750 ml   Net -430 ml         Physical Exam       Vitals reviewed.   Constitutional:       General: She is in acute distress (moderate distress).   HENT:      Head: Normocephalic and atraumatic.      Mouth/Throat:      Mouth: Mucous membranes are dry.      Pharynx: Oropharynx is clear.   Eyes:      General: No scleral icterus.     Extraocular Movements: Extraocular movements intact.   Cardiovascular:      Rate and Rhythm: Normal rate and regular rhythm.   Pulmonary:      Effort: Pulmonary effort is normal. No respiratory distress.   Abdominal:      Palpations: Abdomen is soft.      Tenderness: There is abdominal tenderness (diffuse ttp).   Musculoskeletal:         General: No swelling or tenderness.      Cervical back: Neck supple. No rigidity.   Skin:     General: Skin is warm and dry.   Neurological:      General: No focal deficit present.      Mental Status: She is alert and oriented to person, place, and time.   Psychiatric:         Mood and Affect: Mood normal.         Behavior: Behavior normal.   Significant Labs: All pertinent labs within the past 24 hours have been reviewed.    Significant Imaging: I have reviewed all pertinent imaging results/findings within the past 24 hours.      Assessment/Plan:      * Small bowel obstruction  63F presenting with several days of worsening abd pain, anorexia, and nausea.  CT abd/pelvis with Abnormal fluid, air distension small bowel, termination point at or near ileocecal valve, not well-defined, associated less distension proximal small bowel with some bowel wall thickening.  Adhesion etiology of obstructive process favored.    -NG tube in place to LIWS  -npo diet  -general surgery consulted, appreciate management of SBO  -Analgesics ordered prn       Osteoporosis  Continue home medications      Depression  Restart home medications as indicated        Leukocytosis  In setting of acute small-bowel obstruction and no definitive findings of infection appears likely reactive, but low threshold for starting antibiotics. LA  negative x2.  -procal mildly elevated and leukocytosis persistent    -start on zosyn for now  -tx as stated above for sbo      HTN (hypertension)  Currently controlled.  Will continue to monitor        VTE Risk Mitigation (From admission, onward)         Ordered     heparin (porcine) injection 5,000 Units  Every 8 hours         07/14/23 1428     IP VTE LOW RISK PATIENT  Once         07/14/23 1236     Place sequential compression device  Until discontinued         07/14/23 1236                Discharge Planning   WIL:      Code Status: Full Code   Is the patient medically ready for discharge?:     Reason for patient still in hospital (select all that apply): Treatment and Consult recommendations  Discharge Plan A: Home                  Wesley Vargas III, MD  Department of Hospital Medicine   [facility]

## 2023-07-15 NOTE — SUBJECTIVE & OBJECTIVE
Interval History: Patient seen and examined. NG tube with minimal output. Remains tender to central abdomen. WBC downtrending, lactate normal. Plan for gastrograffin challenge today.     Medications:  Continuous Infusions:   lactated ringers 100 mL/hr at 07/14/23 1652     Scheduled Meds:   amLODIPine  5 mg Oral Daily    buPROPion  150 mg Oral BID    heparin (porcine)  5,000 Units Subcutaneous Q8H    piperacillin-tazobactam (Zosyn) IV (PEDS and ADULTS) (extended infusion is not appropriate)  4.5 g Intravenous Q8H    raloxifene  60 mg Oral Daily    sertraline  100 mg Oral QAM     PRN Meds:acetaminophen, acetaminophen, dextrose 50%, dextrose 50%, diatrizoate meglumineand-diatrizoate sodium, glucagon (human recombinant), glucose, glucose, HYDROmorphone, HYDROmorphone, insulin aspart U-100, naloxone, ondansetron, sodium chloride 0.9%     Review of patient's allergies indicates:   Allergen Reactions    Morphine Nausea And Vomiting     Objective:     Vital Signs (Most Recent):  Temp: 98.6 °F (37 °C) (07/15/23 0707)  Pulse: 101 (07/15/23 0707)  Resp: 18 (07/15/23 0901)  BP: 122/78 (07/15/23 0707)  SpO2: 95 % (07/15/23 0707) Vital Signs (24h Range):  Temp:  [97.4 °F (36.3 °C)-98.9 °F (37.2 °C)] 98.6 °F (37 °C)  Pulse:  [] 101  Resp:  [16-22] 18  SpO2:  [95 %-99 %] 95 %  BP: (111-141)/(70-78) 122/78     Weight: 45.4 kg (100 lb)  Body mass index is 18.29 kg/m².    Intake/Output - Last 3 Shifts         07/13 0700  07/14 0659 07/14 0700  07/15 0659 07/15 0700 07/16 0659    P.O.  120     I.V. (mL/kg)  200 (4.4)     NG/GT  0     Total Intake(mL/kg)  320 (7)     Urine (mL/kg/hr)  550     Drains  0     Total Output  550     Net  -230                     Physical Exam  Vitals and nursing note reviewed.   Constitutional:       General: She is in acute distress.   HENT:      Nose: Nose normal.      Comments: NG tube in place, minimal output     Mouth/Throat:      Mouth: Mucous membranes are moist.   Cardiovascular:      Rate and  Rhythm: Normal rate and regular rhythm.   Pulmonary:      Effort: Pulmonary effort is normal. No respiratory distress.   Abdominal:      Comments: Abdomen moderately distended  Exquisitely tender to palpation throughout the abdomen, mainly centrally  Well healed supra-umbilical incision   Musculoskeletal:         General: Normal range of motion.   Skin:     General: Skin is warm.   Neurological:      General: No focal deficit present.      Mental Status: She is alert.        Significant Labs:  I have reviewed all pertinent lab results within the past 24 hours.  CBC:   Recent Labs   Lab 07/15/23  0455   WBC 16.31*   RBC 3.78*   HGB 11.3*   HCT 34.7*      MCV 92   MCH 29.9   MCHC 32.6     CMP:   Recent Labs   Lab 07/15/23  0455   *   CALCIUM 8.6*   ALBUMIN 2.9*   PROT 6.0      K 3.7   CO2 22*      BUN 23   CREATININE 0.7   ALKPHOS 59   ALT 9*   AST 15   BILITOT 0.5       Significant Diagnostics:  I have reviewed all pertinent imaging results/findings within the past 24 hours.

## 2023-07-16 ENCOUNTER — ANESTHESIA EVENT (OUTPATIENT)
Dept: SURGERY | Facility: HOSPITAL | Age: 63
DRG: 329 | End: 2023-07-16
Payer: MEDICARE

## 2023-07-16 ENCOUNTER — ANESTHESIA (OUTPATIENT)
Dept: SURGERY | Facility: HOSPITAL | Age: 63
DRG: 329 | End: 2023-07-16
Payer: MEDICARE

## 2023-07-16 LAB
ABO + RH BLD: NORMAL
ALBUMIN SERPL BCP-MCNC: 2 G/DL (ref 3.5–5.2)
ALBUMIN SERPL BCP-MCNC: 2.7 G/DL (ref 3.5–5.2)
ALP SERPL-CCNC: 46 U/L (ref 55–135)
ALP SERPL-CCNC: 64 U/L (ref 55–135)
ALT SERPL W/O P-5'-P-CCNC: 10 U/L (ref 10–44)
ALT SERPL W/O P-5'-P-CCNC: 11 U/L (ref 10–44)
ANION GAP SERPL CALC-SCNC: 10 MMOL/L (ref 8–16)
ANION GAP SERPL CALC-SCNC: 8 MMOL/L (ref 8–16)
ANISOCYTOSIS BLD QL SMEAR: SLIGHT
AST SERPL-CCNC: 15 U/L (ref 10–40)
AST SERPL-CCNC: 16 U/L (ref 10–40)
BASOPHILS # BLD AUTO: 0.02 K/UL (ref 0–0.2)
BASOPHILS # BLD AUTO: ABNORMAL K/UL (ref 0–0.2)
BASOPHILS NFR BLD: 0 % (ref 0–1.9)
BASOPHILS NFR BLD: 0.5 % (ref 0–1.9)
BILIRUB SERPL-MCNC: 0.7 MG/DL (ref 0.1–1)
BILIRUB SERPL-MCNC: 0.8 MG/DL (ref 0.1–1)
BLD GP AB SCN CELLS X3 SERPL QL: NORMAL
BUN SERPL-MCNC: 28 MG/DL (ref 8–23)
BUN SERPL-MCNC: 31 MG/DL (ref 8–23)
CALCIUM SERPL-MCNC: 8.3 MG/DL (ref 8.7–10.5)
CALCIUM SERPL-MCNC: 9.3 MG/DL (ref 8.7–10.5)
CHLORIDE SERPL-SCNC: 101 MMOL/L (ref 95–110)
CHLORIDE SERPL-SCNC: 104 MMOL/L (ref 95–110)
CO2 SERPL-SCNC: 23 MMOL/L (ref 23–29)
CO2 SERPL-SCNC: 24 MMOL/L (ref 23–29)
CREAT SERPL-MCNC: 0.6 MG/DL (ref 0.5–1.4)
CREAT SERPL-MCNC: 0.8 MG/DL (ref 0.5–1.4)
DIFFERENTIAL METHOD: ABNORMAL
DIFFERENTIAL METHOD: ABNORMAL
EOSINOPHIL # BLD AUTO: 0 K/UL (ref 0–0.5)
EOSINOPHIL # BLD AUTO: ABNORMAL K/UL (ref 0–0.5)
EOSINOPHIL NFR BLD: 0 % (ref 0–8)
EOSINOPHIL NFR BLD: 0 % (ref 0–8)
ERYTHROCYTE [DISTWIDTH] IN BLOOD BY AUTOMATED COUNT: 13.3 % (ref 11.5–14.5)
ERYTHROCYTE [DISTWIDTH] IN BLOOD BY AUTOMATED COUNT: 13.7 % (ref 11.5–14.5)
EST. GFR  (NO RACE VARIABLE): >60 ML/MIN/1.73 M^2
EST. GFR  (NO RACE VARIABLE): >60 ML/MIN/1.73 M^2
GLUCOSE SERPL-MCNC: 128 MG/DL (ref 70–110)
GLUCOSE SERPL-MCNC: 133 MG/DL (ref 70–110)
HCT VFR BLD AUTO: 31.8 % (ref 37–48.5)
HCT VFR BLD AUTO: 34.5 % (ref 37–48.5)
HGB BLD-MCNC: 11 G/DL (ref 12–16)
HGB BLD-MCNC: 11.2 G/DL (ref 12–16)
HYPOCHROMIA BLD QL SMEAR: ABNORMAL
IMM GRANULOCYTES # BLD AUTO: 0 K/UL (ref 0–0.04)
IMM GRANULOCYTES # BLD AUTO: ABNORMAL K/UL (ref 0–0.04)
IMM GRANULOCYTES NFR BLD AUTO: 0 % (ref 0–0.5)
IMM GRANULOCYTES NFR BLD AUTO: ABNORMAL % (ref 0–0.5)
LYMPHOCYTES # BLD AUTO: 0.3 K/UL (ref 1–4.8)
LYMPHOCYTES # BLD AUTO: ABNORMAL K/UL (ref 1–4.8)
LYMPHOCYTES NFR BLD: 11 % (ref 18–48)
LYMPHOCYTES NFR BLD: 8 % (ref 18–48)
MAGNESIUM SERPL-MCNC: 2.1 MG/DL (ref 1.6–2.6)
MCH RBC QN AUTO: 29.8 PG (ref 27–31)
MCH RBC QN AUTO: 30.2 PG (ref 27–31)
MCHC RBC AUTO-ENTMCNC: 32.5 G/DL (ref 32–36)
MCHC RBC AUTO-ENTMCNC: 34.6 G/DL (ref 32–36)
MCV RBC AUTO: 87 FL (ref 82–98)
MCV RBC AUTO: 92 FL (ref 82–98)
METAMYELOCYTES NFR BLD MANUAL: 4 %
MONOCYTES # BLD AUTO: 0.2 K/UL (ref 0.3–1)
MONOCYTES # BLD AUTO: ABNORMAL K/UL (ref 0.3–1)
MONOCYTES NFR BLD: 0 % (ref 4–15)
MONOCYTES NFR BLD: 5.9 % (ref 4–15)
MYELOCYTES NFR BLD MANUAL: 2 %
NEUTROPHILS # BLD AUTO: 3.3 K/UL (ref 1.8–7.7)
NEUTROPHILS NFR BLD: 23 % (ref 38–73)
NEUTROPHILS NFR BLD: 85.6 % (ref 38–73)
NEUTS BAND NFR BLD MANUAL: 60 %
NRBC BLD-RTO: 0 /100 WBC
NRBC BLD-RTO: 0 /100 WBC
PHOSPHATE SERPL-MCNC: 2.9 MG/DL (ref 2.7–4.5)
PLATELET # BLD AUTO: 221 K/UL (ref 150–450)
PLATELET # BLD AUTO: 269 K/UL (ref 150–450)
PLATELET BLD QL SMEAR: ABNORMAL
PMV BLD AUTO: 8.8 FL (ref 9.2–12.9)
PMV BLD AUTO: 9.1 FL (ref 9.2–12.9)
POCT GLUCOSE: 114 MG/DL (ref 70–110)
POCT GLUCOSE: 134 MG/DL (ref 70–110)
POCT GLUCOSE: 144 MG/DL (ref 70–110)
POLYCHROMASIA BLD QL SMEAR: ABNORMAL
POTASSIUM SERPL-SCNC: 3.2 MMOL/L (ref 3.5–5.1)
POTASSIUM SERPL-SCNC: 3.3 MMOL/L (ref 3.5–5.1)
PROT SERPL-MCNC: 5.1 G/DL (ref 6–8.4)
PROT SERPL-MCNC: 6.4 G/DL (ref 6–8.4)
RBC # BLD AUTO: 3.64 M/UL (ref 4–5.4)
RBC # BLD AUTO: 3.76 M/UL (ref 4–5.4)
SODIUM SERPL-SCNC: 135 MMOL/L (ref 136–145)
SODIUM SERPL-SCNC: 135 MMOL/L (ref 136–145)
SPECIMEN OUTDATE: NORMAL
WBC # BLD AUTO: 2.27 K/UL (ref 3.9–12.7)
WBC # BLD AUTO: 3.89 K/UL (ref 3.9–12.7)

## 2023-07-16 PROCEDURE — 88307 TISSUE EXAM BY PATHOLOGIST: CPT | Performed by: PATHOLOGY

## 2023-07-16 PROCEDURE — 80053 COMPREHEN METABOLIC PANEL: CPT | Mod: 91 | Performed by: STUDENT IN AN ORGANIZED HEALTH CARE EDUCATION/TRAINING PROGRAM

## 2023-07-16 PROCEDURE — D9220A PRA ANESTHESIA: ICD-10-PCS | Mod: CRNA,,, | Performed by: NURSE ANESTHETIST, CERTIFIED REGISTERED

## 2023-07-16 PROCEDURE — 37000009 HC ANESTHESIA EA ADD 15 MINS: Performed by: SURGERY

## 2023-07-16 PROCEDURE — 37000008 HC ANESTHESIA 1ST 15 MINUTES: Performed by: SURGERY

## 2023-07-16 PROCEDURE — 44120 REMOVAL OF SMALL INTESTINE: CPT | Mod: ,,, | Performed by: SURGERY

## 2023-07-16 PROCEDURE — 63600175 PHARM REV CODE 636 W HCPCS: Performed by: SURGERY

## 2023-07-16 PROCEDURE — 36415 COLL VENOUS BLD VENIPUNCTURE: CPT | Performed by: STUDENT IN AN ORGANIZED HEALTH CARE EDUCATION/TRAINING PROGRAM

## 2023-07-16 PROCEDURE — 71000033 HC RECOVERY, INTIAL HOUR: Performed by: SURGERY

## 2023-07-16 PROCEDURE — 36000709 HC OR TIME LEV III EA ADD 15 MIN: Performed by: SURGERY

## 2023-07-16 PROCEDURE — D9220A PRA ANESTHESIA: Mod: CRNA,,, | Performed by: NURSE ANESTHETIST, CERTIFIED REGISTERED

## 2023-07-16 PROCEDURE — 25000003 PHARM REV CODE 250: Performed by: STUDENT IN AN ORGANIZED HEALTH CARE EDUCATION/TRAINING PROGRAM

## 2023-07-16 PROCEDURE — 84100 ASSAY OF PHOSPHORUS: CPT | Performed by: STUDENT IN AN ORGANIZED HEALTH CARE EDUCATION/TRAINING PROGRAM

## 2023-07-16 PROCEDURE — 85027 COMPLETE CBC AUTOMATED: CPT | Performed by: STUDENT IN AN ORGANIZED HEALTH CARE EDUCATION/TRAINING PROGRAM

## 2023-07-16 PROCEDURE — 11000001 HC ACUTE MED/SURG PRIVATE ROOM

## 2023-07-16 PROCEDURE — 85007 BL SMEAR W/DIFF WBC COUNT: CPT | Performed by: STUDENT IN AN ORGANIZED HEALTH CARE EDUCATION/TRAINING PROGRAM

## 2023-07-16 PROCEDURE — 44120 PR RESECT SMALL INTEST,SINGL RESEC/ANAS: ICD-10-PCS | Mod: ,,, | Performed by: SURGERY

## 2023-07-16 PROCEDURE — 86900 BLOOD TYPING SEROLOGIC ABO: CPT | Performed by: ANESTHESIOLOGY

## 2023-07-16 PROCEDURE — V2790 AMNIOTIC MEMBRANE: HCPCS | Performed by: SURGERY

## 2023-07-16 PROCEDURE — 88307 TISSUE EXAM BY PATHOLOGIST: CPT | Mod: 26,,, | Performed by: PATHOLOGY

## 2023-07-16 PROCEDURE — 63600175 PHARM REV CODE 636 W HCPCS: Performed by: ANESTHESIOLOGY

## 2023-07-16 PROCEDURE — 88307 PR  SURG PATH,LEVEL V: ICD-10-PCS | Mod: 26,,, | Performed by: PATHOLOGY

## 2023-07-16 PROCEDURE — 80053 COMPREHEN METABOLIC PANEL: CPT | Performed by: STUDENT IN AN ORGANIZED HEALTH CARE EDUCATION/TRAINING PROGRAM

## 2023-07-16 PROCEDURE — 85025 COMPLETE CBC W/AUTO DIFF WBC: CPT | Performed by: STUDENT IN AN ORGANIZED HEALTH CARE EDUCATION/TRAINING PROGRAM

## 2023-07-16 PROCEDURE — 63600175 PHARM REV CODE 636 W HCPCS: Performed by: STUDENT IN AN ORGANIZED HEALTH CARE EDUCATION/TRAINING PROGRAM

## 2023-07-16 PROCEDURE — 83735 ASSAY OF MAGNESIUM: CPT | Performed by: STUDENT IN AN ORGANIZED HEALTH CARE EDUCATION/TRAINING PROGRAM

## 2023-07-16 PROCEDURE — 25000003 PHARM REV CODE 250: Performed by: NURSE ANESTHETIST, CERTIFIED REGISTERED

## 2023-07-16 PROCEDURE — D9220A PRA ANESTHESIA: ICD-10-PCS | Mod: ANES,,, | Performed by: ANESTHESIOLOGY

## 2023-07-16 PROCEDURE — 36000708 HC OR TIME LEV III 1ST 15 MIN: Performed by: SURGERY

## 2023-07-16 PROCEDURE — 63600175 PHARM REV CODE 636 W HCPCS: Performed by: NURSE ANESTHETIST, CERTIFIED REGISTERED

## 2023-07-16 PROCEDURE — 36415 COLL VENOUS BLD VENIPUNCTURE: CPT | Performed by: ANESTHESIOLOGY

## 2023-07-16 PROCEDURE — 27201423 OPTIME MED/SURG SUP & DEVICES STERILE SUPPLY: Performed by: SURGERY

## 2023-07-16 PROCEDURE — D9220A PRA ANESTHESIA: Mod: ANES,,, | Performed by: ANESTHESIOLOGY

## 2023-07-16 DEVICE — PATCH AMNION DUAL LAYER 4X8CM: Type: IMPLANTABLE DEVICE | Site: ABDOMEN | Status: FUNCTIONAL

## 2023-07-16 RX ORDER — ONDANSETRON 2 MG/ML
INJECTION INTRAMUSCULAR; INTRAVENOUS
Status: DISCONTINUED | OUTPATIENT
Start: 2023-07-16 | End: 2023-07-16

## 2023-07-16 RX ORDER — METRONIDAZOLE 500 MG/100ML
500 INJECTION, SOLUTION INTRAVENOUS
Status: DISCONTINUED | OUTPATIENT
Start: 2023-07-16 | End: 2023-07-22

## 2023-07-16 RX ORDER — SODIUM CHLORIDE 0.9 % (FLUSH) 0.9 %
10 SYRINGE (ML) INJECTION
Status: DISCONTINUED | OUTPATIENT
Start: 2023-07-16 | End: 2023-07-16 | Stop reason: HOSPADM

## 2023-07-16 RX ORDER — PROPOFOL 10 MG/ML
VIAL (ML) INTRAVENOUS
Status: DISCONTINUED | OUTPATIENT
Start: 2023-07-16 | End: 2023-07-16

## 2023-07-16 RX ORDER — DEXAMETHASONE SODIUM PHOSPHATE 4 MG/ML
INJECTION, SOLUTION INTRA-ARTICULAR; INTRALESIONAL; INTRAMUSCULAR; INTRAVENOUS; SOFT TISSUE
Status: DISCONTINUED | OUTPATIENT
Start: 2023-07-16 | End: 2023-07-16

## 2023-07-16 RX ORDER — HYDROMORPHONE HYDROCHLORIDE 2 MG/ML
0.2 INJECTION, SOLUTION INTRAMUSCULAR; INTRAVENOUS; SUBCUTANEOUS EVERY 5 MIN PRN
Status: COMPLETED | OUTPATIENT
Start: 2023-07-16 | End: 2023-07-16

## 2023-07-16 RX ORDER — PHENYLEPHRINE HYDROCHLORIDE 10 MG/ML
INJECTION INTRAVENOUS
Status: DISCONTINUED | OUTPATIENT
Start: 2023-07-16 | End: 2023-07-16

## 2023-07-16 RX ORDER — POTASSIUM CHLORIDE 7.45 MG/ML
10 INJECTION INTRAVENOUS
Status: COMPLETED | OUTPATIENT
Start: 2023-07-16 | End: 2023-07-17

## 2023-07-16 RX ORDER — SUCCINYLCHOLINE CHLORIDE 20 MG/ML
INJECTION INTRAMUSCULAR; INTRAVENOUS
Status: DISCONTINUED | OUTPATIENT
Start: 2023-07-16 | End: 2023-07-16

## 2023-07-16 RX ORDER — ROCURONIUM BROMIDE 10 MG/ML
INJECTION, SOLUTION INTRAVENOUS
Status: DISCONTINUED | OUTPATIENT
Start: 2023-07-16 | End: 2023-07-16

## 2023-07-16 RX ORDER — METRONIDAZOLE 500 MG/100ML
INJECTION, SOLUTION INTRAVENOUS
Status: DISCONTINUED | OUTPATIENT
Start: 2023-07-16 | End: 2023-07-16

## 2023-07-16 RX ORDER — MIDAZOLAM HYDROCHLORIDE 1 MG/ML
INJECTION, SOLUTION INTRAMUSCULAR; INTRAVENOUS
Status: DISCONTINUED | OUTPATIENT
Start: 2023-07-16 | End: 2023-07-16

## 2023-07-16 RX ORDER — LIDOCAINE HYDROCHLORIDE 20 MG/ML
INJECTION INTRAVENOUS
Status: DISCONTINUED | OUTPATIENT
Start: 2023-07-16 | End: 2023-07-16

## 2023-07-16 RX ORDER — FENTANYL CITRATE 50 UG/ML
INJECTION, SOLUTION INTRAMUSCULAR; INTRAVENOUS
Status: DISCONTINUED | OUTPATIENT
Start: 2023-07-16 | End: 2023-07-16

## 2023-07-16 RX ADMIN — FENTANYL CITRATE 50 MCG: 0.05 INJECTION, SOLUTION INTRAMUSCULAR; INTRAVENOUS at 11:07

## 2023-07-16 RX ADMIN — PHENYLEPHRINE HYDROCHLORIDE 100 MCG: 10 INJECTION INTRAVENOUS at 11:07

## 2023-07-16 RX ADMIN — HYDROMORPHONE HYDROCHLORIDE 0.2 MG: 2 INJECTION INTRAMUSCULAR; INTRAVENOUS; SUBCUTANEOUS at 01:07

## 2023-07-16 RX ADMIN — SODIUM CHLORIDE, SODIUM LACTATE, POTASSIUM CHLORIDE, AND CALCIUM CHLORIDE: .6; .31; .03; .02 INJECTION, SOLUTION INTRAVENOUS at 11:07

## 2023-07-16 RX ADMIN — HYDROMORPHONE HYDROCHLORIDE 1 MG: 1 INJECTION, SOLUTION INTRAMUSCULAR; INTRAVENOUS; SUBCUTANEOUS at 02:07

## 2023-07-16 RX ADMIN — POTASSIUM CHLORIDE 10 MEQ: 7.46 INJECTION, SOLUTION INTRAVENOUS at 11:07

## 2023-07-16 RX ADMIN — BUPROPION HYDROCHLORIDE 150 MG: 150 TABLET, EXTENDED RELEASE ORAL at 08:07

## 2023-07-16 RX ADMIN — IBUPROFEN 800 MG: 800 INJECTION INTRAVENOUS at 01:07

## 2023-07-16 RX ADMIN — HYDROMORPHONE HYDROCHLORIDE 1 MG: 1 INJECTION, SOLUTION INTRAMUSCULAR; INTRAVENOUS; SUBCUTANEOUS at 05:07

## 2023-07-16 RX ADMIN — BUPROPION HYDROCHLORIDE 150 MG: 150 TABLET, EXTENDED RELEASE ORAL at 09:07

## 2023-07-16 RX ADMIN — PHENYLEPHRINE HYDROCHLORIDE 100 MCG: 10 INJECTION INTRAVENOUS at 12:07

## 2023-07-16 RX ADMIN — ONDANSETRON 4 MG: 2 INJECTION, SOLUTION INTRAMUSCULAR; INTRAVENOUS at 11:07

## 2023-07-16 RX ADMIN — SUGAMMADEX 200 MG: 100 INJECTION, SOLUTION INTRAVENOUS at 01:07

## 2023-07-16 RX ADMIN — IBUPROFEN 800 MG: 400 TABLET ORAL at 05:07

## 2023-07-16 RX ADMIN — PHENYLEPHRINE HYDROCHLORIDE 200 MCG: 10 INJECTION INTRAVENOUS at 11:07

## 2023-07-16 RX ADMIN — METHOCARBAMOL 500 MG: 100 INJECTION, SOLUTION INTRAMUSCULAR; INTRAVENOUS at 05:07

## 2023-07-16 RX ADMIN — RALOXIFENE HYDROCHLORIDE 60 MG: 60 TABLET, FILM COATED ORAL at 08:07

## 2023-07-16 RX ADMIN — MIDAZOLAM HYDROCHLORIDE 2 MG: 1 INJECTION, SOLUTION INTRAMUSCULAR; INTRAVENOUS at 11:07

## 2023-07-16 RX ADMIN — HEPARIN SODIUM 5000 UNITS: 5000 INJECTION INTRAVENOUS; SUBCUTANEOUS at 10:07

## 2023-07-16 RX ADMIN — HYDROMORPHONE HYDROCHLORIDE 0.2 MG: 2 INJECTION INTRAMUSCULAR; INTRAVENOUS; SUBCUTANEOUS at 02:07

## 2023-07-16 RX ADMIN — METRONIDAZOLE 500 MG: 500 INJECTION, SOLUTION INTRAVENOUS at 03:07

## 2023-07-16 RX ADMIN — SODIUM CHLORIDE, SODIUM LACTATE, POTASSIUM CHLORIDE, AND CALCIUM CHLORIDE: .6; .31; .03; .02 INJECTION, SOLUTION INTRAVENOUS at 10:07

## 2023-07-16 RX ADMIN — IBUPROFEN 800 MG: 400 TABLET ORAL at 10:07

## 2023-07-16 RX ADMIN — ACETAMINOPHEN 1000 MG: 10 INJECTION INTRAVENOUS at 05:07

## 2023-07-16 RX ADMIN — HYDROMORPHONE HYDROCHLORIDE 1 MG: 1 INJECTION, SOLUTION INTRAMUSCULAR; INTRAVENOUS; SUBCUTANEOUS at 09:07

## 2023-07-16 RX ADMIN — HYDROMORPHONE HYDROCHLORIDE 1 MG: 1 INJECTION, SOLUTION INTRAMUSCULAR; INTRAVENOUS; SUBCUTANEOUS at 04:07

## 2023-07-16 RX ADMIN — PIPERACILLIN AND TAZOBACTAM 4.5 G: 4; .5 INJECTION, POWDER, LYOPHILIZED, FOR SOLUTION INTRAVENOUS; PARENTERAL at 04:07

## 2023-07-16 RX ADMIN — METRONIDAZOLE 500 MG: 500 INJECTION, SOLUTION INTRAVENOUS at 10:07

## 2023-07-16 RX ADMIN — GLYCOPYRROLATE 0.1 MG: 0.2 INJECTION, SOLUTION INTRAMUSCULAR; INTRAVITREAL at 11:07

## 2023-07-16 RX ADMIN — HEPARIN SODIUM 5000 UNITS: 5000 INJECTION INTRAVENOUS; SUBCUTANEOUS at 05:07

## 2023-07-16 RX ADMIN — SUCCINYLCHOLINE CHLORIDE 100 MG: 20 INJECTION, SOLUTION INTRAMUSCULAR; INTRAVENOUS at 11:07

## 2023-07-16 RX ADMIN — FENTANYL CITRATE 50 MCG: 0.05 INJECTION, SOLUTION INTRAMUSCULAR; INTRAVENOUS at 12:07

## 2023-07-16 RX ADMIN — METRONIDAZOLE 500 MG: 500 INJECTION, SOLUTION INTRAVENOUS at 11:07

## 2023-07-16 RX ADMIN — ROCURONIUM BROMIDE 30 MG: 10 INJECTION, SOLUTION INTRAVENOUS at 11:07

## 2023-07-16 RX ADMIN — POTASSIUM CHLORIDE 10 MEQ: 7.46 INJECTION, SOLUTION INTRAVENOUS at 10:07

## 2023-07-16 RX ADMIN — PIPERACILLIN AND TAZOBACTAM 4.5 G: 4; .5 INJECTION, POWDER, LYOPHILIZED, FOR SOLUTION INTRAVENOUS; PARENTERAL at 09:07

## 2023-07-16 RX ADMIN — PROPOFOL 150 MG: 10 INJECTION, EMULSION INTRAVENOUS at 11:07

## 2023-07-16 RX ADMIN — AMLODIPINE BESYLATE 5 MG: 5 TABLET ORAL at 08:07

## 2023-07-16 RX ADMIN — SODIUM CHLORIDE, SODIUM LACTATE, POTASSIUM CHLORIDE, AND CALCIUM CHLORIDE: .6; .31; .03; .02 INJECTION, SOLUTION INTRAVENOUS at 12:07

## 2023-07-16 RX ADMIN — ACETAMINOPHEN 1000 MG: 500 TABLET, FILM COATED ORAL at 09:07

## 2023-07-16 RX ADMIN — HYDROMORPHONE HYDROCHLORIDE 1 MG: 1 INJECTION, SOLUTION INTRAMUSCULAR; INTRAVENOUS; SUBCUTANEOUS at 07:07

## 2023-07-16 RX ADMIN — LIDOCAINE HYDROCHLORIDE 100 MG: 20 INJECTION, SOLUTION INTRAVENOUS at 11:07

## 2023-07-16 RX ADMIN — DEXAMETHASONE SODIUM PHOSPHATE 4 MG: 4 INJECTION, SOLUTION INTRAMUSCULAR; INTRAVENOUS at 11:07

## 2023-07-16 RX ADMIN — HYDROMORPHONE HYDROCHLORIDE 1 MG: 1 INJECTION, SOLUTION INTRAMUSCULAR; INTRAVENOUS; SUBCUTANEOUS at 10:07

## 2023-07-16 RX ADMIN — PIPERACILLIN AND TAZOBACTAM 4.5 G: 4; .5 INJECTION, POWDER, LYOPHILIZED, FOR SOLUTION INTRAVENOUS; PARENTERAL at 01:07

## 2023-07-16 RX ADMIN — ROCURONIUM BROMIDE 10 MG: 10 INJECTION, SOLUTION INTRAVENOUS at 12:07

## 2023-07-16 NOTE — HOSPITAL COURSE
63F with pmh of depression, fibromyalgia, htn, osteoporosis admitted on 07/07/2023 for small bowel obstruction. presented with a several day history of abdominal pain, nausea, vomiting, and PO intolerance.  CT scan in ED with demonstration of small bowel obstruction and surgery consulted who had NG tube placed. Attempts at gastrografin with persistent obstruction. Pt taken to OR on 7/16. NG tube remained after operation and surgery managing. She is now status post exploratory laparotomy on 07/16/2023 with multiple small bowel resections and anastomosis, evidence of enterotomy with over-sewing. Replace electrolytes as needed. Had BM on 07/21/2023. PT/OT consulted for evaluation. NGT removed on 07/21/2023. Patient with worsening leukocytosis on 07/21. Repeat CT abd showed moderate quantity of intraperitoneal fluid in the pelvis. IR consulted-s/p transgluteal-approach drainage catheter into the complex pelvic fluid collection. Fluid cx with candida albicans. ID consulted-continued on zosyn and micafungin. Repeat blood cx with NGTD. Leukocytosis resolved on 07/24/23. Continue to monitor      Scant fluid in drain, may be able to remove, will discuss with [facility]. Likely discharge home with HH if so.

## 2023-07-16 NOTE — PLAN OF CARE
Problem: Adult Inpatient Plan of Care  Goal: Plan of Care Review  Outcome: Ongoing, Progressing  Goal: Patient-Specific Goal (Individualized)  Outcome: Ongoing, Progressing  Goal: Absence of Hospital-Acquired Illness or Injury  Outcome: Ongoing, Progressing  Goal: Optimal Comfort and Wellbeing  Outcome: Ongoing, Progressing  Goal: Readiness for Transition of Care  Outcome: Ongoing, Progressing     Problem: Skin Injury Risk Increased  Goal: Skin Health and Integrity  Outcome: Ongoing, Progressing     Problem: Infection  Goal: Absence of Infection Signs and Symptoms  Outcome: Ongoing, Progressing     Problem: Bleeding (Surgery Nonspecified)  Goal: Absence of Bleeding  Outcome: Ongoing, Progressing     Problem: Glycemic Control Impaired (Surgery Nonspecified)  Goal: Blood Glucose Level Within Targeted Range  Outcome: Ongoing, Progressing     Problem: Infection (Surgery Nonspecified)  Goal: Absence of Infection Signs and Symptoms  Outcome: Ongoing, Progressing     Problem: Pain (Surgery Nonspecified)  Goal: Acceptable Pain Control  Outcome: Ongoing, Progressing     Problem: Postoperative Nausea and Vomiting (Surgery Nonspecified)  Goal: Nausea and Vomiting Relief  Outcome: Ongoing, Progressing     Problem: Respiratory Compromise (Surgery Nonspecified)  Goal: Effective Oxygenation and Ventilation  Outcome: Ongoing, Progressing

## 2023-07-16 NOTE — PROGRESS NOTES
[facility] Medicine  Progress Note    Patient Name: Marilee Townsend  MRN: [id number]  Patient Class: IP- Inpatient   Admission Date: 7/14/2023  Length of Stay: 2 days  Attending Physician: Wesley Vargas III, MD  Primary Care Provider: Claudia Hopper DO        Subjective:     Principal Problem:Small bowel obstruction        HPI:  63F with pmh of depression, fibromyalgia, htn, osteoporosis who presents due to several day h/o abd pain and nausea with anorexia. Pt was evaluated at outside emergency department patient was seen in the day prompting the ED for evaluation.  Patient denies any recent flatulence or bowel movements unclear on the last date.  Patient states pain is diffuse in the with minimal improvement with pain medications given in the ED. CT scan in the emergency department with demonstration of small bowel obstruction and surgery consulted who had NG tube placed.  Patient denies chest pain, shortness a breath, fever, chills.  Patient states she had a shoulder surgery about 3 weeks prior to arrival and tolerated the anesthesia well.  Patient is a former smoker, but denies alcohol or drug use.      Overview/Hospital Course:  No notes on file    Interval History: Pt seen this morning. Plan for surgery today. Still with abd pain some improvement with pain meds, but still unable to sleep    Review of Systems  Objective:     Vital Signs (Most Recent):  Temp: 98.3 °F (36.8 °C) (07/16/23 0708)  Pulse: 85 (07/16/23 0708)  Resp: 18 (07/16/23 0911)  BP: 108/69 (07/16/23 0708)  SpO2: (!) 92 % (07/16/23 0708) Vital Signs (24h Range):  Temp:  [97.8 °F (36.6 °C)-98.7 °F (37.1 °C)] 98.3 °F (36.8 °C)  Pulse:  [] 85  Resp:  [16-18] 18  SpO2:  [92 %-96 %] 92 %  BP: (106-135)/(65-78) 108/69     Weight: 45.4 kg (100 lb)  Body mass index is 18.29 kg/m².    Intake/Output Summary (Last 24 hours) at 7/16/2023 1117  Last data filed at 7/16/2023 1001  Gross per 24 hour   Intake 1794.82 ml   Output 1350  ml   Net 444.82 ml         Physical Exam    Vitals reviewed.   Constitutional:       General: She is in acute distress (moderate distress).   HENT:      Head: Normocephalic and atraumatic.      Mouth/Throat:      Mouth: Mucous membranes are dry.      Pharynx: Oropharynx is clear.   Eyes:      General: No scleral icterus.     Extraocular Movements: Extraocular movements intact.   Cardiovascular:      Rate and Rhythm: Normal rate and regular rhythm.   Pulmonary:      Effort: Pulmonary effort is normal. No respiratory distress.   Abdominal:      Palpations: Abdomen is soft.      Tenderness: There is abdominal tenderness (diffuse ttp).   Musculoskeletal:         General: No swelling or tenderness.      Cervical back: Neck supple. No rigidity.   Skin:     General: Skin is warm and dry.   Neurological:      General: No focal deficit present.      Mental Status: She is alert and oriented to person, place, and time.   Psychiatric:         Mood and Affect: Mood normal.         Behavior: Behavior normal.     Significant Labs: All pertinent labs within the past 24 hours have been reviewed.    Significant Imaging: I have reviewed all pertinent imaging results/findings within the past 24 hours.      Assessment/Plan:      * Small bowel obstruction  63F presenting with several days of worsening abd pain, anorexia, and nausea.  CT abd/pelvis with Abnormal fluid, air distension small bowel, termination point at or near ileocecal valve, not well-defined, associated less distension proximal small bowel with some bowel wall thickening.  Adhesion etiology of obstructive process favored.    -NG tube in place to LIWS  -npo diet  -general surgery consulted, appreciate management of SBO  -Analgesics ordered prn       Osteoporosis  Continue home medications      Depression  Restart home medications as indicated        Leukocytosis  In setting of acute small-bowel obstruction and no definitive findings of infection appears likely reactive,  but low threshold for starting antibiotics. LA negative x2.  -procal mildly elevated, but leukocytosis resolved    -start on zosyn for now  -tx as stated above for sbo      HTN (hypertension)  Currently controlled.  Will continue to monitor        VTE Risk Mitigation (From admission, onward)         Ordered     heparin (porcine) injection 5,000 Units  Every 8 hours         07/14/23 1428     IP VTE LOW RISK PATIENT  Once         07/14/23 1236     Place sequential compression device  Until discontinued         07/14/23 1236                Discharge Planning   WIL:      Code Status: Full Code   Is the patient medically ready for discharge?:     Reason for patient still in hospital (select all that apply): Laboratory test, Treatment and Consult recommendations  Discharge Plan A: Home                  Wesley Vargas III, MD  Department of Hospital Medicine   [facility] [place] - Surgery

## 2023-07-16 NOTE — PROGRESS NOTES
[facility] Surg  General Surgery  Progress Note    Subjective:     History of Present Illness:  Marilee Townsend is a 63yoF with a history of hypertension, rectal prolapse s/p rectopexy who presents to [facility] with complaints of nausea, vomiting and worsening abdominal pain. Of note, she presented to an outside hospital with the same complaints yesterday and was discharged with instructions to follow up with her primary care provider. The patient describes a three-day history of abdominal pain, nausea, vomiting and PO intolerance. This has persistently worsened throughout this time, which prompted both presentations. She states that she did have a small bowel movement yesterday, described as hard pellets. On work-up, her vital signs are stable. She has an elevated leukocytosis to 18. Lactate normal at 1. Repeat ordered. Her CT scan demonstrates dilated small bowel consistent with a small bowel obstruction. There is normal caliber small intestine distally. She is admitted to the hospital medicine service. General surgery consulted for evaluation.     Of note, her WBC has increased from 12.8 to 18 over the last 24hrs. Imaging at the outside hospital, per the official read, did not demonstrate any degree of obstruction, which is a rather burk contrast from her CT scan at our facility. Plan for NG tube insertion with low threshold to proceed to operating room for diagnostic laparoscopy. Discussed this plan with the patient.       Post-Op Info:  Procedure(s) (LRB):  LAPAROSCOPY, DIAGNOSTIC (N/A)  LAPAROTOMY, EXPLORATORY (N/A)         Interval History: Patient seen and examined this morning. Remains with abdominal pain. Failed gastrograffin challenge. To OR today for diagnostic laparoscopy. Surgical consent obtained.     Medications:  Continuous Infusions:   lactated ringers 100 mL/hr at 07/15/23 1418     Scheduled Meds:   acetaminophen  1,000 mg Oral Q8H    amLODIPine  5 mg Oral Daily    buPROPion  150 mg Oral  BID    heparin (porcine)  5,000 Units Subcutaneous Q8H    ibuprofen  800 mg Oral Q8H    piperacillin-tazobactam (Zosyn) IV (PEDS and ADULTS) (extended infusion is not appropriate)  4.5 g Intravenous Q8H    raloxifene  60 mg Oral Daily    sertraline  100 mg Oral QAM     PRN Meds:acetaminophen, acetaminophen, dextrose 50%, dextrose 50%, diatrizoate meglumineand-diatrizoate sodium, glucagon (human recombinant), glucose, glucose, HYDROmorphone, HYDROmorphone, insulin aspart U-100, naloxone, ondansetron, sodium chloride 0.9%     Review of patient's allergies indicates:   Allergen Reactions    Morphine Nausea And Vomiting     Objective:     Vital Signs (Most Recent):  Temp: 98.3 °F (36.8 °C) (07/16/23 0708)  Pulse: 85 (07/16/23 0708)  Resp: 18 (07/16/23 0708)  BP: 108/69 (07/16/23 0708)  SpO2: (!) 92 % (07/16/23 0708) Vital Signs (24h Range):  Temp:  [97.8 °F (36.6 °C)-98.7 °F (37.1 °C)] 98.3 °F (36.8 °C)  Pulse:  [] 85  Resp:  [16-18] 18  SpO2:  [92 %-96 %] 92 %  BP: (106-135)/(65-78) 108/69     Weight: 45.4 kg (100 lb)  Body mass index is 18.29 kg/m².    Intake/Output - Last 3 Shifts         07/14 0700  07/15 0659 07/15 0700  07/16 0659 07/16 0700 07/17 0659    P.O. 120 0     I.V. (mL/kg) 200 (4.4) 1100 (24.2)     NG/GT 0      IV Piggyback  600     Total Intake(mL/kg) 320 (7) 1700 (37.4)     Urine (mL/kg/hr) 550 325 (0.3)     Emesis/NG output  125     Drains 0 1000     Stool  0     Total Output 550 1450     Net -230 +250            Urine Occurrence  4 x     Stool Occurrence  0 x              Physical Exam  Vitals and nursing note reviewed.   Constitutional:       General: She is in acute distress.   HENT:      Nose: Nose normal.      Comments: NG tube in place, thin bilious output     Mouth/Throat:      Mouth: Mucous membranes are moist.   Cardiovascular:      Rate and Rhythm: Normal rate and regular rhythm.   Pulmonary:      Effort: Pulmonary effort is normal. No respiratory distress.   Abdominal:       Comments: Abdomen moderately distended  Exquisitely tender to palpation throughout the abdomen, mainly centrally  Well healed supra-umbilical incision   Musculoskeletal:         General: Normal range of motion.   Skin:     General: Skin is warm.   Neurological:      General: No focal deficit present.      Mental Status: She is alert.        Significant Labs:  I have reviewed all pertinent lab results within the past 24 hours.  CBC:   Recent Labs   Lab 07/16/23  0440   WBC 3.89*   RBC 3.76*   HGB 11.2*   HCT 34.5*      MCV 92   MCH 29.8   MCHC 32.5     CMP:   Recent Labs   Lab 07/16/23  0440   *   CALCIUM 9.3   ALBUMIN 2.7*   PROT 6.4   *   K 3.2*   CO2 24      BUN 31*   CREATININE 0.8   ALKPHOS 64   ALT 10   AST 15   BILITOT 0.7       Significant Diagnostics:  I have reviewed all pertinent imaging results/findings within the past 24 hours. Failed gastrograffin challenge. KUB this morning without progression of contrast    Assessment/Plan:     * Small bowel obstruction  Marilee Townsend is a 63yoF with a history of HTN and rectal prolapse who presents with a several day history of abdominal pain, nausea, vomiting, and PO intolerance. Her work-up is consistent with a small bowel obstruction. General surgery consulted for evaluation.     - patient seen and examined  - labs and imaging reviewed   -  Failed gastrograffin challenge   - KUB this morning without progression of contrast  - NG tube in place with thin bilious output; continue to LIWS  - to OR today for diagnostic laparoscopy  - surgical consent obtained, all questions answered  - will continue to follow        Manuel Wilson MD  General Surgery  [facility] [place] [facility]

## 2023-07-16 NOTE — ANESTHESIA PROCEDURE NOTES
Intubation    Date/Time: 7/16/2023 11:19 AM  Performed by: Wesley Archer CRNA  Authorized by: Eulalia Hses MD     Intubation:     Induction:  Rapid sequence induction    Intubated:  Postinduction    Mask Ventilation:  Not attempted    Attempts:  1    Attempted By:  CRNA    Method of Intubation:  Video laryngoscopy    Blade:  Other (see comments) (Mckinney X3 blade)    Laryngeal View Grade: Grade I - full view of cords      Difficult Airway Encountered?: No      Complications:  None    Airway Device:  Oral endotracheal tube    Airway Device Size:  6.5    Style/Cuff Inflation:  Cuffed (inflated to minimal occlusive pressure)    Inflation Amount (mL):  5    Tube secured:  21    Secured at:  The lips    Placement Verified By:  Capnometry    Complicating Factors:  None    Findings Post-Intubation:  BS equal bilateral and atraumatic/condition of teeth unchanged

## 2023-07-16 NOTE — PLAN OF CARE
Problem: Adult Inpatient Plan of Care  Goal: Plan of Care Review  Outcome: Ongoing, Progressing  Goal: Patient-Specific Goal (Individualized)  Outcome: Ongoing, Progressing  Goal: Absence of Hospital-Acquired Illness or Injury  Outcome: Ongoing, Progressing  Goal: Optimal Comfort and Wellbeing  Outcome: Ongoing, Progressing  Goal: Readiness for Transition of Care  Outcome: Ongoing, Progressing     Problem: Skin Injury Risk Increased  Goal: Skin Health and Integrity  Outcome: Ongoing, Progressing       Patient remained free from falls/injury throughout shift.  Patient remain NPO with NG tube to LIWS.  Bed locked in lowest position.  Side rails up x2.  Call bell within reach.  Purposeful rounding maintained throughout shift.

## 2023-07-16 NOTE — SUBJECTIVE & OBJECTIVE
Interval History: Patient seen and examined this morning. Remains with abdominal pain. Failed gastrograffin challenge. To OR today for diagnostic laparoscopy. Surgical consent obtained.     Medications:  Continuous Infusions:   lactated ringers 100 mL/hr at 07/15/23 1418     Scheduled Meds:   acetaminophen  1,000 mg Oral Q8H    amLODIPine  5 mg Oral Daily    buPROPion  150 mg Oral BID    heparin (porcine)  5,000 Units Subcutaneous Q8H    ibuprofen  800 mg Oral Q8H    piperacillin-tazobactam (Zosyn) IV (PEDS and ADULTS) (extended infusion is not appropriate)  4.5 g Intravenous Q8H    raloxifene  60 mg Oral Daily    sertraline  100 mg Oral QAM     PRN Meds:acetaminophen, acetaminophen, dextrose 50%, dextrose 50%, diatrizoate meglumineand-diatrizoate sodium, glucagon (human recombinant), glucose, glucose, HYDROmorphone, HYDROmorphone, insulin aspart U-100, naloxone, ondansetron, sodium chloride 0.9%     Review of patient's allergies indicates:   Allergen Reactions    Morphine Nausea And Vomiting     Objective:     Vital Signs (Most Recent):  Temp: 98.3 °F (36.8 °C) (07/16/23 0708)  Pulse: 85 (07/16/23 0708)  Resp: 18 (07/16/23 0708)  BP: 108/69 (07/16/23 0708)  SpO2: (!) 92 % (07/16/23 0708) Vital Signs (24h Range):  Temp:  [97.8 °F (36.6 °C)-98.7 °F (37.1 °C)] 98.3 °F (36.8 °C)  Pulse:  [] 85  Resp:  [16-18] 18  SpO2:  [92 %-96 %] 92 %  BP: (106-135)/(65-78) 108/69     Weight: 45.4 kg (100 lb)  Body mass index is 18.29 kg/m².    Intake/Output - Last 3 Shifts         07/14 0700  07/15 0659 07/15 0700 07/16 0659 07/16 0700 07/17 0659    P.O. 120 0     I.V. (mL/kg) 200 (4.4) 1100 (24.2)     NG/GT 0      IV Piggyback  600     Total Intake(mL/kg) 320 (7) 1700 (37.4)     Urine (mL/kg/hr) 550 325 (0.3)     Emesis/NG output  125     Drains 0 1000     Stool  0     Total Output 550 1450     Net -230 +250            Urine Occurrence  4 x     Stool Occurrence  0 x              Physical Exam  Vitals and nursing note  reviewed.   Constitutional:       General: She is in acute distress.   HENT:      Nose: Nose normal.      Comments: NG tube in place, thin bilious output     Mouth/Throat:      Mouth: Mucous membranes are moist.   Cardiovascular:      Rate and Rhythm: Normal rate and regular rhythm.   Pulmonary:      Effort: Pulmonary effort is normal. No respiratory distress.   Abdominal:      Comments: Abdomen moderately distended  Exquisitely tender to palpation throughout the abdomen, mainly centrally  Well healed supra-umbilical incision   Musculoskeletal:         General: Normal range of motion.   Skin:     General: Skin is warm.   Neurological:      General: No focal deficit present.      Mental Status: She is alert.        Significant Labs:  I have reviewed all pertinent lab results within the past 24 hours.  CBC:   Recent Labs   Lab 07/16/23  0440   WBC 3.89*   RBC 3.76*   HGB 11.2*   HCT 34.5*      MCV 92   MCH 29.8   MCHC 32.5     CMP:   Recent Labs   Lab 07/16/23  0440   *   CALCIUM 9.3   ALBUMIN 2.7*   PROT 6.4   *   K 3.2*   CO2 24      BUN 31*   CREATININE 0.8   ALKPHOS 64   ALT 10   AST 15   BILITOT 0.7       Significant Diagnostics:  I have reviewed all pertinent imaging results/findings within the past 24 hours. Failed gastrograffin challenge. KUB this morning without progression of contrast

## 2023-07-16 NOTE — OP NOTE
[facility] [place]  General Surgery  Operative Note    SUMMARY     Date of Procedure: 7/16/2023     Procedure: Procedure(s) (LRB):  LAPAROSCOPY, DIAGNOSTIC (N/A)  LAPAROTOMY, EXPLORATORY (N/A)  lysis of adhesions, small bowel resection repair of enterotomies    Surgeon(s) and Role:     * Bora Quevedo MD - Primary    Assisting Surgeon:  Manuel Wilson MD    Pre-Operative Diagnosis: Small bowel obstruction [K56.609]    Post-Operative Diagnosis: Post-Op Diagnosis Codes:     * Small bowel obstruction [K56.609]    Anesthesia: General    Operative Findings (including complications, if any):  Small bowel secondary to adhesions and kinking with multiple enterotomies    Description of Technical Procedures:  Patient was taken to the operating room placed on operating table in supine position.  Under adequate general anesthesia prepped and draped around the abdomen usual sterile fashion a time-out was taken surgical checklist was discussed.  A 5 mm port was placed into the left upper quadrant however because of the amount of distention the trocar went through the bowel.  At this point I decided to go ahead and do a laparotomy so an incision was made right around her umbilicus slightly up and down getting into the abdominal cavity was significant amount of free fluid could be appreciated.  On inspection of her abdomen there were a couple of enterotomies secondary to distention and tension.  Once the entire bowel was the pulled up can see an area that was in her pelvis that was ischemic.  And this was intimately associated with a band adhesion and some swirling.  This was lysed and this portion of her small bowel was resected the cut edges were then placed together and anastomosis was created in a side-to-side functional end-to-end fashion using staplers.  More adhesion lysis was done all the way into the pelvis and I was able to free up the entire intestinal an inspect both small bowel as well as her colon from the  ligament of Treitz all the way down to the rectum.  There was another area where there was a large area of disorder authorized bile that eventually went full-thickness to mother enterotomy.  I resected this portion and did another side-to-side functional end-to-end anastomosis.  2-3 other areas of concern were oversewn or stapled across.  At the conclusion I then irrigated the entire abdominal cavity copiously because there was some spillage.  Human allograft tissue was implanted on the bowel anastomosis and then the patient's abdomen was closed in layers with absorbable suture followed by reapproximating skin with skin clips.  Bandages were applied the patient was awakened and transported to the recovery room in stable condition.    Significant Surgical Tasks Conducted by the Assistant(s), if Applicable:  Greater than 50%    Estimated Blood Loss (EBL): 100 mL           Implants: * No implants in log *    Specimens:   Specimen (24h ago, onward)       Start     Ordered    07/16/23 1249  Specimen to Pathology, Surgery General Surgery  Once        Comments: Pre-op Diagnosis: Small bowel obstruction [K56.609]Procedure(s):LAPAROSCOPY, DIAGNOSTICLAPAROTOMY, EXPLORATORY Number of specimens: 1Name of specimens: Small bowel     References:    Click here for ordering Quick Tip   Question Answer Comment   Procedure Type: General Surgery    Specimen Class: Routine/Screening    Which provider would you like to cc? NEAL TRUJILLO    Release to patient Immediate        07/16/23 1248                            Condition: Stable    Disposition: PACU - hemodynamically stable.    Attestation: I was present and scrubbed for the entire procedure.

## 2023-07-16 NOTE — PLAN OF CARE
1419: Recovery care complete. No acute events during recovery phase. AA/Ox4. Danielle score 9/10. No N/V. Afebrile. Adequate spo2s maintained on o2 via NC at 2LPM; no SOB noted. All other VSS. S. Tachy per monitor. Acceptable level of pain maintained (5/10) via PRN pain medication administered per MD order. IVF infusing. Surgical dressings x2 to ABD d/i with no redness or swelling noted; scant sanguineous drainage (area marked) noted x1 dressing. Pt stable. Discharge criteria met for Phase I and pt released by Dr. Hess. PER handoff given to ADAM Hernandez RN on Med-Surg. Pt awaiting transport to Med-Surg via bed. Family updated.

## 2023-07-16 NOTE — NURSING
[facility] [place]  Nurses Note -- 4 Eyes      7/16/2023       Skin assessed on: Q Shift      [x] No Pressure Injuries Present    []Prevention Measures Documented    [] Yes LDA  for Pressure Injury Previously documented     [] Yes New Pressure Injury Discovered   [] LDA for New Pressure Injury Added      Attending RN:  Nicole Castro RN     Second RN:  Heather RODRIGUEZ

## 2023-07-16 NOTE — SUBJECTIVE & OBJECTIVE
Interval History: Pt seen this morning. Plan for surgery today. Still with abd pain some improvement with pain meds, but still unable to sleep    Review of Systems  Objective:     Vital Signs (Most Recent):  Temp: 98.3 °F (36.8 °C) (07/16/23 0708)  Pulse: 85 (07/16/23 0708)  Resp: 18 (07/16/23 0911)  BP: 108/69 (07/16/23 0708)  SpO2: (!) 92 % (07/16/23 0708) Vital Signs (24h Range):  Temp:  [97.8 °F (36.6 °C)-98.7 °F (37.1 °C)] 98.3 °F (36.8 °C)  Pulse:  [] 85  Resp:  [16-18] 18  SpO2:  [92 %-96 %] 92 %  BP: (106-135)/(65-78) 108/69     Weight: 45.4 kg (100 lb)  Body mass index is 18.29 kg/m².    Intake/Output Summary (Last 24 hours) at 7/16/2023 1117  Last data filed at 7/16/2023 1001  Gross per 24 hour   Intake 1794.82 ml   Output 1350 ml   Net 444.82 ml         Physical Exam    Vitals reviewed.   Constitutional:       General: She is in acute distress (moderate distress).   HENT:      Head: Normocephalic and atraumatic.      Mouth/Throat:      Mouth: Mucous membranes are dry.      Pharynx: Oropharynx is clear.   Eyes:      General: No scleral icterus.     Extraocular Movements: Extraocular movements intact.   Cardiovascular:      Rate and Rhythm: Normal rate and regular rhythm.   Pulmonary:      Effort: Pulmonary effort is normal. No respiratory distress.   Abdominal:      Palpations: Abdomen is soft.      Tenderness: There is abdominal tenderness (diffuse ttp).   Musculoskeletal:         General: No swelling or tenderness.      Cervical back: Neck supple. No rigidity.   Skin:     General: Skin is warm and dry.   Neurological:      General: No focal deficit present.      Mental Status: She is alert and oriented to person, place, and time.   Psychiatric:         Mood and Affect: Mood normal.         Behavior: Behavior normal.     Significant Labs: All pertinent labs within the past 24 hours have been reviewed.    Significant Imaging: I have reviewed all pertinent imaging results/findings within the past 24  hours.

## 2023-07-16 NOTE — NURSING
[facility]  Nurses Note -- 4 Eyes      7/16/2023       Skin assessed on: Q Shift      [x] No Pressure Injuries Present    [x]Prevention Measures Documented    [] Yes LDA  for Pressure Injury Previously documented     [] Yes New Pressure Injury Discovered   [] LDA for New Pressure Injury Added      Attending RN:  Lissette Hernandez, CORTEZ     Second RN:  Ami SOSA, PCT

## 2023-07-16 NOTE — ASSESSMENT & PLAN NOTE
Marilee Townsend is a 63yoF with a history of HTN and rectal prolapse who presents with a several day history of abdominal pain, nausea, vomiting, and PO intolerance. Her work-up is consistent with a small bowel obstruction. General surgery consulted for evaluation.     - patient seen and examined  - labs and imaging reviewed   -  Failed gastrograffin challenge   - KUB this morning without progression of contrast  - NG tube in place with thin bilious output; continue to LIWS  - to OR today for diagnostic laparoscopy  - surgical consent obtained, all questions answered  - will continue to follow

## 2023-07-16 NOTE — ASSESSMENT & PLAN NOTE
In setting of acute small-bowel obstruction and no definitive findings of infection appears likely reactive, but low threshold for starting antibiotics. LA negative x2.  -procal mildly elevated, but leukocytosis resolved    -start on zosyn for now  -tx as stated above for sbo

## 2023-07-16 NOTE — NURSING TRANSFER
Nursing Transfer Note      7/16/2023   2:35 PM    Nurse giving handoff:PHI Pal RN    Nurse receiving handoff:ADAM Hernandez RN    Reason patient is being transferred: Phase I care completer    Transfer To: Room 420    Transfer From: PACU    Transfer via bed    Transfer with IVF and O2 via NC at 2LPM,     Transported by Saúl    Transfer Vital Signs:  Blood Pressure:107/69   Heart Rate:118  O2:94  Temperature:98.5  Respirations:19      Order for Tele Monitor? No    Additional Lines: Oxygen, Suction, and Warner Catheter    4eyes on Skin: yes    Medicines sent: IVF    Patient belongings transferred with patient:  N/A    Chart send with patient: Yes    Notified: spouse in family waiting room

## 2023-07-16 NOTE — ANESTHESIA PREPROCEDURE EVALUATION
07/16/2023  Marilee Townsend is a 63 y.o., female.      Pre-op Assessment    I have reviewed the Patient Summary Reports.     I have reviewed the Nursing Notes.    I have reviewed the Medications.     Review of Systems  Anesthesia Hx:  No problems with previous Anesthesia  Neg history of prior surgery. Denies Family Hx of Anesthesia complications.   Denies Personal Hx of Anesthesia complications.   Social:  No Alcohol Use, Former Smoker    Hematology/Oncology:  Hematology Normal   Oncology Normal     EENT/Dental:EENT/Dental Normal   Cardiovascular:   Exercise tolerance: good Hypertension    Pulmonary:  Pulmonary Normal    Renal/:  Renal/ Normal     Hepatic/GI:  Hepatic/GI Normal    Musculoskeletal:  Musculoskeletal Normal    Neurological:   Neuromuscular Disease,    Endocrine:  Endocrine Normal    Dermatological:  Skin Normal    Psych:   Psychiatric History          Physical Exam  General: Ill appearing  NG tube in place  Airway:  Mallampati: II   Mouth Opening: Normal  Tongue: Normal  Neck ROM: Normal ROM    Dental:  Intact    Chest/Lungs:  Clear to auscultation    Heart:  Rate: Normal  Rhythm: Regular Rhythm  Sounds: Normal    Abdomen:  Distention      Lab Results   Component Value Date    WBC 3.89 (L) 07/16/2023    HGB 11.2 (L) 07/16/2023    HCT 34.5 (L) 07/16/2023    MCV 92 07/16/2023     07/16/2023         Chemistry        Component Value Date/Time     (L) 07/16/2023 0440    K 3.2 (L) 07/16/2023 0440     07/16/2023 0440    CO2 24 07/16/2023 0440    BUN 31 (H) 07/16/2023 0440    CREATININE 0.8 07/16/2023 0440     (H) 07/16/2023 0440        Component Value Date/Time    CALCIUM 9.3 07/16/2023 0440    ALKPHOS 64 07/16/2023 0440    AST 15 07/16/2023 0440    ALT 10 07/16/2023 0440    BILITOT 0.7 07/16/2023 0440    ESTGFRAFRICA 84 (L) 06/07/2023 1017    ESTGFRAFRICA >60.0  07/28/2017 0510    EGFRNONAA >60.0 07/28/2017 0510            Anesthesia Plan  Type of Anesthesia, risks & benefits discussed:    Anesthesia Type: Gen ETT  Intra-op Monitoring Plan: Standard ASA Monitors  Post Op Pain Control Plan: multimodal analgesia  Induction:  IV and rapid sequence  Informed Consent: Informed consent signed with the Patient and all parties understand the risks and agree with anesthesia plan.  All questions answered. Patient consented to blood products? Yes  ASA Score: 3 Emergent    Ready For Surgery From Anesthesia Perspective.     .

## 2023-07-16 NOTE — TRANSFER OF CARE
"Anesthesia Transfer of Care Note    Patient: Marilee Townsend    Procedure(s) Performed: Procedure(s) (LRB):  LAPAROSCOPY, DIAGNOSTIC (N/A)  LAPAROTOMY, EXPLORATORY (N/A)    Patient location: PACU    Anesthesia Type: general    Transport from OR: Transported from OR on 100% O2 by closed face mask with adequate spontaneous ventilation    Post pain: pain needs to be addressed    Post assessment: no apparent anesthetic complications and tolerated procedure well    Post vital signs: stable    Level of consciousness: awake, alert and oriented    Nausea/Vomiting: no nausea/vomiting    Complications: none    Transfer of care protocol was followed      Last vitals:   Visit Vitals  BP (!) 134/96 (BP Location: Left arm)   Pulse (!) 125   Temp 36.6 °C (97.9 °F) (Oral)   Resp 19   Ht 5' 2" (1.575 m)   Wt 45.4 kg (100 lb)   SpO2 98%   Breastfeeding No   BMI 18.29 kg/m²     "

## 2023-07-17 LAB
ALBUMIN SERPL BCP-MCNC: 2 G/DL (ref 3.5–5.2)
ALP SERPL-CCNC: 45 U/L (ref 55–135)
ALT SERPL W/O P-5'-P-CCNC: 16 U/L (ref 10–44)
ANION GAP SERPL CALC-SCNC: 8 MMOL/L (ref 8–16)
AST SERPL-CCNC: 28 U/L (ref 10–40)
BASOPHILS # BLD AUTO: 0.02 K/UL (ref 0–0.2)
BASOPHILS NFR BLD: 0.5 % (ref 0–1.9)
BILIRUB SERPL-MCNC: 0.5 MG/DL (ref 0.1–1)
BUN SERPL-MCNC: 27 MG/DL (ref 8–23)
CALCIUM SERPL-MCNC: 8.2 MG/DL (ref 8.7–10.5)
CHLORIDE SERPL-SCNC: 103 MMOL/L (ref 95–110)
CO2 SERPL-SCNC: 23 MMOL/L (ref 23–29)
CREAT SERPL-MCNC: 0.7 MG/DL (ref 0.5–1.4)
DIFFERENTIAL METHOD: ABNORMAL
EOSINOPHIL # BLD AUTO: 0 K/UL (ref 0–0.5)
EOSINOPHIL NFR BLD: 0 % (ref 0–8)
ERYTHROCYTE [DISTWIDTH] IN BLOOD BY AUTOMATED COUNT: 13.3 % (ref 11.5–14.5)
EST. GFR  (NO RACE VARIABLE): >60 ML/MIN/1.73 M^2
GLUCOSE SERPL-MCNC: 101 MG/DL (ref 70–110)
HCT VFR BLD AUTO: 31 % (ref 37–48.5)
HGB BLD-MCNC: 10.2 G/DL (ref 12–16)
IMM GRANULOCYTES # BLD AUTO: 0.06 K/UL (ref 0–0.04)
IMM GRANULOCYTES NFR BLD AUTO: 1.4 % (ref 0–0.5)
LYMPHOCYTES # BLD AUTO: 0.4 K/UL (ref 1–4.8)
LYMPHOCYTES NFR BLD: 8.6 % (ref 18–48)
MAGNESIUM SERPL-MCNC: 1.9 MG/DL (ref 1.6–2.6)
MCH RBC QN AUTO: 29.7 PG (ref 27–31)
MCHC RBC AUTO-ENTMCNC: 32.9 G/DL (ref 32–36)
MCV RBC AUTO: 90 FL (ref 82–98)
MONOCYTES # BLD AUTO: 0.2 K/UL (ref 0.3–1)
MONOCYTES NFR BLD: 5.2 % (ref 4–15)
NEUTROPHILS # BLD AUTO: 3.7 K/UL (ref 1.8–7.7)
NEUTROPHILS NFR BLD: 84.3 % (ref 38–73)
NRBC BLD-RTO: 0 /100 WBC
PHOSPHATE SERPL-MCNC: 2.6 MG/DL (ref 2.7–4.5)
PLATELET # BLD AUTO: 250 K/UL (ref 150–450)
PMV BLD AUTO: 9.4 FL (ref 9.2–12.9)
POCT GLUCOSE: 103 MG/DL (ref 70–110)
POCT GLUCOSE: 110 MG/DL (ref 70–110)
POCT GLUCOSE: 90 MG/DL (ref 70–110)
POCT GLUCOSE: 91 MG/DL (ref 70–110)
POTASSIUM SERPL-SCNC: 3.7 MMOL/L (ref 3.5–5.1)
PROT SERPL-MCNC: 5.3 G/DL (ref 6–8.4)
RBC # BLD AUTO: 3.44 M/UL (ref 4–5.4)
SODIUM SERPL-SCNC: 134 MMOL/L (ref 136–145)
WBC # BLD AUTO: 4.43 K/UL (ref 3.9–12.7)

## 2023-07-17 PROCEDURE — 25000003 PHARM REV CODE 250: Performed by: STUDENT IN AN ORGANIZED HEALTH CARE EDUCATION/TRAINING PROGRAM

## 2023-07-17 PROCEDURE — 94799 UNLISTED PULMONARY SVC/PX: CPT

## 2023-07-17 PROCEDURE — 80053 COMPREHEN METABOLIC PANEL: CPT | Performed by: STUDENT IN AN ORGANIZED HEALTH CARE EDUCATION/TRAINING PROGRAM

## 2023-07-17 PROCEDURE — 84100 ASSAY OF PHOSPHORUS: CPT | Performed by: STUDENT IN AN ORGANIZED HEALTH CARE EDUCATION/TRAINING PROGRAM

## 2023-07-17 PROCEDURE — 85025 COMPLETE CBC W/AUTO DIFF WBC: CPT | Performed by: STUDENT IN AN ORGANIZED HEALTH CARE EDUCATION/TRAINING PROGRAM

## 2023-07-17 PROCEDURE — 63600175 PHARM REV CODE 636 W HCPCS: Performed by: STUDENT IN AN ORGANIZED HEALTH CARE EDUCATION/TRAINING PROGRAM

## 2023-07-17 PROCEDURE — 36415 COLL VENOUS BLD VENIPUNCTURE: CPT | Performed by: STUDENT IN AN ORGANIZED HEALTH CARE EDUCATION/TRAINING PROGRAM

## 2023-07-17 PROCEDURE — 11000001 HC ACUTE MED/SURG PRIVATE ROOM

## 2023-07-17 PROCEDURE — 27000221 HC OXYGEN, UP TO 24 HOURS

## 2023-07-17 PROCEDURE — 94761 N-INVAS EAR/PLS OXIMETRY MLT: CPT

## 2023-07-17 PROCEDURE — C1751 CATH, INF, PER/CENT/MIDLINE: HCPCS

## 2023-07-17 PROCEDURE — 83735 ASSAY OF MAGNESIUM: CPT | Performed by: STUDENT IN AN ORGANIZED HEALTH CARE EDUCATION/TRAINING PROGRAM

## 2023-07-17 PROCEDURE — 36569 INSJ PICC 5 YR+ W/O IMAGING: CPT

## 2023-07-17 RX ORDER — SODIUM CHLORIDE 0.9 % (FLUSH) 0.9 %
10 SYRINGE (ML) INJECTION
Status: DISCONTINUED | OUTPATIENT
Start: 2023-07-17 | End: 2023-07-26 | Stop reason: HOSPADM

## 2023-07-17 RX ORDER — SODIUM CHLORIDE 0.9 % (FLUSH) 0.9 %
10 SYRINGE (ML) INJECTION EVERY 6 HOURS
Status: DISCONTINUED | OUTPATIENT
Start: 2023-07-18 | End: 2023-07-26 | Stop reason: HOSPADM

## 2023-07-17 RX ORDER — INSULIN ASPART 100 [IU]/ML
0-5 INJECTION, SOLUTION INTRAVENOUS; SUBCUTANEOUS EVERY 6 HOURS PRN
Status: DISCONTINUED | OUTPATIENT
Start: 2023-07-17 | End: 2023-07-26 | Stop reason: HOSPADM

## 2023-07-17 RX ORDER — MUPIROCIN 20 MG/G
OINTMENT TOPICAL 2 TIMES DAILY
Status: DISCONTINUED | OUTPATIENT
Start: 2023-07-17 | End: 2023-07-17

## 2023-07-17 RX ADMIN — HEPARIN SODIUM 5000 UNITS: 5000 INJECTION INTRAVENOUS; SUBCUTANEOUS at 05:07

## 2023-07-17 RX ADMIN — HYDROMORPHONE HYDROCHLORIDE 1 MG: 1 INJECTION, SOLUTION INTRAMUSCULAR; INTRAVENOUS; SUBCUTANEOUS at 04:07

## 2023-07-17 RX ADMIN — POTASSIUM CHLORIDE 10 MEQ: 7.46 INJECTION, SOLUTION INTRAVENOUS at 01:07

## 2023-07-17 RX ADMIN — RALOXIFENE HYDROCHLORIDE 60 MG: 60 TABLET, FILM COATED ORAL at 08:07

## 2023-07-17 RX ADMIN — POTASSIUM CHLORIDE 10 MEQ: 7.46 INJECTION, SOLUTION INTRAVENOUS at 12:07

## 2023-07-17 RX ADMIN — ACETAMINOPHEN 650 MG: 500 TABLET ORAL at 05:07

## 2023-07-17 RX ADMIN — HYDROMORPHONE HYDROCHLORIDE 1 MG: 1 INJECTION, SOLUTION INTRAMUSCULAR; INTRAVENOUS; SUBCUTANEOUS at 01:07

## 2023-07-17 RX ADMIN — PIPERACILLIN AND TAZOBACTAM 4.5 G: 4; .5 INJECTION, POWDER, LYOPHILIZED, FOR SOLUTION INTRAVENOUS; PARENTERAL at 12:07

## 2023-07-17 RX ADMIN — METRONIDAZOLE 500 MG: 500 INJECTION, SOLUTION INTRAVENOUS at 06:07

## 2023-07-17 RX ADMIN — SODIUM CHLORIDE, POTASSIUM CHLORIDE, SODIUM LACTATE AND CALCIUM CHLORIDE: 600; 310; 30; 20 INJECTION, SOLUTION INTRAVENOUS at 09:07

## 2023-07-17 RX ADMIN — HEPARIN SODIUM 5000 UNITS: 5000 INJECTION INTRAVENOUS; SUBCUTANEOUS at 01:07

## 2023-07-17 RX ADMIN — ACETAMINOPHEN 1000 MG: 500 TABLET, FILM COATED ORAL at 01:07

## 2023-07-17 RX ADMIN — METRONIDAZOLE 500 MG: 500 INJECTION, SOLUTION INTRAVENOUS at 02:07

## 2023-07-17 RX ADMIN — IBUPROFEN 800 MG: 400 TABLET ORAL at 01:07

## 2023-07-17 RX ADMIN — IBUPROFEN 800 MG: 400 TABLET ORAL at 05:07

## 2023-07-17 RX ADMIN — BUPROPION HYDROCHLORIDE 150 MG: 150 TABLET, EXTENDED RELEASE ORAL at 08:07

## 2023-07-17 RX ADMIN — SERTRALINE HYDROCHLORIDE 100 MG: 50 TABLET ORAL at 04:07

## 2023-07-17 RX ADMIN — SODIUM CHLORIDE, POTASSIUM CHLORIDE, SODIUM LACTATE AND CALCIUM CHLORIDE: 600; 310; 30; 20 INJECTION, SOLUTION INTRAVENOUS at 12:07

## 2023-07-17 RX ADMIN — PIPERACILLIN AND TAZOBACTAM 4.5 G: 4; .5 INJECTION, POWDER, LYOPHILIZED, FOR SOLUTION INTRAVENOUS; PARENTERAL at 08:07

## 2023-07-17 RX ADMIN — PIPERACILLIN AND TAZOBACTAM 4.5 G: 4; .5 INJECTION, POWDER, LYOPHILIZED, FOR SOLUTION INTRAVENOUS; PARENTERAL at 04:07

## 2023-07-17 NOTE — PLAN OF CARE
Case Management Re-assessment      PCP: Claudia Hopper  Pharmacy: [facility] on [address]     Patient Arrived From: home  Existing Help at Home: none     Barriers to Discharge: none     Discharge Plan:               A. Home               B. Home   General surgery follow up appointment scheduled and listed on avs. NG tube in place. TN to continue to follow for dc needs.    07/17/23 1142   Discharge Reassessment   Assessment Type Discharge Planning Reassessment   Did the patient's condition or plan change since previous assessment? No   Discharge Plan discussed with: Patient   Communicated WIL with patient/caregiver Yes   DME Needed Upon Discharge  other (see comments)  (TBD)   Why the patient remains in the hospital Requires continued medical care   Post-Acute Status   Coverage PHN   Discharge Delays None known at this time

## 2023-07-17 NOTE — NURSING
House supervisor notified PICC line is needed.Awaiting PICC nurse. Patient is aware that central line will be inserted

## 2023-07-17 NOTE — PLAN OF CARE
Problem: Fall Injury Risk  Goal: Absence of Fall and Fall-Related Injury  Outcome: Ongoing, Progressing  Intervention: Identify and Manage Contributors  Flowsheets (Taken 7/17/2023 1533)  Self-Care Promotion:   independence encouraged   BADL personal objects within reach   BADL personal routines maintained  Medication Review/Management: medications reviewed  Intervention: Promote Injury-Free Environment  Flowsheets (Taken 7/17/2023 1533)  Safety Promotion/Fall Prevention:   assistive device/personal item within reach   bed alarm set   commode/urinal/bedpan at bedside   Fall Risk reviewed with patient/family   diversional activities provided   Fall Risk signage in place   high risk medications identified   medications reviewed   nonskid shoes/socks when out of bed   side rails raised x 2   instructed to call staff for mobility     Problem: Pain (Surgery Nonspecified)  Goal: Acceptable Pain Control  Outcome: Ongoing, Progressing  Intervention: Prevent or Manage Pain  Flowsheets (Taken 7/17/2023 1533)  Pain Management Interventions:   diversional activity provided   medication offered   pain management plan reviewed with patient/caregiver   position adjusted

## 2023-07-17 NOTE — ASSESSMENT & PLAN NOTE
Marilee Townsend is a 63yoF with a history of HTN and rectal prolapse who presents with a several day history of abdominal pain, nausea, vomiting, and PO intolerance. Her work-up is consistent with a small bowel obstruction. General surgery consulted for evaluation.  She is now status post exploratory laparotomy on 07/16/2023 with multiple small bowel resections and anastomosis, evidence of enterotomy with over-sewing.    - patient seen and examined  -doing better this morning, abdominal exam is improved, she is still not having any bowel function which is not surprising given the degree of her operative findings, discussed with her that she likely will have a postoperative ileus and that we would be keeping the NG tube in place for now until she has substantial bowel function  -remain NPO for now  - NG tube in place with thin bilious output; continue to LIWS  - will continue to follow

## 2023-07-17 NOTE — SUBJECTIVE & OBJECTIVE
Interval History: Pt states she still has significant abd pain, but no n/v. Pt still without bm or passing of gas.    Review of Systems  Objective:     Vital Signs (Most Recent):  Temp: 98.4 °F (36.9 °C) (07/17/23 1054)  Pulse: 94 (07/17/23 1054)  Resp: 18 (07/17/23 1054)  BP: 122/64 (07/17/23 1054)  SpO2: (!) 90 % (07/17/23 1057) Vital Signs (24h Range):  Temp:  [97.9 °F (36.6 °C)-98.6 °F (37 °C)] 98.4 °F (36.9 °C)  Pulse:  [] 94  Resp:  [15-21] 18  SpO2:  [83 %-98 %] 90 %  BP: ()/(59-96) 122/64     Weight: 45.4 kg (100 lb)  Body mass index is 18.29 kg/m².    Intake/Output Summary (Last 24 hours) at 7/17/2023 1311  Last data filed at 7/17/2023 1132  Gross per 24 hour   Intake 2391.33 ml   Output 1300 ml   Net 1091.33 ml         Physical Exam       Vitals reviewed.   Constitutional:       General: She is not in acute distress   HENT:      Head: Normocephalic and atraumatic. NG tube in place.     Mouth/Throat:      Mouth: Mucous membranes are dry.      Pharynx: Oropharynx is clear.   Eyes:      General: No scleral icterus.     Extraocular Movements: Extraocular movements intact.   Cardiovascular:      Rate and Rhythm: Normal rate and regular rhythm.   Pulmonary:      Effort: Pulmonary effort is normal. No respiratory distress.   Abdominal:      Tenderness: There is abdominal tenderness (diffuse ttp).   Musculoskeletal:         General: No swelling or tenderness.      Cervical back: Neck supple. No rigidity.   Skin:     General: Skin is warm and dry.   Neurological:      General: No focal deficit present.      Mental Status: She is alert and oriented to person, place, and time.   Psychiatric:         Mood and Affect: Mood normal.         Behavior: Behavior normal.   Significant Labs: All pertinent labs within the past 24 hours have been reviewed.    Significant Imaging: I have reviewed all pertinent imaging results/findings within the past 24 hours.

## 2023-07-17 NOTE — ASSESSMENT & PLAN NOTE
63F presenting with several days of worsening abd pain, anorexia, and nausea.  CT abd/pelvis with Abnormal fluid, air distension small bowel, termination point at or near ileocecal valve, not well-defined, associated less distension proximal small bowel with some bowel wall thickening.  Adhesion etiology of obstructive process favored.  -Post-op day 1 NG tube remains in place    -NG tube in place to LIWS  -continue ivf  -npo diet  -general surgery consulted, appreciate management of SBO  -Analgesics ordered prn

## 2023-07-17 NOTE — NURSING
[facility], [facility] - [place], [place]  Nurses Note -- 4 Eyes      7/17/2023       Skin assessed on: Q Shift      [x] No Pressure Injuries Present    []Prevention Measures Documented    [] Yes LDA  for Pressure Injury Previously documented     [] Yes New Pressure Injury Discovered   [] LDA for New Pressure Injury Added      Attending RN:  Nicole Castro RN     Second RN:  Queta TORO

## 2023-07-17 NOTE — NURSING
Report received and care assumed. Discussed plan of care and safety with patient . Reviewed call system. No acute distress noted . Oxygen infusing at 2 LPM after nasal cannula properly replaced to nose. Encourage deep breathing. Is spirometer given with return demonstration NGT to right nasal. Clamped at this time post meds administration. Dressing abdomen with old dried drainage noted and marked.[facility], [place] [facility]  Nurses Note -- 4 Eyes      7/17/2023       Skin assessed on: shift      [x] No Pressure Injuries Present    [x]Prevention Measures Documented    [] Yes LDA  for Pressure Injury Previously documented     [] Yes New Pressure Injury Discovered   [] LDA for New Pressure Injury Added      Attending RN:  Chioma Null, RN     Second RN:  Nicole Castro RN

## 2023-07-17 NOTE — PROGRESS NOTES
[facility]  General Surgery  Progress Note    Subjective:     History of Present Illness:  Marilee Townsend is a 63yoF with a history of hypertension, rectal prolapse s/p rectopexy who presents to [facility] with complaints of nausea, vomiting and worsening abdominal pain. Of note, she presented to an outside hospital with the same complaints yesterday and was discharged with instructions to follow up with her primary care provider. The patient describes a three-day history of abdominal pain, nausea, vomiting and PO intolerance. This has persistently worsened throughout this time, which prompted both presentations. She states that she did have a small bowel movement yesterday, described as hard pellets. On work-up, her vital signs are stable. She has an elevated leukocytosis to 18. Lactate normal at 1. Repeat ordered. Her CT scan demonstrates dilated small bowel consistent with a small bowel obstruction. There is normal caliber small intestine distally. She is admitted to the hospital medicine service. General surgery consulted for evaluation.     Of note, her WBC has increased from 12.8 to 18 over the last 24hrs. Imaging at the outside hospital, per the official read, did not demonstrate any degree of obstruction, which is a rather burk contrast from her CT scan at our facility. Plan for NG tube insertion with low threshold to proceed to operating room for diagnostic laparoscopy. Discussed this plan with the patient.       Post-Op Info:  Procedure(s) (LRB):  LAPAROSCOPY, DIAGNOSTIC (N/A)  LAPAROTOMY, EXPLORATORY (N/A)   1 Day Post-Op     Interval History:   Eventful day yesterday, taken to the OR for diagnostic lap converted to exploratory laparotomy for grossly necrotic bowel now status post small bowel resection and enterotomy repair postoperative day 1  Pain is better this morning  NG tube is still putting out thick dark output  No bowel function yet    Medications:  Continuous Infusions:   lactated  ringers 100 mL/hr at 07/16/23 1815     Scheduled Meds:   acetaminophen  1,000 mg Oral Q8H    amLODIPine  5 mg Oral Daily    buPROPion  150 mg Oral BID    heparin (porcine)  5,000 Units Subcutaneous Q8H    ibuprofen  800 mg Oral Q8H    metronidazole  500 mg Intravenous Q8H    piperacillin-tazobactam (Zosyn) IV (PEDS and ADULTS) (extended infusion is not appropriate)  4.5 g Intravenous Q8H    raloxifene  60 mg Oral Daily    sertraline  100 mg Oral QAM     PRN Meds:acetaminophen, acetaminophen, dextrose 50%, dextrose 50%, diatrizoate meglumineand-diatrizoate sodium, glucagon (human recombinant), glucose, glucose, HYDROmorphone, HYDROmorphone, insulin aspart U-100, naloxone, ondansetron, sodium chloride 0.9%     Review of patient's allergies indicates:   Allergen Reactions    Morphine Nausea And Vomiting     Objective:     Vital Signs (Most Recent):  Temp: 98.5 °F (36.9 °C) (07/17/23 0712)  Pulse: 86 (07/17/23 0712)  Resp: 16 (07/17/23 0459)  BP: (!) 115/59 (07/17/23 0712)  SpO2: (!) 90 % (07/17/23 0712) Vital Signs (24h Range):  Temp:  [97.9 °F (36.6 °C)-98.6 °F (37 °C)] 98.5 °F (36.9 °C)  Pulse:  [] 86  Resp:  [15-21] 16  SpO2:  [90 %-98 %] 90 %  BP: ()/(59-96) 115/59     Weight: 45.4 kg (100 lb)  Body mass index is 18.29 kg/m².    Intake/Output - Last 3 Shifts         07/15 0700  07/16 0659 07/16 0700  07/17 0659 07/17 0700  07/18 0659    P.O. 0 120     I.V. (mL/kg) 1100 (24.2) 1305 (28.7)     NG/GT       IV Piggyback 600 3161.2     Total Intake(mL/kg) 1700 (37.4) 4586.2 (101)     Urine (mL/kg/hr) 325 (0.3) 870 (0.8)     Emesis/NG output 125 0     Drains 1000 225     Other  200     Stool 0 0     Blood  100     Total Output 1450 1395     Net +250 +3191.2            Urine Occurrence 4 x      Stool Occurrence 0 x 0 x     Emesis Occurrence  0 x              Physical Exam  Vitals and nursing note reviewed.   Constitutional:       General: She is not in acute distress.     Appearance: She is not  toxic-appearing.   HENT:      Head: Normocephalic and atraumatic.      Nose:      Comments: NG tube in place with thick dark output  Cardiovascular:      Rate and Rhythm: Normal rate and regular rhythm.   Pulmonary:      Effort: Pulmonary effort is normal. No respiratory distress.   Abdominal:      General: Abdomen is flat.      Palpations: Abdomen is soft.      Comments: Abdominal exam is better this morning, appropriately tender to palpation  Surgical site is clean dry and intact with overlying dressing and scant strike through   Skin:     General: Skin is warm and dry.   Neurological:      General: No focal deficit present.      Mental Status: She is alert and oriented to person, place, and time.   Psychiatric:         Mood and Affect: Mood normal.        Significant Labs:  I have reviewed all pertinent lab results within the past 24 hours.  CBC:   Recent Labs   Lab 07/17/23  0409   WBC 4.43   RBC 3.44*   HGB 10.2*   HCT 31.0*      MCV 90   MCH 29.7   MCHC 32.9     CMP:   Recent Labs   Lab 07/17/23 0409      CALCIUM 8.2*   ALBUMIN 2.0*   PROT 5.3*   *   K 3.7   CO2 23      BUN 27*   CREATININE 0.7   ALKPHOS 45*   ALT 16   AST 28   BILITOT 0.5       Significant Diagnostics:  I have reviewed all pertinent imaging results/findings within the past 24 hours.    Assessment/Plan:     * Small bowel obstruction  Marilee Townsend is a 63yoF with a history of HTN and rectal prolapse who presents with a several day history of abdominal pain, nausea, vomiting, and PO intolerance. Her work-up is consistent with a small bowel obstruction. General surgery consulted for evaluation.  She is now status post exploratory laparotomy on 07/16/2023 with multiple small bowel resections and anastomosis, evidence of enterotomy with over-sewing.    - patient seen and examined  -doing better this morning, abdominal exam is improved, she is still not having any bowel function which is not surprising given the  degree of her operative findings, discussed with her that she likely will have a postoperative ileus and that we would be keeping the NG tube in place for now until she has substantial bowel function  -remain NPO for now  - NG tube in place with thin bilious output; continue to LIWS  - will continue to follow        Jesus Adams MD  General Surgery  [facility]

## 2023-07-17 NOTE — PROGRESS NOTES
[facility] Medicine  Progress Note    Patient Name: Marilee Townsend  MRN: [id number]  Patient Class: IP- Inpatient   Admission Date: 7/14/2023  Length of Stay: 3 days  Attending Physician: Wesley Vargas III, MD  Primary Care Provider: Claudia Hopper DO        Subjective:     Principal Problem:Small bowel obstruction        HPI:  63F with pmh of depression, fibromyalgia, htn, osteoporosis who presents due to several day h/o abd pain and nausea with anorexia. Pt was evaluated at outside emergency department patient was seen in the day prompting the ED for evaluation.  Patient denies any recent flatulence or bowel movements unclear on the last date.  Patient states pain is diffuse in the with minimal improvement with pain medications given in the ED. CT scan in the emergency department with demonstration of small bowel obstruction and surgery consulted who had NG tube placed.  Patient denies chest pain, shortness a breath, fever, chills.  Patient states she had a shoulder surgery about 3 weeks prior to arrival and tolerated the anesthesia well.  Patient is a former smoker, but denies alcohol or drug use.      Overview/Hospital Course:  63F with pmh of depression, fibromyalgia, htn, osteoporosis who presents due to several day h/o abd pain and nausea with anorexia. Pt was evaluated at outside emergency department patient was seen in the day prompting the ED for evaluation.  Patient denies any recent flatulence or bowel movements unclear on the last date.  Patient states pain is diffuse in the with minimal improvement with pain medications given in the ED. CT scan in the emergency department with demonstration of small bowel obstruction and surgery consulted who had NG tube placed. Attempts at gastrografin with persistent obstruction. Pt taken to OR on 7/16. NG tube remained after operation and surgery managing      Interval History: Pt states she still has significant abd pain, but no n/v. Pt  still without bm or passing of gas.    Review of Systems  Objective:     Vital Signs (Most Recent):  Temp: 98.4 °F (36.9 °C) (07/17/23 1054)  Pulse: 94 (07/17/23 1054)  Resp: 18 (07/17/23 1054)  BP: 122/64 (07/17/23 1054)  SpO2: (!) 90 % (07/17/23 1057) Vital Signs (24h Range):  Temp:  [97.9 °F (36.6 °C)-98.6 °F (37 °C)] 98.4 °F (36.9 °C)  Pulse:  [] 94  Resp:  [15-21] 18  SpO2:  [83 %-98 %] 90 %  BP: ()/(59-96) 122/64     Weight: 45.4 kg (100 lb)  Body mass index is 18.29 kg/m².    Intake/Output Summary (Last 24 hours) at 7/17/2023 1311  Last data filed at 7/17/2023 1132  Gross per 24 hour   Intake 2391.33 ml   Output 1300 ml   Net 1091.33 ml         Physical Exam       Vitals reviewed.   Constitutional:       General: She is not in acute distress   HENT:      Head: Normocephalic and atraumatic. NG tube in place.     Mouth/Throat:      Mouth: Mucous membranes are dry.      Pharynx: Oropharynx is clear.   Eyes:      General: No scleral icterus.     Extraocular Movements: Extraocular movements intact.   Cardiovascular:      Rate and Rhythm: Normal rate and regular rhythm.   Pulmonary:      Effort: Pulmonary effort is normal. No respiratory distress.   Abdominal:      Tenderness: There is abdominal tenderness (diffuse ttp).   Musculoskeletal:         General: No swelling or tenderness.      Cervical back: Neck supple. No rigidity.   Skin:     General: Skin is warm and dry.   Neurological:      General: No focal deficit present.      Mental Status: She is alert and oriented to person, place, and time.   Psychiatric:         Mood and Affect: Mood normal.         Behavior: Behavior normal.   Significant Labs: All pertinent labs within the past 24 hours have been reviewed.    Significant Imaging: I have reviewed all pertinent imaging results/findings within the past 24 hours.      Assessment/Plan:      * Small bowel obstruction  63F presenting with several days of worsening abd pain, anorexia, and nausea.  CT  abd/pelvis with Abnormal fluid, air distension small bowel, termination point at or near ileocecal valve, not well-defined, associated less distension proximal small bowel with some bowel wall thickening.  Adhesion etiology of obstructive process favored.  -Post-op day 1 NG tube remains in place    -NG tube in place to LIWS  -continue ivf  -npo diet  -general surgery consulted, appreciate management of SBO  -Analgesics ordered prn       Osteoporosis  Continue home medications      Depression  Restart home medications as indicated        Leukocytosis  In setting of acute small-bowel obstruction and no definitive findings of infection appears likely reactive, but low threshold for starting antibiotics. LA negative x2.  -procal mildly elevated, but leukocytosis resolved    -start on zosyn for now  -tx as stated above for sbo      HTN (hypertension)  Currently controlled.  Will continue to monitor        VTE Risk Mitigation (From admission, onward)         Ordered     heparin (porcine) injection 5,000 Units  Every 8 hours         07/14/23 1428     IP VTE LOW RISK PATIENT  Once         07/14/23 1236     Place sequential compression device  Until discontinued         07/14/23 1236                Discharge Planning   WIL: 7/21/2023     Code Status: Full Code   Is the patient medically ready for discharge?:     Reason for patient still in hospital (select all that apply): Treatment and Consult recommendations  Discharge Plan A: Home   Discharge Delays: None known at this time              Wesley Vargas III, MD  Department of Hospital Medicine   [facility]

## 2023-07-17 NOTE — PHYSICIAN QUERY
PT Name: Marilee Townsend  MR #: [id number]     DOCUMENTATION CLARIFICATION     CDS/: Gabby Persaud               Contact information: [email or web address]  This form is a permanent document in the medical record.     Query Date: July 17, 2023    By submitting this query, we are merely seeking further clarification of documentation to reflect the severity of illness of your patient. Please utilize your independent clinical judgment when addressing the question(s) below.    The medical record reflects the following:     Indicators   Supporting Clinical Findings Location in Medical Record   x Bowel obstruction, intestinal obstruction, LBO or SBO documented Small bowel secondary to adhesions     Small bowel obstruction  CT abd/pelvis with Abnormal fluid, air distension small bowel, termination point at or near ileocecal valve, not well-defined, associated less distension proximal small bowel with some bowel wall thickening.  Adhesion etiology of obstructive process favored   Op Note 07/16    Hospital Medicine PN 07/17     x Radiology findings Abnormal fluid, air distension small bowel, termination point at or near ileocecal valve, not well-defined, associated less distension proximal small bowel with some bowel wall thickening.  Adhesion etiology of obstructive process favored.      Dilated small bowel loops are noted with partial opacification with oral contrast, extending to the 8 hour film there is no evidence for oral contrast within the colon, the findings are consistent with a small bowel obstructive process   CT Abdomen Pelvis 07/14        XR Gastrografiin Challenge 07/15     x Treatment/Medication Failed gastrograffin challenge. To OR today for diagnostic laparoscopy.  KUB this morning without progression of contrast      x Procedure/Surgery Once the entire bowel was the pulled up can see an area that was in her pelvis that was ischemic.  And this was intimately associated with a band  adhesion and some swirling.  This was lysed and this portion of her small bowel was resected the cut edges were then placed together and anastomosis was created in a side-to-side functional end-to-end fashion using staplers.  More adhesion lysis was done all the way into the pelvis    Op Note 07/16    Other       Provider, please further specify the bowel obstruction diagnosis:  [   ] Partial or incomplete intestinal obstruction, due to adhesions   [ X  ] Complete intestinal obstruction, due to adhesions   [   ] Intestinal obstruction, due to adhesions, extent unknown or clinically undetermined   [   ] Other intestinal condition (please specify): _____________________           Please document in your progress notes daily for the duration of treatment until resolved, and include in your discharge summary.

## 2023-07-17 NOTE — SUBJECTIVE & OBJECTIVE
Interval History:   Eventful day yesterday, taken to the OR for diagnostic lap converted to exploratory laparotomy for grossly necrotic bowel now status post small bowel resection and enterotomy repair postoperative day 1  Pain is better this morning  NG tube is still putting out thick dark output  No bowel function yet    Medications:  Continuous Infusions:   lactated ringers 100 mL/hr at 07/16/23 1815     Scheduled Meds:   acetaminophen  1,000 mg Oral Q8H    amLODIPine  5 mg Oral Daily    buPROPion  150 mg Oral BID    heparin (porcine)  5,000 Units Subcutaneous Q8H    ibuprofen  800 mg Oral Q8H    metronidazole  500 mg Intravenous Q8H    piperacillin-tazobactam (Zosyn) IV (PEDS and ADULTS) (extended infusion is not appropriate)  4.5 g Intravenous Q8H    raloxifene  60 mg Oral Daily    sertraline  100 mg Oral QAM     PRN Meds:acetaminophen, acetaminophen, dextrose 50%, dextrose 50%, diatrizoate meglumineand-diatrizoate sodium, glucagon (human recombinant), glucose, glucose, HYDROmorphone, HYDROmorphone, insulin aspart U-100, naloxone, ondansetron, sodium chloride 0.9%     Review of patient's allergies indicates:   Allergen Reactions    Morphine Nausea And Vomiting     Objective:     Vital Signs (Most Recent):  Temp: 98.5 °F (36.9 °C) (07/17/23 0712)  Pulse: 86 (07/17/23 0712)  Resp: 16 (07/17/23 0459)  BP: (!) 115/59 (07/17/23 0712)  SpO2: (!) 90 % (07/17/23 0712) Vital Signs (24h Range):  Temp:  [97.9 °F (36.6 °C)-98.6 °F (37 °C)] 98.5 °F (36.9 °C)  Pulse:  [] 86  Resp:  [15-21] 16  SpO2:  [90 %-98 %] 90 %  BP: ()/(59-96) 115/59     Weight: 45.4 kg (100 lb)  Body mass index is 18.29 kg/m².    Intake/Output - Last 3 Shifts         07/15 0700  07/16 0659 07/16 0700 07/17 0659 07/17 0700 07/18 0659    P.O. 0 120     I.V. (mL/kg) 1100 (24.2) 1305 (28.7)     NG/GT       IV Piggyback 600 3161.2     Total Intake(mL/kg) 1700 (37.4) 4586.2 (101)     Urine (mL/kg/hr) 325 (0.3) 870 (0.8)     Emesis/NG output  125 0     Drains 1000 225     Other  200     Stool 0 0     Blood  100     Total Output 1450 1395     Net +250 +3191.2            Urine Occurrence 4 x      Stool Occurrence 0 x 0 x     Emesis Occurrence  0 x              Physical Exam  Vitals and nursing note reviewed.   Constitutional:       General: She is not in acute distress.     Appearance: She is not toxic-appearing.   HENT:      Head: Normocephalic and atraumatic.      Nose:      Comments: NG tube in place with thick dark output  Cardiovascular:      Rate and Rhythm: Normal rate and regular rhythm.   Pulmonary:      Effort: Pulmonary effort is normal. No respiratory distress.   Abdominal:      General: Abdomen is flat.      Palpations: Abdomen is soft.      Comments: Abdominal exam is better this morning, appropriately tender to palpation  Surgical site is clean dry and intact with overlying dressing and scant strike through   Skin:     General: Skin is warm and dry.   Neurological:      General: No focal deficit present.      Mental Status: She is alert and oriented to person, place, and time.   Psychiatric:         Mood and Affect: Mood normal.        Significant Labs:  I have reviewed all pertinent lab results within the past 24 hours.  CBC:   Recent Labs   Lab 07/17/23  0409   WBC 4.43   RBC 3.44*   HGB 10.2*   HCT 31.0*      MCV 90   MCH 29.7   MCHC 32.9     CMP:   Recent Labs   Lab 07/17/23  0409      CALCIUM 8.2*   ALBUMIN 2.0*   PROT 5.3*   *   K 3.7   CO2 23      BUN 27*   CREATININE 0.7   ALKPHOS 45*   ALT 16   AST 28   BILITOT 0.5       Significant Diagnostics:  I have reviewed all pertinent imaging results/findings within the past 24 hours.

## 2023-07-18 PROBLEM — E87.6 HYPOKALEMIA: Status: ACTIVE | Noted: 2023-07-18

## 2023-07-18 LAB
ALBUMIN SERPL BCP-MCNC: 1.8 G/DL (ref 3.5–5.2)
ALP SERPL-CCNC: 44 U/L (ref 55–135)
ALT SERPL W/O P-5'-P-CCNC: 18 U/L (ref 10–44)
ANION GAP SERPL CALC-SCNC: 13 MMOL/L (ref 8–16)
ANISOCYTOSIS BLD QL SMEAR: ABNORMAL
AST SERPL-CCNC: 29 U/L (ref 10–40)
BACTERIA BLD CULT: NORMAL
BACTERIA BLD CULT: NORMAL
BASOPHILS # BLD AUTO: ABNORMAL K/UL (ref 0–0.2)
BASOPHILS NFR BLD: 0 % (ref 0–1.9)
BASOPHILS NFR BLD: 0 % (ref 0–1.9)
BILIRUB SERPL-MCNC: 0.4 MG/DL (ref 0.1–1)
BUN SERPL-MCNC: 25 MG/DL (ref 8–23)
CALCIUM SERPL-MCNC: 8.4 MG/DL (ref 8.7–10.5)
CHLORIDE SERPL-SCNC: 101 MMOL/L (ref 95–110)
CO2 SERPL-SCNC: 26 MMOL/L (ref 23–29)
CREAT SERPL-MCNC: 0.6 MG/DL (ref 0.5–1.4)
DIFFERENTIAL METHOD: ABNORMAL
DIFFERENTIAL METHOD: ABNORMAL
EOSINOPHIL # BLD AUTO: ABNORMAL K/UL (ref 0–0.5)
EOSINOPHIL NFR BLD: 0 % (ref 0–8)
EOSINOPHIL NFR BLD: 0 % (ref 0–8)
ERYTHROCYTE [DISTWIDTH] IN BLOOD BY AUTOMATED COUNT: 13.6 % (ref 11.5–14.5)
ERYTHROCYTE [DISTWIDTH] IN BLOOD BY AUTOMATED COUNT: 13.8 % (ref 11.5–14.5)
EST. GFR  (NO RACE VARIABLE): >60 ML/MIN/1.73 M^2
GLUCOSE SERPL-MCNC: 88 MG/DL (ref 70–110)
HCT VFR BLD AUTO: 25.1 % (ref 37–48.5)
HCT VFR BLD AUTO: 25.6 % (ref 37–48.5)
HGB BLD-MCNC: 8.6 G/DL (ref 12–16)
HGB BLD-MCNC: 8.7 G/DL (ref 12–16)
IMM GRANULOCYTES # BLD AUTO: ABNORMAL K/UL (ref 0–0.04)
IMM GRANULOCYTES # BLD AUTO: ABNORMAL K/UL (ref 0–0.04)
IMM GRANULOCYTES NFR BLD AUTO: ABNORMAL % (ref 0–0.5)
IMM GRANULOCYTES NFR BLD AUTO: ABNORMAL % (ref 0–0.5)
LYMPHOCYTES # BLD AUTO: ABNORMAL K/UL (ref 1–4.8)
LYMPHOCYTES NFR BLD: 11 % (ref 18–48)
LYMPHOCYTES NFR BLD: 6 % (ref 18–48)
MAGNESIUM SERPL-MCNC: 2 MG/DL (ref 1.6–2.6)
MCH RBC QN AUTO: 29.5 PG (ref 27–31)
MCH RBC QN AUTO: 29.8 PG (ref 27–31)
MCHC RBC AUTO-ENTMCNC: 34 G/DL (ref 32–36)
MCHC RBC AUTO-ENTMCNC: 34.3 G/DL (ref 32–36)
MCV RBC AUTO: 87 FL (ref 82–98)
MCV RBC AUTO: 87 FL (ref 82–98)
MONOCYTES # BLD AUTO: ABNORMAL K/UL (ref 0.3–1)
MONOCYTES NFR BLD: 2 % (ref 4–15)
MONOCYTES NFR BLD: 3 % (ref 4–15)
NEUTROPHILS NFR BLD: 66 % (ref 38–73)
NEUTROPHILS NFR BLD: 92 % (ref 38–73)
NEUTS BAND NFR BLD MANUAL: 20 %
NRBC BLD-RTO: 0 /100 WBC
NRBC BLD-RTO: 0 /100 WBC
PHOSPHATE SERPL-MCNC: 2.4 MG/DL (ref 2.7–4.5)
PLATELET # BLD AUTO: 245 K/UL (ref 150–450)
PLATELET # BLD AUTO: 254 K/UL (ref 150–450)
PLATELET BLD QL SMEAR: ABNORMAL
PMV BLD AUTO: 8.9 FL (ref 9.2–12.9)
PMV BLD AUTO: 9.1 FL (ref 9.2–12.9)
POCT GLUCOSE: 138 MG/DL (ref 70–110)
POCT GLUCOSE: 191 MG/DL (ref 70–110)
POLYCHROMASIA BLD QL SMEAR: ABNORMAL
POTASSIUM SERPL-SCNC: 2.9 MMOL/L (ref 3.5–5.1)
PROT SERPL-MCNC: 5.1 G/DL (ref 6–8.4)
RBC # BLD AUTO: 2.89 M/UL (ref 4–5.4)
RBC # BLD AUTO: 2.95 M/UL (ref 4–5.4)
SODIUM SERPL-SCNC: 140 MMOL/L (ref 136–145)
WBC # BLD AUTO: 7.48 K/UL (ref 3.9–12.7)
WBC # BLD AUTO: 8.21 K/UL (ref 3.9–12.7)

## 2023-07-18 PROCEDURE — A4216 STERILE WATER/SALINE, 10 ML: HCPCS | Performed by: STUDENT IN AN ORGANIZED HEALTH CARE EDUCATION/TRAINING PROGRAM

## 2023-07-18 PROCEDURE — 84100 ASSAY OF PHOSPHORUS: CPT | Performed by: STUDENT IN AN ORGANIZED HEALTH CARE EDUCATION/TRAINING PROGRAM

## 2023-07-18 PROCEDURE — B4185 PARENTERAL SOL 10 GM LIPIDS: HCPCS | Performed by: STUDENT IN AN ORGANIZED HEALTH CARE EDUCATION/TRAINING PROGRAM

## 2023-07-18 PROCEDURE — 94761 N-INVAS EAR/PLS OXIMETRY MLT: CPT

## 2023-07-18 PROCEDURE — 94799 UNLISTED PULMONARY SVC/PX: CPT

## 2023-07-18 PROCEDURE — 25000003 PHARM REV CODE 250: Performed by: STUDENT IN AN ORGANIZED HEALTH CARE EDUCATION/TRAINING PROGRAM

## 2023-07-18 PROCEDURE — 63600175 PHARM REV CODE 636 W HCPCS

## 2023-07-18 PROCEDURE — 83735 ASSAY OF MAGNESIUM: CPT | Performed by: STUDENT IN AN ORGANIZED HEALTH CARE EDUCATION/TRAINING PROGRAM

## 2023-07-18 PROCEDURE — 27000221 HC OXYGEN, UP TO 24 HOURS

## 2023-07-18 PROCEDURE — 80053 COMPREHEN METABOLIC PANEL: CPT | Performed by: STUDENT IN AN ORGANIZED HEALTH CARE EDUCATION/TRAINING PROGRAM

## 2023-07-18 PROCEDURE — 85007 BL SMEAR W/DIFF WBC COUNT: CPT

## 2023-07-18 PROCEDURE — 85027 COMPLETE CBC AUTOMATED: CPT | Mod: 91

## 2023-07-18 PROCEDURE — 63600175 PHARM REV CODE 636 W HCPCS: Performed by: STUDENT IN AN ORGANIZED HEALTH CARE EDUCATION/TRAINING PROGRAM

## 2023-07-18 PROCEDURE — 85027 COMPLETE CBC AUTOMATED: CPT

## 2023-07-18 PROCEDURE — 11000001 HC ACUTE MED/SURG PRIVATE ROOM

## 2023-07-18 PROCEDURE — 85007 BL SMEAR W/DIFF WBC COUNT: CPT | Mod: 91

## 2023-07-18 RX ORDER — POTASSIUM CHLORIDE 7.45 MG/ML
10 INJECTION INTRAVENOUS
Status: COMPLETED | OUTPATIENT
Start: 2023-07-18 | End: 2023-07-18

## 2023-07-18 RX ORDER — POTASSIUM CHLORIDE 7.45 MG/ML
10 INJECTION INTRAVENOUS
Status: DISCONTINUED | OUTPATIENT
Start: 2023-07-18 | End: 2023-07-18

## 2023-07-18 RX ADMIN — ACETAMINOPHEN 1000 MG: 500 TABLET, FILM COATED ORAL at 10:07

## 2023-07-18 RX ADMIN — METRONIDAZOLE 500 MG: 500 INJECTION, SOLUTION INTRAVENOUS at 02:07

## 2023-07-18 RX ADMIN — SOYBEAN OIL 250 ML: 20 INJECTION, SOLUTION INTRAVENOUS at 10:07

## 2023-07-18 RX ADMIN — LEUCINE, PHENYLALANINE, LYSINE, METHIONINE, ISOLEUCINE, VALINE, HISTIDINE, THREONINE, TRYPTOPHAN, ALANINE, GLYCINE, ARGININE, PROLINE, SERINE, TYROSINE, SODIUM ACETATE, DIBASIC POTASSIUM PHOSPHATE, MAGNESIUM CHLORIDE, SODIUM CHLORIDE, CALCIUM CHLORIDE, DEXTROSE
365; 280; 290; 200; 300; 290; 240; 210; 90; 1035; 515; 575; 340; 250; 20; 340; 261; 51; 59; 33; 15 INJECTION INTRAVENOUS at 10:07

## 2023-07-18 RX ADMIN — METRONIDAZOLE 500 MG: 500 INJECTION, SOLUTION INTRAVENOUS at 01:07

## 2023-07-18 RX ADMIN — IBUPROFEN 800 MG: 400 TABLET ORAL at 10:07

## 2023-07-18 RX ADMIN — ASCORBIC ACID, VITAMIN A PALMITATE, CHOLECALCIFEROL, THIAMINE HYDROCHLORIDE, RIBOFLAVIN-5 PHOSPHATE SODIUM, PYRIDOXINE HYDROCHLORIDE, NIACINAMIDE, DEXPANTHENOL, ALPHA-TOCOPHEROL ACETATE, VITAMIN K1, FOLIC ACID, BIOTIN, CYANOCOBALAMIN: 200; 3300; 200; 6; 3.6; 6; 40; 15; 10; 150; 600; 60; 5 INJECTION, SOLUTION INTRAVENOUS at 03:07

## 2023-07-18 RX ADMIN — ACETAMINOPHEN 1000 MG: 500 TABLET, FILM COATED ORAL at 01:07

## 2023-07-18 RX ADMIN — Medication 10 ML: at 03:07

## 2023-07-18 RX ADMIN — BUPROPION HYDROCHLORIDE 150 MG: 150 TABLET, EXTENDED RELEASE ORAL at 08:07

## 2023-07-18 RX ADMIN — POTASSIUM CHLORIDE 10 MEQ: 7.46 INJECTION, SOLUTION INTRAVENOUS at 09:07

## 2023-07-18 RX ADMIN — PIPERACILLIN AND TAZOBACTAM 4.5 G: 4; .5 INJECTION, POWDER, LYOPHILIZED, FOR SOLUTION INTRAVENOUS; PARENTERAL at 03:07

## 2023-07-18 RX ADMIN — METRONIDAZOLE 500 MG: 500 INJECTION, SOLUTION INTRAVENOUS at 10:07

## 2023-07-18 RX ADMIN — POTASSIUM CHLORIDE 10 MEQ: 7.46 INJECTION, SOLUTION INTRAVENOUS at 08:07

## 2023-07-18 RX ADMIN — HYDROMORPHONE HYDROCHLORIDE 1 MG: 1 INJECTION, SOLUTION INTRAMUSCULAR; INTRAVENOUS; SUBCUTANEOUS at 08:07

## 2023-07-18 RX ADMIN — AMLODIPINE BESYLATE 5 MG: 5 TABLET ORAL at 08:07

## 2023-07-18 RX ADMIN — HEPARIN SODIUM 5000 UNITS: 5000 INJECTION INTRAVENOUS; SUBCUTANEOUS at 01:07

## 2023-07-18 RX ADMIN — RALOXIFENE HYDROCHLORIDE 60 MG: 60 TABLET, FILM COATED ORAL at 08:07

## 2023-07-18 RX ADMIN — PIPERACILLIN AND TAZOBACTAM 4.5 G: 4; .5 INJECTION, POWDER, LYOPHILIZED, FOR SOLUTION INTRAVENOUS; PARENTERAL at 04:07

## 2023-07-18 RX ADMIN — IBUPROFEN 800 MG: 400 TABLET ORAL at 01:07

## 2023-07-18 RX ADMIN — METRONIDAZOLE 500 MG: 500 INJECTION, SOLUTION INTRAVENOUS at 07:07

## 2023-07-18 RX ADMIN — HEPARIN SODIUM 5000 UNITS: 5000 INJECTION INTRAVENOUS; SUBCUTANEOUS at 10:07

## 2023-07-18 RX ADMIN — HYDROMORPHONE HYDROCHLORIDE 1 MG: 1 INJECTION, SOLUTION INTRAMUSCULAR; INTRAVENOUS; SUBCUTANEOUS at 01:07

## 2023-07-18 RX ADMIN — POTASSIUM CHLORIDE 10 MEQ: 7.46 INJECTION, SOLUTION INTRAVENOUS at 06:07

## 2023-07-18 RX ADMIN — POTASSIUM CHLORIDE 10 MEQ: 7.46 INJECTION, SOLUTION INTRAVENOUS at 05:07

## 2023-07-18 RX ADMIN — PIPERACILLIN AND TAZOBACTAM 4.5 G: 4; .5 INJECTION, POWDER, LYOPHILIZED, FOR SOLUTION INTRAVENOUS; PARENTERAL at 08:07

## 2023-07-18 NOTE — ASSESSMENT & PLAN NOTE
Marilee Townsend is a 63yoF with a history of HTN and rectal prolapse who presents with a several day history of abdominal pain, nausea, vomiting, and PO intolerance. Her work-up is consistent with a small bowel obstruction. General surgery consulted for evaluation.  She is now status post exploratory laparotomy on 07/16/2023 with multiple small bowel resections and anastomosis, evidence of enterotomy with over-sewing.    - patient seen and examined  - vitals, labs reviewed   - given lack of PO intake, low albumin, patient initiated on TPN overnight   - PICC line placed; central TPN ordered this morning   - K replaced  - NG tube with 1L output, no bowel function to this point   - continue NG to LIWS  - NPO; ok for ice chips for comfort  - serial abdominal exams  - continue IV antibiotics  - DVT ppx  - half mIVF rate  - please call with questions

## 2023-07-18 NOTE — NURSING
[facility]  Nurses Note -- 4 Eyes      7/18/2023       Skin assessed on: Q Shift      [x] No Pressure Injuries Present    [x]Prevention Measures Documented    [] Yes LDA  for Pressure Injury Previously documented     [] Yes New Pressure Injury Discovered   [] LDA for New Pressure Injury Added      Attending RN:  Lynn Leblanc LPN     Second RN:  Nupur Quintana RN

## 2023-07-18 NOTE — PROGRESS NOTES
[facility] Medicine  Progress Note    Patient Name: Marilee Townsend  MRN: [id number]  Patient Class: IP- Inpatient   Admission Date: 7/14/2023  Length of Stay: 4 days  Attending Physician: Adriane Jang DO  Primary Care Provider: Claudia Hopper DO        Subjective:     Principal Problem:Small bowel obstruction        HPI:  63F with pmh of depression, fibromyalgia, htn, osteoporosis who presents due to several day h/o abd pain and nausea with anorexia. Pt was evaluated at outside emergency department patient was seen in the day prompting the ED for evaluation.  Patient denies any recent flatulence or bowel movements unclear on the last date.  Patient states pain is diffuse in the with minimal improvement with pain medications given in the ED. CT scan in the emergency department with demonstration of small bowel obstruction and surgery consulted who had NG tube placed.  Patient denies chest pain, shortness a breath, fever, chills.  Patient states she had a shoulder surgery about 3 weeks prior to arrival and tolerated the anesthesia well.  Patient is a former smoker, but denies alcohol or drug use.      Overview/Hospital Course:  63F with pmh of depression, fibromyalgia, htn, osteoporosis admitted on 07/07/2023 for small bowel obstruction. presented with a several day history of abdominal pain, nausea, vomiting, and PO intolerance.  CT scan in ED with demonstration of small bowel obstruction and surgery consulted who had NG tube placed. Attempts at gastrografin with persistent obstruction. Pt taken to OR on 7/16. NG tube remained after operation and surgery managing. She is now status post exploratory laparotomy on 07/16/2023 with multiple small bowel resections and anastomosis, evidence of enterotomy with over-sewing. Replace electrolytes as needed.       Interval History:  No acute overnight events.  Patient remained afebrile.  Admits nausea but no vomiting.  Feeling a little bit better this  morning.  Continues to have output out the NG tube.  Approximately 1 L of dark output overnight.  No bowel movements overnight.  Not passing gas.    Review of Systems   Constitutional:  Negative for chills, fatigue and fever.   HENT:  Positive for postnasal drip and sore throat.    Respiratory:  Negative for cough, chest tightness and shortness of breath.    Cardiovascular:  Negative for chest pain, palpitations and leg swelling.   Gastrointestinal:  Positive for nausea. Negative for abdominal distention, abdominal pain, diarrhea and vomiting.   Genitourinary:  Negative for difficulty urinating and dysuria.   Musculoskeletal:  Negative for joint swelling.   Neurological:  Negative for dizziness, weakness and headaches.     Objective:     Vital Signs (Most Recent):  Temp: 98.3 °F (36.8 °C) (07/18/23 1146)  Pulse: 92 (07/18/23 1146)  Resp: 16 (07/18/23 1146)  BP: 137/76 (07/18/23 1146)  SpO2: 95 % (07/18/23 1146) Vital Signs (24h Range):  Temp:  [97.8 °F (36.6 °C)-99 °F (37.2 °C)] 98.3 °F (36.8 °C)  Pulse:  [80-92] 92  Resp:  [16-20] 16  SpO2:  [92 %-95 %] 95 %  BP: (115-137)/(67-80) 137/76     Weight: 45.4 kg (100 lb)  Body mass index is 18.29 kg/m².    Intake/Output Summary (Last 24 hours) at 7/18/2023 1346  Last data filed at 7/18/2023 0805  Gross per 24 hour   Intake 281.83 ml   Output 2085 ml   Net -1803.17 ml         Physical Exam  Vitals and nursing note reviewed.   Constitutional:       General: She is not in acute distress.     Appearance: She is ill-appearing.   HENT:      Nose:      Comments: NG tube with dark output     Mouth/Throat:      Mouth: Mucous membranes are dry.   Cardiovascular:      Rate and Rhythm: Normal rate and regular rhythm.   Pulmonary:      Effort: Pulmonary effort is normal. No respiratory distress.      Breath sounds: No wheezing or rales.   Abdominal:      General: There is distension.      Palpations: Abdomen is soft.      Tenderness: There is abdominal tenderness.      Comments:  Abdomen soft, mildly distended  Midline incision with dressing in place  Abdomen remains diffusely tender, although improving    Musculoskeletal:         General: Normal range of motion.      Right lower leg: No edema.      Left lower leg: No edema.   Skin:     General: Skin is warm and dry.   Neurological:      General: No focal deficit present.      Mental Status: She is alert and oriented to person, place, and time.   Psychiatric:         Mood and Affect: Mood normal.         Thought Content: Thought content normal.           Significant Labs: All pertinent labs within the past 24 hours have been reviewed.    Significant Imaging: I have reviewed all pertinent imaging results/findings within the past 24 hours.      Assessment/Plan:      * Small bowel obstruction  63F presenting with several days of worsening abd pain, anorexia, and nausea.  CT abd/pelvis with Abnormal fluid, air distension small bowel, termination point at or near ileocecal valve, not well-defined, associated less distension proximal small bowel with some bowel wall thickening.  Adhesion etiology of obstructive process favored.    -Surgery team consulted: s/p exploratory laparotomy on 07/16/2023 with multiple small bowel resections and anastomosis, evidence of enterotomy with over-sewing.  -NG tube in place to LIWS  -continue TPN per surgery   -npo diet  -Analgesics ordered prn       HTN (hypertension)  Currently controlled.  Continue home norvasc  Will continue to monitor      Leukocytosis  -In setting of acute small-bowel obstruction and no definitive findings of infection appears likely reactive, but low threshold for starting antibiotics.   -LA negative x2.  -procal mildly elevated, but leukocytosis resolved  -Continue on zosyn for now  -tx as stated above for sbo      Depression  Restart home medications as indicated- setraline 100mg         Osteoporosis  Continue home medications      Hypokalemia  -Replace as needed  -TPN per surgery          VTE Risk Mitigation (From admission, onward)         Ordered     heparin (porcine) injection 5,000 Units  Every 8 hours         07/14/23 1428     IP VTE LOW RISK PATIENT  Once         07/14/23 1236     Place sequential compression device  Until discontinued         07/14/23 1236                Discharge Planning   WIL: 7/21/2023     Code Status: Full Code   Is the patient medically ready for discharge?:     Reason for patient still in hospital (select all that apply): Patient trending condition, Treatment and Consult recommendations  Discharge Plan A: Home   Discharge Delays: None known at this time              Adriane Jang DO  Department of Hospital Medicine   [facility]

## 2023-07-18 NOTE — ASSESSMENT & PLAN NOTE
-In setting of acute small-bowel obstruction and no definitive findings of infection appears likely reactive, but low threshold for starting antibiotics.   -LA negative x2.  -procal mildly elevated, but leukocytosis resolved  -Continue on zosyn for now  -tx as stated above for sbo

## 2023-07-18 NOTE — ASSESSMENT & PLAN NOTE
63F presenting with several days of worsening abd pain, anorexia, and nausea.  CT abd/pelvis with Abnormal fluid, air distension small bowel, termination point at or near ileocecal valve, not well-defined, associated less distension proximal small bowel with some bowel wall thickening.  Adhesion etiology of obstructive process favored.    -Surgery team consulted: s/p exploratory laparotomy on 07/16/2023 with multiple small bowel resections and anastomosis, evidence of enterotomy with over-sewing.  -NG tube in place to LIWS  -continue TPN per surgery   -npo diet  -Analgesics ordered prn

## 2023-07-18 NOTE — SUBJECTIVE & OBJECTIVE
Interval History: Patient seen and examined this morning. No acute events overnight. Vitals remain stable. Labs within normal limits-- outside of albumin. Initiated on TPN overnight. PICC line placed. 1L out of NG overnight. No bowel function to this point, but patient endorsing feeling better, subjectively.     Medications:  Continuous Infusions:   Amino acid 4.25% - dextrose 5% (CLINIMIX-E) solution with additives (1L provides 42.5 gm AA, 50 gm CHO (170 kcal/L dextrose), Na 35, K 30, Mg 5, Ca 4.5, Acetate 70, Cl 39, Phos 15) 75 mL/hr at 07/18/23 0314    Amino acid 5% - dextrose 15% (CLINIMIX-E) solution with additives. CENTRAL LINE ONLY (1395 mOsm/L). 1L provides 50 gm AA, 150 gm CHO (510 kcal/L dextrose), Na 35, K 30, Mg 5, Ca 4.5, Acetate 80, Cl 39, Phos 15      lactated ringers 100 mL/hr at 07/17/23 2140     Scheduled Meds:   acetaminophen  1,000 mg Oral Q8H    amLODIPine  5 mg Oral Daily    buPROPion  150 mg Oral BID    fat emulsion  250 mL Intravenous Daily    heparin (porcine)  5,000 Units Subcutaneous Q8H    ibuprofen  800 mg Oral Q8H    metronidazole  500 mg Intravenous Q8H    piperacillin-tazobactam (Zosyn) IV (PEDS and ADULTS) (extended infusion is not appropriate)  4.5 g Intravenous Q8H    potassium chloride  10 mEq Intravenous Q1H    raloxifene  60 mg Oral Daily    sertraline  100 mg Oral QAM    sodium chloride 0.9%  10 mL Intravenous Q6H     PRN Meds:acetaminophen, acetaminophen, dextrose 50%, dextrose 50%, diatrizoate meglumineand-diatrizoate sodium, glucagon (human recombinant), glucose, glucose, HYDROmorphone, HYDROmorphone, insulin aspart U-100, naloxone, ondansetron, sodium chloride 0.9%, Flushing PICC Protocol **AND** sodium chloride 0.9% **AND** sodium chloride 0.9%     Review of patient's allergies indicates:   Allergen Reactions    Morphine Nausea And Vomiting     Objective:     Vital Signs (Most Recent):  Temp: 97.8 °F (36.6 °C) (07/18/23 0804)  Pulse: 80 (07/18/23 0804)  Resp: 17 (07/18/23  0804)  BP: 123/80 (07/18/23 0804)  SpO2: (!) 94 % (07/18/23 0804) Vital Signs (24h Range):  Temp:  [97.8 °F (36.6 °C)-99 °F (37.2 °C)] 97.8 °F (36.6 °C)  Pulse:  [80-94] 80  Resp:  [17-20] 17  SpO2:  [83 %-94 %] 94 %  BP: (115-129)/(64-80) 123/80     Weight: 45.4 kg (100 lb)  Body mass index is 18.29 kg/m².    Intake/Output - Last 3 Shifts         07/16 0700 07/17 0659 07/17 0700 07/18 0659 07/18 0700 07/19 0659    P.O. 120      I.V. (mL/kg) 1305 (28.7)      IV Piggyback 3161.2      Total Intake(mL/kg) 4586.2 (101)      Urine (mL/kg/hr) 870 (0.8) 1200 (1.1)     Emesis/NG output 0 0     Drains 225      Other 200 0     Stool 0 0     Blood 100 0     Total Output 1395 1200     Net +3191.2 -1200            Urine Occurrence  0 x     Stool Occurrence 0 x 0 x     Emesis Occurrence 0 x 0 x              Physical Exam  Vitals and nursing note reviewed.   Constitutional:       General: She is not in acute distress.     Appearance: She is ill-appearing.   HENT:      Nose:      Comments: NG tube with dark output  Cardiovascular:      Rate and Rhythm: Normal rate and regular rhythm.   Pulmonary:      Effort: Pulmonary effort is normal. No respiratory distress.   Abdominal:      Comments: Abdomen soft, mildly distended, improved from previous exams  Midline incision with island dressing in place  Abdomen remains diffusely tender, although improving from pre-operative exam   Musculoskeletal:         General: Normal range of motion.   Skin:     General: Skin is warm.   Neurological:      General: No focal deficit present.      Mental Status: She is alert.        Significant Labs:  I have reviewed all pertinent lab results within the past 24 hours.  CBC:   Recent Labs   Lab 07/18/23  0249   WBC 7.48   RBC 2.95*   HGB 8.7*   HCT 25.6*      MCV 87   MCH 29.5   MCHC 34.0     CMP:   Recent Labs   Lab 07/18/23  0249   GLU 88   CALCIUM 8.4*   ALBUMIN 1.8*   PROT 5.1*      K 2.9*   CO2 26      BUN 25*   CREATININE 0.6    ALKPHOS 44*   ALT 18   AST 29   BILITOT 0.4       Significant Diagnostics:  I have reviewed all pertinent imaging results/findings within the past 24 hours.

## 2023-07-18 NOTE — ANESTHESIA POSTPROCEDURE EVALUATION
Anesthesia Post Evaluation    Patient: Marilee Townsend    Procedure(s) Performed: Procedure(s) (LRB):  LAPAROSCOPY, DIAGNOSTIC (N/A)  LAPAROTOMY, EXPLORATORY (N/A)  EXCISION, SMALL INTESTINE    Final Anesthesia Type: general      Patient location during evaluation: PACU  Patient participation: Yes- Able to Participate  Level of consciousness: awake and alert, oriented and awake  Post-procedure vital signs: reviewed and stable  Airway patency: patent    PONV status at discharge: No PONV  Anesthetic complications: no      Cardiovascular status: blood pressure returned to baseline  Respiratory status: unassisted, spontaneous ventilation and room air  Hydration status: euvolemic  Follow-up not needed.          Vitals Value Taken Time   /67 07/18/23 0400   Temp 36.7 °C (98.1 °F) 07/17/23 2330   Pulse 81 07/18/23 0400   Resp 19 07/18/23 0400   SpO2 93 % 07/17/23 2330         Event Time   Out of Recovery 07/16/2023 14:35:00         Pain/Danielle Score: Pain Rating Prior to Med Admin: 8 (7/18/2023  1:49 AM)  Pain Rating Post Med Admin: 4 (7/17/2023  2:01 PM)  Danielle Score: 10 (7/17/2023  7:43 AM)

## 2023-07-18 NOTE — NURSING
[facility]  Nurses Note -- 4 Eyes      7/18/2023       Skin assessed on: Q Shift      [x] No Pressure Injuries Present    [x]Prevention Measures Documented    [] Yes LDA  for Pressure Injury Previously documented     [] Yes New Pressure Injury Discovered   [] LDA for New Pressure Injury Added      Attending RN:  Lynn Leblanc LPN     Second RN:  Nupur Quintana RN

## 2023-07-18 NOTE — PLAN OF CARE
Problem: Adult Inpatient Plan of Care  Goal: Plan of Care Review  Outcome: Ongoing, Progressing  Goal: Patient-Specific Goal (Individualized)  Outcome: Ongoing, Progressing  Goal: Absence of Hospital-Acquired Illness or Injury  Outcome: Ongoing, Progressing  Goal: Optimal Comfort and Wellbeing  Outcome: Ongoing, Progressing  Goal: Readiness for Transition of Care  Outcome: Ongoing, Progressing     Problem: Skin Injury Risk Increased  Goal: Skin Health and Integrity  Outcome: Ongoing, Progressing     Problem: Infection  Goal: Absence of Infection Signs and Symptoms  Outcome: Ongoing, Progressing     Problem: Bleeding (Surgery Nonspecified)  Goal: Absence of Bleeding  Outcome: Ongoing, Progressing     Problem: Bowel Motility Impaired (Surgery Nonspecified)  Goal: Effective Bowel Elimination  Outcome: Ongoing, Progressing     Problem: Fluid and Electrolyte Imbalance (Surgery Nonspecified)  Goal: Fluid and Electrolyte Balance  Outcome: Ongoing, Progressing     Problem: Glycemic Control Impaired (Surgery Nonspecified)  Goal: Blood Glucose Level Within Targeted Range  Outcome: Ongoing, Progressing     Problem: Infection (Surgery Nonspecified)  Goal: Absence of Infection Signs and Symptoms  Outcome: Ongoing, Progressing     Problem: Ongoing Anesthesia Effects (Surgery Nonspecified)  Goal: Anesthesia/Sedation Recovery  Outcome: Ongoing, Progressing     Problem: Pain (Surgery Nonspecified)  Goal: Acceptable Pain Control  Outcome: Ongoing, Progressing     Problem: Postoperative Nausea and Vomiting (Surgery Nonspecified)  Goal: Nausea and Vomiting Relief  Outcome: Ongoing, Progressing     Problem: Postoperative Urinary Retention (Surgery Nonspecified)  Goal: Effective Urinary Elimination  Outcome: Ongoing, Progressing     Problem: Respiratory Compromise (Surgery Nonspecified)  Goal: Effective Oxygenation and Ventilation  Outcome: Ongoing, Progressing     Problem: Fall Injury Risk  Goal: Absence of Fall and Fall-Related  Injury  Outcome: Ongoing, Progressing

## 2023-07-18 NOTE — SUBJECTIVE & OBJECTIVE
Interval History:  No acute overnight events.  Patient remained afebrile.  Admits nausea but no vomiting.  Feeling a little bit better this morning.  Continues to have output out the NG tube.  Approximately 1 L of dark output overnight.  No bowel movements overnight.  Not passing gas.    Review of Systems   Constitutional:  Negative for chills, fatigue and fever.   HENT:  Positive for postnasal drip and sore throat.    Respiratory:  Negative for cough, chest tightness and shortness of breath.    Cardiovascular:  Negative for chest pain, palpitations and leg swelling.   Gastrointestinal:  Positive for nausea. Negative for abdominal distention, abdominal pain, diarrhea and vomiting.   Genitourinary:  Negative for difficulty urinating and dysuria.   Musculoskeletal:  Negative for joint swelling.   Neurological:  Negative for dizziness, weakness and headaches.     Objective:     Vital Signs (Most Recent):  Temp: 98.3 °F (36.8 °C) (07/18/23 1146)  Pulse: 92 (07/18/23 1146)  Resp: 16 (07/18/23 1146)  BP: 137/76 (07/18/23 1146)  SpO2: 95 % (07/18/23 1146) Vital Signs (24h Range):  Temp:  [97.8 °F (36.6 °C)-99 °F (37.2 °C)] 98.3 °F (36.8 °C)  Pulse:  [80-92] 92  Resp:  [16-20] 16  SpO2:  [92 %-95 %] 95 %  BP: (115-137)/(67-80) 137/76     Weight: 45.4 kg (100 lb)  Body mass index is 18.29 kg/m².    Intake/Output Summary (Last 24 hours) at 7/18/2023 1346  Last data filed at 7/18/2023 0805  Gross per 24 hour   Intake 281.83 ml   Output 2085 ml   Net -1803.17 ml         Physical Exam  Vitals and nursing note reviewed.   Constitutional:       General: She is not in acute distress.     Appearance: She is ill-appearing.   HENT:      Nose:      Comments: NG tube with dark output     Mouth/Throat:      Mouth: Mucous membranes are dry.   Cardiovascular:      Rate and Rhythm: Normal rate and regular rhythm.   Pulmonary:      Effort: Pulmonary effort is normal. No respiratory distress.      Breath sounds: No wheezing or rales.    Abdominal:      General: There is distension.      Palpations: Abdomen is soft.      Tenderness: There is abdominal tenderness.      Comments: Abdomen soft, mildly distended  Midline incision with dressing in place  Abdomen remains diffusely tender, although improving    Musculoskeletal:         General: Normal range of motion.      Right lower leg: No edema.      Left lower leg: No edema.   Skin:     General: Skin is warm and dry.   Neurological:      General: No focal deficit present.      Mental Status: She is alert and oriented to person, place, and time.   Psychiatric:         Mood and Affect: Mood normal.         Thought Content: Thought content normal.           Significant Labs: All pertinent labs within the past 24 hours have been reviewed.    Significant Imaging: I have reviewed all pertinent imaging results/findings within the past 24 hours.

## 2023-07-18 NOTE — PROCEDURES
"Marilee Townsend is a 63 y.o. female patient.    Temp: 99 °F (37.2 °C) (07/17/23 1941)  Pulse: 91 (07/17/23 1941)  Resp: 18 (07/17/23 1941)  BP: 115/69 (07/17/23 1941)  SpO2: (!) 94 % (07/17/23 1941)  Weight: 45.4 kg (100 lb) (07/17/23 0558)  Height: 5' 2" (157.5 cm) (07/15/23 2320)    PICC  Date/Time: 7/17/2023 7:55 PM  Performed by: Chilango Maynard RN  Consent Done: Yes  Time out: Immediately prior to procedure a time out was called to verify the correct patient, procedure, equipment, support staff and site/side marked as required  Indications: med administration and vascular access  Anesthesia: local infiltration  Local anesthetic: lidocaine 1% without epinephrine  Anesthetic Total (mL): 2  Preparation: skin prepped with ChloraPrep  Skin prep agent dried: skin prep agent completely dried prior to procedure  Sterile barriers: all five maximum sterile barriers used - cap, mask, sterile gown, sterile gloves, and large sterile sheet  Hand hygiene: hand hygiene performed prior to central venous catheter insertion  Location details: right basilic  Catheter type: double lumen  Catheter size: 5 Fr  Catheter Length: 35cm    Ultrasound guidance: yes  Vessel Caliber: medium, compressibility normal  Vascular Doppler: not done  Needle advanced into vessel with real time Ultrasound guidance.  Guidewire confirmed in vessel.  Sterile sheath used.  Number of attempts: 1  Post-procedure: blood return through all ports, chlorhexidine patch and sterile dressing applied    Assessment: successful placement, placement verified by x-ray and tip termination        Name Chilango Maynard RN   7/17/2023    "

## 2023-07-18 NOTE — PROGRESS NOTES
[facility]  General Surgery  Progress Note    Subjective:     History of Present Illness:  Marilee Townsend is a 63yoF with a history of hypertension, rectal prolapse s/p rectopexy who presents to [facility] with complaints of nausea, vomiting and worsening abdominal pain. Of note, she presented to an outside hospital with the same complaints yesterday and was discharged with instructions to follow up with her primary care provider. The patient describes a three-day history of abdominal pain, nausea, vomiting and PO intolerance. This has persistently worsened throughout this time, which prompted both presentations. She states that she did have a small bowel movement yesterday, described as hard pellets. On work-up, her vital signs are stable. She has an elevated leukocytosis to 18. Lactate normal at 1. Repeat ordered. Her CT scan demonstrates dilated small bowel consistent with a small bowel obstruction. There is normal caliber small intestine distally. She is admitted to the hospital medicine service. General surgery consulted for evaluation.     Of note, her WBC has increased from 12.8 to 18 over the last 24hrs. Imaging at the outside hospital, per the official read, did not demonstrate any degree of obstruction, which is a rather burk contrast from her CT scan at our facility. Plan for NG tube insertion with low threshold to proceed to operating room for diagnostic laparoscopy. Discussed this plan with the patient.       Post-Op Info:  Procedure(s) (LRB):  LAPAROSCOPY, DIAGNOSTIC (N/A)  LAPAROTOMY, EXPLORATORY (N/A)  EXCISION, SMALL INTESTINE   2 Days Post-Op     Interval History: Patient seen and examined this morning. No acute events overnight. Vitals remain stable. Labs within normal limits-- outside of albumin. Initiated on TPN overnight. PICC line placed. 1L out of NG overnight. No bowel function to this point, but patient endorsing feeling better, subjectively.     Medications:  Continuous  Infusions:   Amino acid 4.25% - dextrose 5% (CLINIMIX-E) solution with additives (1L provides 42.5 gm AA, 50 gm CHO (170 kcal/L dextrose), Na 35, K 30, Mg 5, Ca 4.5, Acetate 70, Cl 39, Phos 15) 75 mL/hr at 07/18/23 0314    Amino acid 5% - dextrose 15% (CLINIMIX-E) solution with additives. CENTRAL LINE ONLY (1395 mOsm/L). 1L provides 50 gm AA, 150 gm CHO (510 kcal/L dextrose), Na 35, K 30, Mg 5, Ca 4.5, Acetate 80, Cl 39, Phos 15      lactated ringers 100 mL/hr at 07/17/23 2140     Scheduled Meds:   acetaminophen  1,000 mg Oral Q8H    amLODIPine  5 mg Oral Daily    buPROPion  150 mg Oral BID    fat emulsion  250 mL Intravenous Daily    heparin (porcine)  5,000 Units Subcutaneous Q8H    ibuprofen  800 mg Oral Q8H    metronidazole  500 mg Intravenous Q8H    piperacillin-tazobactam (Zosyn) IV (PEDS and ADULTS) (extended infusion is not appropriate)  4.5 g Intravenous Q8H    potassium chloride  10 mEq Intravenous Q1H    raloxifene  60 mg Oral Daily    sertraline  100 mg Oral QAM    sodium chloride 0.9%  10 mL Intravenous Q6H     PRN Meds:acetaminophen, acetaminophen, dextrose 50%, dextrose 50%, diatrizoate meglumineand-diatrizoate sodium, glucagon (human recombinant), glucose, glucose, HYDROmorphone, HYDROmorphone, insulin aspart U-100, naloxone, ondansetron, sodium chloride 0.9%, Flushing PICC Protocol **AND** sodium chloride 0.9% **AND** sodium chloride 0.9%     Review of patient's allergies indicates:   Allergen Reactions    Morphine Nausea And Vomiting     Objective:     Vital Signs (Most Recent):  Temp: 97.8 °F (36.6 °C) (07/18/23 0804)  Pulse: 80 (07/18/23 0804)  Resp: 17 (07/18/23 0804)  BP: 123/80 (07/18/23 0804)  SpO2: (!) 94 % (07/18/23 0804) Vital Signs (24h Range):  Temp:  [97.8 °F (36.6 °C)-99 °F (37.2 °C)] 97.8 °F (36.6 °C)  Pulse:  [80-94] 80  Resp:  [17-20] 17  SpO2:  [83 %-94 %] 94 %  BP: (115-129)/(64-80) 123/80     Weight: 45.4 kg (100 lb)  Body mass index is 18.29  kg/m².    Intake/Output - Last 3 Shifts         07/16 0700 07/17 0659 07/17 0700 07/18 0659 07/18 0700 07/19 0659    P.O. 120      I.V. (mL/kg) 1305 (28.7)      IV Piggyback 3161.2      Total Intake(mL/kg) 4586.2 (101)      Urine (mL/kg/hr) 870 (0.8) 1200 (1.1)     Emesis/NG output 0 0     Drains 225      Other 200 0     Stool 0 0     Blood 100 0     Total Output 1395 1200     Net +3191.2 -1200            Urine Occurrence  0 x     Stool Occurrence 0 x 0 x     Emesis Occurrence 0 x 0 x              Physical Exam  Vitals and nursing note reviewed.   Constitutional:       General: She is not in acute distress.     Appearance: She is ill-appearing.   HENT:      Nose:      Comments: NG tube with dark output  Cardiovascular:      Rate and Rhythm: Normal rate and regular rhythm.   Pulmonary:      Effort: Pulmonary effort is normal. No respiratory distress.   Abdominal:      Comments: Abdomen soft, mildly distended, improved from previous exams  Midline incision with island dressing in place  Abdomen remains diffusely tender, although improving from pre-operative exam   Musculoskeletal:         General: Normal range of motion.   Skin:     General: Skin is warm.   Neurological:      General: No focal deficit present.      Mental Status: She is alert.        Significant Labs:  I have reviewed all pertinent lab results within the past 24 hours.  CBC:   Recent Labs   Lab 07/18/23  0249   WBC 7.48   RBC 2.95*   HGB 8.7*   HCT 25.6*      MCV 87   MCH 29.5   MCHC 34.0     CMP:   Recent Labs   Lab 07/18/23  0249   GLU 88   CALCIUM 8.4*   ALBUMIN 1.8*   PROT 5.1*      K 2.9*   CO2 26      BUN 25*   CREATININE 0.6   ALKPHOS 44*   ALT 18   AST 29   BILITOT 0.4       Significant Diagnostics:  I have reviewed all pertinent imaging results/findings within the past 24 hours.    Assessment/Plan:     * Small bowel obstruction  Marilee Townsend is a 63yoF with a history of HTN and rectal prolapse who presents with a  several day history of abdominal pain, nausea, vomiting, and PO intolerance. Her work-up is consistent with a small bowel obstruction. General surgery consulted for evaluation.  She is now status post exploratory laparotomy on 07/16/2023 with multiple small bowel resections and anastomosis, evidence of enterotomy with over-sewing.    - patient seen and examined  - vitals, labs reviewed   - given lack of PO intake, low albumin, patient initiated on TPN overnight   - PICC line placed; central TPN ordered this morning   - K replaced  - NG tube with 1L output, no bowel function to this point   - continue NG to LIWS  - NPO; ok for ice chips for comfort  - serial abdominal exams  - continue IV antibiotics  - DVT ppx  - half mIVF rate  - please call with questions        Manuel Wilson MD  General Surgery  [facility]

## 2023-07-18 NOTE — NURSING
Pt NG output is dark red in color with 760ML in suction cannister, pt is in bed, eyes open, with no sxs of distress, reported information to provider, provider ordered STAT CBC and requested monitoring for any change in mental status, also reported urine of 100ML since start of shift, provider ordered bladder scan and insertion of straight catheter if bladder scan results show greater than 300 ML of urine, bladder scan performed, bladder scan results show 143 ML of urine, reported to provider, will continue plan of care per provider orders

## 2023-07-19 PROBLEM — Z71.89 ADVANCED CARE PLANNING/COUNSELING DISCUSSION: Status: ACTIVE | Noted: 2023-07-19

## 2023-07-19 LAB
ALBUMIN SERPL BCP-MCNC: 1.7 G/DL (ref 3.5–5.2)
ALP SERPL-CCNC: 52 U/L (ref 55–135)
ALT SERPL W/O P-5'-P-CCNC: 16 U/L (ref 10–44)
ANION GAP SERPL CALC-SCNC: 8 MMOL/L (ref 8–16)
ANISOCYTOSIS BLD QL SMEAR: SLIGHT
AST SERPL-CCNC: 23 U/L (ref 10–40)
BASOPHILS NFR BLD: 0 % (ref 0–1.9)
BILIRUB SERPL-MCNC: 0.3 MG/DL (ref 0.1–1)
BUN SERPL-MCNC: 28 MG/DL (ref 8–23)
CALCIUM SERPL-MCNC: 7.7 MG/DL (ref 8.7–10.5)
CHLORIDE SERPL-SCNC: 102 MMOL/L (ref 95–110)
CO2 SERPL-SCNC: 28 MMOL/L (ref 23–29)
CREAT SERPL-MCNC: 0.5 MG/DL (ref 0.5–1.4)
DIFFERENTIAL METHOD: ABNORMAL
EOSINOPHIL NFR BLD: 0 % (ref 0–8)
ERYTHROCYTE [DISTWIDTH] IN BLOOD BY AUTOMATED COUNT: 14.1 % (ref 11.5–14.5)
EST. GFR  (NO RACE VARIABLE): >60 ML/MIN/1.73 M^2
FINAL PATHOLOGIC DIAGNOSIS: NORMAL
GLUCOSE SERPL-MCNC: 144 MG/DL (ref 70–110)
GROSS: NORMAL
HCT VFR BLD AUTO: 23.4 % (ref 37–48.5)
HGB BLD-MCNC: 7.8 G/DL (ref 12–16)
HYPOCHROMIA BLD QL SMEAR: ABNORMAL
IMM GRANULOCYTES # BLD AUTO: ABNORMAL K/UL (ref 0–0.04)
IMM GRANULOCYTES NFR BLD AUTO: ABNORMAL % (ref 0–0.5)
LYMPHOCYTES NFR BLD: 9 % (ref 18–48)
Lab: NORMAL
MAGNESIUM SERPL-MCNC: 1.8 MG/DL (ref 1.6–2.6)
MCH RBC QN AUTO: 29.7 PG (ref 27–31)
MCHC RBC AUTO-ENTMCNC: 33.3 G/DL (ref 32–36)
MCV RBC AUTO: 89 FL (ref 82–98)
METAMYELOCYTES NFR BLD MANUAL: 3 %
MONOCYTES NFR BLD: 3 % (ref 4–15)
MYELOCYTES NFR BLD MANUAL: 2 %
NEUTROPHILS NFR BLD: 76 % (ref 38–73)
NEUTS BAND NFR BLD MANUAL: 7 %
NRBC BLD-RTO: 0 /100 WBC
PHOSPHATE SERPL-MCNC: 2.3 MG/DL (ref 2.7–4.5)
PLATELET # BLD AUTO: 233 K/UL (ref 150–450)
PLATELET BLD QL SMEAR: ABNORMAL
PMV BLD AUTO: 9.4 FL (ref 9.2–12.9)
POCT GLUCOSE: 144 MG/DL (ref 70–110)
POCT GLUCOSE: 144 MG/DL (ref 70–110)
POCT GLUCOSE: 150 MG/DL (ref 70–110)
POCT GLUCOSE: 152 MG/DL (ref 70–110)
POTASSIUM SERPL-SCNC: 3.1 MMOL/L (ref 3.5–5.1)
PROT SERPL-MCNC: 4.9 G/DL (ref 6–8.4)
RBC # BLD AUTO: 2.63 M/UL (ref 4–5.4)
SODIUM SERPL-SCNC: 138 MMOL/L (ref 136–145)
WBC # BLD AUTO: 11.93 K/UL (ref 3.9–12.7)

## 2023-07-19 PROCEDURE — 84100 ASSAY OF PHOSPHORUS: CPT | Performed by: STUDENT IN AN ORGANIZED HEALTH CARE EDUCATION/TRAINING PROGRAM

## 2023-07-19 PROCEDURE — 94799 UNLISTED PULMONARY SVC/PX: CPT

## 2023-07-19 PROCEDURE — 27000221 HC OXYGEN, UP TO 24 HOURS

## 2023-07-19 PROCEDURE — 11000001 HC ACUTE MED/SURG PRIVATE ROOM

## 2023-07-19 PROCEDURE — 85027 COMPLETE CBC AUTOMATED: CPT | Performed by: STUDENT IN AN ORGANIZED HEALTH CARE EDUCATION/TRAINING PROGRAM

## 2023-07-19 PROCEDURE — 80053 COMPREHEN METABOLIC PANEL: CPT | Performed by: STUDENT IN AN ORGANIZED HEALTH CARE EDUCATION/TRAINING PROGRAM

## 2023-07-19 PROCEDURE — B4185 PARENTERAL SOL 10 GM LIPIDS: HCPCS | Performed by: STUDENT IN AN ORGANIZED HEALTH CARE EDUCATION/TRAINING PROGRAM

## 2023-07-19 PROCEDURE — 63600175 PHARM REV CODE 636 W HCPCS: Performed by: STUDENT IN AN ORGANIZED HEALTH CARE EDUCATION/TRAINING PROGRAM

## 2023-07-19 PROCEDURE — 83735 ASSAY OF MAGNESIUM: CPT | Performed by: STUDENT IN AN ORGANIZED HEALTH CARE EDUCATION/TRAINING PROGRAM

## 2023-07-19 PROCEDURE — A4216 STERILE WATER/SALINE, 10 ML: HCPCS | Performed by: STUDENT IN AN ORGANIZED HEALTH CARE EDUCATION/TRAINING PROGRAM

## 2023-07-19 PROCEDURE — 85007 BL SMEAR W/DIFF WBC COUNT: CPT | Performed by: STUDENT IN AN ORGANIZED HEALTH CARE EDUCATION/TRAINING PROGRAM

## 2023-07-19 PROCEDURE — 25000003 PHARM REV CODE 250: Performed by: STUDENT IN AN ORGANIZED HEALTH CARE EDUCATION/TRAINING PROGRAM

## 2023-07-19 RX ORDER — POTASSIUM CHLORIDE 7.45 MG/ML
10 INJECTION INTRAVENOUS
Status: COMPLETED | OUTPATIENT
Start: 2023-07-19 | End: 2023-07-19

## 2023-07-19 RX ADMIN — ONDANSETRON 4 MG: 2 INJECTION INTRAMUSCULAR; INTRAVENOUS at 10:07

## 2023-07-19 RX ADMIN — IBUPROFEN 800 MG: 400 TABLET ORAL at 06:07

## 2023-07-19 RX ADMIN — HEPARIN SODIUM 5000 UNITS: 5000 INJECTION INTRAVENOUS; SUBCUTANEOUS at 10:07

## 2023-07-19 RX ADMIN — POTASSIUM CHLORIDE 10 MEQ: 7.46 INJECTION, SOLUTION INTRAVENOUS at 03:07

## 2023-07-19 RX ADMIN — PIPERACILLIN AND TAZOBACTAM 4.5 G: 4; .5 INJECTION, POWDER, LYOPHILIZED, FOR SOLUTION INTRAVENOUS; PARENTERAL at 12:07

## 2023-07-19 RX ADMIN — POTASSIUM CHLORIDE 10 MEQ: 7.46 INJECTION, SOLUTION INTRAVENOUS at 12:07

## 2023-07-19 RX ADMIN — RALOXIFENE HYDROCHLORIDE 60 MG: 60 TABLET, FILM COATED ORAL at 09:07

## 2023-07-19 RX ADMIN — SOYBEAN OIL 250 ML: 20 INJECTION, SOLUTION INTRAVENOUS at 10:07

## 2023-07-19 RX ADMIN — BUPROPION HYDROCHLORIDE 150 MG: 150 TABLET, EXTENDED RELEASE ORAL at 09:07

## 2023-07-19 RX ADMIN — AMLODIPINE BESYLATE 5 MG: 5 TABLET ORAL at 09:07

## 2023-07-19 RX ADMIN — Medication 10 ML: at 12:07

## 2023-07-19 RX ADMIN — HYDROMORPHONE HYDROCHLORIDE 1 MG: 1 INJECTION, SOLUTION INTRAMUSCULAR; INTRAVENOUS; SUBCUTANEOUS at 10:07

## 2023-07-19 RX ADMIN — HYDROMORPHONE HYDROCHLORIDE 1 MG: 1 INJECTION, SOLUTION INTRAMUSCULAR; INTRAVENOUS; SUBCUTANEOUS at 11:07

## 2023-07-19 RX ADMIN — METRONIDAZOLE 500 MG: 500 INJECTION, SOLUTION INTRAVENOUS at 06:07

## 2023-07-19 RX ADMIN — HEPARIN SODIUM 5000 UNITS: 5000 INJECTION INTRAVENOUS; SUBCUTANEOUS at 02:07

## 2023-07-19 RX ADMIN — METRONIDAZOLE 500 MG: 500 INJECTION, SOLUTION INTRAVENOUS at 03:07

## 2023-07-19 RX ADMIN — HYDROMORPHONE HYDROCHLORIDE 1 MG: 1 INJECTION, SOLUTION INTRAMUSCULAR; INTRAVENOUS; SUBCUTANEOUS at 08:07

## 2023-07-19 RX ADMIN — LEUCINE, PHENYLALANINE, LYSINE, METHIONINE, ISOLEUCINE, VALINE, HISTIDINE, THREONINE, TRYPTOPHAN, ALANINE, GLYCINE, ARGININE, PROLINE, SERINE, TYROSINE, SODIUM ACETATE, DIBASIC POTASSIUM PHOSPHATE, MAGNESIUM CHLORIDE, SODIUM CHLORIDE, CALCIUM CHLORIDE, DEXTROSE
365; 280; 290; 200; 300; 290; 240; 210; 90; 1035; 515; 575; 340; 250; 20; 340; 261; 51; 59; 33; 15 INJECTION INTRAVENOUS at 10:07

## 2023-07-19 RX ADMIN — ACETAMINOPHEN 1000 MG: 500 TABLET, FILM COATED ORAL at 02:07

## 2023-07-19 RX ADMIN — HYDROMORPHONE HYDROCHLORIDE 1 MG: 1 INJECTION, SOLUTION INTRAMUSCULAR; INTRAVENOUS; SUBCUTANEOUS at 12:07

## 2023-07-19 RX ADMIN — SODIUM PHOSPHATE, MONOBASIC, MONOHYDRATE AND SODIUM PHOSPHATE, DIBASIC, ANHYDROUS 15 MMOL: 142; 276 INJECTION, SOLUTION INTRAVENOUS at 12:07

## 2023-07-19 RX ADMIN — PIPERACILLIN AND TAZOBACTAM 4.5 G: 4; .5 INJECTION, POWDER, LYOPHILIZED, FOR SOLUTION INTRAVENOUS; PARENTERAL at 10:07

## 2023-07-19 RX ADMIN — POTASSIUM CHLORIDE 10 MEQ: 7.46 INJECTION, SOLUTION INTRAVENOUS at 01:07

## 2023-07-19 RX ADMIN — Medication 10 ML: at 11:07

## 2023-07-19 RX ADMIN — ACETAMINOPHEN 1000 MG: 500 TABLET, FILM COATED ORAL at 06:07

## 2023-07-19 RX ADMIN — HEPARIN SODIUM 5000 UNITS: 5000 INJECTION INTRAVENOUS; SUBCUTANEOUS at 06:07

## 2023-07-19 RX ADMIN — HYDROMORPHONE HYDROCHLORIDE 1 MG: 1 INJECTION, SOLUTION INTRAMUSCULAR; INTRAVENOUS; SUBCUTANEOUS at 04:07

## 2023-07-19 RX ADMIN — ACETAMINOPHEN 1000 MG: 500 TABLET, FILM COATED ORAL at 10:07

## 2023-07-19 RX ADMIN — POTASSIUM CHLORIDE 10 MEQ: 7.46 INJECTION, SOLUTION INTRAVENOUS at 02:07

## 2023-07-19 RX ADMIN — SERTRALINE HYDROCHLORIDE 100 MG: 50 TABLET ORAL at 06:07

## 2023-07-19 RX ADMIN — IBUPROFEN 800 MG: 400 TABLET ORAL at 10:07

## 2023-07-19 RX ADMIN — BUPROPION HYDROCHLORIDE 150 MG: 150 TABLET, EXTENDED RELEASE ORAL at 08:07

## 2023-07-19 RX ADMIN — Medication 10 ML: at 05:07

## 2023-07-19 RX ADMIN — PIPERACILLIN AND TAZOBACTAM 4.5 G: 4; .5 INJECTION, POWDER, LYOPHILIZED, FOR SOLUTION INTRAVENOUS; PARENTERAL at 05:07

## 2023-07-19 RX ADMIN — IBUPROFEN 800 MG: 400 TABLET ORAL at 02:07

## 2023-07-19 RX ADMIN — ONDANSETRON 4 MG: 2 INJECTION INTRAMUSCULAR; INTRAVENOUS at 08:07

## 2023-07-19 RX ADMIN — METRONIDAZOLE 500 MG: 500 INJECTION, SOLUTION INTRAVENOUS at 11:07

## 2023-07-19 NOTE — SUBJECTIVE & OBJECTIVE
Interval History: Patient seen and examined this morning. Vitals remain stable, labs reviewed. Abdominal pain improving. NG tube with 650 out over last 24hrs. No bowel function to this point. Continue TPN.    Medications:  Continuous Infusions:   Amino acid 5% - dextrose 15% (CLINIMIX-E) solution with additives. CENTRAL LINE ONLY (1395 mOsm/L). 1L provides 50 gm AA, 150 gm CHO (510 kcal/L dextrose), Na 35, K 30, Mg 5, Ca 4.5, Acetate 80, Cl 39, Phos 15 50 mL/hr at 07/18/23 2234    lactated ringers 100 mL/hr at 07/18/23 0600     Scheduled Meds:   acetaminophen  1,000 mg Oral Q8H    amLODIPine  5 mg Oral Daily    buPROPion  150 mg Oral BID    heparin (porcine)  5,000 Units Subcutaneous Q8H    ibuprofen  800 mg Oral Q8H    metronidazole  500 mg Intravenous Q8H    piperacillin-tazobactam (Zosyn) IV (PEDS and ADULTS) (extended infusion is not appropriate)  4.5 g Intravenous Q8H    raloxifene  60 mg Oral Daily    sertraline  100 mg Oral QAM    sodium chloride 0.9%  10 mL Intravenous Q6H     PRN Meds:acetaminophen, acetaminophen, dextrose 50%, dextrose 50%, diatrizoate meglumineand-diatrizoate sodium, glucagon (human recombinant), glucose, glucose, HYDROmorphone, HYDROmorphone, insulin aspart U-100, naloxone, ondansetron, sodium chloride 0.9%, Flushing PICC Protocol **AND** sodium chloride 0.9% **AND** sodium chloride 0.9%     Review of patient's allergies indicates:   Allergen Reactions    Morphine Nausea And Vomiting     Objective:     Vital Signs (Most Recent):  Temp: 98.5 °F (36.9 °C) (07/19/23 0345)  Pulse: 89 (07/19/23 0345)  Resp: 18 (07/19/23 1009)  BP: 134/79 (07/19/23 0345)  SpO2: (!) 94 % (07/19/23 0758) Vital Signs (24h Range):  Temp:  [97.7 °F (36.5 °C)-99.8 °F (37.7 °C)] 98.5 °F (36.9 °C)  Pulse:  [89-93] 89  Resp:  [16-18] 18  SpO2:  [93 %-95 %] 94 %  BP: (129-137)/(76-79) 134/79     Weight: 45.4 kg (100 lb)  Body mass index is 18.29 kg/m².    Intake/Output - Last 3 Shifts         07/17 0700  07/18 0659  07/18 0700  07/19 0659 07/19 0700 07/20 0659    P.O.  0     I.V. (mL/kg)  20 (0.4)     NG/GT  0     IV Piggyback       TPN  281.8     Total Intake(mL/kg)  301.8 (6.6)     Urine (mL/kg/hr) 1525 (1.4) 750 (0.7) 300 (1.5)    Emesis/NG output 0      Drains  650     Other 1060 0     Stool 0 0     Blood 0      Total Output 2585 1400 300    Net -2585 -1098.2 -300           Urine Occurrence 0 x 1 x     Stool Occurrence 0 x 0 x     Emesis Occurrence 0 x               Physical Exam  Vitals and nursing note reviewed.   Constitutional:       General: She is not in acute distress.     Appearance: Normal appearance.   HENT:      Nose:      Comments: NG tube with dark output  Cardiovascular:      Rate and Rhythm: Normal rate and regular rhythm.   Pulmonary:      Effort: Pulmonary effort is normal. No respiratory distress.   Abdominal:      Comments: Abdomen soft, moderately distended,  Midline incision without concern  Abdominal tenderness largely resolved; soreness present   Musculoskeletal:         General: Normal range of motion.   Skin:     General: Skin is warm.   Neurological:      General: No focal deficit present.      Mental Status: She is alert.        Significant Labs:  I have reviewed all pertinent lab results within the past 24 hours.  CBC:   Recent Labs   Lab 07/19/23  0700   WBC 11.93   RBC 2.63*   HGB 7.8*   HCT 23.4*      MCV 89   MCH 29.7   MCHC 33.3     CMP:   Recent Labs   Lab 07/19/23  0522   *   CALCIUM 7.7*   ALBUMIN 1.7*   PROT 4.9*      K 3.1*   CO2 28      BUN 28*   CREATININE 0.5   ALKPHOS 52*   ALT 16   AST 23   BILITOT 0.3       Significant Diagnostics:  I have reviewed all pertinent imaging results/findings within the past 24 hours.

## 2023-07-19 NOTE — ASSESSMENT & PLAN NOTE
Advance Care Planning     Date: 07/19/2023    Code Status  I engaged the the patient in a voluntary conversation about the patient's preferences for care  at the very end of life. The patient wishes to have CPR and other invasive treatments performed when her heart and/or breathing stops. I communicated to the patient that her wishes align with full code status.  I spent a total of 16 minutes engaging the patient in this advance care planning discussion.         Code Status: Full Code

## 2023-07-19 NOTE — PROGRESS NOTES
[facility] Medicine  Progress Note    Patient Name: Marilee Townsend  MRN: [id number]  Patient Class: IP- Inpatient   Admission Date: 7/14/2023  Length of Stay: 5 days  Attending Physician: Adriane Jang DO  Primary Care Provider: Claudia Hopper DO        Subjective:     Principal Problem:Small bowel obstruction        HPI:  63F with pmh of depression, fibromyalgia, htn, osteoporosis who presents due to several day h/o abd pain and nausea with anorexia. Pt was evaluated at outside emergency department patient was seen in the day prompting the ED for evaluation.  Patient denies any recent flatulence or bowel movements unclear on the last date.  Patient states pain is diffuse in the with minimal improvement with pain medications given in the ED. CT scan in the emergency department with demonstration of small bowel obstruction and surgery consulted who had NG tube placed.  Patient denies chest pain, shortness a breath, fever, chills.  Patient states she had a shoulder surgery about 3 weeks prior to arrival and tolerated the anesthesia well.  Patient is a former smoker, but denies alcohol or drug use.      Overview/Hospital Course:  63F with pmh of depression, fibromyalgia, htn, osteoporosis admitted on 07/07/2023 for small bowel obstruction. presented with a several day history of abdominal pain, nausea, vomiting, and PO intolerance.  CT scan in ED with demonstration of small bowel obstruction and surgery consulted who had NG tube placed. Attempts at gastrografin with persistent obstruction. Pt taken to OR on 7/16. NG tube remained after operation and surgery managing. She is now status post exploratory laparotomy on 07/16/2023 with multiple small bowel resections and anastomosis, evidence of enterotomy with over-sewing. Replace electrolytes as needed. Awaiting for bowel functions to return       Interval History:  No acute overnight events.  Patient remained afebrile.  Admits nausea but no  vomiting.  Feeling better this morning.  NG tube with 650mL output over the last 24 hrs, no bowel function to this point. Not passing gas.    Review of Systems   Constitutional:  Negative for chills, fatigue and fever.   HENT:  Positive for postnasal drip and sore throat.    Respiratory:  Negative for cough, chest tightness and shortness of breath.    Cardiovascular:  Negative for chest pain, palpitations and leg swelling.   Gastrointestinal:  Positive for abdominal pain (mild) and nausea. Negative for diarrhea and vomiting.   Genitourinary:  Negative for difficulty urinating and dysuria.   Musculoskeletal:  Negative for joint swelling.   Neurological:  Negative for dizziness, weakness and headaches.     Objective:     Vital Signs (Most Recent):  Temp: 98.5 °F (36.9 °C) (07/19/23 0345)  Pulse: 89 (07/19/23 0345)  Resp: 18 (07/19/23 1009)  BP: 134/79 (07/19/23 0345)  SpO2: (!) 94 % (07/19/23 0758) Vital Signs (24h Range):  Temp:  [97.7 °F (36.5 °C)-99.8 °F (37.7 °C)] 98.5 °F (36.9 °C)  Pulse:  [89-93] 89  Resp:  [16-18] 18  SpO2:  [93 %-95 %] 94 %  BP: (129-136)/(77-79) 134/79     Weight: 45.4 kg (100 lb)  Body mass index is 18.29 kg/m².    Intake/Output Summary (Last 24 hours) at 7/19/2023 1203  Last data filed at 7/19/2023 0954  Gross per 24 hour   Intake 20 ml   Output 1700 ml   Net -1680 ml           Physical Exam  Vitals and nursing note reviewed.   Constitutional:       General: She is not in acute distress.     Appearance: She is ill-appearing.   HENT:      Nose:      Comments: NG tube with dark output     Mouth/Throat:      Mouth: Mucous membranes are dry.   Cardiovascular:      Rate and Rhythm: Normal rate and regular rhythm.   Pulmonary:      Effort: Pulmonary effort is normal. No respiratory distress.      Breath sounds: No wheezing or rales.   Abdominal:      General: There is distension.      Palpations: Abdomen is soft.      Tenderness: There is abdominal tenderness.      Comments: Abdomen soft, mildly  distended  Midline incision with dressing in place  Abdomen remains diffusely tender, although improving    Musculoskeletal:         General: Normal range of motion.      Right lower leg: No edema.      Left lower leg: No edema.   Skin:     General: Skin is warm and dry.   Neurological:      General: No focal deficit present.      Mental Status: She is alert and oriented to person, place, and time.   Psychiatric:         Mood and Affect: Mood normal.         Thought Content: Thought content normal.           Significant Labs: All pertinent labs within the past 24 hours have been reviewed.    Significant Imaging: I have reviewed all pertinent imaging results/findings within the past 24 hours.      Assessment/Plan:      * Small bowel obstruction  63F presenting with several days of worsening abd pain, anorexia, and nausea.  CT abd/pelvis with Abnormal fluid, air distension small bowel, termination point at or near ileocecal valve, not well-defined, associated less distension proximal small bowel with some bowel wall thickening.  Adhesion etiology of obstructive process favored.    -Surgery team consulted: s/p exploratory laparotomy on 07/16/2023 with multiple small bowel resections and anastomosis, evidence of enterotomy with over-sewing.  -NG tube in place to LIWS  -continue TPN per surgery   -npo diet  -Analgesics ordered prn       HTN (hypertension)  Currently controlled.  Continue home norvasc  Will continue to monitor      Leukocytosis  -In setting of acute small-bowel obstruction and no definitive findings of infection appears likely reactive, but low threshold for starting antibiotics.   -LA negative x2.  -procal mildly elevated, but leukocytosis resolved  -Continue on zosyn for now  -tx as stated above for sbo      Depression  Restart home medications as indicated- setraline 100mg         Osteoporosis  Continue home medications      Hypokalemia  -Replace as needed  -TPN per surgery       Advanced care  planning/counseling discussion  Advance Care Planning     Date: 07/19/2023    Code Status  I engaged the the patient in a voluntary conversation about the patient's preferences for care  at the very end of life. The patient wishes to have CPR and other invasive treatments performed when her heart and/or breathing stops. I communicated to the patient that her wishes align with full code status.  I spent a total of 16 minutes engaging the patient in this advance care planning discussion.        Code Status: Full Code            VTE Risk Mitigation (From admission, onward)         Ordered     heparin (porcine) injection 5,000 Units  Every 8 hours         07/14/23 1428     IP VTE LOW RISK PATIENT  Once         07/14/23 1236     Place sequential compression device  Until discontinued         07/14/23 1236                Discharge Planning   WIL: 7/21/2023     Code Status: Full Code   Is the patient medically ready for discharge?:     Reason for patient still in hospital (select all that apply): Patient trending condition, Treatment and Consult recommendations  Discharge Plan A: Home   Discharge Delays: None known at this time              Adriane Jang DO  Department of Hospital Medicine   [facility] [place] [facility]

## 2023-07-19 NOTE — ASSESSMENT & PLAN NOTE
Marilee Townsend is a 63yoF with a history of HTN and rectal prolapse who presents with a several day history of abdominal pain, nausea, vomiting, and PO intolerance. Her work-up is consistent with a small bowel obstruction. General surgery consulted for evaluation.  She is now status post exploratory laparotomy on 07/16/2023 with multiple small bowel resections and anastomosis, evidence of enterotomy with over-sewing.    - patient seen and examined  - vitals, labs reviewed   - normal heart rate, WBC normal  - continue TPN; reordered  - NG tube with 650mL output, no bowel function to this point   - continue NG to LIWS  - NPO; ok for ice chips for comfort  - serial abdominal exams  - continue IV antibiotics  - DVT ppx  - please call with questions

## 2023-07-19 NOTE — PLAN OF CARE
Problem: Infection  Goal: Absence of Infection Signs and Symptoms  Outcome: Ongoing, Progressing  Intervention: Prevent or Manage Infection  Flowsheets (Taken 7/19/2023 0555)  Fever Reduction/Comfort Measures: (ice pack used for shoulder discomfort) ice pack(s) applied  Infection Management: (iv zosyn q8hr and flagyl q8hr) aseptic technique maintained     Problem: Bowel Motility Impaired (Surgery Nonspecified)  Goal: Effective Bowel Elimination  Outcome: Ongoing, Progressing  Intervention: Enhance Bowel Motility and Elimination  Flowsheets (Taken 7/19/2023 0555)  Bowel Elimination Management: toileting offered  Bowel Motility Enhancement: (NGT to LIWS) ambulation promoted     Problem: Fluid and Electrolyte Imbalance (Surgery Nonspecified)  Goal: Fluid and Electrolyte Balance  Outcome: Ongoing, Progressing  Intervention: Monitor and Manage Fluid and Electrolyte Balance  Flowsheets (Taken 7/19/2023 0555)  Fluid/Electrolyte Management: intravenous fluid replacement initiated

## 2023-07-19 NOTE — SUBJECTIVE & OBJECTIVE
Interval History:  No acute overnight events.  Patient remained afebrile.  Admits nausea but no vomiting.  Feeling better this morning.  NG tube with 650mL output over the last 24 hrs, no bowel function to this point. Not passing gas.    Review of Systems   Constitutional:  Negative for chills, fatigue and fever.   HENT:  Positive for postnasal drip and sore throat.    Respiratory:  Negative for cough, chest tightness and shortness of breath.    Cardiovascular:  Negative for chest pain, palpitations and leg swelling.   Gastrointestinal:  Positive for abdominal pain (mild) and nausea. Negative for diarrhea and vomiting.   Genitourinary:  Negative for difficulty urinating and dysuria.   Musculoskeletal:  Negative for joint swelling.   Neurological:  Negative for dizziness, weakness and headaches.     Objective:     Vital Signs (Most Recent):  Temp: 98.5 °F (36.9 °C) (07/19/23 0345)  Pulse: 89 (07/19/23 0345)  Resp: 18 (07/19/23 1009)  BP: 134/79 (07/19/23 0345)  SpO2: (!) 94 % (07/19/23 0758) Vital Signs (24h Range):  Temp:  [97.7 °F (36.5 °C)-99.8 °F (37.7 °C)] 98.5 °F (36.9 °C)  Pulse:  [89-93] 89  Resp:  [16-18] 18  SpO2:  [93 %-95 %] 94 %  BP: (129-136)/(77-79) 134/79     Weight: 45.4 kg (100 lb)  Body mass index is 18.29 kg/m².    Intake/Output Summary (Last 24 hours) at 7/19/2023 1203  Last data filed at 7/19/2023 0954  Gross per 24 hour   Intake 20 ml   Output 1700 ml   Net -1680 ml           Physical Exam  Vitals and nursing note reviewed.   Constitutional:       General: She is not in acute distress.     Appearance: She is ill-appearing.   HENT:      Nose:      Comments: NG tube with dark output     Mouth/Throat:      Mouth: Mucous membranes are dry.   Cardiovascular:      Rate and Rhythm: Normal rate and regular rhythm.   Pulmonary:      Effort: Pulmonary effort is normal. No respiratory distress.      Breath sounds: No wheezing or rales.   Abdominal:      General: There is distension.      Palpations:  Abdomen is soft.      Tenderness: There is abdominal tenderness.      Comments: Abdomen soft, mildly distended  Midline incision with dressing in place  Abdomen remains diffusely tender, although improving    Musculoskeletal:         General: Normal range of motion.      Right lower leg: No edema.      Left lower leg: No edema.   Skin:     General: Skin is warm and dry.   Neurological:      General: No focal deficit present.      Mental Status: She is alert and oriented to person, place, and time.   Psychiatric:         Mood and Affect: Mood normal.         Thought Content: Thought content normal.           Significant Labs: All pertinent labs within the past 24 hours have been reviewed.    Significant Imaging: I have reviewed all pertinent imaging results/findings within the past 24 hours.

## 2023-07-19 NOTE — NURSING
[facility]  Nurses Note -- 4 Eyes      7/19/2023       Skin assessed on: Q Shift      [x] No Pressure Injuries Present    []Prevention Measures Documented    [] Yes LDA  for Pressure Injury Previously documented     [] Yes New Pressure Injury Discovered   [] LDA for New Pressure Injury Added      Attending RN:  Silvia Carter, RN     Second RN:  ALLIE Paez

## 2023-07-19 NOTE — NURSING
[facility], [facility]  Nurses Note -- 4 Eyes      7/19/2023       Skin assessed on: Q Shift      [x] No Pressure Injuries Present    [x]Prevention Measures Documented    [] Yes LDA  for Pressure Injury Previously documented     [] Yes New Pressure Injury Discovered   [] LDA for New Pressure Injury Added      Attending RN:  Lynn Leblanc LPN     Second RN:  Silvia Carter RN

## 2023-07-19 NOTE — PROGRESS NOTES
[facility]  General Surgery  Progress Note    Subjective:     History of Present Illness:  Marilee Townsend is a 63yoF with a history of hypertension, rectal prolapse s/p rectopexy who presents to [facility] with complaints of nausea, vomiting and worsening abdominal pain. Of note, she presented to an outside hospital with the same complaints yesterday and was discharged with instructions to follow up with her primary care provider. The patient describes a three-day history of abdominal pain, nausea, vomiting and PO intolerance. This has persistently worsened throughout this time, which prompted both presentations. She states that she did have a small bowel movement yesterday, described as hard pellets. On work-up, her vital signs are stable. She has an elevated leukocytosis to 18. Lactate normal at 1. Repeat ordered. Her CT scan demonstrates dilated small bowel consistent with a small bowel obstruction. There is normal caliber small intestine distally. She is admitted to the hospital medicine service. General surgery consulted for evaluation.     Of note, her WBC has increased from 12.8 to 18 over the last 24hrs. Imaging at the outside hospital, per the official read, did not demonstrate any degree of obstruction, which is a rather burk contrast from her CT scan at our facility. Plan for NG tube insertion with low threshold to proceed to operating room for diagnostic laparoscopy. Discussed this plan with the patient.       Post-Op Info:  Procedure(s) (LRB):  LAPAROSCOPY, DIAGNOSTIC (N/A)  LAPAROTOMY, EXPLORATORY (N/A)  EXCISION, SMALL INTESTINE   3 Days Post-Op     Interval History: Patient seen and examined this morning. Vitals remain stable, labs reviewed. Abdominal pain improving. NG tube with 650 out over last 24hrs. No bowel function to this point. Continue TPN.    Medications:  Continuous Infusions:   Amino acid 5% - dextrose 15% (CLINIMIX-E) solution with additives. CENTRAL LINE ONLY (1395 mOsm/L).  1L provides 50 gm AA, 150 gm CHO (510 kcal/L dextrose), Na 35, K 30, Mg 5, Ca 4.5, Acetate 80, Cl 39, Phos 15 50 mL/hr at 07/18/23 2234    lactated ringers 100 mL/hr at 07/18/23 0600     Scheduled Meds:   acetaminophen  1,000 mg Oral Q8H    amLODIPine  5 mg Oral Daily    buPROPion  150 mg Oral BID    heparin (porcine)  5,000 Units Subcutaneous Q8H    ibuprofen  800 mg Oral Q8H    metronidazole  500 mg Intravenous Q8H    piperacillin-tazobactam (Zosyn) IV (PEDS and ADULTS) (extended infusion is not appropriate)  4.5 g Intravenous Q8H    raloxifene  60 mg Oral Daily    sertraline  100 mg Oral QAM    sodium chloride 0.9%  10 mL Intravenous Q6H     PRN Meds:acetaminophen, acetaminophen, dextrose 50%, dextrose 50%, diatrizoate meglumineand-diatrizoate sodium, glucagon (human recombinant), glucose, glucose, HYDROmorphone, HYDROmorphone, insulin aspart U-100, naloxone, ondansetron, sodium chloride 0.9%, Flushing PICC Protocol **AND** sodium chloride 0.9% **AND** sodium chloride 0.9%     Review of patient's allergies indicates:   Allergen Reactions    Morphine Nausea And Vomiting     Objective:     Vital Signs (Most Recent):  Temp: 98.5 °F (36.9 °C) (07/19/23 0345)  Pulse: 89 (07/19/23 0345)  Resp: 18 (07/19/23 1009)  BP: 134/79 (07/19/23 0345)  SpO2: (!) 94 % (07/19/23 0758) Vital Signs (24h Range):  Temp:  [97.7 °F (36.5 °C)-99.8 °F (37.7 °C)] 98.5 °F (36.9 °C)  Pulse:  [89-93] 89  Resp:  [16-18] 18  SpO2:  [93 %-95 %] 94 %  BP: (129-137)/(76-79) 134/79     Weight: 45.4 kg (100 lb)  Body mass index is 18.29 kg/m².    Intake/Output - Last 3 Shifts         07/17 0700 07/18 0659 07/18 0700 07/19 0659 07/19 0700 07/20 0659    P.O.  0     I.V. (mL/kg)  20 (0.4)     NG/GT  0     IV Piggyback       TPN  281.8     Total Intake(mL/kg)  301.8 (6.6)     Urine (mL/kg/hr) 1525 (1.4) 750 (0.7) 300 (1.5)    Emesis/NG output 0      Drains  650     Other 1060 0     Stool 0 0     Blood 0      Total Output 2585 1400 300     Net -2585 -1098.2 -300           Urine Occurrence 0 x 1 x     Stool Occurrence 0 x 0 x     Emesis Occurrence 0 x               Physical Exam  Vitals and nursing note reviewed.   Constitutional:       General: She is not in acute distress.     Appearance: Normal appearance.   HENT:      Nose:      Comments: NG tube with dark output  Cardiovascular:      Rate and Rhythm: Normal rate and regular rhythm.   Pulmonary:      Effort: Pulmonary effort is normal. No respiratory distress.   Abdominal:      Comments: Abdomen soft, moderately distended,  Midline incision without concern  Abdominal tenderness largely resolved; soreness present   Musculoskeletal:         General: Normal range of motion.   Skin:     General: Skin is warm.   Neurological:      General: No focal deficit present.      Mental Status: She is alert.        Significant Labs:  I have reviewed all pertinent lab results within the past 24 hours.  CBC:   Recent Labs   Lab 07/19/23  0700   WBC 11.93   RBC 2.63*   HGB 7.8*   HCT 23.4*      MCV 89   MCH 29.7   MCHC 33.3     CMP:   Recent Labs   Lab 07/19/23  0522   *   CALCIUM 7.7*   ALBUMIN 1.7*   PROT 4.9*      K 3.1*   CO2 28      BUN 28*   CREATININE 0.5   ALKPHOS 52*   ALT 16   AST 23   BILITOT 0.3       Significant Diagnostics:  I have reviewed all pertinent imaging results/findings within the past 24 hours.    Assessment/Plan:     * Small bowel obstruction  Marilee Townsend is a 63yoF with a history of HTN and rectal prolapse who presents with a several day history of abdominal pain, nausea, vomiting, and PO intolerance. Her work-up is consistent with a small bowel obstruction. General surgery consulted for evaluation.  She is now status post exploratory laparotomy on 07/16/2023 with multiple small bowel resections and anastomosis, evidence of enterotomy with over-sewing.    - patient seen and examined  - vitals, labs reviewed   - normal heart rate, WBC normal  - continue TPN;  reordered  - NG tube with 650mL output, no bowel function to this point   - continue NG to LIWS  - NPO; ok for ice chips for comfort  - serial abdominal exams  - continue IV antibiotics  - DVT ppx  - please call with questions        Manuel Wilson MD  General Surgery  [facility]

## 2023-07-19 NOTE — NURSING
Pt received. Assessment per flowsheet complete. NGT to LIS with dk, bloody drainage assessed in tube. No c/o N/V assessed at this time. Pt. Denies passing gas or BM. C/o pain to abd site, will medication with dilaudid as ordered. 4eyes assessment complete with ALLIE Paez with no skin breakdown assessed. Pt. Utilized IS to 1,000ml. Pt. Educated on importance of IS with verbal understanding assessed. Fall precautions in place and pt. Instructed to call for assistance when needed. TPN infusing to rt. PICC with no redness or swelling assessed and + blood return from both ports. Pt instructed to call when she needs to void, boykin removed at 1830 per day nurse. Call light in reach, bed in low position, will continue to monitor.

## 2023-07-20 LAB
ALBUMIN SERPL BCP-MCNC: 1.9 G/DL (ref 3.5–5.2)
ALP SERPL-CCNC: 69 U/L (ref 55–135)
ALT SERPL W/O P-5'-P-CCNC: 16 U/L (ref 10–44)
ANION GAP SERPL CALC-SCNC: 7 MMOL/L (ref 8–16)
ANISOCYTOSIS BLD QL SMEAR: SLIGHT
AST SERPL-CCNC: 29 U/L (ref 10–40)
BASOPHILS # BLD AUTO: ABNORMAL K/UL (ref 0–0.2)
BASOPHILS NFR BLD: 0 % (ref 0–1.9)
BILIRUB SERPL-MCNC: 0.2 MG/DL (ref 0.1–1)
BUN SERPL-MCNC: 18 MG/DL (ref 8–23)
CALCIUM SERPL-MCNC: 7.8 MG/DL (ref 8.7–10.5)
CHLORIDE SERPL-SCNC: 101 MMOL/L (ref 95–110)
CO2 SERPL-SCNC: 28 MMOL/L (ref 23–29)
CREAT SERPL-MCNC: 0.5 MG/DL (ref 0.5–1.4)
DIFFERENTIAL METHOD: ABNORMAL
EOSINOPHIL # BLD AUTO: ABNORMAL K/UL (ref 0–0.5)
EOSINOPHIL NFR BLD: 0 % (ref 0–8)
ERYTHROCYTE [DISTWIDTH] IN BLOOD BY AUTOMATED COUNT: 14.3 % (ref 11.5–14.5)
EST. GFR  (NO RACE VARIABLE): >60 ML/MIN/1.73 M^2
GLUCOSE SERPL-MCNC: 136 MG/DL (ref 70–110)
HCT VFR BLD AUTO: 23.1 % (ref 37–48.5)
HGB BLD-MCNC: 7.6 G/DL (ref 12–16)
HYPOCHROMIA BLD QL SMEAR: ABNORMAL
IMM GRANULOCYTES # BLD AUTO: ABNORMAL K/UL (ref 0–0.04)
IMM GRANULOCYTES NFR BLD AUTO: ABNORMAL % (ref 0–0.5)
LYMPHOCYTES # BLD AUTO: ABNORMAL K/UL (ref 1–4.8)
LYMPHOCYTES NFR BLD: 14 % (ref 18–48)
MAGNESIUM SERPL-MCNC: 1.8 MG/DL (ref 1.6–2.6)
MCH RBC QN AUTO: 29.3 PG (ref 27–31)
MCHC RBC AUTO-ENTMCNC: 32.9 G/DL (ref 32–36)
MCV RBC AUTO: 89 FL (ref 82–98)
METAMYELOCYTES NFR BLD MANUAL: 4 %
MONOCYTES # BLD AUTO: ABNORMAL K/UL (ref 0.3–1)
MONOCYTES NFR BLD: 2 % (ref 4–15)
NEUTROPHILS # BLD AUTO: ABNORMAL K/UL (ref 1.8–7.7)
NEUTROPHILS NFR BLD: 71 % (ref 38–73)
NEUTS BAND NFR BLD MANUAL: 9 %
NRBC BLD-RTO: 0 /100 WBC
PHOSPHATE SERPL-MCNC: 2.9 MG/DL (ref 2.7–4.5)
PLATELET # BLD AUTO: 229 K/UL (ref 150–450)
PLATELET BLD QL SMEAR: ABNORMAL
PMV BLD AUTO: 9.7 FL (ref 9.2–12.9)
POCT GLUCOSE: 151 MG/DL (ref 70–110)
POCT GLUCOSE: 153 MG/DL (ref 70–110)
POTASSIUM SERPL-SCNC: 3.3 MMOL/L (ref 3.5–5.1)
PROT SERPL-MCNC: 5.2 G/DL (ref 6–8.4)
RBC # BLD AUTO: 2.59 M/UL (ref 4–5.4)
SODIUM SERPL-SCNC: 136 MMOL/L (ref 136–145)
WBC # BLD AUTO: 14.36 K/UL (ref 3.9–12.7)

## 2023-07-20 PROCEDURE — 83735 ASSAY OF MAGNESIUM: CPT | Performed by: STUDENT IN AN ORGANIZED HEALTH CARE EDUCATION/TRAINING PROGRAM

## 2023-07-20 PROCEDURE — 25000003 PHARM REV CODE 250: Performed by: STUDENT IN AN ORGANIZED HEALTH CARE EDUCATION/TRAINING PROGRAM

## 2023-07-20 PROCEDURE — 27000221 HC OXYGEN, UP TO 24 HOURS

## 2023-07-20 PROCEDURE — 63600175 PHARM REV CODE 636 W HCPCS: Performed by: STUDENT IN AN ORGANIZED HEALTH CARE EDUCATION/TRAINING PROGRAM

## 2023-07-20 PROCEDURE — 94799 UNLISTED PULMONARY SVC/PX: CPT

## 2023-07-20 PROCEDURE — A4216 STERILE WATER/SALINE, 10 ML: HCPCS | Performed by: STUDENT IN AN ORGANIZED HEALTH CARE EDUCATION/TRAINING PROGRAM

## 2023-07-20 PROCEDURE — 85027 COMPLETE CBC AUTOMATED: CPT | Performed by: STUDENT IN AN ORGANIZED HEALTH CARE EDUCATION/TRAINING PROGRAM

## 2023-07-20 PROCEDURE — 85007 BL SMEAR W/DIFF WBC COUNT: CPT | Performed by: STUDENT IN AN ORGANIZED HEALTH CARE EDUCATION/TRAINING PROGRAM

## 2023-07-20 PROCEDURE — 84100 ASSAY OF PHOSPHORUS: CPT | Performed by: STUDENT IN AN ORGANIZED HEALTH CARE EDUCATION/TRAINING PROGRAM

## 2023-07-20 PROCEDURE — B4185 PARENTERAL SOL 10 GM LIPIDS: HCPCS | Performed by: STUDENT IN AN ORGANIZED HEALTH CARE EDUCATION/TRAINING PROGRAM

## 2023-07-20 PROCEDURE — 94760 N-INVAS EAR/PLS OXIMETRY 1: CPT

## 2023-07-20 PROCEDURE — 80053 COMPREHEN METABOLIC PANEL: CPT | Performed by: STUDENT IN AN ORGANIZED HEALTH CARE EDUCATION/TRAINING PROGRAM

## 2023-07-20 PROCEDURE — 11000001 HC ACUTE MED/SURG PRIVATE ROOM

## 2023-07-20 RX ORDER — POTASSIUM CHLORIDE 7.45 MG/ML
10 INJECTION INTRAVENOUS
Status: COMPLETED | OUTPATIENT
Start: 2023-07-20 | End: 2023-07-20

## 2023-07-20 RX ADMIN — PIPERACILLIN AND TAZOBACTAM 4.5 G: 4; .5 INJECTION, POWDER, LYOPHILIZED, FOR SOLUTION INTRAVENOUS; PARENTERAL at 10:07

## 2023-07-20 RX ADMIN — POTASSIUM CHLORIDE 10 MEQ: 10 INJECTION, SOLUTION INTRAVENOUS at 10:07

## 2023-07-20 RX ADMIN — ONDANSETRON 4 MG: 2 INJECTION INTRAMUSCULAR; INTRAVENOUS at 03:07

## 2023-07-20 RX ADMIN — PIPERACILLIN AND TAZOBACTAM 4.5 G: 4; .5 INJECTION, POWDER, LYOPHILIZED, FOR SOLUTION INTRAVENOUS; PARENTERAL at 06:07

## 2023-07-20 RX ADMIN — HEPARIN SODIUM 5000 UNITS: 5000 INJECTION INTRAVENOUS; SUBCUTANEOUS at 06:07

## 2023-07-20 RX ADMIN — RALOXIFENE HYDROCHLORIDE 60 MG: 60 TABLET, FILM COATED ORAL at 08:07

## 2023-07-20 RX ADMIN — Medication 10 ML: at 12:07

## 2023-07-20 RX ADMIN — IBUPROFEN 800 MG: 400 TABLET ORAL at 06:07

## 2023-07-20 RX ADMIN — METRONIDAZOLE 500 MG: 500 INJECTION, SOLUTION INTRAVENOUS at 10:07

## 2023-07-20 RX ADMIN — BUPROPION HYDROCHLORIDE 150 MG: 150 TABLET, EXTENDED RELEASE ORAL at 08:07

## 2023-07-20 RX ADMIN — Medication 10 ML: at 06:07

## 2023-07-20 RX ADMIN — AMLODIPINE BESYLATE 5 MG: 5 TABLET ORAL at 08:07

## 2023-07-20 RX ADMIN — POTASSIUM CHLORIDE 10 MEQ: 10 INJECTION, SOLUTION INTRAVENOUS at 12:07

## 2023-07-20 RX ADMIN — BUPROPION HYDROCHLORIDE 150 MG: 150 TABLET, EXTENDED RELEASE ORAL at 09:07

## 2023-07-20 RX ADMIN — Medication 10 ML: at 07:07

## 2023-07-20 RX ADMIN — ONDANSETRON 4 MG: 2 INJECTION INTRAMUSCULAR; INTRAVENOUS at 05:07

## 2023-07-20 RX ADMIN — SOYBEAN OIL 250 ML: 20 INJECTION, SOLUTION INTRAVENOUS at 10:07

## 2023-07-20 RX ADMIN — LEUCINE, PHENYLALANINE, LYSINE, METHIONINE, ISOLEUCINE, VALINE, HISTIDINE, THREONINE, TRYPTOPHAN, ALANINE, GLYCINE, ARGININE, PROLINE, SERINE, TYROSINE, SODIUM ACETATE, DIBASIC POTASSIUM PHOSPHATE, MAGNESIUM CHLORIDE, SODIUM CHLORIDE, CALCIUM CHLORIDE, DEXTROSE
365; 280; 290; 200; 300; 290; 240; 210; 90; 1035; 515; 575; 340; 250; 20; 340; 261; 51; 59; 33; 15 INJECTION INTRAVENOUS at 10:07

## 2023-07-20 RX ADMIN — POTASSIUM CHLORIDE 10 MEQ: 10 INJECTION, SOLUTION INTRAVENOUS at 08:07

## 2023-07-20 RX ADMIN — METRONIDAZOLE 500 MG: 500 INJECTION, SOLUTION INTRAVENOUS at 04:07

## 2023-07-20 RX ADMIN — PIPERACILLIN AND TAZOBACTAM 4.5 G: 4; .5 INJECTION, POWDER, LYOPHILIZED, FOR SOLUTION INTRAVENOUS; PARENTERAL at 01:07

## 2023-07-20 RX ADMIN — ACETAMINOPHEN 1000 MG: 500 TABLET, FILM COATED ORAL at 06:07

## 2023-07-20 RX ADMIN — IBUPROFEN 800 MG: 400 TABLET ORAL at 03:07

## 2023-07-20 RX ADMIN — METRONIDAZOLE 500 MG: 500 INJECTION, SOLUTION INTRAVENOUS at 08:07

## 2023-07-20 RX ADMIN — HEPARIN SODIUM 5000 UNITS: 5000 INJECTION INTRAVENOUS; SUBCUTANEOUS at 03:07

## 2023-07-20 RX ADMIN — HYDROMORPHONE HYDROCHLORIDE 1 MG: 1 INJECTION, SOLUTION INTRAMUSCULAR; INTRAVENOUS; SUBCUTANEOUS at 12:07

## 2023-07-20 RX ADMIN — HYDROMORPHONE HYDROCHLORIDE 1 MG: 1 INJECTION, SOLUTION INTRAMUSCULAR; INTRAVENOUS; SUBCUTANEOUS at 05:07

## 2023-07-20 RX ADMIN — HEPARIN SODIUM 5000 UNITS: 5000 INJECTION INTRAVENOUS; SUBCUTANEOUS at 10:07

## 2023-07-20 RX ADMIN — ACETAMINOPHEN 1000 MG: 500 TABLET, FILM COATED ORAL at 03:07

## 2023-07-20 RX ADMIN — SERTRALINE HYDROCHLORIDE 100 MG: 50 TABLET ORAL at 12:07

## 2023-07-20 NOTE — PLAN OF CARE
Case Management Re-assessment      PCP: Claudia Hopper  Pharmacy: [facility] on [address]     Patient Arrived From: home  Existing Help at Home: none     Barriers to Discharge: none     Discharge Plan:               A. Home               B. Home   Gen surgery continuing to follow, monitor NG out put, awaiting BM. TN to continue to follow for dc needs.    07/20/23 1053   Discharge Reassessment   Assessment Type Discharge Planning Reassessment   Did the patient's condition or plan change since previous assessment? No   Discharge Plan discussed with: Patient   Communicated WIL with patient/caregiver Yes   DME Needed Upon Discharge  none   Transition of Care Barriers None   Why the patient remains in the hospital Requires continued medical care   Post-Acute Status   Coverage PHN   Discharge Delays None known at this time

## 2023-07-20 NOTE — SUBJECTIVE & OBJECTIVE
Interval History:   No acute events overnight, she had a small bowel movement that was not very substantial   Abdominal exam is benign   Pain is well controlled   NG tube with dark output approximately 500 cc in the last 24 hours    Medications:  Continuous Infusions:   Amino acid 5% - dextrose 15% (CLINIMIX-E) solution with additives. CENTRAL LINE ONLY (1395 mOsm/L). 1L provides 50 gm AA, 150 gm CHO (510 kcal/L dextrose), Na 35, K 30, Mg 5, Ca 4.5, Acetate 80, Cl 39, Phos 15 75 mL/hr at 07/19/23 2239     Scheduled Meds:   acetaminophen  1,000 mg Oral Q8H    amLODIPine  5 mg Oral Daily    buPROPion  150 mg Oral BID    fat emulsion 20%  250 mL Intravenous Daily    heparin (porcine)  5,000 Units Subcutaneous Q8H    ibuprofen  800 mg Oral Q8H    metronidazole  500 mg Intravenous Q8H    piperacillin-tazobactam (Zosyn) IV (PEDS and ADULTS) (extended infusion is not appropriate)  4.5 g Intravenous Q8H    potassium chloride  10 mEq Intravenous Q1H    raloxifene  60 mg Oral Daily    sertraline  100 mg Oral QAM    sodium chloride 0.9%  10 mL Intravenous Q6H     PRN Meds:acetaminophen, acetaminophen, dextrose 50%, dextrose 50%, diatrizoate meglumineand-diatrizoate sodium, glucagon (human recombinant), glucose, glucose, HYDROmorphone, HYDROmorphone, insulin aspart U-100, naloxone, ondansetron, sodium chloride 0.9%, Flushing PICC Protocol **AND** sodium chloride 0.9% **AND** sodium chloride 0.9%     Review of patient's allergies indicates:   Allergen Reactions    Morphine Nausea And Vomiting     Objective:     Vital Signs (Most Recent):  Temp: 98 °F (36.7 °C) (07/20/23 0742)  Pulse: 86 (07/20/23 0742)  Resp: 18 (07/20/23 0742)  BP: 123/75 (07/20/23 0742)  SpO2: 97 % (07/20/23 0815) Vital Signs (24h Range):  Temp:  [98 °F (36.7 °C)-98.9 °F (37.2 °C)] 98 °F (36.7 °C)  Pulse:  [83-92] 86  Resp:  [16-21] 18  SpO2:  [95 %-97 %] 97 %  BP: (118-129)/(69-79) 123/75     Weight: 45.4 kg (100 lb)  Body mass index is 18.29  kg/m².    Intake/Output - Last 3 Shifts         07/18 0700 07/19 0659 07/19 0700 07/20 0659 07/20 0700 07/21 0659    P.O. 0 0 0    I.V. (mL/kg) 20 (0.4)      NG/GT 0      .8      Total Intake(mL/kg) 301.8 (6.6) 0 (0) 0 (0)    Urine (mL/kg/hr) 750 (0.7) 1200 (1.1)     Emesis/NG output       Drains 650 500     Other 0      Stool 0 0     Blood       Total Output 1400 1700     Net -1098.2 -1700 0           Urine Occurrence 1 x 4 x 1 x    Stool Occurrence 0 x 4 x 0 x             Physical Exam  Vitals and nursing note reviewed.   Constitutional:       General: She is not in acute distress.     Appearance: Normal appearance.   HENT:      Nose:      Comments: NG tube with dark output  Cardiovascular:      Rate and Rhythm: Normal rate and regular rhythm.   Pulmonary:      Effort: Pulmonary effort is normal. No respiratory distress.   Abdominal:      Comments: Abdomen soft, less distended,  Midline incision without concern  Abdominal tenderness largely resolved; soreness present   Musculoskeletal:         General: Normal range of motion.   Skin:     General: Skin is warm.   Neurological:      General: No focal deficit present.      Mental Status: She is alert.        Significant Labs:  I have reviewed all pertinent lab results within the past 24 hours.  CBC:   Recent Labs   Lab 07/20/23  0500   WBC 14.36*   RBC 2.59*   HGB 7.6*   HCT 23.1*      MCV 89   MCH 29.3   MCHC 32.9     CMP:   Recent Labs   Lab 07/20/23  0500   *   CALCIUM 7.8*   ALBUMIN 1.9*   PROT 5.2*      K 3.3*   CO2 28      BUN 18   CREATININE 0.5   ALKPHOS 69   ALT 16   AST 29   BILITOT 0.2       Significant Diagnostics:  I have reviewed all pertinent imaging results/findings within the past 24 hours.

## 2023-07-20 NOTE — NURSING
Received report from off going nurse, Lynn. Pt received lying in bed AAO X 3. Resp even and unlabored. O2 2l/nc in use.  [facility] [place] 4 Eyes      7/20/2023       Skin assessed on: Q Shift      [x] No Pressure Injuries Present    []Prevention Measures Documented    [] Yes LDA  for Pressure Injury Previously documented     [] Yes New Pressure Injury Discovered   [] LDA for New Pressure Injury Added      Attending RN:  Sergei Flores RN     Second LPN:  Nikolas

## 2023-07-20 NOTE — PROGRESS NOTES
[facility]  Progress Note    Patient Name: Marilee Townsend  MRN: [id number]  Patient Class: IP- Inpatient   Admission Date: 7/14/2023  Length of Stay: 6 days  Attending Physician: Adriane Jang DO  Primary Care Provider: Claudia Hopper DO        Subjective:     Principal Problem:Small bowel obstruction        HPI:  63F with pmh of depression, fibromyalgia, htn, osteoporosis who presents due to several day h/o abd pain and nausea with anorexia. Pt was evaluated at outside emergency department patient was seen in the day prompting the ED for evaluation.  Patient denies any recent flatulence or bowel movements unclear on the last date.  Patient states pain is diffuse in the with minimal improvement with pain medications given in the ED. CT scan in the emergency department with demonstration of small bowel obstruction and surgery consulted who had NG tube placed.  Patient denies chest pain, shortness a breath, fever, chills.  Patient states she had a shoulder surgery about 3 weeks prior to arrival and tolerated the anesthesia well.  Patient is a former smoker, but denies alcohol or drug use.      Overview/Hospital Course:  63F with pmh of depression, fibromyalgia, htn, osteoporosis admitted on 07/07/2023 for small bowel obstruction. presented with a several day history of abdominal pain, nausea, vomiting, and PO intolerance.  CT scan in ED with demonstration of small bowel obstruction and surgery consulted who had NG tube placed. Attempts at gastrografin with persistent obstruction. Pt taken to OR on 7/16. NG tube remained after operation and surgery managing. She is now status post exploratory laparotomy on 07/16/2023 with multiple small bowel resections and anastomosis, evidence of enterotomy with over-sewing. Replace electrolytes as needed. Awaiting for bowel functions to return       Interval History:  No acute overnight events.  Patient remained afebrile.  Admits nausea improving  "but no vomiting.  Feeling better this morning.  NG tube with 500ml output over the last 24 hrs, states she had a "tiny" stool. No flatus    Review of Systems   Constitutional:  Negative for chills, fatigue and fever.   HENT:  Positive for postnasal drip and sore throat.    Respiratory:  Negative for cough, chest tightness and shortness of breath.    Cardiovascular:  Negative for chest pain, palpitations and leg swelling.   Gastrointestinal:  Positive for abdominal pain (mild) and nausea. Negative for diarrhea and vomiting.   Genitourinary:  Negative for difficulty urinating and dysuria.   Musculoskeletal:  Negative for joint swelling.   Neurological:  Negative for dizziness, weakness and headaches.     Objective:     Vital Signs (Most Recent):  Temp: 98.3 °F (36.8 °C) (07/20/23 1052)  Pulse: 89 (07/20/23 1052)  Resp: (!) 26 (07/20/23 1243)  BP: 131/65 (07/20/23 1052)  SpO2: (!) 92 % (07/20/23 1052) Vital Signs (24h Range):  Temp:  [98 °F (36.7 °C)-98.9 °F (37.2 °C)] 98.3 °F (36.8 °C)  Pulse:  [86-92] 89  Resp:  [16-26] 26  SpO2:  [92 %-97 %] 92 %  BP: (118-131)/(65-79) 131/65     Weight: 45.4 kg (100 lb)  Body mass index is 18.29 kg/m².    Intake/Output Summary (Last 24 hours) at 7/20/2023 1426  Last data filed at 7/20/2023 1303  Gross per 24 hour   Intake 0 ml   Output 1200 ml   Net -1200 ml           Physical Exam  Vitals and nursing note reviewed.   Constitutional:       General: She is not in acute distress.     Appearance: She is ill-appearing.   HENT:      Nose:      Comments: NG tube with dark output     Mouth/Throat:      Mouth: Mucous membranes are dry.   Cardiovascular:      Rate and Rhythm: Normal rate and regular rhythm.   Pulmonary:      Effort: Pulmonary effort is normal. No respiratory distress.      Breath sounds: No wheezing or rales.   Abdominal:      General: There is distension.      Palpations: Abdomen is soft.      Tenderness: There is no abdominal tenderness (none on exam today).      " Comments: Abdomen soft, mildly distended  Midline incision with dressing in place  Abdomen nontender today    Musculoskeletal:         General: Normal range of motion.      Right lower leg: No edema.      Left lower leg: No edema.   Skin:     General: Skin is warm and dry.   Neurological:      General: No focal deficit present.      Mental Status: She is alert and oriented to person, place, and time.   Psychiatric:         Mood and Affect: Mood normal.         Thought Content: Thought content normal.           Significant Labs: All pertinent labs within the past 24 hours have been reviewed.    Significant Imaging: I have reviewed all pertinent imaging results/findings within the past 24 hours.      Assessment/Plan:      * Small bowel obstruction  63F presenting with several days of worsening abd pain, anorexia, and nausea.  CT abd/pelvis with Abnormal fluid, air distension small bowel, termination point at or near ileocecal valve, not well-defined, associated less distension proximal small bowel with some bowel wall thickening.  Adhesion etiology of obstructive process favored.    -Surgery team consulted: s/p exploratory laparotomy on 07/16/2023 with multiple small bowel resections and anastomosis, evidence of enterotomy with over-sewing.  -NG tube in place to LIWS  -continue TPN per surgery   -npo diet  -Analgesics ordered prn       HTN (hypertension)  Currently controlled.  Continue home norvasc  Will continue to monitor      Leukocytosis  -In setting of acute small-bowel obstruction and no definitive findings of infection appears likely reactive, but low threshold for starting antibiotics.   -LA negative x2.  -procal mildly elevated, WBC# trended up to 14 on 07/20  -Continue on zosyn for now  -tx as stated above for sbo      Depression  Restart home medications as indicated- setraline 100mg         Osteoporosis  Continue home medications      Hypokalemia  -Replace as needed  -TPN per surgery       Advanced care  planning/counseling discussion  Advance Care Planning     Date: 07/19/2023    Code Status  I engaged the the patient in a voluntary conversation about the patient's preferences for care  at the very end of life. The patient wishes to have CPR and other invasive treatments performed when her heart and/or breathing stops. I communicated to the patient that her wishes align with full code status.  I spent a total of 16 minutes engaging the patient in this advance care planning discussion.        Code Status: Full Code            VTE Risk Mitigation (From admission, onward)         Ordered     heparin (porcine) injection 5,000 Units  Every 8 hours         07/14/23 1428     IP VTE LOW RISK PATIENT  Once         07/14/23 1236     Place sequential compression device  Until discontinued         07/14/23 1236                Discharge Planning   WIL: 7/23/2023     Code Status: Full Code   Is the patient medically ready for discharge?:     Reason for patient still in hospital (select all that apply): Patient trending condition, Treatment and Consult recommendations  Discharge Plan A: Home   Discharge Delays: None known at this time              Adriane Jang DO  Department of Hospital Medicine   [facility]

## 2023-07-20 NOTE — ASSESSMENT & PLAN NOTE
-In setting of acute small-bowel obstruction and no definitive findings of infection appears likely reactive, but low threshold for starting antibiotics.   -LA negative x2.  -procal mildly elevated, WBC# trended up to 14 on 07/20  -Continue on zosyn for now  -tx as stated above for sbo

## 2023-07-20 NOTE — ASSESSMENT & PLAN NOTE
Marilee Townsend is a 63yoF with a history of HTN and rectal prolapse who presents with a several day history of abdominal pain, nausea, vomiting, and PO intolerance. Her work-up is consistent with a small bowel obstruction. General surgery consulted for evaluation.  She is now status post exploratory laparotomy on 07/16/2023 with multiple small bowel resections and anastomosis, evidence of enterotomy with over-sewing.    - patient seen and examined  - vitals, labs reviewed   - normal heart rate, WBC normal  - continue TPN; reordered  - NG tube with 500 mL output, has only had a small volume stool which was not very substantial, she is still not passing flatus- continue NG to LIWS for now, we will consider removal and advancing to clears when she has more substantial bowel function  - NPO; ok for ice chips for comfort  - serial abdominal exams  - continue IV antibiotics  - DVT ppx  - please call with questions

## 2023-07-20 NOTE — PROGRESS NOTES
[facility]  General Surgery  Progress Note    Subjective:     History of Present Illness:  Marilee Townsend is a 63yoF with a history of hypertension, rectal prolapse s/p rectopexy who presents to [facility] with complaints of nausea, vomiting and worsening abdominal pain. Of note, she presented to an outside hospital with the same complaints yesterday and was discharged with instructions to follow up with her primary care provider. The patient describes a three-day history of abdominal pain, nausea, vomiting and PO intolerance. This has persistently worsened throughout this time, which prompted both presentations. She states that she did have a small bowel movement yesterday, described as hard pellets. On work-up, her vital signs are stable. She has an elevated leukocytosis to 18. Lactate normal at 1. Repeat ordered. Her CT scan demonstrates dilated small bowel consistent with a small bowel obstruction. There is normal caliber small intestine distally. She is admitted to the hospital medicine service. General surgery consulted for evaluation.     Of note, her WBC has increased from 12.8 to 18 over the last 24hrs. Imaging at the outside hospital, per the official read, did not demonstrate any degree of obstruction, which is a rather burk contrast from her CT scan at our facility. Plan for NG tube insertion with low threshold to proceed to operating room for diagnostic laparoscopy. Discussed this plan with the patient.       Post-Op Info:  Procedure(s) (LRB):  LAPAROSCOPY, DIAGNOSTIC (N/A)  LAPAROTOMY, EXPLORATORY (N/A)  EXCISION, SMALL INTESTINE   4 Days Post-Op     Interval History:   No acute events overnight, she had a small bowel movement that was not very substantial   Abdominal exam is benign   Pain is well controlled   NG tube with dark output approximately 500 cc in the last 24 hours    Medications:  Continuous Infusions:   Amino acid 5% - dextrose 15% (CLINIMIX-E) solution with additives. CENTRAL  LINE ONLY (1395 mOsm/L). 1L provides 50 gm AA, 150 gm CHO (510 kcal/L dextrose), Na 35, K 30, Mg 5, Ca 4.5, Acetate 80, Cl 39, Phos 15 75 mL/hr at 07/19/23 2239     Scheduled Meds:   acetaminophen  1,000 mg Oral Q8H    amLODIPine  5 mg Oral Daily    buPROPion  150 mg Oral BID    fat emulsion 20%  250 mL Intravenous Daily    heparin (porcine)  5,000 Units Subcutaneous Q8H    ibuprofen  800 mg Oral Q8H    metronidazole  500 mg Intravenous Q8H    piperacillin-tazobactam (Zosyn) IV (PEDS and ADULTS) (extended infusion is not appropriate)  4.5 g Intravenous Q8H    potassium chloride  10 mEq Intravenous Q1H    raloxifene  60 mg Oral Daily    sertraline  100 mg Oral QAM    sodium chloride 0.9%  10 mL Intravenous Q6H     PRN Meds:acetaminophen, acetaminophen, dextrose 50%, dextrose 50%, diatrizoate meglumineand-diatrizoate sodium, glucagon (human recombinant), glucose, glucose, HYDROmorphone, HYDROmorphone, insulin aspart U-100, naloxone, ondansetron, sodium chloride 0.9%, Flushing PICC Protocol **AND** sodium chloride 0.9% **AND** sodium chloride 0.9%     Review of patient's allergies indicates:   Allergen Reactions    Morphine Nausea And Vomiting     Objective:     Vital Signs (Most Recent):  Temp: 98 °F (36.7 °C) (07/20/23 0742)  Pulse: 86 (07/20/23 0742)  Resp: 18 (07/20/23 0742)  BP: 123/75 (07/20/23 0742)  SpO2: 97 % (07/20/23 0815) Vital Signs (24h Range):  Temp:  [98 °F (36.7 °C)-98.9 °F (37.2 °C)] 98 °F (36.7 °C)  Pulse:  [83-92] 86  Resp:  [16-21] 18  SpO2:  [95 %-97 %] 97 %  BP: (118-129)/(69-79) 123/75     Weight: 45.4 kg (100 lb)  Body mass index is 18.29 kg/m².    Intake/Output - Last 3 Shifts         07/18 0700 07/19 0659 07/19 0700 07/20 0659 07/20 0700 07/21 0659    P.O. 0 0 0    I.V. (mL/kg) 20 (0.4)      NG/GT 0      .8      Total Intake(mL/kg) 301.8 (6.6) 0 (0) 0 (0)    Urine (mL/kg/hr) 750 (0.7) 1200 (1.1)     Emesis/NG output       Drains 650 500     Other 0      Stool 0 0      Blood       Total Output 1400 1700     Net -1098.2 -1700 0           Urine Occurrence 1 x 4 x 1 x    Stool Occurrence 0 x 4 x 0 x             Physical Exam  Vitals and nursing note reviewed.   Constitutional:       General: She is not in acute distress.     Appearance: Normal appearance.   HENT:      Nose:      Comments: NG tube with dark output  Cardiovascular:      Rate and Rhythm: Normal rate and regular rhythm.   Pulmonary:      Effort: Pulmonary effort is normal. No respiratory distress.   Abdominal:      Comments: Abdomen soft, less distended,  Midline incision without concern  Abdominal tenderness largely resolved; soreness present   Musculoskeletal:         General: Normal range of motion.   Skin:     General: Skin is warm.   Neurological:      General: No focal deficit present.      Mental Status: She is alert.        Significant Labs:  I have reviewed all pertinent lab results within the past 24 hours.  CBC:   Recent Labs   Lab 07/20/23  0500   WBC 14.36*   RBC 2.59*   HGB 7.6*   HCT 23.1*      MCV 89   MCH 29.3   MCHC 32.9     CMP:   Recent Labs   Lab 07/20/23  0500   *   CALCIUM 7.8*   ALBUMIN 1.9*   PROT 5.2*      K 3.3*   CO2 28      BUN 18   CREATININE 0.5   ALKPHOS 69   ALT 16   AST 29   BILITOT 0.2       Significant Diagnostics:  I have reviewed all pertinent imaging results/findings within the past 24 hours.    Assessment/Plan:     * Small bowel obstruction  Marilee Townsend is a 63yoF with a history of HTN and rectal prolapse who presents with a several day history of abdominal pain, nausea, vomiting, and PO intolerance. Her work-up is consistent with a small bowel obstruction. General surgery consulted for evaluation.  She is now status post exploratory laparotomy on 07/16/2023 with multiple small bowel resections and anastomosis, evidence of enterotomy with over-sewing.    - patient seen and examined  - vitals, labs reviewed   - normal heart rate, WBC normal  -  continue TPN; reordered  - NG tube with 500 mL output, has only had a small volume stool which was not very substantial, she is still not passing flatus- continue NG to LIWS for now, we will consider removal and advancing to clears when she has more substantial bowel function  - NPO; ok for ice chips for comfort  - serial abdominal exams  - continue IV antibiotics  - DVT ppx  - please call with questions        Jesus Adams MD  General Surgery  [facility]

## 2023-07-20 NOTE — SUBJECTIVE & OBJECTIVE
"Interval History:  No acute overnight events.  Patient remained afebrile.  Admits nausea improving but no vomiting.  Feeling better this morning.  NG tube with 500ml output over the last 24 hrs, states she had a "tiny" stool. No flatus    Review of Systems   Constitutional:  Negative for chills, fatigue and fever.   HENT:  Positive for postnasal drip and sore throat.    Respiratory:  Negative for cough, chest tightness and shortness of breath.    Cardiovascular:  Negative for chest pain, palpitations and leg swelling.   Gastrointestinal:  Positive for abdominal pain (mild) and nausea. Negative for diarrhea and vomiting.   Genitourinary:  Negative for difficulty urinating and dysuria.   Musculoskeletal:  Negative for joint swelling.   Neurological:  Negative for dizziness, weakness and headaches.     Objective:     Vital Signs (Most Recent):  Temp: 98.3 °F (36.8 °C) (07/20/23 1052)  Pulse: 89 (07/20/23 1052)  Resp: (!) 26 (07/20/23 1243)  BP: 131/65 (07/20/23 1052)  SpO2: (!) 92 % (07/20/23 1052) Vital Signs (24h Range):  Temp:  [98 °F (36.7 °C)-98.9 °F (37.2 °C)] 98.3 °F (36.8 °C)  Pulse:  [86-92] 89  Resp:  [16-26] 26  SpO2:  [92 %-97 %] 92 %  BP: (118-131)/(65-79) 131/65     Weight: 45.4 kg (100 lb)  Body mass index is 18.29 kg/m².    Intake/Output Summary (Last 24 hours) at 7/20/2023 1426  Last data filed at 7/20/2023 1303  Gross per 24 hour   Intake 0 ml   Output 1200 ml   Net -1200 ml           Physical Exam  Vitals and nursing note reviewed.   Constitutional:       General: She is not in acute distress.     Appearance: She is ill-appearing.   HENT:      Nose:      Comments: NG tube with dark output     Mouth/Throat:      Mouth: Mucous membranes are dry.   Cardiovascular:      Rate and Rhythm: Normal rate and regular rhythm.   Pulmonary:      Effort: Pulmonary effort is normal. No respiratory distress.      Breath sounds: No wheezing or rales.   Abdominal:      General: There is distension.      Palpations: " Abdomen is soft.      Tenderness: There is no abdominal tenderness (none on exam today).      Comments: Abdomen soft, mildly distended  Midline incision with dressing in place  Abdomen nontender today    Musculoskeletal:         General: Normal range of motion.      Right lower leg: No edema.      Left lower leg: No edema.   Skin:     General: Skin is warm and dry.   Neurological:      General: No focal deficit present.      Mental Status: She is alert and oriented to person, place, and time.   Psychiatric:         Mood and Affect: Mood normal.         Thought Content: Thought content normal.           Significant Labs: All pertinent labs within the past 24 hours have been reviewed.    Significant Imaging: I have reviewed all pertinent imaging results/findings within the past 24 hours.

## 2023-07-21 PROBLEM — R53.1 WEAKNESS: Status: ACTIVE | Noted: 2023-07-21

## 2023-07-21 PROBLEM — J98.11 ATELECTASIS: Status: ACTIVE | Noted: 2023-07-21

## 2023-07-21 LAB
ABO + RH BLD: NORMAL
ALBUMIN SERPL BCP-MCNC: 1.8 G/DL (ref 3.5–5.2)
ALP SERPL-CCNC: 70 U/L (ref 55–135)
ALT SERPL W/O P-5'-P-CCNC: 25 U/L (ref 10–44)
ANION GAP SERPL CALC-SCNC: 7 MMOL/L (ref 8–16)
ANISOCYTOSIS BLD QL SMEAR: SLIGHT
AST SERPL-CCNC: 43 U/L (ref 10–40)
BASOPHILS # BLD AUTO: ABNORMAL K/UL (ref 0–0.2)
BASOPHILS NFR BLD: 0 % (ref 0–1.9)
BILIRUB SERPL-MCNC: 0.2 MG/DL (ref 0.1–1)
BLD GP AB SCN CELLS X3 SERPL QL: NORMAL
BLD PROD TYP BPU: NORMAL
BLOOD UNIT EXPIRATION DATE: NORMAL
BLOOD UNIT TYPE CODE: 9500
BLOOD UNIT TYPE: NORMAL
BUN SERPL-MCNC: 15 MG/DL (ref 8–23)
CALCIUM SERPL-MCNC: 7.7 MG/DL (ref 8.7–10.5)
CHLORIDE SERPL-SCNC: 103 MMOL/L (ref 95–110)
CO2 SERPL-SCNC: 26 MMOL/L (ref 23–29)
CODING SYSTEM: NORMAL
CREAT SERPL-MCNC: 0.5 MG/DL (ref 0.5–1.4)
CROSSMATCH INTERPRETATION: NORMAL
DIFFERENTIAL METHOD: ABNORMAL
DISPENSE STATUS: NORMAL
EOSINOPHIL # BLD AUTO: ABNORMAL K/UL (ref 0–0.5)
EOSINOPHIL NFR BLD: 0 % (ref 0–8)
ERYTHROCYTE [DISTWIDTH] IN BLOOD BY AUTOMATED COUNT: 14.2 % (ref 11.5–14.5)
EST. GFR  (NO RACE VARIABLE): >60 ML/MIN/1.73 M^2
GLUCOSE SERPL-MCNC: 153 MG/DL (ref 70–110)
GRAM STN SPEC: NORMAL
GRAM STN SPEC: NORMAL
HCT VFR BLD AUTO: 21 % (ref 37–48.5)
HGB BLD-MCNC: 7 G/DL (ref 12–16)
HYPOCHROMIA BLD QL SMEAR: ABNORMAL
IMM GRANULOCYTES # BLD AUTO: ABNORMAL K/UL (ref 0–0.04)
IMM GRANULOCYTES NFR BLD AUTO: ABNORMAL % (ref 0–0.5)
LYMPHOCYTES # BLD AUTO: ABNORMAL K/UL (ref 1–4.8)
LYMPHOCYTES NFR BLD: 9 % (ref 18–48)
MAGNESIUM SERPL-MCNC: 1.8 MG/DL (ref 1.6–2.6)
MCH RBC QN AUTO: 30.3 PG (ref 27–31)
MCHC RBC AUTO-ENTMCNC: 33.3 G/DL (ref 32–36)
MCV RBC AUTO: 91 FL (ref 82–98)
MONOCYTES # BLD AUTO: ABNORMAL K/UL (ref 0.3–1)
MONOCYTES NFR BLD: 5 % (ref 4–15)
MYELOCYTES NFR BLD MANUAL: 2 %
NEUTROPHILS NFR BLD: 83 % (ref 38–73)
NEUTS BAND NFR BLD MANUAL: 1 %
NRBC BLD-RTO: 0 /100 WBC
PHOSPHATE SERPL-MCNC: 2.8 MG/DL (ref 2.7–4.5)
PLATELET # BLD AUTO: 240 K/UL (ref 150–450)
PLATELET BLD QL SMEAR: ABNORMAL
PMV BLD AUTO: 9.6 FL (ref 9.2–12.9)
POCT GLUCOSE: 140 MG/DL (ref 70–110)
POCT GLUCOSE: 146 MG/DL (ref 70–110)
POCT GLUCOSE: 150 MG/DL (ref 70–110)
POCT GLUCOSE: 161 MG/DL (ref 70–110)
POTASSIUM SERPL-SCNC: 3.8 MMOL/L (ref 3.5–5.1)
PROT SERPL-MCNC: 5.1 G/DL (ref 6–8.4)
RBC # BLD AUTO: 2.31 M/UL (ref 4–5.4)
SODIUM SERPL-SCNC: 136 MMOL/L (ref 136–145)
SPECIMEN OUTDATE: NORMAL
TRANS ERYTHROCYTES VOL PATIENT: NORMAL ML
WBC # BLD AUTO: 17.55 K/UL (ref 3.9–12.7)

## 2023-07-21 PROCEDURE — C9113 INJ PANTOPRAZOLE SODIUM, VIA: HCPCS | Performed by: INTERNAL MEDICINE

## 2023-07-21 PROCEDURE — 25000003 PHARM REV CODE 250: Performed by: STUDENT IN AN ORGANIZED HEALTH CARE EDUCATION/TRAINING PROGRAM

## 2023-07-21 PROCEDURE — 11000001 HC ACUTE MED/SURG PRIVATE ROOM

## 2023-07-21 PROCEDURE — 36430 TRANSFUSION BLD/BLD COMPNT: CPT

## 2023-07-21 PROCEDURE — 87075 CULTR BACTERIA EXCEPT BLOOD: CPT | Performed by: STUDENT IN AN ORGANIZED HEALTH CARE EDUCATION/TRAINING PROGRAM

## 2023-07-21 PROCEDURE — 84100 ASSAY OF PHOSPHORUS: CPT | Performed by: STUDENT IN AN ORGANIZED HEALTH CARE EDUCATION/TRAINING PROGRAM

## 2023-07-21 PROCEDURE — 87076 CULTURE ANAEROBE IDENT EACH: CPT | Performed by: STUDENT IN AN ORGANIZED HEALTH CARE EDUCATION/TRAINING PROGRAM

## 2023-07-21 PROCEDURE — 63600175 PHARM REV CODE 636 W HCPCS: Performed by: STUDENT IN AN ORGANIZED HEALTH CARE EDUCATION/TRAINING PROGRAM

## 2023-07-21 PROCEDURE — 87070 CULTURE OTHR SPECIMN AEROBIC: CPT | Performed by: STUDENT IN AN ORGANIZED HEALTH CARE EDUCATION/TRAINING PROGRAM

## 2023-07-21 PROCEDURE — 86920 COMPATIBILITY TEST SPIN: CPT | Performed by: STUDENT IN AN ORGANIZED HEALTH CARE EDUCATION/TRAINING PROGRAM

## 2023-07-21 PROCEDURE — 99223 1ST HOSP IP/OBS HIGH 75: CPT | Mod: ,,, | Performed by: INTERNAL MEDICINE

## 2023-07-21 PROCEDURE — 85027 COMPLETE CBC AUTOMATED: CPT | Performed by: STUDENT IN AN ORGANIZED HEALTH CARE EDUCATION/TRAINING PROGRAM

## 2023-07-21 PROCEDURE — 63600175 PHARM REV CODE 636 W HCPCS: Performed by: RADIOLOGY

## 2023-07-21 PROCEDURE — 99223 PR INITIAL HOSPITAL CARE,LEVL III: ICD-10-PCS | Mod: ,,, | Performed by: NURSE PRACTITIONER

## 2023-07-21 PROCEDURE — 99223 PR INITIAL HOSPITAL CARE,LEVL III: ICD-10-PCS | Mod: ,,, | Performed by: INTERNAL MEDICINE

## 2023-07-21 PROCEDURE — A4216 STERILE WATER/SALINE, 10 ML: HCPCS | Performed by: STUDENT IN AN ORGANIZED HEALTH CARE EDUCATION/TRAINING PROGRAM

## 2023-07-21 PROCEDURE — 63600175 PHARM REV CODE 636 W HCPCS: Performed by: INTERNAL MEDICINE

## 2023-07-21 PROCEDURE — 86900 BLOOD TYPING SEROLOGIC ABO: CPT | Performed by: STUDENT IN AN ORGANIZED HEALTH CARE EDUCATION/TRAINING PROGRAM

## 2023-07-21 PROCEDURE — 80053 COMPREHEN METABOLIC PANEL: CPT | Performed by: STUDENT IN AN ORGANIZED HEALTH CARE EDUCATION/TRAINING PROGRAM

## 2023-07-21 PROCEDURE — 83735 ASSAY OF MAGNESIUM: CPT | Performed by: STUDENT IN AN ORGANIZED HEALTH CARE EDUCATION/TRAINING PROGRAM

## 2023-07-21 PROCEDURE — 85007 BL SMEAR W/DIFF WBC COUNT: CPT | Performed by: STUDENT IN AN ORGANIZED HEALTH CARE EDUCATION/TRAINING PROGRAM

## 2023-07-21 PROCEDURE — 99223 1ST HOSP IP/OBS HIGH 75: CPT | Mod: ,,, | Performed by: NURSE PRACTITIONER

## 2023-07-21 PROCEDURE — 25500020 PHARM REV CODE 255: Performed by: STUDENT IN AN ORGANIZED HEALTH CARE EDUCATION/TRAINING PROGRAM

## 2023-07-21 PROCEDURE — 87205 SMEAR GRAM STAIN: CPT | Performed by: STUDENT IN AN ORGANIZED HEALTH CARE EDUCATION/TRAINING PROGRAM

## 2023-07-21 PROCEDURE — P9021 RED BLOOD CELLS UNIT: HCPCS | Performed by: STUDENT IN AN ORGANIZED HEALTH CARE EDUCATION/TRAINING PROGRAM

## 2023-07-21 PROCEDURE — B4185 PARENTERAL SOL 10 GM LIPIDS: HCPCS | Performed by: STUDENT IN AN ORGANIZED HEALTH CARE EDUCATION/TRAINING PROGRAM

## 2023-07-21 PROCEDURE — 25000003 PHARM REV CODE 250: Performed by: RADIOLOGY

## 2023-07-21 RX ORDER — PANTOPRAZOLE SODIUM 40 MG/10ML
40 INJECTION, POWDER, LYOPHILIZED, FOR SOLUTION INTRAVENOUS 2 TIMES DAILY
Status: DISCONTINUED | OUTPATIENT
Start: 2023-07-21 | End: 2023-07-26 | Stop reason: HOSPADM

## 2023-07-21 RX ORDER — LIDOCAINE HYDROCHLORIDE 10 MG/ML
INJECTION INFILTRATION; PERINEURAL
Status: COMPLETED | OUTPATIENT
Start: 2023-07-21 | End: 2023-07-21

## 2023-07-21 RX ORDER — MIDAZOLAM HYDROCHLORIDE 1 MG/ML
INJECTION INTRAMUSCULAR; INTRAVENOUS
Status: COMPLETED | OUTPATIENT
Start: 2023-07-21 | End: 2023-07-21

## 2023-07-21 RX ORDER — FENTANYL CITRATE 50 UG/ML
INJECTION, SOLUTION INTRAMUSCULAR; INTRAVENOUS
Status: COMPLETED | OUTPATIENT
Start: 2023-07-21 | End: 2023-07-21

## 2023-07-21 RX ORDER — HYDROCODONE BITARTRATE AND ACETAMINOPHEN 500; 5 MG/1; MG/1
TABLET ORAL
Status: DISCONTINUED | OUTPATIENT
Start: 2023-07-21 | End: 2023-07-26 | Stop reason: HOSPADM

## 2023-07-21 RX ADMIN — HYDROMORPHONE HYDROCHLORIDE 1 MG: 1 INJECTION, SOLUTION INTRAMUSCULAR; INTRAVENOUS; SUBCUTANEOUS at 01:07

## 2023-07-21 RX ADMIN — LIDOCAINE HYDROCHLORIDE 10 ML: 10 INJECTION, SOLUTION INFILTRATION; PERINEURAL at 02:07

## 2023-07-21 RX ADMIN — LEUCINE, PHENYLALANINE, LYSINE, METHIONINE, ISOLEUCINE, VALINE, HISTIDINE, THREONINE, TRYPTOPHAN, ALANINE, GLYCINE, ARGININE, PROLINE, SERINE, TYROSINE, SODIUM ACETATE, DIBASIC POTASSIUM PHOSPHATE, MAGNESIUM CHLORIDE, SODIUM CHLORIDE, CALCIUM CHLORIDE, DEXTROSE
365; 280; 290; 200; 300; 290; 240; 210; 90; 1035; 515; 575; 340; 250; 20; 340; 261; 51; 59; 33; 15 INJECTION INTRAVENOUS at 10:07

## 2023-07-21 RX ADMIN — FENTANYL CITRATE 50 MCG: 50 INJECTION INTRAMUSCULAR; INTRAVENOUS at 02:07

## 2023-07-21 RX ADMIN — HEPARIN SODIUM 5000 UNITS: 5000 INJECTION INTRAVENOUS; SUBCUTANEOUS at 01:07

## 2023-07-21 RX ADMIN — SODIUM CHLORIDE: 9 INJECTION, SOLUTION INTRAVENOUS at 02:07

## 2023-07-21 RX ADMIN — BUPROPION HYDROCHLORIDE 150 MG: 150 TABLET, EXTENDED RELEASE ORAL at 08:07

## 2023-07-21 RX ADMIN — METRONIDAZOLE 500 MG: 500 INJECTION, SOLUTION INTRAVENOUS at 10:07

## 2023-07-21 RX ADMIN — ONDANSETRON 4 MG: 2 INJECTION INTRAMUSCULAR; INTRAVENOUS at 03:07

## 2023-07-21 RX ADMIN — METRONIDAZOLE 500 MG: 500 INJECTION, SOLUTION INTRAVENOUS at 06:07

## 2023-07-21 RX ADMIN — ACETAMINOPHEN 1000 MG: 500 TABLET, FILM COATED ORAL at 10:07

## 2023-07-21 RX ADMIN — FENTANYL CITRATE 50 MCG: 50 INJECTION INTRAMUSCULAR; INTRAVENOUS at 01:07

## 2023-07-21 RX ADMIN — METRONIDAZOLE 500 MG: 500 INJECTION, SOLUTION INTRAVENOUS at 02:07

## 2023-07-21 RX ADMIN — PIPERACILLIN AND TAZOBACTAM 4.5 G: 4; .5 INJECTION, POWDER, LYOPHILIZED, FOR SOLUTION INTRAVENOUS; PARENTERAL at 04:07

## 2023-07-21 RX ADMIN — Medication 10 ML: at 11:07

## 2023-07-21 RX ADMIN — SERTRALINE HYDROCHLORIDE 100 MG: 50 TABLET ORAL at 06:07

## 2023-07-21 RX ADMIN — IBUPROFEN 800 MG: 400 TABLET ORAL at 05:07

## 2023-07-21 RX ADMIN — PANTOPRAZOLE SODIUM 40 MG: 40 INJECTION, POWDER, FOR SOLUTION INTRAVENOUS at 10:07

## 2023-07-21 RX ADMIN — HYDROMORPHONE HYDROCHLORIDE 1 MG: 1 INJECTION, SOLUTION INTRAMUSCULAR; INTRAVENOUS; SUBCUTANEOUS at 08:07

## 2023-07-21 RX ADMIN — HEPARIN SODIUM 5000 UNITS: 5000 INJECTION INTRAVENOUS; SUBCUTANEOUS at 10:07

## 2023-07-21 RX ADMIN — ACETAMINOPHEN 1000 MG: 500 TABLET, FILM COATED ORAL at 05:07

## 2023-07-21 RX ADMIN — IOHEXOL 75 ML: 350 INJECTION, SOLUTION INTRAVENOUS at 06:07

## 2023-07-21 RX ADMIN — PIPERACILLIN AND TAZOBACTAM 4.5 G: 4; .5 INJECTION, POWDER, LYOPHILIZED, FOR SOLUTION INTRAVENOUS; PARENTERAL at 01:07

## 2023-07-21 RX ADMIN — SOYBEAN OIL 250 ML: 20 INJECTION, SOLUTION INTRAVENOUS at 10:07

## 2023-07-21 RX ADMIN — PANTOPRAZOLE SODIUM 40 MG: 40 INJECTION, POWDER, FOR SOLUTION INTRAVENOUS at 02:07

## 2023-07-21 RX ADMIN — RALOXIFENE HYDROCHLORIDE 60 MG: 60 TABLET, FILM COATED ORAL at 08:07

## 2023-07-21 RX ADMIN — HYDROMORPHONE HYDROCHLORIDE 1 MG: 1 INJECTION, SOLUTION INTRAMUSCULAR; INTRAVENOUS; SUBCUTANEOUS at 03:07

## 2023-07-21 RX ADMIN — PIPERACILLIN AND TAZOBACTAM 4.5 G: 4; .5 INJECTION, POWDER, LYOPHILIZED, FOR SOLUTION INTRAVENOUS; PARENTERAL at 08:07

## 2023-07-21 RX ADMIN — MIDAZOLAM HYDROCHLORIDE 1 MG: 1 INJECTION, SOLUTION INTRAMUSCULAR; INTRAVENOUS at 01:07

## 2023-07-21 RX ADMIN — AMLODIPINE BESYLATE 5 MG: 5 TABLET ORAL at 08:07

## 2023-07-21 RX ADMIN — MIDAZOLAM HYDROCHLORIDE 1 MG: 1 INJECTION, SOLUTION INTRAMUSCULAR; INTRAVENOUS at 02:07

## 2023-07-21 RX ADMIN — Medication 10 ML: at 12:07

## 2023-07-21 RX ADMIN — ONDANSETRON 4 MG: 2 INJECTION INTRAMUSCULAR; INTRAVENOUS at 01:07

## 2023-07-21 RX ADMIN — HEPARIN SODIUM 5000 UNITS: 5000 INJECTION INTRAVENOUS; SUBCUTANEOUS at 05:07

## 2023-07-21 RX ADMIN — Medication 10 ML: at 05:07

## 2023-07-21 NOTE — PROGRESS NOTES
[facility] Medicine  Progress Note    Patient Name: Marilee Twonsend  MRN: [id number]  Patient Class: IP- Inpatient   Admission Date: 7/14/2023  Length of Stay: 7 days  Attending Physician: Adriane Jang DO  Primary Care Provider: Claudia Hopper DO        Subjective:     Principal Problem:Small bowel obstruction        HPI:  63F with pmh of depression, fibromyalgia, htn, osteoporosis who presents due to several day h/o abd pain and nausea with anorexia. Pt was evaluated at outside emergency department patient was seen in the day prompting the ED for evaluation.  Patient denies any recent flatulence or bowel movements unclear on the last date.  Patient states pain is diffuse in the with minimal improvement with pain medications given in the ED. CT scan in the emergency department with demonstration of small bowel obstruction and surgery consulted who had NG tube placed.  Patient denies chest pain, shortness a breath, fever, chills.  Patient states she had a shoulder surgery about 3 weeks prior to arrival and tolerated the anesthesia well.  Patient is a former smoker, but denies alcohol or drug use.      Overview/Hospital Course:  63F with pmh of depression, fibromyalgia, htn, osteoporosis admitted on 07/07/2023 for small bowel obstruction. presented with a several day history of abdominal pain, nausea, vomiting, and PO intolerance.  CT scan in ED with demonstration of small bowel obstruction and surgery consulted who had NG tube placed. Attempts at gastrografin with persistent obstruction. Pt taken to OR on 7/16. NG tube remained after operation and surgery managing. She is now status post exploratory laparotomy on 07/16/2023 with multiple small bowel resections and anastomosis, evidence of enterotomy with over-sewing. Replace electrolytes as needed. Awaiting for bowel functions to return. Had BM on 07/21/2023. PT/OT consulted      Interval History:  No acute overnight events.  Patient  remained afebrile.  Admits nausea improving but no vomiting. Has multiple BM this AM. WBC continued to rise so CT abdomen ordered.  CT showed pelvic fluid collection. IR consulted. ID consulted    Review of Systems   Constitutional:  Negative for chills, fatigue and fever.   HENT:  Positive for postnasal drip and sore throat.    Respiratory:  Negative for cough, chest tightness and shortness of breath.    Cardiovascular:  Negative for chest pain, palpitations and leg swelling.   Gastrointestinal:  Positive for abdominal pain (mild) and nausea. Negative for diarrhea and vomiting.   Genitourinary:  Negative for difficulty urinating and dysuria.   Musculoskeletal:  Negative for joint swelling.   Neurological:  Negative for dizziness, weakness and headaches.     Objective:     Vital Signs (Most Recent):  Temp: 98.5 °F (36.9 °C) (07/21/23 1139)  Pulse: 96 (07/21/23 1139)  Resp: 18 (07/21/23 1139)  BP: 121/69 (07/21/23 1139)  SpO2: 96 % (07/21/23 1139) Vital Signs (24h Range):  Temp:  [97.5 °F (36.4 °C)-98.5 °F (36.9 °C)] 98.5 °F (36.9 °C)  Pulse:  [] 96  Resp:  [18-26] 18  SpO2:  [93 %-96 %] 96 %  BP: (121-133)/(69-81) 121/69     Weight: 45.4 kg (100 lb)  Body mass index is 18.29 kg/m².    Intake/Output Summary (Last 24 hours) at 7/21/2023 1223  Last data filed at 7/21/2023 0655  Gross per 24 hour   Intake 0 ml   Output 1600 ml   Net -1600 ml           Physical Exam  Vitals and nursing note reviewed.   Constitutional:       General: She is not in acute distress.     Appearance: She is ill-appearing.   HENT:      Nose:      Comments: NG tube with dark output     Mouth/Throat:      Mouth: Mucous membranes are dry.   Cardiovascular:      Rate and Rhythm: Normal rate and regular rhythm.   Pulmonary:      Effort: Pulmonary effort is normal. No respiratory distress.      Breath sounds: No wheezing or rales.   Abdominal:      General: There is distension.      Palpations: Abdomen is soft.      Tenderness: There is  abdominal tenderness (mild tenderenss).      Comments: Abdomen soft, mildly distended  Midline incision with dressing in place  Abdomen mildly tender today    Musculoskeletal:         General: Normal range of motion.      Right lower leg: No edema.      Left lower leg: No edema.   Skin:     General: Skin is warm and dry.   Neurological:      General: No focal deficit present.      Mental Status: She is alert and oriented to person, place, and time.   Psychiatric:         Mood and Affect: Mood normal.         Thought Content: Thought content normal.           Significant Labs: All pertinent labs within the past 24 hours have been reviewed.    Significant Imaging: I have reviewed all pertinent imaging results/findings within the past 24 hours.      Assessment/Plan:      * Small bowel obstruction  63F presenting with several days of worsening abd pain, anorexia, and nausea.  CT abd/pelvis with Abnormal fluid, air distension small bowel, termination point at or near ileocecal valve, not well-defined, associated less distension proximal small bowel with some bowel wall thickening.  Adhesion etiology of obstructive process favored.    -Surgery team consulted: s/p exploratory laparotomy on 07/16/2023 with multiple small bowel resections and anastomosis, evidence of enterotomy with over-sewing.  -NG tube in place to LIWS  -continue TPN per surgery   -Pt with worsening leukocytosis, CT abdomen repeated  -CT abdomen: there is now a moderate to large quantity of intraperitoneal fluid, with new enhancement of the adjacent portions of the parietal peritoneum, suspicious for peritonitis.   -IR consulted for drainage of pelvic collection  -ID consulted  -Analgesics ordered prn   -npo diet  -Pt with BM on 07/21      HTN (hypertension)  Currently controlled.  Continue home norvasc  Will continue to monitor      Leukocytosis  -In setting of acute small-bowel obstruction and no definitive findings of infection appears likely reactive,  but low threshold for starting antibiotics.   -LA negative x2.  -procal mildly elevated, WBC# trended up to 14 on 07/20 and to 17 on 07/21  -Repeat CT abdomen with fluid in the pelvic  -IR consulted  -ID consulted   -Continue on zosyn for now  -tx as stated above for sbo      Depression  Restart home medications as indicated- setraline 100mg         Osteoporosis  Continue home medications      Hypokalemia  -Replace as needed  -TPN per surgery       Atelectasis  -CT abdomen:  Opacities in the adjacent dorsal aspect of either lower lobe most likely represent atelectasis.   -Encourage IS   -PT/OT consulted      Weakness  -PT/OT consulted      Advanced care planning/counseling discussion  Advance Care Planning     Date: 07/19/2023    Code Status  I engaged the the patient in a voluntary conversation about the patient's preferences for care  at the very end of life. The patient wishes to have CPR and other invasive treatments performed when her heart and/or breathing stops. I communicated to the patient that her wishes align with full code status.  I spent a total of 16 minutes engaging the patient in this advance care planning discussion.        Code Status: Full Code          Body mass index (BMI) less than 19  Body mass index is 18.29 kg/m².  Continue TPN per surgery         VTE Risk Mitigation (From admission, onward)         Ordered     heparin (porcine) injection 5,000 Units  Every 8 hours         07/14/23 1428     IP VTE LOW RISK PATIENT  Once         07/14/23 1236     Place sequential compression device  Until discontinued         07/14/23 1236                Discharge Planning   WIL: 7/23/2023     Code Status: Full Code   Is the patient medically ready for discharge?:     Reason for patient still in hospital (select all that apply): Patient trending condition, Treatment, Consult recommendations and PT / OT recommendations  Discharge Plan A: Home   Discharge Delays: None known at this time              Adriane MENON  DO [place]  Department of Hospital Medicine   [facility]

## 2023-07-21 NOTE — PLAN OF CARE
Problem: Infection  Goal: Absence of Infection Signs and Symptoms  Intervention: Prevent or Manage Infection  Flowsheets (Taken 7/21/2023 1841)  Infection Management: cultures obtained and sent to lab     Problem: Pain Acute  Goal: Acceptable Pain Control and Functional Ability  Intervention: Develop Pain Management Plan  Flowsheets (Taken 7/21/2023 1841)  Pain Management Interventions:   care clustered   diversional activity provided   medication offered but refused   pain management plan reviewed with patient/caregiver

## 2023-07-21 NOTE — CONSULTS
Inpatient Radiology Pre-procedure Note    History of Present Illness:  Marilee Townsend is a 63 y.o. female admitted to [facility] with SBO s/p exploratory laparotomy and partial small bowel resection with increasing leukocytosis noted post-op and new imaging revealing a loculated complex pelvic fluid collection with surrounding thin enhancing rind measuring up to 14.4 x 13.2 x 7.0-cm in maximal AP x TV x CC diameters which may reflect seroma, hematoma or abscess and requiring image-guided decompression and fluid-smapling for symptom relief, source control, diagnosis and treatment planning.    A new inpatient IR consult received for CT-guided placement of a Rt transgluteal-approach drainage catheter into the complex pelvic fluid collection.    Admission H&P reviewed.  Past Medical History:   Diagnosis Date    Arthritis     Depression     Encounter for blood transfusion     as a child    Fibromyalgia     Neck pain     Rectal prolapse      Past Surgical History:   Procedure Laterality Date    BREAST SURGERY Bilateral     augmentation-Implants    DIAGNOSTIC LAPAROSCOPY N/A 7/16/2023    Procedure: LAPAROSCOPY, DIAGNOSTIC;  Surgeon: Bora Quevedo MD;  Location: [facility] OR;  Service: General;  Laterality: N/A;    epidural steroid injection  01/09/2017    twice-11/2017  & 1/9/17 to neck    EXCISION, SMALL INTESTINE  7/16/2023    Procedure: EXCISION, SMALL INTESTINE;  Surgeon: Bora Quevedo MD;  Location: [facility] OR;  Service: General;;    HERNIA REPAIR      umbilical    HYSTERECTOMY      LAPAROTOMY, EXPLORATORY N/A 7/16/2023    Procedure: LAPAROTOMY, EXPLORATORY;  Surgeon: Bora Quevedo MD;  Location: [facility] OR;  Service: General;  Laterality: N/A;    ROTATOR CUFF REPAIR Bilateral     TONSILLECTOMY      TOTAL SHOULDER ARTHROPLASTY Bilateral     WRIST FRACTURE SURGERY Right     with hardware placed     Review of Systems:   As documented in primary team H&P    Home Meds:   Prior to Admission medications    Medication Sig  Start Date End Date Taking? Authorizing Provider   amLODIPine (NORVASC) 5 MG tablet once daily. 11/8/22  Yes Historical Provider   buPROPion (WELLBUTRIN SR) 150 MG TBSR 12 hr tablet Take 150 mg by mouth 2 (two) times daily. 1/28/23  Yes Historical Provider   ergocalciferol (ERGOCALCIFEROL) 50,000 unit Cap Take 1 capsule (50,000 Units total) by mouth every Sunday. 6/4/23  Yes Claudia Hopper,    HYDROcodone-acetaminophen (NORCO)  mg per tablet Take 1 tablet by mouth every 6 (six) hours as needed. 4/28/23  Yes Historical Provider   lisinopriL-hydrochlorothiazide (PRINZIDE,ZESTORETIC) 10-12.5 mg per tablet Take 1 tablet by mouth. 1/28/23  Yes Historical Provider   meloxicam (MOBIC) 15 MG tablet Take 15 mg by mouth once daily. Instructed to stop until after procedure 1-9-17.   Yes Historical Provider   raloxifene (EVISTA) 60 mg tablet Take 60 mg by mouth once daily. 2/3/23  Yes Historical Provider   sertraline (ZOLOFT) 100 MG tablet Take 100 mg by mouth every morning.     Historical Provider     Scheduled Meds:    acetaminophen  1,000 mg Oral Q8H    amLODIPine  5 mg Oral Daily    buPROPion  150 mg Oral BID    fat emulsion  250 mL Intravenous Once    heparin (porcine)  5,000 Units Subcutaneous Q8H    ibuprofen  800 mg Oral Q8H    metronidazole  500 mg Intravenous Q8H    piperacillin-tazobactam (Zosyn) IV (PEDS and ADULTS) (extended infusion is not appropriate)  4.5 g Intravenous Q8H    raloxifene  60 mg Oral Daily    sertraline  100 mg Oral QAM    sodium chloride 0.9%  10 mL Intravenous Q6H     Continuous Infusions:    Amino acid 5% - dextrose 15% (CLINIMIX-E) solution with additives. CENTRAL LINE ONLY (1395 mOsm/L). 1L provides 50 gm AA, 150 gm CHO (510 kcal/L dextrose), Na 35, K 30, Mg 5, Ca 4.5, Acetate 80, Cl 39, Phos 15 75 mL/hr at 07/20/23 2220     PRN Meds:sodium chloride, acetaminophen, acetaminophen, dextrose 50%, dextrose 50%, diatrizoate meglumineand-diatrizoate sodium, glucagon (human recombinant),  glucose, glucose, HYDROmorphone, HYDROmorphone, insulin aspart U-100, naloxone, ondansetron, sodium chloride 0.9%, Flushing PICC Protocol **AND** sodium chloride 0.9% **AND** sodium chloride 0.9%    Anticoagulants/Antiplatelets: Heparin    Allergies:   Review of patient's allergies indicates:   Allergen Reactions    Morphine Nausea And Vomiting     Sedation Hx: have not been any systemic reactions    Labs:  No results for input(s): INR in the last 168 hours.    Invalid input(s):  PT,  PTT    Recent Labs   Lab 07/21/23 0513   WBC 17.55*   HGB 7.0*   HCT 21.0*   MCV 91         Recent Labs   Lab 07/21/23 0513   *      K 3.8      CO2 26   BUN 15   CREATININE 0.5   CALCIUM 7.7*   MG 1.8   ALT 25   AST 43*   ALBUMIN 1.8*   BILITOT 0.2     Vitals:  Temp: 98.3 °F (36.8 °C) (07/21/23 0838)  Pulse: 97 (07/21/23 0838)  Resp: 18 (07/21/23 0838)  BP: 132/73 (07/21/23 0838)  SpO2: 95 % (07/21/23 0838)     Physical Exam:  ASA: III  Mallampati: II    General: no acute distress  Mental Status: alert and oriented to person, place and time  HEENT: normocephalic, atraumatic  Chest: unlabored breathing  Heart: regular heart rate  Abdomen: nondistended  Extremity: moves all extremities    A/P:  63 y.o. female admitted to [facility] with SBO s/p exploratory laparotomy and partial small bowel resection with increasing leukocytosis noted post-op and new imaging revealing a loculated complex pelvic fluid collection with surrounding thin enhancing rind measuring up to 14.4 x 13.2 x 7.0-cm in maximal AP x TV x CC diameters which may reflect seroma, hematoma or abscess and requiring image-guided decompression and fluid-smapling for symptom relief, source control, diagnosis and treatment planning.    Complex pelvic fluid collection - Will attempt CT-guided placement of a Rt transgluteal-approach drainage catheter into the complex pelvic fluid collection with local anesthetic and moderate conscious sedation at  approximately 1300 today.    Please continue to hold all non-essential anti-platelets, anti-coagulants and keep pt NPO.    Please place all pertinent fluid studies in epic prior to the procedure to ensure that the studies the ordering provider/team deem appropriate are performed.\    Risks (including, but not limited to, pain, bleeding, infection, damage to nearby structures, failure to obtain sufficient material for a diagnosis, the need for additional procedures, and death), benefits, and alternatives were discussed with the patient. All questions were answered to the best of my abilities. The patient wishes to proceed with the procedure. Written informed consent was obtained.    Thank you for considering IR for the care of your patient.     Steven Minor MD  Interventional Radiology

## 2023-07-21 NOTE — NURSING
Returned to unit via bed from IR. Accordion drain placed instructed patient and her brother on how to charge drain for suctioning. Scant amount sanguineous present.

## 2023-07-21 NOTE — ASSESSMENT & PLAN NOTE
Marilee Townsend is a 63yoF with a history of HTN and rectal prolapse who presents with a several day history of abdominal pain, nausea, vomiting, and PO intolerance. Her work-up is consistent with a small bowel obstruction. General surgery consulted for evaluation.  She is now status post exploratory laparotomy on 07/16/2023 with multiple small bowel resections and anastomosis, evidence of enterotomy with over-sewing.    - patient seen and examined  - vitals, labs and imaging reviewed   - WBC continues to rise; 17 this morning   - CT scan this morning demonstrates an anterior fluid collection within the pelvis    - IR consulted for possible drain placement  - continue TPN; reordered  - patient with several loose stools overnight; contrast throughout colon this morning confirming bowel function   - plan for NG tube removal after a PO challenge with NG tube clamped   - if patient remains nauseous after PO challenge, will plan to continue NG tube  - NPO while NG tube in place; if removed, ok for clear liquid diet   - would recommend boost breeze with every meal once NG tube removed  - serial abdominal exams  - continue IV antibiotics  - 1u pRBC today; consent obtained  - DVT ppx  - PT/OT-- it is imperative that this patient gets out of bed and walks  - please call with questions

## 2023-07-21 NOTE — NURSING
[facility], [facility]  Nurses Note -- 4 Eyes      7/20/2023       Skin assessed on: Q Shift      [x] No Pressure Injuries Present    []Prevention Measures Documented    [] Yes LDA  for Pressure Injury Previously documented     [] Yes New Pressure Injury Discovered   [] LDA for New Pressure Injury Added      Attending RN:  Soledad Gilbert RN     Second RN:  Evelyn Jean LPN

## 2023-07-21 NOTE — NURSING
NGT removed per doctor.  Refused antiemetics and analgesics. States feels better after NGT was removed

## 2023-07-21 NOTE — ASSESSMENT & PLAN NOTE
-CT abdomen:  Opacities in the adjacent dorsal aspect of either lower lobe most likely represent atelectasis.   -Encourage IS   -PT/OT consulted

## 2023-07-21 NOTE — NURSING
Unit of PRBC infusing at this time . Complains of black tarry stool. GI evaluating awaiting decision regarding EDG

## 2023-07-21 NOTE — SUBJECTIVE & OBJECTIVE
Interval History:  No acute overnight events.  Patient remained afebrile.  Admits nausea improving but no vomiting. Has multiple BM this AM. WBC continued to rise so CT abdomen ordered.  CT showed pelvic fluid collection. IR consulted. ID consulted    Review of Systems   Constitutional:  Negative for chills, fatigue and fever.   HENT:  Positive for postnasal drip and sore throat.    Respiratory:  Negative for cough, chest tightness and shortness of breath.    Cardiovascular:  Negative for chest pain, palpitations and leg swelling.   Gastrointestinal:  Positive for abdominal pain (mild) and nausea. Negative for diarrhea and vomiting.   Genitourinary:  Negative for difficulty urinating and dysuria.   Musculoskeletal:  Negative for joint swelling.   Neurological:  Negative for dizziness, weakness and headaches.     Objective:     Vital Signs (Most Recent):  Temp: 98.5 °F (36.9 °C) (07/21/23 1139)  Pulse: 96 (07/21/23 1139)  Resp: 18 (07/21/23 1139)  BP: 121/69 (07/21/23 1139)  SpO2: 96 % (07/21/23 1139) Vital Signs (24h Range):  Temp:  [97.5 °F (36.4 °C)-98.5 °F (36.9 °C)] 98.5 °F (36.9 °C)  Pulse:  [] 96  Resp:  [18-26] 18  SpO2:  [93 %-96 %] 96 %  BP: (121-133)/(69-81) 121/69     Weight: 45.4 kg (100 lb)  Body mass index is 18.29 kg/m².    Intake/Output Summary (Last 24 hours) at 7/21/2023 1223  Last data filed at 7/21/2023 0655  Gross per 24 hour   Intake 0 ml   Output 1600 ml   Net -1600 ml           Physical Exam  Vitals and nursing note reviewed.   Constitutional:       General: She is not in acute distress.     Appearance: She is ill-appearing.   HENT:      Nose:      Comments: NG tube with dark output     Mouth/Throat:      Mouth: Mucous membranes are dry.   Cardiovascular:      Rate and Rhythm: Normal rate and regular rhythm.   Pulmonary:      Effort: Pulmonary effort is normal. No respiratory distress.      Breath sounds: No wheezing or rales.   Abdominal:      General: There is distension.       Palpations: Abdomen is soft.      Tenderness: There is abdominal tenderness (mild tenderenss).      Comments: Abdomen soft, mildly distended  Midline incision with dressing in place  Abdomen mildly tender today    Musculoskeletal:         General: Normal range of motion.      Right lower leg: No edema.      Left lower leg: No edema.   Skin:     General: Skin is warm and dry.   Neurological:      General: No focal deficit present.      Mental Status: She is alert and oriented to person, place, and time.   Psychiatric:         Mood and Affect: Mood normal.         Thought Content: Thought content normal.           Significant Labs: All pertinent labs within the past 24 hours have been reviewed.    Significant Imaging: I have reviewed all pertinent imaging results/findings within the past 24 hours.

## 2023-07-21 NOTE — ASSESSMENT & PLAN NOTE
-In setting of acute small-bowel obstruction and no definitive findings of infection appears likely reactive, but low threshold for starting antibiotics.   -LA negative x2.  -procal mildly elevated, WBC# trended up to 14 on 07/20 and to 17 on 07/21  -Repeat CT abdomen with fluid in the pelvic  -IR consulted  -ID consulted   -Continue on zosyn for now  -tx as stated above for sbo

## 2023-07-21 NOTE — ASSESSMENT & PLAN NOTE
63F presenting with several days of worsening abd pain, anorexia, and nausea.  CT abd/pelvis with Abnormal fluid, air distension small bowel, termination point at or near ileocecal valve, not well-defined, associated less distension proximal small bowel with some bowel wall thickening.  Adhesion etiology of obstructive process favored.    -Surgery team consulted: s/p exploratory laparotomy on 07/16/2023 with multiple small bowel resections and anastomosis, evidence of enterotomy with over-sewing.  -NG tube in place to LIWS  -continue TPN per surgery   -Pt with worsening leukocytosis, CT abdomen repeated  -CT abdomen: there is now a moderate to large quantity of intraperitoneal fluid, with new enhancement of the adjacent portions of the parietal peritoneum, suspicious for peritonitis.   -IR consulted for drainage of pelvic collection  -ID consulted  -Analgesics ordered prn   -npo diet  -Pt with BM on 07/21

## 2023-07-21 NOTE — HPI
63F with h/o depression, fibromyalgia, htn, prior abdominal surgeries admitted 7/14 with progressive intractable abdominal pain, decreased appetite, nausea. Reports OSH ED visit for the same, progressed, so returned. Denies fever. Found to have SBO s/p surgical repair. Reports abdomen tender, but better than admit. Reports black tarry stools. Denies issues with iv lines. Denies dysuria. About to go down to IR    Afebrile    Day 6 zosyn/flagyl    Wbc rising    Bcx negative    On 7/16 taken for   LAPAROSCOPY, DIAGNOSTIC (N/A)  LAPAROTOMY, EXPLORATORY (N/A)  lysis of adhesions, small bowel resection repair of enterotomies    Found to have sbo, thought to be secondary to adhesions and kinking with multiple enterotomies      Ct repeated today  Interval resection of abnormal pelvic small bowel with diminished but not fully resolved small bowel dilatation.  A new small bowel transition point is questioned in the vicinity of the new intrapelvic enteroenteric anastomosis.  Correlate clinically, and with follow-up imaging.     There is a moderate quantity of intraperitoneal fluid in the pelvis.  Its attenuation of up to 26 HU suggests proteinaceous fluid (DDX: Purulent debris, dilute blood products, extraluminal succus entericus etc.).  New diffuse enhancement of the adjacent portions of the parietal peritoneum raises suspicion for peritonitis.  Correlate clinically.     Gas with urinary bladder lumen is most likely related to recent catheterization.  In the absence of any recent instrumentation, a gas-forming infection or enterocystic fistula might also be considerations.  Correlate clinically.     New small bilateral pleural effusions.  Opacities in the adjacent dorsal aspect of either lower lobe most likely represent atelectasis.  Pneumonia not fully excluded.  Correlate clinically, and with follow-up imaging.     New patchy hazy ground-glass opacities in the lingula and right middle lobe possibly represent pulmonary  "edema, small airways disease, atypical pneumonia, aspiration, or other abnormality.  Correlate clinically, and with follow-up imaging.      IR consulted for abdominal drain placement for complex fluid collection, cultures ordered    Gi consulted for tarry stools; anemia    ID consulted for "worsening leukocytosis; s/p bowel resection on 7/16  "

## 2023-07-21 NOTE — PROGRESS NOTES
[facility]  General Surgery  Progress Note    Subjective:     History of Present Illness:  Marilee Townsend is a 63yoF with a history of hypertension, rectal prolapse s/p rectopexy who presents to [facility] with complaints of nausea, vomiting and worsening abdominal pain. Of note, she presented to an outside hospital with the same complaints yesterday and was discharged with instructions to follow up with her primary care provider. The patient describes a three-day history of abdominal pain, nausea, vomiting and PO intolerance. This has persistently worsened throughout this time, which prompted both presentations. She states that she did have a small bowel movement yesterday, described as hard pellets. On work-up, her vital signs are stable. She has an elevated leukocytosis to 18. Lactate normal at 1. Repeat ordered. Her CT scan demonstrates dilated small bowel consistent with a small bowel obstruction. There is normal caliber small intestine distally. She is admitted to the hospital medicine service. General surgery consulted for evaluation.     Of note, her WBC has increased from 12.8 to 18 over the last 24hrs. Imaging at the outside hospital, per the official read, did not demonstrate any degree of obstruction, which is a rather burk contrast from her CT scan at our facility. Plan for NG tube insertion with low threshold to proceed to operating room for diagnostic laparoscopy. Discussed this plan with the patient.       Post-Op Info:  Procedure(s) (LRB):  LAPAROSCOPY, DIAGNOSTIC (N/A)  LAPAROTOMY, EXPLORATORY (N/A)  EXCISION, SMALL INTESTINE   5 Days Post-Op     Interval History: Patient seen and examined this morning. Vitals remain stable. WBC continued to rise-- prompting CT scan this morning. CT demonstrates contrast throughout the colon, which confirms bowel function, as well as a pelvic fluid collection. IR consulted.     Medications:  Continuous Infusions:   Amino acid 5% - dextrose 15%  (CLINIMIX-E) solution with additives. CENTRAL LINE ONLY (1395 mOsm/L). 1L provides 50 gm AA, 150 gm CHO (510 kcal/L dextrose), Na 35, K 30, Mg 5, Ca 4.5, Acetate 80, Cl 39, Phos 15 75 mL/hr at 07/20/23 2220     Scheduled Meds:   acetaminophen  1,000 mg Oral Q8H    amLODIPine  5 mg Oral Daily    buPROPion  150 mg Oral BID    fat emulsion  250 mL Intravenous Once    heparin (porcine)  5,000 Units Subcutaneous Q8H    ibuprofen  800 mg Oral Q8H    metronidazole  500 mg Intravenous Q8H    piperacillin-tazobactam (Zosyn) IV (PEDS and ADULTS) (extended infusion is not appropriate)  4.5 g Intravenous Q8H    raloxifene  60 mg Oral Daily    sertraline  100 mg Oral QAM    sodium chloride 0.9%  10 mL Intravenous Q6H     PRN Meds:sodium chloride, acetaminophen, acetaminophen, dextrose 50%, dextrose 50%, diatrizoate meglumineand-diatrizoate sodium, glucagon (human recombinant), glucose, glucose, HYDROmorphone, HYDROmorphone, insulin aspart U-100, naloxone, ondansetron, sodium chloride 0.9%, Flushing PICC Protocol **AND** sodium chloride 0.9% **AND** sodium chloride 0.9%     Review of patient's allergies indicates:   Allergen Reactions    Morphine Nausea And Vomiting     Objective:     Vital Signs (Most Recent):  Temp: 98.1 °F (36.7 °C) (07/21/23 0530)  Pulse: 92 (07/21/23 0530)  Resp: (!) 22 (07/21/23 0530)  BP: 125/74 (07/21/23 0530)  SpO2: (!) 94 % (07/21/23 0530) Vital Signs (24h Range):  Temp:  [97.5 °F (36.4 °C)-98.5 °F (36.9 °C)] 98.1 °F (36.7 °C)  Pulse:  [] 92  Resp:  [18-26] 22  SpO2:  [92 %-94 %] 94 %  BP: (125-133)/(65-81) 125/74     Weight: 45.4 kg (100 lb)  Body mass index is 18.29 kg/m².    Intake/Output - Last 3 Shifts         07/19 0700 07/20 0659 07/20 0700 07/21 0659 07/21 0700 07/22 0659    P.O. 0 0     I.V. (mL/kg)       NG/GT       TPN       Total Intake(mL/kg) 0 (0) 0 (0)     Urine (mL/kg/hr) 1200 (1.1) 1150 (1.1)     Drains 500 750     Other       Stool 0 0     Total Output 1700 1900      Net -1700 -1900            Urine Occurrence 4 x 2 x     Stool Occurrence 4 x 7 x              Physical Exam  Vitals and nursing note reviewed.   Constitutional:       General: She is not in acute distress.     Appearance: Normal appearance.   HENT:      Nose:      Comments: NG tube with dark output  Cardiovascular:      Rate and Rhythm: Normal rate and regular rhythm.   Pulmonary:      Effort: Pulmonary effort is normal. No respiratory distress.   Abdominal:      Comments: Abdomen soft, mildly distended,  Midline incision without concern  Abdominal tenderness largely resolved; soreness present   Musculoskeletal:         General: Normal range of motion.   Skin:     General: Skin is warm.   Neurological:      General: No focal deficit present.      Mental Status: She is alert.        Significant Labs:  I have reviewed all pertinent lab results within the past 24 hours.  CBC:   Recent Labs   Lab 07/21/23  0513   WBC 17.55*   RBC 2.31*   HGB 7.0*   HCT 21.0*      MCV 91   MCH 30.3   MCHC 33.3     CMP:   Recent Labs   Lab 07/21/23  0513   *   CALCIUM 7.7*   ALBUMIN 1.8*   PROT 5.1*      K 3.8   CO2 26      BUN 15   CREATININE 0.5   ALKPHOS 70   ALT 25   AST 43*   BILITOT 0.2       Significant Diagnostics:  I have reviewed all pertinent imaging results/findings within the past 24 hours.  CT scan with fluid collection in anterior pelvis; IR consulted    Assessment/Plan:     * Small bowel obstruction  Marilee Townsend is a 63yoF with a history of HTN and rectal prolapse who presents with a several day history of abdominal pain, nausea, vomiting, and PO intolerance. Her work-up is consistent with a small bowel obstruction. General surgery consulted for evaluation.  She is now status post exploratory laparotomy on 07/16/2023 with multiple small bowel resections and anastomosis, evidence of enterotomy with over-sewing.    - patient seen and examined  - vitals, labs and imaging reviewed   - WBC  continues to rise; 17 this morning   - CT scan this morning demonstrates an anterior fluid collection within the pelvis    - IR consulted for possible drain placement  - continue TPN; reordered  - patient with several loose stools overnight; contrast throughout colon this morning confirming bowel function   - plan for NG tube removal after a PO challenge with NG tube clamped   - if patient remains nauseous after PO challenge, will plan to continue NG tube  - NPO while NG tube in place; if removed, ok for clear liquid diet   - would recommend boost breeze with every meal once NG tube removed  - serial abdominal exams  - continue IV antibiotics  - 1u pRBC today; consent obtained  - DVT ppx  - PT/OT-- it is imperative that this patient gets out of bed and walks  - please call with questions        Manuel Wilson MD  General Surgery  [facility] S[facility]

## 2023-07-21 NOTE — SUBJECTIVE & OBJECTIVE
Interval History: Patient seen and examined this morning. Vitals remain stable. WBC continued to rise-- prompting CT scan this morning. CT demonstrates contrast throughout the colon, which confirms bowel function, as well as a pelvic fluid collection. IR consulted.     Medications:  Continuous Infusions:   Amino acid 5% - dextrose 15% (CLINIMIX-E) solution with additives. CENTRAL LINE ONLY (1395 mOsm/L). 1L provides 50 gm AA, 150 gm CHO (510 kcal/L dextrose), Na 35, K 30, Mg 5, Ca 4.5, Acetate 80, Cl 39, Phos 15 75 mL/hr at 07/20/23 2220     Scheduled Meds:   acetaminophen  1,000 mg Oral Q8H    amLODIPine  5 mg Oral Daily    buPROPion  150 mg Oral BID    fat emulsion  250 mL Intravenous Once    heparin (porcine)  5,000 Units Subcutaneous Q8H    ibuprofen  800 mg Oral Q8H    metronidazole  500 mg Intravenous Q8H    piperacillin-tazobactam (Zosyn) IV (PEDS and ADULTS) (extended infusion is not appropriate)  4.5 g Intravenous Q8H    raloxifene  60 mg Oral Daily    sertraline  100 mg Oral QAM    sodium chloride 0.9%  10 mL Intravenous Q6H     PRN Meds:sodium chloride, acetaminophen, acetaminophen, dextrose 50%, dextrose 50%, diatrizoate meglumineand-diatrizoate sodium, glucagon (human recombinant), glucose, glucose, HYDROmorphone, HYDROmorphone, insulin aspart U-100, naloxone, ondansetron, sodium chloride 0.9%, Flushing PICC Protocol **AND** sodium chloride 0.9% **AND** sodium chloride 0.9%     Review of patient's allergies indicates:   Allergen Reactions    Morphine Nausea And Vomiting     Objective:     Vital Signs (Most Recent):  Temp: 98.1 °F (36.7 °C) (07/21/23 0530)  Pulse: 92 (07/21/23 0530)  Resp: (!) 22 (07/21/23 0530)  BP: 125/74 (07/21/23 0530)  SpO2: (!) 94 % (07/21/23 0530) Vital Signs (24h Range):  Temp:  [97.5 °F (36.4 °C)-98.5 °F (36.9 °C)] 98.1 °F (36.7 °C)  Pulse:  [] 92  Resp:  [18-26] 22  SpO2:  [92 %-94 %] 94 %  BP: (125-133)/(65-81) 125/74     Weight: 45.4 kg (100 lb)  Body mass index is  18.29 kg/m².    Intake/Output - Last 3 Shifts         07/19 0700 07/20 0659 07/20 0700 07/21 0659 07/21 0700 07/22 0659    P.O. 0 0     I.V. (mL/kg)       NG/GT       TPN       Total Intake(mL/kg) 0 (0) 0 (0)     Urine (mL/kg/hr) 1200 (1.1) 1150 (1.1)     Drains 500 750     Other       Stool 0 0     Total Output 1700 1900     Net -1700 -1900            Urine Occurrence 4 x 2 x     Stool Occurrence 4 x 7 x              Physical Exam  Vitals and nursing note reviewed.   Constitutional:       General: She is not in acute distress.     Appearance: Normal appearance.   HENT:      Nose:      Comments: NG tube with dark output  Cardiovascular:      Rate and Rhythm: Normal rate and regular rhythm.   Pulmonary:      Effort: Pulmonary effort is normal. No respiratory distress.   Abdominal:      Comments: Abdomen soft, mildly distended,  Midline incision without concern  Abdominal tenderness largely resolved; soreness present   Musculoskeletal:         General: Normal range of motion.   Skin:     General: Skin is warm.   Neurological:      General: No focal deficit present.      Mental Status: She is alert.        Significant Labs:  I have reviewed all pertinent lab results within the past 24 hours.  CBC:   Recent Labs   Lab 07/21/23  0513   WBC 17.55*   RBC 2.31*   HGB 7.0*   HCT 21.0*      MCV 91   MCH 30.3   MCHC 33.3     CMP:   Recent Labs   Lab 07/21/23  0513   *   CALCIUM 7.7*   ALBUMIN 1.8*   PROT 5.1*      K 3.8   CO2 26      BUN 15   CREATININE 0.5   ALKPHOS 70   ALT 25   AST 43*   BILITOT 0.2       Significant Diagnostics:  I have reviewed all pertinent imaging results/findings within the past 24 hours.  CT scan with fluid collection in anterior pelvis; IR consulted

## 2023-07-21 NOTE — NURSING
Unit of PRBC transfusing .No adverse reaction noted. No complains offered. Resting quietly in bed

## 2023-07-21 NOTE — BRIEF OP NOTE
Radiology Post-Procedure Note    Pre Op Diagnosis: Complex pelvic fluid collection  Post Op Diagnosis: Same    Procedure: 1. CT-guided placement of a 10-Fr Rt transgluteal-approach drainage catheter into the complex pelvic fluid collection    Procedure performed by: Steven Minor MD    Written Informed Consent Obtained: Yes  Specimen Removed: YES, 30-cc's of thin, serosanguinous fluid  Estimated Blood Loss: none    Findings:   Successful CT-guided placement of a 10-Fr Rt transgluteal-approach drainage catheter into the complex pelvic fluid collection with local anesthetic and moderate conscious sedation. Patient tolerated the procedure well. No immediate post-procedural complications noted.     Drain must be flushed with 10-cc of sterile NS BID to assist with drainage and prevent catheter clogging for as long as drain remains indwelling. Consider removal of drainage catheter when daily input = daily output (ie output = 20-cc/day) for 2-3 consecutive days.     Thank you for considering IR for the care of your patient.     Steven Minor MD  Interventional Radiology

## 2023-07-21 NOTE — PLAN OF CARE
Problem: Bowel Motility Impaired (Surgery Nonspecified)  Goal: Effective Bowel Elimination  Outcome: Ongoing, Progressing  Intervention: Enhance Bowel Motility and Elimination  Flowsheets (Taken 7/20/2023 1845)  Bowel Elimination Management: toileting offered  Bowel Motility Enhancement: ambulation promoted     Problem: Fluid and Electrolyte Imbalance (Surgery Nonspecified)  Goal: Fluid and Electrolyte Balance  Outcome: Ongoing, Progressing  Intervention: Monitor and Manage Fluid and Electrolyte Balance  Flowsheets (Taken 7/20/2023 1845)  Fluid/Electrolyte Management: (TPN infusing) other (see comments)     Problem: Infection (Surgery Nonspecified)  Goal: Absence of Infection Signs and Symptoms  Outcome: Ongoing, Progressing  Intervention: Prevent or Manage Infection  Flowsheets (Taken 7/20/2023 1845)  Fever Reduction/Comfort Measures:   lightweight bedding   lightweight clothing  Infection Management: aseptic technique maintained     Problem: Pain (Surgery Nonspecified)  Goal: Acceptable Pain Control  Outcome: Ongoing, Progressing  Intervention: Prevent or Manage Pain  Flowsheets (Taken 7/20/2023 1845)  Diversional Activities:   television   smartphone  Pain Management Interventions:   care clustered   diversional activity provided   medication offered   position adjusted   quiet environment facilitated   relaxation techniques promoted   pain management plan reviewed with patient/caregiver     Problem: Postoperative Nausea and Vomiting (Surgery Nonspecified)  Goal: Nausea and Vomiting Relief  Outcome: Ongoing, Progressing  Intervention: Prevent or Manage Nausea and Vomiting  Flowsheets (Taken 7/20/2023 1845)  Nausea/Vomiting Interventions: (Zofran administered)   stimuli minimized   nasogastric tube to suction   other (see comments)     Problem: Respiratory Compromise (Surgery Nonspecified)  Goal: Effective Oxygenation and Ventilation  Outcome: Ongoing, Progressing  Intervention: Optimize Oxygenation and  Ventilation  Flowsheets (Taken 7/20/2023 1845)  Head of Bed (HOB) Positioning: HOB elevated

## 2023-07-21 NOTE — CONSULTS
[facility] Consultation Note    Patient Complaint: tarry stools    PCP:   Claudia Hopper       LOS: 7        Initial History of Present Illness (HPI):  This is a 63 y.o. female consulted to GI service for concern for GI bleed, dark tarry stools, low hgb. PMH hypertension, rectal prolapse s/p rectopexy. Patient complaint of acute onset of painless dark tarry stools with associated symptoms of constipation that began on last night. She reports she had not had a stool since Wednesday of last week prior to her hospitalization and this was her 1st stool post op from her bowel procedure on 7/16. Denies vomiting, hematemesis, acid reflux, BRBPR. Denies smoking or drinking. Long term NSAID use of Mobic daily. Reports having endoscopy more that 10 yrs ago. Reports EGD showing PUD, more than 10 yrs ago, denies taking PPI at home. Reports a hx of bowel obstructions.    Admit labs wbc 18.3, hgb 12.9  7/16 Post op labs wbc 2.2, hgb 11.0  7/18 wbc 7.4, hgb 8.7  Today labs wbc 17.5, hgb 7.0  CT- retroperitoneal fluid collection      Medical History:  has a past medical history of Arthritis, Depression, Encounter for blood transfusion, Fibromyalgia, Neck pain, and Rectal prolapse.    Surgical History:  has a past surgical history that includes Hysterectomy; Tonsillectomy; Hernia repair; epidural steroid injection (01/09/2017); Breast surgery (Bilateral); Wrist fracture surgery (Right); Total shoulder arthroplasty (Bilateral); Rotator cuff repair (Bilateral); Diagnostic laparoscopy (N/A, 7/16/2023); laparotomy, exploratory (N/A, 7/16/2023); and excision, small intestine (7/16/2023).      Objective Findings:    Vital Signs:  Temp:  [98.1 °F (36.7 °C)-98.8 °F (37.1 °C)]   Pulse:  []   Resp:  [12-24]   BP: (102-133)/(60-81)   SpO2:  [91 %-97 %]   Body mass index is 18.29 kg/m².      Physical Exam  Vitals and nursing note reviewed.   Constitutional:       Appearance: She is underweight.   HENT:      Head:  Normocephalic.   Pulmonary:      Effort: Pulmonary effort is normal.   Abdominal:      General: Bowel sounds are normal.      Palpations: Abdomen is soft.   Skin:     General: Skin is warm and dry.   Neurological:      Mental Status: She is alert and oriented to person, place, and time.   Psychiatric:         Mood and Affect: Mood normal.         Behavior: Behavior normal.         Thought Content: Thought content normal.         Judgment: Judgment normal.             Labs:  Lab Results   Component Value Date    WBC 17.55 (H) 07/21/2023    HGB 7.0 (L) 07/21/2023    HCT 21.0 (L) 07/21/2023     07/21/2023    CHOL 266 (H) 03/08/2023    TRIG 76 03/08/2023    HDL 78 (H) 03/08/2023    ALT 25 07/21/2023    AST 43 (H) 07/21/2023     07/21/2023    K 3.8 07/21/2023     07/21/2023    CREATININE 0.5 07/21/2023    BUN 15 07/21/2023    CO2 26 07/21/2023    TSH 3.396 03/08/2023    HGBA1C 5.6 03/08/2023       Imaging: CT abdomen pelvis-Interval resection of abnormal pelvic small bowel with diminished but not fully resolved small bowel dilatation.  A new small bowel transition point is questioned in the vicinity of the new intrapelvic enteroenteric anastomosis.   There is a moderate quantity of intraperitoneal fluid in the pelvis.  Its attenuation of up to 26 HU suggests proteinaceous fluid.Gas with urinary bladder lumen is most likely related to recent catheterization.     I have independently reviewed and interpreted the imaging above           Acute blood loss anemia. SBO-S/p colon resection. GI bleed. Tarry stools. S/p drainage of retroperitoneal fluid collection. Long term NSAID use.  Plan/ Recommendations:  1.  Hgb acutely decreased post op, monitor counts closely. Transfuse if below 7. Patient currently has ibuprofen 800mg scheduled. Hold NSAIDs, agree with IV PPI. Planned for EGD today, however deferred d/t Dr Quevedo's rec to hold d/t recent resection. Ok for clears.   Will continue to follow        Thank  you so much for allowing us to participate in the care of Marilee Townsend . Please contact us if you have any additional questions.    Ghada Rodríguez NP  Gastroenterology  [facility] - [place] - Med Surg

## 2023-07-21 NOTE — NURSING
[facility] [place]  Nurses Note -- 4 Eyes      7/20/2023       Skin assessed on: Q Shift      [x] No Pressure Injuries Present    []Prevention Measures Documented    [] Yes LDA  for Pressure Injury Previously documented     [] Yes New Pressure Injury Discovered   [] LDA for New Pressure Injury Added      Attending RN:  Juventino Gamble RN     Second RN:  MICHAEL Rivas

## 2023-07-21 NOTE — NURSING
Report received and care assumed. Discussed plan of care and safety with patient . Reviewed call system. No acute distress noted  Physician at bedside and discussed  clamp NGT pending removal of NGT. Patient complains of nausea but refused antiemetics. No vomiting noted expectorating phlegm. Refused bath and oral care states she just want to rest at this time.

## 2023-07-21 NOTE — PT/OT/SLP PROGRESS
Occupational Therapy      Patient Name:  Marilee Townsend   MRN:  [id number]    Patient not seen today secondary to Testing/imaging (xray/CT/MRI) (off unit for IR procedure). Will follow-up as able.    7/21/2023

## 2023-07-22 LAB
ALBUMIN SERPL BCP-MCNC: 1.9 G/DL (ref 3.5–5.2)
ALP SERPL-CCNC: 68 U/L (ref 55–135)
ALT SERPL W/O P-5'-P-CCNC: 21 U/L (ref 10–44)
ANION GAP SERPL CALC-SCNC: 9 MMOL/L (ref 8–16)
AST SERPL-CCNC: 25 U/L (ref 10–40)
BASOPHILS # BLD AUTO: 0.04 K/UL (ref 0–0.2)
BASOPHILS NFR BLD: 0.2 % (ref 0–1.9)
BILIRUB SERPL-MCNC: 0.3 MG/DL (ref 0.1–1)
BUN SERPL-MCNC: 13 MG/DL (ref 8–23)
CALCIUM SERPL-MCNC: 7.9 MG/DL (ref 8.7–10.5)
CHLORIDE SERPL-SCNC: 103 MMOL/L (ref 95–110)
CO2 SERPL-SCNC: 23 MMOL/L (ref 23–29)
CREAT SERPL-MCNC: 0.5 MG/DL (ref 0.5–1.4)
DIFFERENTIAL METHOD: ABNORMAL
EOSINOPHIL # BLD AUTO: 0.1 K/UL (ref 0–0.5)
EOSINOPHIL NFR BLD: 0.7 % (ref 0–8)
ERYTHROCYTE [DISTWIDTH] IN BLOOD BY AUTOMATED COUNT: 14 % (ref 11.5–14.5)
EST. GFR  (NO RACE VARIABLE): >60 ML/MIN/1.73 M^2
GLUCOSE SERPL-MCNC: 122 MG/DL (ref 70–110)
HCT VFR BLD AUTO: 26.9 % (ref 37–48.5)
HGB BLD-MCNC: 9.3 G/DL (ref 12–16)
IMM GRANULOCYTES # BLD AUTO: 0.78 K/UL (ref 0–0.04)
IMM GRANULOCYTES NFR BLD AUTO: 4.1 % (ref 0–0.5)
LYMPHOCYTES # BLD AUTO: 1.3 K/UL (ref 1–4.8)
LYMPHOCYTES NFR BLD: 6.9 % (ref 18–48)
MAGNESIUM SERPL-MCNC: 1.9 MG/DL (ref 1.6–2.6)
MCH RBC QN AUTO: 30.6 PG (ref 27–31)
MCHC RBC AUTO-ENTMCNC: 34.6 G/DL (ref 32–36)
MCV RBC AUTO: 89 FL (ref 82–98)
MONOCYTES # BLD AUTO: 0.7 K/UL (ref 0.3–1)
MONOCYTES NFR BLD: 3.5 % (ref 4–15)
NEUTROPHILS # BLD AUTO: 16 K/UL (ref 1.8–7.7)
NEUTROPHILS NFR BLD: 84.6 % (ref 38–73)
NRBC BLD-RTO: 0 /100 WBC
PHOSPHATE SERPL-MCNC: 3.2 MG/DL (ref 2.7–4.5)
PLATELET # BLD AUTO: 246 K/UL (ref 150–450)
PMV BLD AUTO: 10 FL (ref 9.2–12.9)
POCT GLUCOSE: 118 MG/DL (ref 70–110)
POCT GLUCOSE: 129 MG/DL (ref 70–110)
POCT GLUCOSE: 133 MG/DL (ref 70–110)
POCT GLUCOSE: 133 MG/DL (ref 70–110)
POCT GLUCOSE: 149 MG/DL (ref 70–110)
POTASSIUM SERPL-SCNC: 4 MMOL/L (ref 3.5–5.1)
PROT SERPL-MCNC: 5.6 G/DL (ref 6–8.4)
RBC # BLD AUTO: 3.04 M/UL (ref 4–5.4)
SODIUM SERPL-SCNC: 135 MMOL/L (ref 136–145)
WBC # BLD AUTO: 18.92 K/UL (ref 3.9–12.7)

## 2023-07-22 PROCEDURE — 25000003 PHARM REV CODE 250: Performed by: SURGERY

## 2023-07-22 PROCEDURE — 80053 COMPREHEN METABOLIC PANEL: CPT | Performed by: STUDENT IN AN ORGANIZED HEALTH CARE EDUCATION/TRAINING PROGRAM

## 2023-07-22 PROCEDURE — 63600175 PHARM REV CODE 636 W HCPCS: Performed by: STUDENT IN AN ORGANIZED HEALTH CARE EDUCATION/TRAINING PROGRAM

## 2023-07-22 PROCEDURE — 83735 ASSAY OF MAGNESIUM: CPT | Performed by: STUDENT IN AN ORGANIZED HEALTH CARE EDUCATION/TRAINING PROGRAM

## 2023-07-22 PROCEDURE — 97165 OT EVAL LOW COMPLEX 30 MIN: CPT

## 2023-07-22 PROCEDURE — 63600175 PHARM REV CODE 636 W HCPCS: Performed by: SURGERY

## 2023-07-22 PROCEDURE — 11000001 HC ACUTE MED/SURG PRIVATE ROOM

## 2023-07-22 PROCEDURE — 84100 ASSAY OF PHOSPHORUS: CPT | Performed by: STUDENT IN AN ORGANIZED HEALTH CARE EDUCATION/TRAINING PROGRAM

## 2023-07-22 PROCEDURE — 99024 POSTOP FOLLOW-UP VISIT: CPT | Mod: ,,, | Performed by: SURGERY

## 2023-07-22 PROCEDURE — C9113 INJ PANTOPRAZOLE SODIUM, VIA: HCPCS | Performed by: INTERNAL MEDICINE

## 2023-07-22 PROCEDURE — 63600175 PHARM REV CODE 636 W HCPCS: Performed by: INTERNAL MEDICINE

## 2023-07-22 PROCEDURE — 87040 BLOOD CULTURE FOR BACTERIA: CPT | Mod: 59 | Performed by: INTERNAL MEDICINE

## 2023-07-22 PROCEDURE — 99024 PR POST-OP FOLLOW-UP VISIT: ICD-10-PCS | Mod: ,,, | Performed by: SURGERY

## 2023-07-22 PROCEDURE — 97530 THERAPEUTIC ACTIVITIES: CPT

## 2023-07-22 PROCEDURE — 97116 GAIT TRAINING THERAPY: CPT

## 2023-07-22 PROCEDURE — 36415 COLL VENOUS BLD VENIPUNCTURE: CPT | Performed by: INTERNAL MEDICINE

## 2023-07-22 PROCEDURE — 25000003 PHARM REV CODE 250: Performed by: STUDENT IN AN ORGANIZED HEALTH CARE EDUCATION/TRAINING PROGRAM

## 2023-07-22 PROCEDURE — 25000003 PHARM REV CODE 250: Performed by: INTERNAL MEDICINE

## 2023-07-22 PROCEDURE — A4216 STERILE WATER/SALINE, 10 ML: HCPCS | Performed by: STUDENT IN AN ORGANIZED HEALTH CARE EDUCATION/TRAINING PROGRAM

## 2023-07-22 PROCEDURE — 97161 PT EVAL LOW COMPLEX 20 MIN: CPT

## 2023-07-22 PROCEDURE — B4185 PARENTERAL SOL 10 GM LIPIDS: HCPCS | Performed by: SURGERY

## 2023-07-22 PROCEDURE — 85025 COMPLETE CBC W/AUTO DIFF WBC: CPT | Performed by: STUDENT IN AN ORGANIZED HEALTH CARE EDUCATION/TRAINING PROGRAM

## 2023-07-22 RX ADMIN — RALOXIFENE HYDROCHLORIDE 60 MG: 60 TABLET, FILM COATED ORAL at 09:07

## 2023-07-22 RX ADMIN — ONDANSETRON 4 MG: 2 INJECTION INTRAMUSCULAR; INTRAVENOUS at 10:07

## 2023-07-22 RX ADMIN — Medication 10 ML: at 11:07

## 2023-07-22 RX ADMIN — SERTRALINE HYDROCHLORIDE 100 MG: 50 TABLET ORAL at 06:07

## 2023-07-22 RX ADMIN — HYDROMORPHONE HYDROCHLORIDE 1 MG: 1 INJECTION, SOLUTION INTRAMUSCULAR; INTRAVENOUS; SUBCUTANEOUS at 06:07

## 2023-07-22 RX ADMIN — HEPARIN SODIUM 5000 UNITS: 5000 INJECTION INTRAVENOUS; SUBCUTANEOUS at 10:07

## 2023-07-22 RX ADMIN — PIPERACILLIN AND TAZOBACTAM 4.5 G: 4; .5 INJECTION, POWDER, LYOPHILIZED, FOR SOLUTION INTRAVENOUS; PARENTERAL at 01:07

## 2023-07-22 RX ADMIN — HYDROMORPHONE HYDROCHLORIDE 1 MG: 1 INJECTION, SOLUTION INTRAMUSCULAR; INTRAVENOUS; SUBCUTANEOUS at 12:07

## 2023-07-22 RX ADMIN — PIPERACILLIN AND TAZOBACTAM 4.5 G: 4; .5 INJECTION, POWDER, LYOPHILIZED, FOR SOLUTION INTRAVENOUS; PARENTERAL at 09:07

## 2023-07-22 RX ADMIN — MICAFUNGIN SODIUM 100 MG: 100 INJECTION, POWDER, LYOPHILIZED, FOR SOLUTION INTRAVENOUS at 07:07

## 2023-07-22 RX ADMIN — HYDROMORPHONE HYDROCHLORIDE 1 MG: 1 INJECTION, SOLUTION INTRAMUSCULAR; INTRAVENOUS; SUBCUTANEOUS at 07:07

## 2023-07-22 RX ADMIN — PANTOPRAZOLE SODIUM 40 MG: 40 INJECTION, POWDER, FOR SOLUTION INTRAVENOUS at 09:07

## 2023-07-22 RX ADMIN — HYDROMORPHONE HYDROCHLORIDE 1 MG: 1 INJECTION, SOLUTION INTRAMUSCULAR; INTRAVENOUS; SUBCUTANEOUS at 01:07

## 2023-07-22 RX ADMIN — Medication 10 ML: at 05:07

## 2023-07-22 RX ADMIN — Medication 10 ML: at 06:07

## 2023-07-22 RX ADMIN — BUPROPION HYDROCHLORIDE 150 MG: 150 TABLET, EXTENDED RELEASE ORAL at 10:07

## 2023-07-22 RX ADMIN — HYDROMORPHONE HYDROCHLORIDE 1 MG: 1 INJECTION, SOLUTION INTRAMUSCULAR; INTRAVENOUS; SUBCUTANEOUS at 09:07

## 2023-07-22 RX ADMIN — BUPROPION HYDROCHLORIDE 150 MG: 150 TABLET, EXTENDED RELEASE ORAL at 09:07

## 2023-07-22 RX ADMIN — HYDROMORPHONE HYDROCHLORIDE 1 MG: 1 INJECTION, SOLUTION INTRAMUSCULAR; INTRAVENOUS; SUBCUTANEOUS at 10:07

## 2023-07-22 RX ADMIN — ACETAMINOPHEN 1000 MG: 500 TABLET, FILM COATED ORAL at 01:07

## 2023-07-22 RX ADMIN — HYDROMORPHONE HYDROCHLORIDE 1 MG: 1 INJECTION, SOLUTION INTRAMUSCULAR; INTRAVENOUS; SUBCUTANEOUS at 04:07

## 2023-07-22 RX ADMIN — HEPARIN SODIUM 5000 UNITS: 5000 INJECTION INTRAVENOUS; SUBCUTANEOUS at 01:07

## 2023-07-22 RX ADMIN — HEPARIN SODIUM 5000 UNITS: 5000 INJECTION INTRAVENOUS; SUBCUTANEOUS at 05:07

## 2023-07-22 RX ADMIN — AMLODIPINE BESYLATE 5 MG: 5 TABLET ORAL at 09:07

## 2023-07-22 RX ADMIN — LEUCINE, PHENYLALANINE, LYSINE, METHIONINE, ISOLEUCINE, VALINE, HISTIDINE, THREONINE, TRYPTOPHAN, ALANINE, GLYCINE, ARGININE, PROLINE, SERINE, TYROSINE, SODIUM ACETATE, DIBASIC POTASSIUM PHOSPHATE, MAGNESIUM CHLORIDE, SODIUM CHLORIDE, CALCIUM CHLORIDE, DEXTROSE
365; 280; 290; 200; 300; 290; 240; 210; 90; 1035; 515; 575; 340; 250; 20; 340; 261; 51; 59; 33; 15 INJECTION INTRAVENOUS at 09:07

## 2023-07-22 RX ADMIN — PIPERACILLIN AND TAZOBACTAM 4.5 G: 4; .5 INJECTION, POWDER, LYOPHILIZED, FOR SOLUTION INTRAVENOUS; PARENTERAL at 04:07

## 2023-07-22 RX ADMIN — SOYBEAN OIL 250 ML: 20 INJECTION, SOLUTION INTRAVENOUS at 09:07

## 2023-07-22 RX ADMIN — ACETAMINOPHEN 1000 MG: 500 TABLET, FILM COATED ORAL at 05:07

## 2023-07-22 RX ADMIN — ONDANSETRON 4 MG: 2 INJECTION INTRAMUSCULAR; INTRAVENOUS at 01:07

## 2023-07-22 NOTE — PLAN OF CARE
Problem: Adult Inpatient Plan of Care  Goal: Plan of Care Review  Outcome: Ongoing, Progressing     Problem: Skin Injury Risk Increased  Goal: Skin Health and Integrity  Outcome: Ongoing, Progressing     Problem: Infection  Goal: Absence of Infection Signs and Symptoms  Outcome: Ongoing, Progressing     Problem: Fluid and Electrolyte Imbalance (Surgery Nonspecified)  Goal: Fluid and Electrolyte Balance  Outcome: Ongoing, Progressing     Problem: Glycemic Control Impaired (Surgery Nonspecified)  Goal: Blood Glucose Level Within Targeted Range  Outcome: Ongoing, Progressing     Problem: Fall Injury Risk  Goal: Absence of Fall and Fall-Related Injury  Outcome: Ongoing, Progressing

## 2023-07-22 NOTE — ASSESSMENT & PLAN NOTE
-In setting of acute small-bowel obstruction and no definitive findings of infection appears likely reactive, but low threshold for starting antibiotics.   -LA negative x2.  -procal mildly elevated, WBC# trended up to 14 on 07/20 and to 17 on 07/21  -Repeat CT abdomen with fluid in the pelvic  -IR consulted: s/p transgluteal-approach drainage catheter into the complex pelvic fluid collection on 07/21, cultures pending   -ID consulted: Add micafungin 07/21 and repeat blood culture  -Repeat blood cx with NGTD  -Continue on antibiotics   -tx as stated above for sbo

## 2023-07-22 NOTE — NURSING
"Pt upset stating shes upset that she hit the call light 3 times and other people came to assist and not "her nurse". Informed patient of of care team and that the nurse assistant, charge nurses as well as other nurses on the unit may assist when I am unavailable/with another patient. Pt states, "The charge nurse came in here earlier and gave me pain medicine and helped me to the toilet and that was not her job." Pt is upset that she hasn't seen me in "2 hours". Informed pt nurse and tech alternate hourly rounding but if she needed the nurse specifically in the mean time she could request the nurse otherwise the unit routine is alternating hourly rounds. Charge nurse informed pt is upset. Pt states, "Its not even just you, I feel that everyday I always have to wait when I need something." Pt assisted onto bedside commode. When assisted off commode ~10 min later pt states she was left on commode for "one hour". Pt states she transferred by herself to bed from chair earlier because she waited too long for assistance when she called. Informed pt of fall risk. Staff member state she never called to be assisted back to chair. Nurse check to make sure call light was working.   "

## 2023-07-22 NOTE — ASSESSMENT & PLAN NOTE
Marilee Townsend is a 63yoF with a history of HTN and rectal prolapse who presents with a several day history of abdominal pain, nausea, vomiting, and PO intolerance. Her work-up is consistent with a small bowel obstruction. General surgery consulted for evaluation.  She is now status post exploratory laparotomy on 07/16/2023 with multiple small bowel resections and anastomosis, evidence of enterotomy with over-sewing.    Postop day 1. Status post IR drained pelvis.    Mostly serous sanguinous fluid.    Patient's pain is okay.    Tolerating liquids.    Will advance to soft diet today.

## 2023-07-22 NOTE — ASSESSMENT & PLAN NOTE
"63F with h/o depression, fibromyalgia, htn, prior abdominal surgeries admitted 7/14 with progressive intractable abdominal pain, decreased appetite, nausea. Found to have sbo, thought to be secondary to adhesions and kinking with multiple enterotomies, s/p ex lap, small bowl resection on 7/16. Afebrile. Day 6 zosyn/flagyl. Wbc rising. Admit Bcx negative. Ct repeated today-  moderate quantity of intraperitoneal fluid in the pelvis.  IR consulted for abdominal fluid collection, cultures ordered. Gi consulted for tarry stools; anemia. ID consulted for "worsening leukocytosis; s/p bowel resection on 7/16    Unclear etiology of worsening leukocytosis- d/dx source control issue (leak vs post op abscess?) vs reactive (gi bleed?) vs inadequate coverage of abx (fungal?)    Recommendations:  - continue zosyn. Add micafungin  - repeat bcx  - agree with IR consult for sampling of intrabdominal fluid; please send for gram stain, aerobe, anaerobe, fungal cultures  -  If symptoms worsen, consider repeating CT abd with oral contrast to r/o leak  - repeat bcx    Discussed assessment/plan with hospitalist  "

## 2023-07-22 NOTE — CONSULTS
"[facility] - [facility] Surg  Infectious Disease  Consult Note    Patient Name: Marilee Townsend  MRN: [id number]  Admission Date: 7/14/2023  Hospital Length of Stay: 8 days  Attending Physician: Adriane Jang DO  Primary Care Provider: Claudia Hopper DO     Isolation Status: No active isolations    Patient information was obtained from patient and ER records.      Inpatient consult to Infectious Diseases  Consult performed by: Cara Lara MD  Consult ordered by: Adriane Jang DO        Assessment/Plan:     Oncology  Leukocytosis  63F with h/o depression, fibromyalgia, htn, prior abdominal surgeries admitted 7/14 with progressive intractable abdominal pain, decreased appetite, nausea. Found to have sbo, thought to be secondary to adhesions and kinking with multiple enterotomies, s/p ex lap, small bowl resection on 7/16. Afebrile. Day 6 zosyn/flagyl. Wbc rising. Admit Bcx negative. Ct repeated today-  moderate quantity of intraperitoneal fluid in the pelvis.  IR consulted for abdominal fluid collection, cultures ordered. Gi consulted for tarry stools; anemia. ID consulted for "worsening leukocytosis; s/p bowel resection on 7/16    Unclear etiology of worsening leukocytosis- d/dx source control issue (leak vs post op abscess?) vs reactive (gi bleed?) vs inadequate coverage of abx (fungal?)    Recommendations:  - continue zosyn. Add micafungin  - repeat bcx  - agree with IR consult for sampling of intrabdominal fluid; please send for gram stain, aerobe, anaerobe, fungal cultures  -  If symptoms worsen, consider repeating CT abd with oral contrast to r/o leak  - repeat bcx    Discussed assessment/plan with hospitalist        Thank you for your consult. I will follow-up with patient. Please contact us if you have any additional questions.    Cara Lara MD  Infectious Disease  [facility] [place] - Med Surg    Subjective:     Principal Problem: Small bowel obstruction    HPI: 63F with h/o depression, fibromyalgia, htn, " prior abdominal surgeries admitted 7/14 with progressive intractable abdominal pain, decreased appetite, nausea. Reports OSH ED visit for the same, progressed, so returned. Denies fever. Found to have SBO s/p surgical repair. Reports abdomen tender, but better than admit. Reports black tarry stools. Denies issues with iv lines. Denies dysuria. About to go down to IR    Afebrile    Day 6 zosyn/flagyl    Wbc rising    Bcx negative    On 7/16 taken for   LAPAROSCOPY, DIAGNOSTIC (N/A)  LAPAROTOMY, EXPLORATORY (N/A)  lysis of adhesions, small bowel resection repair of enterotomies    Found to have sbo, thought to be secondary to adhesions and kinking with multiple enterotomies      Ct repeated today  Interval resection of abnormal pelvic small bowel with diminished but not fully resolved small bowel dilatation.  A new small bowel transition point is questioned in the vicinity of the new intrapelvic enteroenteric anastomosis.  Correlate clinically, and with follow-up imaging.     There is a moderate quantity of intraperitoneal fluid in the pelvis.  Its attenuation of up to 26 HU suggests proteinaceous fluid (DDX: Purulent debris, dilute blood products, extraluminal succus entericus etc.).  New diffuse enhancement of the adjacent portions of the parietal peritoneum raises suspicion for peritonitis.  Correlate clinically.     Gas with urinary bladder lumen is most likely related to recent catheterization.  In the absence of any recent instrumentation, a gas-forming infection or enterocystic fistula might also be considerations.  Correlate clinically.     New small bilateral pleural effusions.  Opacities in the adjacent dorsal aspect of either lower lobe most likely represent atelectasis.  Pneumonia not fully excluded.  Correlate clinically, and with follow-up imaging.     New patchy hazy ground-glass opacities in the lingula and right middle lobe possibly represent pulmonary edema, small airways disease, atypical pneumonia,  "aspiration, or other abnormality.  Correlate clinically, and with follow-up imaging.      IR consulted for abdominal drain placement for complex fluid collection, cultures ordered    Gi consulted for tarry stools; anemia    ID consulted for "worsening leukocytosis; s/p bowel resection on 7/16      Past Medical History:   Diagnosis Date    Arthritis     Depression     Encounter for blood transfusion     as a child    Fibromyalgia     Neck pain     Rectal prolapse        Past Surgical History:   Procedure Laterality Date    BREAST SURGERY Bilateral     augmentation-Implants    DIAGNOSTIC LAPAROSCOPY N/A 7/16/2023    Procedure: LAPAROSCOPY, DIAGNOSTIC;  Surgeon: Bora Quevedo MD;  Location: [facility] OR;  Service: General;  Laterality: N/A;    epidural steroid injection  01/09/2017    twice-11/2017  & 1/9/17 to neck    EXCISION, SMALL INTESTINE  7/16/2023    Procedure: EXCISION, SMALL INTESTINE;  Surgeon: Bora Quevedo MD;  Location: [facility] OR;  Service: General;;    HERNIA REPAIR      umbilical    HYSTERECTOMY      LAPAROTOMY, EXPLORATORY N/A 7/16/2023    Procedure: LAPAROTOMY, EXPLORATORY;  Surgeon: Bora Quevedo MD;  Location: [facility] OR;  Service: General;  Laterality: N/A;    ROTATOR CUFF REPAIR Bilateral     TONSILLECTOMY      TOTAL SHOULDER ARTHROPLASTY Bilateral     WRIST FRACTURE SURGERY Right     with hardware placed       Review of patient's allergies indicates:   Allergen Reactions    Morphine Nausea And Vomiting       No current facility-administered medications on file prior to encounter.     Current Outpatient Medications on File Prior to Encounter   Medication Sig    amLODIPine (NORVASC) 5 MG tablet once daily.    buPROPion (WELLBUTRIN SR) 150 MG TBSR 12 hr tablet Take 150 mg by mouth 2 (two) times daily.    ergocalciferol (ERGOCALCIFEROL) 50,000 unit Cap Take 1 capsule (50,000 Units total) by mouth every Sunday.    HYDROcodone-acetaminophen (NORCO)  mg per tablet Take 1 " tablet by mouth every 6 (six) hours as needed.    lisinopriL-hydrochlorothiazide (PRINZIDE,ZESTORETIC) 10-12.5 mg per tablet Take 1 tablet by mouth.    meloxicam (MOBIC) 15 MG tablet Take 15 mg by mouth once daily. Instructed to stop until after procedure 1-9-17.    raloxifene (EVISTA) 60 mg tablet Take 60 mg by mouth once daily.    sertraline (ZOLOFT) 100 MG tablet Take 100 mg by mouth every morning.      Family History    None       Tobacco Use    Smoking status: Former     Packs/day: 1.00     Years: 20.00     Pack years: 20.00     Types: Cigarettes     Start date: 5/12/2014    Smokeless tobacco: Never    Tobacco comments:     Stopped smoking greater than 5 years   Substance and Sexual Activity    Alcohol use: Yes     Comment: rarely    Drug use: Not Currently     Types: Hydromorphone     Comment: 3 - 4 tabs daily as needed    Sexual activity: Not Currently     Review of Systems  Objective:     Vital Signs (Most Recent):  Temp: 98.1 °F (36.7 °C) (07/21/23 2352)  Pulse: 101 (07/21/23 2352)  Resp: 20 (07/22/23 0148)  BP: 124/75 (07/21/23 2352)  SpO2: 96 % (07/21/23 2352) Vital Signs (24h Range):  Temp:  [98 °F (36.7 °C)-98.8 °F (37.1 °C)] 98.1 °F (36.7 °C)  Pulse:  [] 101  Resp:  [12-22] 20  SpO2:  [91 %-97 %] 96 %  BP: (102-144)/(60-78) 124/75     Weight: 45.4 kg (100 lb)  Body mass index is 18.29 kg/m².     Physical Exam  Vitals reviewed.   Constitutional:       General: She is not in acute distress (moderate distress).  HENT:      Head: Normocephalic and atraumatic.      Mouth/Throat:      Mouth: Mucous membranes are dry.      Pharynx: Oropharynx is clear.   Eyes:      General: No scleral icterus.     Extraocular Movements: Extraocular movements intact.   Cardiovascular:      Rate and Rhythm: Normal rate and regular rhythm.   Pulmonary:      Effort: Pulmonary effort is normal. No respiratory distress.      Breath sounds: Normal breath sounds. No wheezing.   Abdominal:      Palpations: Abdomen is  soft.      Tenderness: There is abdominal tenderness (diffuse ttp).      Comments: Post op scar on abdomen, tender. No drainage   Musculoskeletal:         General: No swelling or tenderness.      Cervical back: Neck supple. No rigidity.   Skin:     General: Skin is warm and dry.   Neurological:      General: No focal deficit present.      Mental Status: She is alert and oriented to person, place, and time.   Psychiatric:         Mood and Affect: Mood normal.         Behavior: Behavior normal.              Significant Labs: All pertinent labs within the past 24 hours have been reviewed.    Significant Imaging: I have reviewed all pertinent imaging results/findings within the past 24 hours.

## 2023-07-22 NOTE — ASSESSMENT & PLAN NOTE
Currently controlled.  Continue home [facility]  Will continue to monitor

## 2023-07-22 NOTE — PROGRESS NOTES
[facility] Medicine  Progress Note    Patient Name: Marilee Townsend  MRN: [id number]  Patient Class: IP- Inpatient   Admission Date: 7/14/2023  Length of Stay: 8 days  Attending Physician: Adriane Jang DO  Primary Care Provider: Claudia Hopper DO        Subjective:     Principal Problem:Small bowel obstruction        HPI:  63F with pmh of depression, fibromyalgia, htn, osteoporosis who presents due to several day h/o abd pain and nausea with anorexia. Pt was evaluated at outside emergency department patient was seen in the day prompting the ED for evaluation.  Patient denies any recent flatulence or bowel movements unclear on the last date.  Patient states pain is diffuse in the with minimal improvement with pain medications given in the ED. CT scan in the emergency department with demonstration of small bowel obstruction and surgery consulted who had NG tube placed.  Patient denies chest pain, shortness a breath, fever, chills.  Patient states she had a shoulder surgery about 3 weeks prior to arrival and tolerated the anesthesia well.  Patient is a former smoker, but denies alcohol or drug use.      Overview/Hospital Course:  63F with pmh of depression, fibromyalgia, htn, osteoporosis admitted on 07/07/2023 for small bowel obstruction. presented with a several day history of abdominal pain, nausea, vomiting, and PO intolerance.  CT scan in ED with demonstration of small bowel obstruction and surgery consulted who had NG tube placed. Attempts at gastrografin with persistent obstruction. Pt taken to OR on 7/16. NG tube remained after operation and surgery managing. She is now status post exploratory laparotomy on 07/16/2023 with multiple small bowel resections and anastomosis, evidence of enterotomy with over-sewing. Replace electrolytes as needed. Had BM on 07/21/2023. PT/OT consulted for evaluation. NGT removed on 07/21/2023. Patient with worsening leukocytosis on 07/21. Repeat CT abd  showed moderate quantity of intraperitoneal fluid in the pelvis. IR consulted-s/p transgluteal-approach drainage catheter into the complex pelvic fluid collection. Fluid cx ordered. ID consulted. Repeat blood cx ordered.       Interval History:  No acute overnight events.  Patient remained afebrile.  Admits nausea improving but no vomiting.  WBC continued to rise ID consulted. She is s/p transgluteal-approach drainage catheter into the complex pelvic fluid collection yesterday.  Pt had NGT dc yesterday.     Review of Systems   Constitutional:  Negative for chills, fatigue and fever.   HENT:  Positive for sore throat.    Respiratory:  Negative for cough, chest tightness and shortness of breath.    Cardiovascular:  Negative for chest pain, palpitations and leg swelling.   Gastrointestinal:  Positive for abdominal pain (mild) and nausea. Negative for diarrhea and vomiting.   Genitourinary:  Negative for difficulty urinating and dysuria.   Musculoskeletal:  Negative for joint swelling.   Neurological:  Negative for dizziness, weakness and headaches.     Objective:     Vital Signs (Most Recent):  Temp: 98.3 °F (36.8 °C) (07/22/23 1157)  Pulse: 88 (07/22/23 1157)  Resp: 18 (07/22/23 1258)  BP: 131/76 (07/22/23 1157)  SpO2: 95 % (07/22/23 1157) Vital Signs (24h Range):  Temp:  [98 °F (36.7 °C)-98.8 °F (37.1 °C)] 98.3 °F (36.8 °C)  Pulse:  [] 88  Resp:  [12-22] 18  SpO2:  [91 %-97 %] 95 %  BP: (102-144)/(60-79) 131/76     Weight: 45.4 kg (100 lb)  Body mass index is 18.29 kg/m².    Intake/Output Summary (Last 24 hours) at 7/22/2023 1409  Last data filed at 7/22/2023 1230  Gross per 24 hour   Intake 1183.75 ml   Output 310 ml   Net 873.75 ml           Physical Exam  Vitals and nursing note reviewed.   Constitutional:       General: She is not in acute distress.     Appearance: She is ill-appearing.   HENT:      Nose:      Comments: NG tube removed on 07/21/2023     Mouth/Throat:      Mouth: Mucous membranes are dry.    Cardiovascular:      Rate and Rhythm: Normal rate and regular rhythm.   Pulmonary:      Effort: Pulmonary effort is normal. No respiratory distress.      Breath sounds: No wheezing or rales.   Abdominal:      General: There is distension.      Palpations: Abdomen is soft.      Tenderness: There is abdominal tenderness (mild tenderenss).      Comments: Abdomen soft, mildly distended  Midline incision with dressing in place  Abdomen mildly tender today    Musculoskeletal:         General: Normal range of motion.      Right lower leg: No edema.      Left lower leg: No edema.   Skin:     General: Skin is warm and dry.      Comments: Has drainage catheter  near right gluteal from recent transgluteal-approach drainage catheter into the complex pelvic fluid collection (07/21/23)   Neurological:      General: No focal deficit present.      Mental Status: She is alert and oriented to person, place, and time.   Psychiatric:         Mood and Affect: Mood normal.         Thought Content: Thought content normal.           Significant Labs: All pertinent labs within the past 24 hours have been reviewed.    Significant Imaging: I have reviewed all pertinent imaging results/findings within the past 24 hours.      Assessment/Plan:      * Small bowel obstruction  63F presenting with several days of worsening abd pain, anorexia, and nausea.  CT abd/pelvis with Abnormal fluid, air distension small bowel, termination point at or near ileocecal valve, not well-defined, associated less distension proximal small bowel with some bowel wall thickening.  Adhesion etiology of obstructive process favored.    -Surgery team consulted: s/p exploratory laparotomy on 07/16/2023 with multiple small bowel resections and anastomosis, evidence of enterotomy with over-sewing.  -NG tube in place to LIWS  -continue TPN per surgery   -Pt with worsening leukocytosis, CT abdomen repeated  -CT abdomen: there is now a moderate to large quantity of  intraperitoneal fluid, with new enhancement of the adjacent portions of the parietal peritoneum, suspicious for peritonitis.   -IR consulted for drainage of pelvic collection  -ID consulted  -Analgesics ordered prn   -Advance diet as tolerated  -Pt with BM on 07/21      HTN (hypertension)  Currently controlled.  Continue home norvasc  Will continue to monitor      Leukocytosis  -In setting of acute small-bowel obstruction and no definitive findings of infection appears likely reactive, but low threshold for starting antibiotics.   -LA negative x2.  -procal mildly elevated, WBC# trended up to 14 on 07/20 and to 17 on 07/21  -Repeat CT abdomen with fluid in the pelvic  -IR consulted: s/p transgluteal-approach drainage catheter into the complex pelvic fluid collection on 07/21, cultures pending   -ID consulted: Add micafungin 07/21 and repeat blood culture  -Repeat blood cx with NGTD  -Continue on antibiotics   -tx as stated above for sbo      Depression  Restart home medications as indicated- setraline 100mg         Osteoporosis  Continue home medications      Hypokalemia  -Replace as needed  -TPN per surgery       Atelectasis  -CT abdomen:  Opacities in the adjacent dorsal aspect of either lower lobe most likely represent atelectasis.   -Encourage IS   -PT/OT consulted      Weakness  -PT/OT consulted      Advanced care planning/counseling discussion  Advance Care Planning     Date: 07/19/2023    Code Status  I engaged the the patient in a voluntary conversation about the patient's preferences for care  at the very end of life. The patient wishes to have CPR and other invasive treatments performed when her heart and/or breathing stops. I communicated to the patient that her wishes align with full code status.  I spent a total of 16 minutes engaging the patient in this advance care planning discussion.        Code Status: Full Code          Body mass index (BMI) less than 19  Body mass index is 18.29 kg/m².  Continue  TPN per surgery         VTE Risk Mitigation (From admission, onward)         Ordered     heparin (porcine) injection 5,000 Units  Every 8 hours         07/14/23 1428     IP VTE LOW RISK PATIENT  Once         07/14/23 1236     Place sequential compression device  Until discontinued         07/14/23 1236                Discharge Planning   WIL: 7/23/2023     Code Status: Full Code   Is the patient medically ready for discharge?:     Reason for patient still in hospital (select all that apply): Patient trending condition, Treatment, Consult recommendations and PT / OT recommendations  Discharge Plan A: Home   Discharge Delays: None known at this time              Adriane Jang DO  Department of Hospital Medicine   [facility] Surg

## 2023-07-22 NOTE — PT/OT/SLP EVAL
"Occupational Therapy   Evaluation    Name: Marilee Townsend  MRN: [id number]  Admitting Diagnosis: Small bowel obstruction  Recent Surgery: Procedure(s) (LRB):  LAPAROSCOPY, DIAGNOSTIC (N/A)  LAPAROTOMY, EXPLORATORY (N/A)  EXCISION, SMALL INTESTINE 6 Days Post-Op    Recommendations:     Discharge Recommendations:  Home with HH and Family support.   Discharge Equipment Recommendations:  bedside commode  Barriers to discharge:   (condition trend.)    Assessment:     Marilee Townsend is a 63 y.o. female with a medical diagnosis of Small bowel obstruction. Patient motivation, pain tolerance and interactivity levels high this date. Performance deficits affecting function: weakness, gait instability, decreased upper extremity function, decreased lower extremity function, impaired balance, impaired endurance, pain, impaired self care skills, impaired functional mobility, impaired skin, impaired cardiopulmonary response to activity.      Rehab Prognosis: Good; patient would benefit from acute skilled OT services to address these deficits and reach maximum level of function.       Plan:     Patient to be seen x3-5/week to address the above listed problems via self-care/home management, therapeutic activities, therapeutic exercises  Plan of Care Expires: 08/11/23  Plan of Care Reviewed with:  Patient     Subjective     Chief Complaint: Abdominal pain, shortness of breath in stand > 2 mins.   Patient/Family Comments/goals: Return to own home with assist of Sister arrived this date.     Occupational Profile:  Living Environment: Alone, SSH, 0 steps to enter, t/shower combo, has ordered a tub bench, not yet delivered. (I) ADLs and driving following recent L shoulder Sx ("< 1 month").   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     Equipment Used at Home: bath bench, rollator, grab bar  Assistance upon Discharge: Sister has arrived this date to assist as per previous Sx events. " "    Pain/Comfort:  Pain Rating 1:  (fleeting expressions and gestures of abdomnal discomfort in transitional movement at outset of bed mobility x1, and demonstrated pain reaction "It's sciatic" x1 upon seating OOB in bedside chair at sessions end. Pt however, interactive, motivation high.)  Pain Addressed 1: Reposition, Distraction, Cessation of Activity, Nurse notified  Pain Rating Post-Intervention 1:  (Patient cheerfully/pleasantly reporting pain fleeting and within tolerable limits during this dates mobiization interventions.)    Patients cultural, spiritual, Mormonism conflicts given the current situation: yes    Objective:     Communicated with: RN prior to session.  Patient found  semi recumbent  with bed alarm, peripheral IV upon OT entry to room.    General Precautions: Standard, fall (R flank drain.)  Orthopedic Precautions: N/A  Braces: N/A  Respiratory Status: Room air    Occupational Performance:    Bed Mobility:    Patient completed Rolling/Turning to Left <> Right with  stand by assistance  Patient completed Scooting/Bridging with stand by assistance  Patient completed Supine to Sit with contact guard assistance and log roll intro. Follow up recommended.     Functional Mobility/Transfers:  Patient completed Sit <> Stand Transfer with contact guard assistance  with  rolling walker   Patient completed Bed <> Chair Transfer using Step Transfer technique with contact guard assistance with rolling walker  Patient completed BSC Transfer Step Transfer technique with contact guard assistance with  rolling walker    Activities of Daily Living:  Feeding:  courtesy set up  OOB  Grooming: minimum assistance in stand at basin, posterior hair matting emergent.   Upper Body Dressing: minimum assistance gown, back ties  Lower Body Dressing: minimum assistance non skid socks  Toileting: minimum assistance standing posterior assist (slight) 2* loose stool drippage.     Patient presents with reduced mobility, UB " strength, and stamina deficits, which impact the patient's ability to complete functional tasks & activities safely and in a timely manner.   Pt could benefit from a collaborative therapeutic program to improve quality of life and sustain focus on impairment resoltion. Patient outlook for progression towards goals (+) at this time.       Cognitive/Visual Perceptual:Intact, A+OX4, able to follow complex direction, able to make complex needs known, congruent information integration at time of eval. Patient cooperative / active in own POC.    Upper Body Physical Exam:  RESPIRATORY: non-labored / normal effort / rate. (+/-) accessory MM engagement.    UB SKIN: Observable UB skin warm and dry.   Sensation: Norm in UB extremities light touch/localization.  Posture: Norm  slightly Rounded shoulders.  BUE AROM WNL  BUE MMT  4-/5  UB GM/FM coordination WNL  Good (-) dynamic standing bedside  (-) UB edema      Treatment & Education:  Discussed role of OT,eval and collaborative POC dev completed. Patient/ states understanding and agreement with all herein.   Interventions:   UE ROM/MMT assessment  Bed mobility training / assessment  Functional mobility assessment  Sit/standing balance assessment  Educated on importance of sitting OOB in bedside chair to promote increased strength, endurance & proper breathing.  Intro intro deep relaxed breathing for non Rx pain management needs, a-equip intro edu/safety, energy conservation construct intro. Follow up recommended.        AMPA 6 Click ADL:  AMPAC Total Score: 19      Patient left up in chair with all lines intact, call button in reach, and RN notified.    GOALS:   Multidisciplinary Problems       Occupational Therapy Goals          Problem: Occupational Therapy    Goal Priority Disciplines Outcome Interventions   Occupational Therapy Goal     OT, PT/OT Ongoing, Progressing    Description: Goals to be met by: 8/11/2023     Patient will increase functional independence with ADLs  by performing:    UE Dressing with Modified [place].  LE Dressing with Set-up Assistance.  Grooming while standing at sink with [place].  Toileting from toilet with Modified [place] for hygiene and clothing management.   Bathing seated UB and baldo care sponge with Modified [place].  Toilet transfer to toilet with Supervision.  Upper extremity exercise program x10 reps per handout, with assistance as needed for form constancy, proper respiration and energy conservation construct verbal recitation.                          History:     Past Medical History:   Diagnosis Date    Arthritis     Depression     Encounter for blood transfusion     as a child    Fibromyalgia     Neck pain     Rectal prolapse          Past Surgical History:   Procedure Laterality Date    BREAST SURGERY Bilateral     augmentation-Implants    DIAGNOSTIC LAPAROSCOPY N/A 7/16/2023    Procedure: LAPAROSCOPY, DIAGNOSTIC;  Surgeon: Bora Quevedo MD;  Location: [facility] OR;  Service: General;  Laterality: N/A;    epidural steroid injection  01/09/2017    twice-11/2017  & 1/9/17 to neck    EXCISION, SMALL INTESTINE  7/16/2023    Procedure: EXCISION, SMALL INTESTINE;  Surgeon: Bora Quevedo MD;  Location: [facility] OR;  Service: General;;    HERNIA REPAIR      umbilical    HYSTERECTOMY      LAPAROTOMY, EXPLORATORY N/A 7/16/2023    Procedure: LAPAROTOMY, EXPLORATORY;  Surgeon: Bora Quevedo MD;  Location: [facility] OR;  Service: General;  Laterality: N/A;    ROTATOR CUFF REPAIR Bilateral     TONSILLECTOMY      TOTAL SHOULDER ARTHROPLASTY Bilateral     WRIST FRACTURE SURGERY Right     with hardware placed       Time Tracking:     OT Date of Treatment: 07/22/23  OT Start Time: 0159  OT Stop Time: 0229  OT Total Time (min): 30 min    Billable Minutes:Evaluation 14  Therapeutic Activity 16    7/22/2023

## 2023-07-22 NOTE — SUBJECTIVE & OBJECTIVE
Interval History:  No acute overnight events.  Patient remained afebrile.  Admits nausea improving but no vomiting.  WBC continued to rise ID consulted. She is s/p transgluteal-approach drainage catheter into the complex pelvic fluid collection yesterday.  Pt had NGT dc yesterday.     Review of Systems   Constitutional:  Negative for chills, fatigue and fever.   HENT:  Positive for sore throat.    Respiratory:  Negative for cough, chest tightness and shortness of breath.    Cardiovascular:  Negative for chest pain, palpitations and leg swelling.   Gastrointestinal:  Positive for abdominal pain (mild) and nausea. Negative for diarrhea and vomiting.   Genitourinary:  Negative for difficulty urinating and dysuria.   Musculoskeletal:  Negative for joint swelling.   Neurological:  Negative for dizziness, weakness and headaches.     Objective:     Vital Signs (Most Recent):  Temp: 98.3 °F (36.8 °C) (07/22/23 1157)  Pulse: 88 (07/22/23 1157)  Resp: 18 (07/22/23 1258)  BP: 131/76 (07/22/23 1157)  SpO2: 95 % (07/22/23 1157) Vital Signs (24h Range):  Temp:  [98 °F (36.7 °C)-98.8 °F (37.1 °C)] 98.3 °F (36.8 °C)  Pulse:  [] 88  Resp:  [12-22] 18  SpO2:  [91 %-97 %] 95 %  BP: (102-144)/(60-79) 131/76     Weight: 45.4 kg (100 lb)  Body mass index is 18.29 kg/m².    Intake/Output Summary (Last 24 hours) at 7/22/2023 1409  Last data filed at 7/22/2023 1230  Gross per 24 hour   Intake 1183.75 ml   Output 310 ml   Net 873.75 ml           Physical Exam  Vitals and nursing note reviewed.   Constitutional:       General: She is not in acute distress.     Appearance: She is ill-appearing.   HENT:      Nose:      Comments: NG tube removed on 07/21/2023     Mouth/Throat:      Mouth: Mucous membranes are dry.   Cardiovascular:      Rate and Rhythm: Normal rate and regular rhythm.   Pulmonary:      Effort: Pulmonary effort is normal. No respiratory distress.      Breath sounds: No wheezing or rales.   Abdominal:      General: There is  distension.      Palpations: Abdomen is soft.      Tenderness: There is abdominal tenderness (mild tenderenss).      Comments: Abdomen soft, mildly distended  Midline incision with dressing in place  Abdomen mildly tender today    Musculoskeletal:         General: Normal range of motion.      Right lower leg: No edema.      Left lower leg: No edema.   Skin:     General: Skin is warm and dry.      Comments: Has drainage catheter  near right gluteal from recent transgluteal-approach drainage catheter into the complex pelvic fluid collection (07/21/23)   Neurological:      General: No focal deficit present.      Mental Status: She is alert and oriented to person, place, and time.   Psychiatric:         Mood and Affect: Mood normal.         Thought Content: Thought content normal.           Significant Labs: All pertinent labs within the past 24 hours have been reviewed.    Significant Imaging: I have reviewed all pertinent imaging results/findings within the past 24 hours.

## 2023-07-22 NOTE — PT/OT/SLP EVAL
Physical Therapy Evaluation    Patient Name:  Marilee Townsend   MRN:  [id number]    Recommendations:     Discharge Recommendations: home health PT   Discharge Equipment Recommendations: bedside commode   Barriers to discharge:  None from PT standpoint    Assessment:     Marilee Townsend is a 63 y.o. female admitted with a medical diagnosis of Small bowel obstruction.  She presents with the following impairments/functional limitations: weakness, gait instability, decreased upper extremity function, decreased ROM, impaired endurance, impaired balance, impaired coordination, decreased safety awareness, impaired self care skills, pain, impaired skin, impaired functional mobility, decreased coordination .    Rehab Prognosis: Good; patient would benefit from acute skilled PT services to address these deficits and reach maximum level of function.    Recent Surgery: Procedure(s) (LRB):  LAPAROSCOPY, DIAGNOSTIC (N/A)  LAPAROTOMY, EXPLORATORY (N/A)  EXCISION, SMALL INTESTINE 6 Days Post-Op    Plan:     During this hospitalization, patient to be seen  (2-3x/wk) to address the identified rehab impairments via gait training, therapeutic activities, therapeutic exercises and progress toward the following goals:    Plan of Care Expires:  08/05/23    Subjective     Chief Complaint: pain  Patient/Family Comments/goals: Pt agreeable to evaluation and ambulation  Pain/Comfort:  Pain Rating 1:  (Not rated)  Location 1: abdomen (shoulders)  Pain Addressed 1: Pre-medicate for activity, Reposition, Distraction, Cessation of Activity, Nurse notified    Patients cultural, spiritual, Rastafari conflicts given the current situation: no    Living Environment:  Pt lives alone, but states that her sister is coming this evening to stay  Prior to admission, patients level of function was Independent.  Equipment used at home: grab bar, shower chair.  DME owned (not currently used):  Rollator .  Upon discharge, patient will have assistance from  "Sister.    Objective:     Communicated with nsg prior to session.  Patient found HOB elevated with bed alarm, peripheral IV (drain)  upon PT entry to room.    General Precautions: Standard, fall  Orthopedic Precautions:N/A   Braces: N/A  Respiratory Status: Room air    Exams:  Cognitive Exam:  Patient is oriented to Person, Place, Time, and Situation  Gross Motor Coordination:  impaired 2/2 weakness and pain  Postural Exam:  Patient presented with the following abnormalities:    -       Rounded shoulders  -       Forward head  Sensation:    -       Intact  light/touch    Skin Integrity/Edema:      -       Skin integrity: Visible skin intact  -       Edema: None noted    RLE ROM: WFL  RLE Strength: WFL  LLE ROM: WFL  LLE Strength: WFL    Functional Mobility:  Bed Mobility:     Rolling Right: SBA with VC for logrolling  Scooting: stand by assistance  Supine to Sit: contact guard assistance and with VC/TC for logrolling and body mechanics  Transfers:     Sit to Stand:  contact guard assistance with rolling walker  Gait: Gait training with RW and SBA/CGA approx 200' with VC/TC for walker management/safety and increased trunk ext  Balance: Fair+ sit and STand      AM-PAC 6 CLICK MOBILITY  Total Score:18       Treatment & Education:  Educated pt on Abdominal precautions and logrolling/body mechanics for bed mobility, PT POC, and to "call before you fall"  Gait training as above. Handout provided to and reviewed with pt for AP's, GS, and TKE's to be performed while up in chair     Patient left up in chair with all lines intact, call button in reach, and nsg notified.    GOALS:   Multidisciplinary Problems       Physical Therapy Goals          Problem: Physical Therapy    Goal Priority Disciplines Outcome Goal Variances Interventions   Physical Therapy Goal     PT, PT/OT Ongoing, Progressing     Description: Goals to be met by: 23     Patient will increase functional independence with mobility by performin. " Supine to sit with Modified [place]  2. Rolling to Left and Right with Modified [place].  3. Sit to stand transfer with Modified [place]  4. Gait  x 100 feet with Modified [place] using Rolling Walker.   5. Lower extremity exercise program x10 reps per handout, with independence                         History:     Past Medical History:   Diagnosis Date    Arthritis     Depression     Encounter for blood transfusion     as a child    Fibromyalgia     Neck pain     Rectal prolapse        Past Surgical History:   Procedure Laterality Date    BREAST SURGERY Bilateral     augmentation-Implants    DIAGNOSTIC LAPAROSCOPY N/A 7/16/2023    Procedure: LAPAROSCOPY, DIAGNOSTIC;  Surgeon: Bora Quevedo MD;  Location: [facility] OR;  Service: General;  Laterality: N/A;    epidural steroid injection  01/09/2017    twice-11/2017  & 1/9/17 to neck    EXCISION, SMALL INTESTINE  7/16/2023    Procedure: EXCISION, SMALL INTESTINE;  Surgeon: Bora Quevedo MD;  Location: [facility] OR;  Service: General;;    HERNIA REPAIR      umbilical    HYSTERECTOMY      LAPAROTOMY, EXPLORATORY N/A 7/16/2023    Procedure: LAPAROTOMY, EXPLORATORY;  Surgeon: Bora Quevedo MD;  Location: [facility] OR;  Service: General;  Laterality: N/A;    ROTATOR CUFF REPAIR Bilateral     TONSILLECTOMY      TOTAL SHOULDER ARTHROPLASTY Bilateral     WRIST FRACTURE SURGERY Right     with hardware placed       Time Tracking:     PT Received On: 07/22/23  PT Start Time: 1400     PT Stop Time: 1428  PT Total Time (min): 28 min     Billable Minutes: Evaluation 15 and Gait Training 8 Co-evaluation with OT      07/22/2023

## 2023-07-22 NOTE — SUBJECTIVE & OBJECTIVE
Interval History:  Patient's pain is improving.    Tolerating liquids, will increase to full liquid diet or soft food.        Medications:  Continuous Infusions:   Amino acid 5% - dextrose 15% (CLINIMIX-E) solution with additives. CENTRAL LINE ONLY (1395 mOsm/L). 1L provides 50 gm AA, 150 gm CHO (510 kcal/L dextrose), Na 35, K 30, Mg 5, Ca 4.5, Acetate 80, Cl 39, Phos 15      Amino acid 5% - dextrose 15% (CLINIMIX-E) solution with additives. CENTRAL LINE ONLY (1395 mOsm/L). 1L provides 50 gm AA, 150 gm CHO (510 kcal/L dextrose), Na 35, K 30, Mg 5, Ca 4.5, Acetate 80, Cl 39, Phos 15       Scheduled Meds:   acetaminophen  1,000 mg Oral Q8H    amLODIPine  5 mg Oral Daily    buPROPion  150 mg Oral BID    fat emulsion  250 mL Intravenous Daily    fat emulsion 20%  250 mL Intravenous Daily    heparin (porcine)  5,000 Units Subcutaneous Q8H    micafungin (MYCAMINE) IVPB  100 mg Intravenous Q24H    pantoprazole  40 mg Intravenous BID    piperacillin-tazobactam (Zosyn) IV (PEDS and ADULTS) (extended infusion is not appropriate)  4.5 g Intravenous Q8H    raloxifene  60 mg Oral Daily    sertraline  100 mg Oral QAM    sodium chloride 0.9%  10 mL Intravenous Q6H     PRN Meds:sodium chloride, acetaminophen, acetaminophen, dextrose 50%, dextrose 50%, diatrizoate meglumineand-diatrizoate sodium, glucagon (human recombinant), glucose, glucose, HYDROmorphone, HYDROmorphone, insulin aspart U-100, naloxone, ondansetron, sodium chloride 0.9%, Flushing PICC Protocol **AND** sodium chloride 0.9% **AND** sodium chloride 0.9%     Review of patient's allergies indicates:   Allergen Reactions    Morphine Nausea And Vomiting     Objective:     Vital Signs (Most Recent):  Temp: 97.9 °F (36.6 °C) (07/22/23 1617)  Pulse: 96 (07/22/23 1617)  Resp: 18 (07/22/23 1617)  BP: 137/80 (07/22/23 1617)  SpO2: 95 % (07/22/23 1617) Vital Signs (24h Range):  Temp:  [97.9 °F (36.6 °C)-98.6 °F (37 °C)] 97.9 °F (36.6 °C)  Pulse:  [] 96  Resp:  [18-22]  18  SpO2:  [95 %-97 %] 95 %  BP: (124-137)/(70-80) 137/80     Weight: 45.4 kg (100 lb)  Body mass index is 18.29 kg/m².    Intake/Output - Last 3 Shifts         07/20 0700 07/21 0659 07/21 0700 07/22 0659 07/22 0700 07/23 0659    P.O. 0 600 360    Blood  343.8     Total Intake(mL/kg) 0 (0) 943.8 (20.8) 360 (7.9)    Urine (mL/kg/hr) 1150 (1.1) 0 (0) 250 (0.6)    Drains 750 60     Stool 0 0 0    Total Output 1900 60 250    Net -1900 +883.8 +110           Urine Occurrence 2 x 3 x 1 x    Stool Occurrence 7 x 3 x 1 x             Physical Exam  Vitals and nursing note reviewed.   Constitutional:       Appearance: She is well-developed.   HENT:      Head: Normocephalic and atraumatic.   Cardiovascular:      Rate and Rhythm: Normal rate.      Heart sounds: Normal heart sounds.   Pulmonary:      Effort: Pulmonary effort is normal.   Abdominal:      General: Bowel sounds are normal. There is no distension.      Palpations: Abdomen is soft.      Tenderness: There is no abdominal tenderness.      Comments: IR drain in place.    Serous sanguinous fluid  Abdominal pain improved   Minimal distention   Musculoskeletal:         General: Normal range of motion.      Cervical back: Normal range of motion.   Skin:     General: Skin is warm and dry.      Capillary Refill: Capillary refill takes less than 2 seconds.   Neurological:      Mental Status: She is alert and oriented to person, place, and time.   Psychiatric:         Behavior: Behavior normal.        Significant Labs:  I have reviewed all pertinent lab results within the past 24 hours.  CBC:   Recent Labs   Lab 07/22/23  0558   WBC 18.92*   RBC 3.04*   HGB 9.3*   HCT 26.9*      MCV 89   MCH 30.6   MCHC 34.6     CMP:   Recent Labs   Lab 07/22/23  0558   *   CALCIUM 7.9*   ALBUMIN 1.9*   PROT 5.6*   *   K 4.0   CO2 23      BUN 13   CREATININE 0.5   ALKPHOS 68   ALT 21   AST 25   BILITOT 0.3       Significant Diagnostics:  I have reviewed all pertinent  imaging results/findings within the past 24 hours.  CT: I have reviewed all pertinent results/findings within the past 24 hours and my personal findings are:  IR drain placed yesterday, serous sanguinous output

## 2023-07-22 NOTE — SUBJECTIVE & OBJECTIVE
Past Medical History:   Diagnosis Date    Arthritis     Depression     Encounter for blood transfusion     as a child    Fibromyalgia     Neck pain     Rectal prolapse        Past Surgical History:   Procedure Laterality Date    BREAST SURGERY Bilateral     augmentation-Implants    DIAGNOSTIC LAPAROSCOPY N/A 7/16/2023    Procedure: LAPAROSCOPY, DIAGNOSTIC;  Surgeon: Bora Quevedo MD;  Location: [facility] OR;  Service: General;  Laterality: N/A;    epidural steroid injection  01/09/2017    twice-11/2017  & 1/9/17 to neck    EXCISION, SMALL INTESTINE  7/16/2023    Procedure: EXCISION, SMALL INTESTINE;  Surgeon: Bora Quevedo MD;  Location: [facility] OR;  Service: General;;    HERNIA REPAIR      umbilical    HYSTERECTOMY      LAPAROTOMY, EXPLORATORY N/A 7/16/2023    Procedure: LAPAROTOMY, EXPLORATORY;  Surgeon: Bora Quevedo MD;  Location: [facility] OR;  Service: General;  Laterality: N/A;    ROTATOR CUFF REPAIR Bilateral     TONSILLECTOMY      TOTAL SHOULDER ARTHROPLASTY Bilateral     WRIST FRACTURE SURGERY Right     with hardware placed       Review of patient's allergies indicates:   Allergen Reactions    Morphine Nausea And Vomiting       No current facility-administered medications on file prior to encounter.     Current Outpatient Medications on File Prior to Encounter   Medication Sig    amLODIPine (NORVASC) 5 MG tablet once daily.    buPROPion (WELLBUTRIN SR) 150 MG TBSR 12 hr tablet Take 150 mg by mouth 2 (two) times daily.    ergocalciferol (ERGOCALCIFEROL) 50,000 unit Cap Take 1 capsule (50,000 Units total) by mouth every Sunday.    HYDROcodone-acetaminophen (NORCO)  mg per tablet Take 1 tablet by mouth every 6 (six) hours as needed.    lisinopriL-hydrochlorothiazide (PRINZIDE,ZESTORETIC) 10-12.5 mg per tablet Take 1 tablet by mouth.    meloxicam (MOBIC) 15 MG tablet Take 15 mg by mouth once daily. Instructed to stop until after procedure 1-9-17.    raloxifene (EVISTA) 60 mg tablet Take 60 mg by mouth  once daily.    sertraline (ZOLOFT) 100 MG tablet Take 100 mg by mouth every morning.      Family History    None       Tobacco Use    Smoking status: Former     Packs/day: 1.00     Years: 20.00     Pack years: 20.00     Types: Cigarettes     Start date: 5/12/2014    Smokeless tobacco: Never    Tobacco comments:     Stopped smoking greater than 5 years   Substance and Sexual Activity    Alcohol use: Yes     Comment: rarely    Drug use: Not Currently     Types: Hydromorphone     Comment: 3 - 4 tabs daily as needed    Sexual activity: Not Currently     Review of Systems  Objective:     Vital Signs (Most Recent):  Temp: 98.1 °F (36.7 °C) (07/21/23 2352)  Pulse: 101 (07/21/23 2352)  Resp: 20 (07/22/23 0148)  BP: 124/75 (07/21/23 2352)  SpO2: 96 % (07/21/23 2352) Vital Signs (24h Range):  Temp:  [98 °F (36.7 °C)-98.8 °F (37.1 °C)] 98.1 °F (36.7 °C)  Pulse:  [] 101  Resp:  [12-22] 20  SpO2:  [91 %-97 %] 96 %  BP: (102-144)/(60-78) 124/75     Weight: 45.4 kg (100 lb)  Body mass index is 18.29 kg/m².     Physical Exam  Vitals reviewed.   Constitutional:       General: She is not in acute distress (moderate distress).  HENT:      Head: Normocephalic and atraumatic.      Mouth/Throat:      Mouth: Mucous membranes are dry.      Pharynx: Oropharynx is clear.   Eyes:      General: No scleral icterus.     Extraocular Movements: Extraocular movements intact.   Cardiovascular:      Rate and Rhythm: Normal rate and regular rhythm.   Pulmonary:      Effort: Pulmonary effort is normal. No respiratory distress.      Breath sounds: Normal breath sounds. No wheezing.   Abdominal:      Palpations: Abdomen is soft.      Tenderness: There is abdominal tenderness (diffuse ttp).      Comments: Post op scar on abdomen, tender. No drainage   Musculoskeletal:         General: No swelling or tenderness.      Cervical back: Neck supple. No rigidity.   Skin:     General: Skin is warm and dry.   Neurological:      General: No focal deficit  present.      Mental Status: She is alert and oriented to person, place, and time.   Psychiatric:         Mood and Affect: Mood normal.         Behavior: Behavior normal.              Significant Labs: All pertinent labs within the past 24 hours have been reviewed.    Significant Imaging: I have reviewed all pertinent imaging results/findings within the past 24 hours.

## 2023-07-22 NOTE — PROGRESS NOTES
[facility]  General Surgery  Progress Note    Subjective:     History of Present Illness:  Marilee Townsend is a 63yoF with a history of hypertension, rectal prolapse s/p rectopexy who presents to [facility] with complaints of nausea, vomiting and worsening abdominal pain. Of note, she presented to an outside hospital with the same complaints yesterday and was discharged with instructions to follow up with her primary care provider. The patient describes a three-day history of abdominal pain, nausea, vomiting and PO intolerance. This has persistently worsened throughout this time, which prompted both presentations. She states that she did have a small bowel movement yesterday, described as hard pellets. On work-up, her vital signs are stable. She has an elevated leukocytosis to 18. Lactate normal at 1. Repeat ordered. Her CT scan demonstrates dilated small bowel consistent with a small bowel obstruction. There is normal caliber small intestine distally. She is admitted to the hospital medicine service. General surgery consulted for evaluation.     Of note, her WBC has increased from 12.8 to 18 over the last 24hrs. Imaging at the outside hospital, per the official read, did not demonstrate any degree of obstruction, which is a rather burk contrast from her CT scan at our facility. Plan for NG tube insertion with low threshold to proceed to operating room for diagnostic laparoscopy. Discussed this plan with the patient.       Post-Op Info:  Procedure(s) (LRB):  LAPAROSCOPY, DIAGNOSTIC (N/A)  LAPAROTOMY, EXPLORATORY (N/A)  EXCISION, SMALL INTESTINE   6 Days Post-Op     Interval History:  Patient's pain is improving.    Tolerating liquids, will increase to full liquid diet or soft food.        Medications:  Continuous Infusions:   Amino acid 5% - dextrose 15% (CLINIMIX-E) solution with additives. CENTRAL LINE ONLY (1395 mOsm/L). 1L provides 50 gm AA, 150 gm CHO (510 kcal/L dextrose), Na 35, K 30, Mg 5, Ca 4.5,  Acetate 80, Cl 39, Phos 15      Amino acid 5% - dextrose 15% (CLINIMIX-E) solution with additives. CENTRAL LINE ONLY (1395 mOsm/L). 1L provides 50 gm AA, 150 gm CHO (510 kcal/L dextrose), Na 35, K 30, Mg 5, Ca 4.5, Acetate 80, Cl 39, Phos 15       Scheduled Meds:   acetaminophen  1,000 mg Oral Q8H    amLODIPine  5 mg Oral Daily    buPROPion  150 mg Oral BID    fat emulsion  250 mL Intravenous Daily    fat emulsion 20%  250 mL Intravenous Daily    heparin (porcine)  5,000 Units Subcutaneous Q8H    micafungin (MYCAMINE) IVPB  100 mg Intravenous Q24H    pantoprazole  40 mg Intravenous BID    piperacillin-tazobactam (Zosyn) IV (PEDS and ADULTS) (extended infusion is not appropriate)  4.5 g Intravenous Q8H    raloxifene  60 mg Oral Daily    sertraline  100 mg Oral QAM    sodium chloride 0.9%  10 mL Intravenous Q6H     PRN Meds:sodium chloride, acetaminophen, acetaminophen, dextrose 50%, dextrose 50%, diatrizoate meglumineand-diatrizoate sodium, glucagon (human recombinant), glucose, glucose, HYDROmorphone, HYDROmorphone, insulin aspart U-100, naloxone, ondansetron, sodium chloride 0.9%, Flushing PICC Protocol **AND** sodium chloride 0.9% **AND** sodium chloride 0.9%     Review of patient's allergies indicates:   Allergen Reactions    Morphine Nausea And Vomiting     Objective:     Vital Signs (Most Recent):  Temp: 97.9 °F (36.6 °C) (07/22/23 1617)  Pulse: 96 (07/22/23 1617)  Resp: 18 (07/22/23 1617)  BP: 137/80 (07/22/23 1617)  SpO2: 95 % (07/22/23 1617) Vital Signs (24h Range):  Temp:  [97.9 °F (36.6 °C)-98.6 °F (37 °C)] 97.9 °F (36.6 °C)  Pulse:  [] 96  Resp:  [18-22] 18  SpO2:  [95 %-97 %] 95 %  BP: (124-137)/(70-80) 137/80     Weight: 45.4 kg (100 lb)  Body mass index is 18.29 kg/m².    Intake/Output - Last 3 Shifts         07/20 0700 07/21 0659 07/21 0700 07/22 0659 07/22 0700 07/23 0659    P.O. 0 600 360    Blood  343.8     Total Intake(mL/kg) 0 (0) 943.8 (20.8) 360 (7.9)    Urine (mL/kg/hr)  1150 (1.1) 0 (0) 250 (0.6)    Drains 750 60     Stool 0 0 0    Total Output 1900 60 250    Net -1900 +883.8 +110           Urine Occurrence 2 x 3 x 1 x    Stool Occurrence 7 x 3 x 1 x             Physical Exam  Vitals and nursing note reviewed.   Constitutional:       Appearance: She is well-developed.   HENT:      Head: Normocephalic and atraumatic.   Cardiovascular:      Rate and Rhythm: Normal rate.      Heart sounds: Normal heart sounds.   Pulmonary:      Effort: Pulmonary effort is normal.   Abdominal:      General: Bowel sounds are normal. There is no distension.      Palpations: Abdomen is soft.      Tenderness: There is no abdominal tenderness.      Comments: IR drain in place.    Serous sanguinous fluid  Abdominal pain improved   Minimal distention   Musculoskeletal:         General: Normal range of motion.      Cervical back: Normal range of motion.   Skin:     General: Skin is warm and dry.      Capillary Refill: Capillary refill takes less than 2 seconds.   Neurological:      Mental Status: She is alert and oriented to person, place, and time.   Psychiatric:         Behavior: Behavior normal.        Significant Labs:  I have reviewed all pertinent lab results within the past 24 hours.  CBC:   Recent Labs   Lab 07/22/23  0558   WBC 18.92*   RBC 3.04*   HGB 9.3*   HCT 26.9*      MCV 89   MCH 30.6   MCHC 34.6     CMP:   Recent Labs   Lab 07/22/23  0558   *   CALCIUM 7.9*   ALBUMIN 1.9*   PROT 5.6*   *   K 4.0   CO2 23      BUN 13   CREATININE 0.5   ALKPHOS 68   ALT 21   AST 25   BILITOT 0.3       Significant Diagnostics:  I have reviewed all pertinent imaging results/findings within the past 24 hours.  CT: I have reviewed all pertinent results/findings within the past 24 hours and my personal findings are:  IR drain placed yesterday, serous sanguinous output    Assessment/Plan:     * Small bowel obstruction  Marilee Townsend is a 63yoF with a history of HTN and rectal prolapse  who presents with a several day history of abdominal pain, nausea, vomiting, and PO intolerance. Her work-up is consistent with a small bowel obstruction. General surgery consulted for evaluation.  She is now status post exploratory laparotomy on 07/16/2023 with multiple small bowel resections and anastomosis, evidence of enterotomy with over-sewing.    Postop day 1. Status post IR drained pelvis.    Mostly serous sanguinous fluid.    Patient's pain is okay.    Tolerating liquids.    Will advance to soft diet today.            Kamaljit Graham MD  General Surgery  [facility]

## 2023-07-22 NOTE — PT/OT/SLP PROGRESS
Physical Therapy      Patient Name:  Marilee Townsend   MRN:  [id number]    Patient not seen today secondary to Testing/imaging (xray/CT/MRI). Will follow-up .

## 2023-07-22 NOTE — NURSING
[facility], [facility] [place]  Nurses Note -- 4 Eyes    7/21/2023      Skin assessed on: Q Shift      [x] No Pressure Injuries Present    []Prevention Measures Documented    [] Yes LDA  for Pressure Injury Previously documented     [] Yes New Pressure Injury Discovered   [] LDA for New Pressure Injury Added      Attending RN:  Juventino Gamble RN     Second RN:  CORTEZ Bedolla

## 2023-07-22 NOTE — NURSING
[facility], [facility]  Nurses Note -- 4 Eyes      7/22/2023       Skin assessed on: Q Shift      [x] No Pressure Injuries Present    []Prevention Measures Documented    [] Yes LDA  for Pressure Injury Previously documented     [] Yes New Pressure Injury Discovered   [] LDA for New Pressure Injury Added      Attending RN:  Yue De Leon RN     Second RN:  Juventino Gamble RN

## 2023-07-22 NOTE — ASSESSMENT & PLAN NOTE
63F presenting with several days of worsening abd pain, anorexia, and nausea.  CT abd/pelvis with Abnormal fluid, air distension small bowel, termination point at or near ileocecal valve, not well-defined, associated less distension proximal small bowel with some bowel wall thickening.  Adhesion etiology of obstructive process favored.    -Surgery team consulted: s/p exploratory laparotomy on 07/16/2023 with multiple small bowel resections and anastomosis, evidence of enterotomy with over-sewing.  -NG tube in place to LIWS  -continue TPN per surgery   -Pt with worsening leukocytosis, CT abdomen repeated  -CT abdomen: there is now a moderate to large quantity of intraperitoneal fluid, with new enhancement of the adjacent portions of the parietal peritoneum, suspicious for peritonitis.   -IR consulted for drainage of pelvic collection  -ID consulted  -Analgesics ordered prn   -Advance diet as tolerated  -Pt with BM on 07/21

## 2023-07-22 NOTE — PLAN OF CARE
Problem: Occupational Therapy  Goal: Occupational Therapy Goal  Description: Goals to be met by: 8/11/2023     Patient will increase functional independence with ADLs by performing:    UE Dressing with Modified [place].  LE Dressing with Set-up Assistance.  Grooming while standing at sink with [place].  Toileting from toilet with Modified [place] for hygiene and clothing management.   Bathing seated UB and baldo care sponge with Modified [place].  Toilet transfer to toilet with Supervision.  Upper extremity exercise program x10 reps per handout, with assistance as needed for form constancy, proper respiration and energy conservation construct verbal recitation.     7/22/2023 1649 by JEB Peoples  Outcome: Ongoing, Progressing

## 2023-07-23 LAB
ALBUMIN SERPL BCP-MCNC: 1.9 G/DL (ref 3.5–5.2)
ALP SERPL-CCNC: 64 U/L (ref 55–135)
ALT SERPL W/O P-5'-P-CCNC: 18 U/L (ref 10–44)
ANION GAP SERPL CALC-SCNC: 9 MMOL/L (ref 8–16)
AST SERPL-CCNC: 21 U/L (ref 10–40)
BACTERIA SPEC AEROBE CULT: ABNORMAL
BASOPHILS # BLD AUTO: 0.03 K/UL (ref 0–0.2)
BASOPHILS NFR BLD: 0.2 % (ref 0–1.9)
BILIRUB SERPL-MCNC: 0.2 MG/DL (ref 0.1–1)
BUN SERPL-MCNC: 11 MG/DL (ref 8–23)
CALCIUM SERPL-MCNC: 7.9 MG/DL (ref 8.7–10.5)
CHLORIDE SERPL-SCNC: 102 MMOL/L (ref 95–110)
CO2 SERPL-SCNC: 22 MMOL/L (ref 23–29)
CREAT SERPL-MCNC: 0.5 MG/DL (ref 0.5–1.4)
DIFFERENTIAL METHOD: ABNORMAL
EOSINOPHIL # BLD AUTO: 0.1 K/UL (ref 0–0.5)
EOSINOPHIL NFR BLD: 0.7 % (ref 0–8)
ERYTHROCYTE [DISTWIDTH] IN BLOOD BY AUTOMATED COUNT: 14.4 % (ref 11.5–14.5)
EST. GFR  (NO RACE VARIABLE): >60 ML/MIN/1.73 M^2
GLUCOSE SERPL-MCNC: 208 MG/DL (ref 70–110)
HCT VFR BLD AUTO: 25.5 % (ref 37–48.5)
HGB BLD-MCNC: 8.6 G/DL (ref 12–16)
IMM GRANULOCYTES # BLD AUTO: 0.28 K/UL (ref 0–0.04)
IMM GRANULOCYTES NFR BLD AUTO: 1.9 % (ref 0–0.5)
LYMPHOCYTES # BLD AUTO: 1 K/UL (ref 1–4.8)
LYMPHOCYTES NFR BLD: 6.5 % (ref 18–48)
MAGNESIUM SERPL-MCNC: 1.9 MG/DL (ref 1.6–2.6)
MCH RBC QN AUTO: 31 PG (ref 27–31)
MCHC RBC AUTO-ENTMCNC: 33.7 G/DL (ref 32–36)
MCV RBC AUTO: 92 FL (ref 82–98)
MONOCYTES # BLD AUTO: 0.5 K/UL (ref 0.3–1)
MONOCYTES NFR BLD: 3.3 % (ref 4–15)
NEUTROPHILS # BLD AUTO: 13 K/UL (ref 1.8–7.7)
NEUTROPHILS NFR BLD: 87.4 % (ref 38–73)
NRBC BLD-RTO: 0 /100 WBC
PHOSPHATE SERPL-MCNC: 3.2 MG/DL (ref 2.7–4.5)
PLATELET # BLD AUTO: 288 K/UL (ref 150–450)
PMV BLD AUTO: 10.2 FL (ref 9.2–12.9)
POCT GLUCOSE: 106 MG/DL (ref 70–110)
POCT GLUCOSE: 118 MG/DL (ref 70–110)
POCT GLUCOSE: 143 MG/DL (ref 70–110)
POCT GLUCOSE: 158 MG/DL (ref 70–110)
POTASSIUM SERPL-SCNC: 4 MMOL/L (ref 3.5–5.1)
PROT SERPL-MCNC: 5.7 G/DL (ref 6–8.4)
RBC # BLD AUTO: 2.77 M/UL (ref 4–5.4)
SODIUM SERPL-SCNC: 133 MMOL/L (ref 136–145)
WBC # BLD AUTO: 14.86 K/UL (ref 3.9–12.7)

## 2023-07-23 PROCEDURE — 80053 COMPREHEN METABOLIC PANEL: CPT | Performed by: STUDENT IN AN ORGANIZED HEALTH CARE EDUCATION/TRAINING PROGRAM

## 2023-07-23 PROCEDURE — 63600175 PHARM REV CODE 636 W HCPCS: Performed by: STUDENT IN AN ORGANIZED HEALTH CARE EDUCATION/TRAINING PROGRAM

## 2023-07-23 PROCEDURE — 11000001 HC ACUTE MED/SURG PRIVATE ROOM

## 2023-07-23 PROCEDURE — 63600175 PHARM REV CODE 636 W HCPCS: Performed by: SURGERY

## 2023-07-23 PROCEDURE — C9113 INJ PANTOPRAZOLE SODIUM, VIA: HCPCS | Performed by: INTERNAL MEDICINE

## 2023-07-23 PROCEDURE — 97530 THERAPEUTIC ACTIVITIES: CPT | Mod: CQ

## 2023-07-23 PROCEDURE — 83735 ASSAY OF MAGNESIUM: CPT | Performed by: STUDENT IN AN ORGANIZED HEALTH CARE EDUCATION/TRAINING PROGRAM

## 2023-07-23 PROCEDURE — 25000003 PHARM REV CODE 250: Performed by: STUDENT IN AN ORGANIZED HEALTH CARE EDUCATION/TRAINING PROGRAM

## 2023-07-23 PROCEDURE — 25000003 PHARM REV CODE 250: Performed by: SURGERY

## 2023-07-23 PROCEDURE — 99233 SBSQ HOSP IP/OBS HIGH 50: CPT | Mod: ,,, | Performed by: INTERNAL MEDICINE

## 2023-07-23 PROCEDURE — 99024 POSTOP FOLLOW-UP VISIT: CPT | Mod: ,,, | Performed by: SURGERY

## 2023-07-23 PROCEDURE — B4185 PARENTERAL SOL 10 GM LIPIDS: HCPCS | Performed by: SURGERY

## 2023-07-23 PROCEDURE — A4216 STERILE WATER/SALINE, 10 ML: HCPCS | Performed by: STUDENT IN AN ORGANIZED HEALTH CARE EDUCATION/TRAINING PROGRAM

## 2023-07-23 PROCEDURE — 97116 GAIT TRAINING THERAPY: CPT | Mod: CQ

## 2023-07-23 PROCEDURE — 99024 PR POST-OP FOLLOW-UP VISIT: ICD-10-PCS | Mod: ,,, | Performed by: SURGERY

## 2023-07-23 PROCEDURE — 99233 PR SUBSEQUENT HOSPITAL CARE,LEVL III: ICD-10-PCS | Mod: ,,, | Performed by: INTERNAL MEDICINE

## 2023-07-23 PROCEDURE — 36415 COLL VENOUS BLD VENIPUNCTURE: CPT | Performed by: STUDENT IN AN ORGANIZED HEALTH CARE EDUCATION/TRAINING PROGRAM

## 2023-07-23 PROCEDURE — 63600175 PHARM REV CODE 636 W HCPCS: Performed by: INTERNAL MEDICINE

## 2023-07-23 PROCEDURE — 25000003 PHARM REV CODE 250: Performed by: INTERNAL MEDICINE

## 2023-07-23 PROCEDURE — 84100 ASSAY OF PHOSPHORUS: CPT | Performed by: STUDENT IN AN ORGANIZED HEALTH CARE EDUCATION/TRAINING PROGRAM

## 2023-07-23 PROCEDURE — 85025 COMPLETE CBC W/AUTO DIFF WBC: CPT | Performed by: STUDENT IN AN ORGANIZED HEALTH CARE EDUCATION/TRAINING PROGRAM

## 2023-07-23 RX ORDER — LOPERAMIDE HYDROCHLORIDE 2 MG/1
2 CAPSULE ORAL 4 TIMES DAILY PRN
Status: DISCONTINUED | OUTPATIENT
Start: 2023-07-23 | End: 2023-07-26 | Stop reason: HOSPADM

## 2023-07-23 RX ADMIN — PIPERACILLIN AND TAZOBACTAM 4.5 G: 4; .5 INJECTION, POWDER, LYOPHILIZED, FOR SOLUTION INTRAVENOUS; PARENTERAL at 04:07

## 2023-07-23 RX ADMIN — HYDROMORPHONE HYDROCHLORIDE 1 MG: 1 INJECTION, SOLUTION INTRAMUSCULAR; INTRAVENOUS; SUBCUTANEOUS at 07:07

## 2023-07-23 RX ADMIN — HYDROMORPHONE HYDROCHLORIDE 1 MG: 1 INJECTION, SOLUTION INTRAMUSCULAR; INTRAVENOUS; SUBCUTANEOUS at 02:07

## 2023-07-23 RX ADMIN — BUPROPION HYDROCHLORIDE 150 MG: 150 TABLET, EXTENDED RELEASE ORAL at 10:07

## 2023-07-23 RX ADMIN — Medication 10 ML: at 05:07

## 2023-07-23 RX ADMIN — AMLODIPINE BESYLATE 5 MG: 5 TABLET ORAL at 08:07

## 2023-07-23 RX ADMIN — PIPERACILLIN AND TAZOBACTAM 4.5 G: 4; .5 INJECTION, POWDER, LYOPHILIZED, FOR SOLUTION INTRAVENOUS; PARENTERAL at 08:07

## 2023-07-23 RX ADMIN — LEUCINE, PHENYLALANINE, LYSINE, METHIONINE, ISOLEUCINE, VALINE, HISTIDINE, THREONINE, TRYPTOPHAN, ALANINE, GLYCINE, ARGININE, PROLINE, SERINE, TYROSINE, SODIUM ACETATE, DIBASIC POTASSIUM PHOSPHATE, MAGNESIUM CHLORIDE, SODIUM CHLORIDE, CALCIUM CHLORIDE, DEXTROSE
365; 280; 290; 200; 300; 290; 240; 210; 90; 1035; 515; 575; 340; 250; 20; 340; 261; 51; 59; 33; 15 INJECTION INTRAVENOUS at 11:07

## 2023-07-23 RX ADMIN — HYDROMORPHONE HYDROCHLORIDE 1 MG: 1 INJECTION, SOLUTION INTRAMUSCULAR; INTRAVENOUS; SUBCUTANEOUS at 09:07

## 2023-07-23 RX ADMIN — HYDROMORPHONE HYDROCHLORIDE 1 MG: 1 INJECTION, SOLUTION INTRAMUSCULAR; INTRAVENOUS; SUBCUTANEOUS at 10:07

## 2023-07-23 RX ADMIN — PANTOPRAZOLE SODIUM 40 MG: 40 INJECTION, POWDER, FOR SOLUTION INTRAVENOUS at 10:07

## 2023-07-23 RX ADMIN — SOYBEAN OIL 250 ML: 20 INJECTION, SOLUTION INTRAVENOUS at 11:07

## 2023-07-23 RX ADMIN — PIPERACILLIN AND TAZOBACTAM 4.5 G: 4; .5 INJECTION, POWDER, LYOPHILIZED, FOR SOLUTION INTRAVENOUS; PARENTERAL at 01:07

## 2023-07-23 RX ADMIN — HEPARIN SODIUM 5000 UNITS: 5000 INJECTION INTRAVENOUS; SUBCUTANEOUS at 05:07

## 2023-07-23 RX ADMIN — BUPROPION HYDROCHLORIDE 150 MG: 150 TABLET, EXTENDED RELEASE ORAL at 08:07

## 2023-07-23 RX ADMIN — LOPERAMIDE HYDROCHLORIDE 2 MG: 2 CAPSULE ORAL at 02:07

## 2023-07-23 RX ADMIN — HEPARIN SODIUM 5000 UNITS: 5000 INJECTION INTRAVENOUS; SUBCUTANEOUS at 02:07

## 2023-07-23 RX ADMIN — Medication 10 ML: at 11:07

## 2023-07-23 RX ADMIN — RALOXIFENE HYDROCHLORIDE 60 MG: 60 TABLET, FILM COATED ORAL at 08:07

## 2023-07-23 RX ADMIN — PANTOPRAZOLE SODIUM 40 MG: 40 INJECTION, POWDER, FOR SOLUTION INTRAVENOUS at 08:07

## 2023-07-23 RX ADMIN — ACETAMINOPHEN 1000 MG: 500 TABLET, FILM COATED ORAL at 02:07

## 2023-07-23 RX ADMIN — SERTRALINE HYDROCHLORIDE 100 MG: 50 TABLET ORAL at 06:07

## 2023-07-23 RX ADMIN — HYDROMORPHONE HYDROCHLORIDE 1 MG: 1 INJECTION, SOLUTION INTRAMUSCULAR; INTRAVENOUS; SUBCUTANEOUS at 05:07

## 2023-07-23 RX ADMIN — ACETAMINOPHEN 1000 MG: 500 TABLET, FILM COATED ORAL at 10:07

## 2023-07-23 RX ADMIN — MICAFUNGIN SODIUM 100 MG: 100 INJECTION, POWDER, LYOPHILIZED, FOR SOLUTION INTRAVENOUS at 06:07

## 2023-07-23 NOTE — SUBJECTIVE & OBJECTIVE
Interval History:    NAEON.  A bit upset about her overall care, calm in the end however  All questions answered and updates on care given.       Review of Systems   Respiratory:  Negative for shortness of breath.    Cardiovascular:  Negative for chest pain.   Gastrointestinal:  Negative for abdominal pain.               Objective:     Vital Signs (Most Recent):  Temp: 99.3 °F (37.4 °C) (07/23/23 0726)  Pulse: 91 (07/23/23 0726)  Resp: 20 (07/23/23 0726)  BP: (!) 141/79 (07/23/23 0726)  SpO2: 96 % (07/23/23 0726) Vital Signs (24h Range):  Temp:  [97.9 °F (36.6 °C)-99.3 °F (37.4 °C)] 99.3 °F (37.4 °C)  Pulse:  [88-97] 91  Resp:  [16-20] 20  SpO2:  [93 %-96 %] 96 %  BP: (131-143)/(63-87) 141/79     Weight: 45.4 kg (100 lb)  Body mass index is 18.29 kg/m².    Intake/Output Summary (Last 24 hours) at 7/23/2023 0836  Last data filed at 7/23/2023 0657  Gross per 24 hour   Intake 1656.7 ml   Output 625 ml   Net 1031.7 ml           Physical Exam  Vitals and nursing note reviewed.   Constitutional:       General: She is not in acute distress.     Appearance: She is ill-appearing.   HENT:      Nose:      Comments: NG tube removed on 07/21/2023     Mouth/Throat:      Mouth: Mucous membranes are dry.   Cardiovascular:      Rate and Rhythm: Normal rate and regular rhythm.   Pulmonary:      Effort: Pulmonary effort is normal. No respiratory distress.      Breath sounds: No wheezing or rales.   Abdominal:      General: There is distension.      Palpations: Abdomen is soft.      Tenderness: There is abdominal tenderness (mild tenderenss).      Comments: Abdomen soft, mildly distended  Midline incision with dressing in place  Abdomen mildly tender today    Musculoskeletal:         General: Normal range of motion.      Right lower leg: No edema.      Left lower leg: No edema.   Skin:     General: Skin is warm and dry.      Comments: Has drainage catheter  near right gluteal from recent transgluteal-approach drainage catheter into the  complex pelvic fluid collection (07/21/23)   Neurological:      General: No focal deficit present.      Mental Status: She is alert and oriented to person, place, and time.   Psychiatric:         Mood and Affect: Mood normal.         Thought Content: Thought content normal.               Recent Results (from the past 24 hour(s))   POCT glucose    Collection Time: 07/22/23 11:53 AM   Result Value Ref Range    POCT Glucose 133 (H) 70 - 110 mg/dL   POCT glucose    Collection Time: 07/22/23  6:03 PM   Result Value Ref Range    POCT Glucose 133 (H) 70 - 110 mg/dL   POCT glucose    Collection Time: 07/22/23 11:29 PM   Result Value Ref Range    POCT Glucose 129 (H) 70 - 110 mg/dL   Comprehensive Metabolic Panel    Collection Time: 07/23/23  4:24 AM   Result Value Ref Range    Sodium 133 (L) 136 - 145 mmol/L    Potassium 4.0 3.5 - 5.1 mmol/L    Chloride 102 95 - 110 mmol/L    CO2 22 (L) 23 - 29 mmol/L    Glucose 208 (H) 70 - 110 mg/dL    BUN 11 8 - 23 mg/dL    Creatinine 0.5 0.5 - 1.4 mg/dL    Calcium 7.9 (L) 8.7 - 10.5 mg/dL    Total Protein 5.7 (L) 6.0 - 8.4 g/dL    Albumin 1.9 (L) 3.5 - 5.2 g/dL    Total Bilirubin 0.2 0.1 - 1.0 mg/dL    Alkaline Phosphatase 64 55 - 135 U/L    AST 21 10 - 40 U/L    ALT 18 10 - 44 U/L    eGFR >60 >60 mL/min/1.73 m^2    Anion Gap 9 8 - 16 mmol/L   Magnesium    Collection Time: 07/23/23  4:24 AM   Result Value Ref Range    Magnesium 1.9 1.6 - 2.6 mg/dL   Phosphorus    Collection Time: 07/23/23  4:24 AM   Result Value Ref Range    Phosphorus 3.2 2.7 - 4.5 mg/dL   CBC auto differential    Collection Time: 07/23/23  4:24 AM   Result Value Ref Range    WBC 14.86 (H) 3.90 - 12.70 K/uL    RBC 2.77 (L) 4.00 - 5.40 M/uL    Hemoglobin 8.6 (L) 12.0 - 16.0 g/dL    Hematocrit 25.5 (L) 37.0 - 48.5 %    MCV 92 82 - 98 fL    MCH 31.0 27.0 - 31.0 pg    MCHC 33.7 32.0 - 36.0 g/dL    RDW 14.4 11.5 - 14.5 %    Platelets 288 150 - 450 K/uL    MPV 10.2 9.2 - 12.9 fL    Immature Granulocytes 1.9 (H) 0.0 - 0.5 %     Gran # (ANC) 13.0 (H) 1.8 - 7.7 K/uL    Immature Grans (Abs) 0.28 (H) 0.00 - 0.04 K/uL    Lymph # 1.0 1.0 - 4.8 K/uL    Mono # 0.5 0.3 - 1.0 K/uL    Eos # 0.1 0.0 - 0.5 K/uL    Baso # 0.03 0.00 - 0.20 K/uL    nRBC 0 0 /100 WBC    Gran % 87.4 (H) 38.0 - 73.0 %    Lymph % 6.5 (L) 18.0 - 48.0 %    Mono % 3.3 (L) 4.0 - 15.0 %    Eosinophil % 0.7 0.0 - 8.0 %    Basophil % 0.2 0.0 - 1.9 %    Differential Method Automated    POCT glucose    Collection Time: 07/23/23  6:50 AM   Result Value Ref Range    POCT Glucose 158 (H) 70 - 110 mg/dL       Microbiology Results (last 7 days)       Procedure Component Value Units Date/Time    Aerobic culture [[id number]]  (Abnormal) Collected: 07/21/23 1413    Order Status: Completed Specimen: Abscess Updated: 07/23/23 0741     Aerobic Bacterial Culture CANDIDA ALBICANS  Moderate      Culture, Anaerobe [[id number]] Collected: 07/21/23 1414    Order Status: Completed Specimen: Abscess from Peritoneal Fluid Updated: 07/23/23 0644     Anaerobic Culture Culture in progress    Narrative:      peritoneal abscess    Blood culture [[id number]] Collected: 07/22/23 0620    Order Status: Completed Specimen: Blood from Peripheral, Right Wrist Updated: 07/22/23 1512     Blood Culture, Routine No Growth to date    Blood culture [[id number]] Collected: 07/22/23 0620    Order Status: Completed Specimen: Blood from Peripheral, Left Hand Updated: 07/22/23 1512     Blood Culture, Routine No Growth to date    Gram stain [[id number]] Collected: 07/21/23 1414    Order Status: Completed Specimen: Abscess from Peritoneal Fluid Updated: 07/21/23 1508     Gram Stain Result Many WBC's      No organisms seen    Narrative:      Peritoneal abscess    Blood culture (site 1) [[id number]] Collected: 07/14/23 1308    Order Status: Completed Specimen: Blood from Peripheral, Antecubital, Left Updated: 07/18/23 1503     Blood Culture, Routine No Growth after 4 days.    Narrative:      Site # 1, aerobic and anaerobic    Blood  culture (site 2) [[id number]] Collected: 07/14/23 1305    Order Status: Completed Specimen: Blood from Peripheral, Forearm, Left Updated: 07/18/23 150     Blood Culture, Routine No Growth after 4 days.    Narrative:      Site # 2, aerobic only             Imaging Results              X-Ray Abdomen AP 1 View (Final result)  Result time 07/14/23 15:04:30      Final result by Manuel Rey MD (07/14/23 15:04:30)                   Impression:      As above      Electronically signed by: Manuel Rey MD  Date:    07/14/2023  Time:    15:04               Narrative:    EXAMINATION:  XR ABDOMEN AP 1 VIEW    CLINICAL HISTORY:  ng tube eval;    TECHNIQUE:  Single AP View of the abdomen was performed.    COMPARISON:  07/14/2023 at 14:06    FINDINGS:  NG tube has been advanced, and now in good position with the tip and proximal port both subdiaphragmatic.    Otherwise stable exam, noting a few dilated loops of small bowel.  No obvious free air.                                       X-Ray Abdomen AP 1 View (KUB) (Final result)  Result time 07/14/23 14:15:20      Final result by Jose Rosales MD (07/14/23 14:15:20)                   Impression:      Recommended advancement of nasogastric tube.      Electronically signed by: Jose Rosales MD  Date:    07/14/2023  Time:    14:15               Narrative:    EXAMINATION:  XR ABDOMEN AP 1 VIEW    TECHNIQUE:  XR ABDOMEN AP 1 VIEW    FINDINGS:  The bowel gas pattern is non-specific.    No airspace consolidation at the lung bases.No acute bony abnormality.No abnormal soft tissue masses.No abnormal calcific densities.Contrast present within the kidneys.  Postoperative changes lower lumbar spine.  Nasogastric tube present with side hole at the level of the GE junction can be advanced.                                        CT Abdomen Pelvis With Contrast (Final result)  Result time 07/14/23 12:01:03      Final result by Sulaiman Ford MD (07/14/23 12:01:03)                    Impression:      Status post hysterectomy and sigmoid surgical therapy.    Abnormal fluid, air distension small bowel, termination point at or near ileocecal valve, not well-defined, associated less distension proximal small bowel with some bowel wall thickening.  Adhesion etiology of obstructive process favored.    Posterior pelvic ascites, mesenteric edema.  Addition remote findings above.    This report was flagged in Epic as abnormal.      Electronically signed by: Sulaiman Ford MD  Date:    07/14/2023  Time:    12:01               Narrative:    EXAMINATION:  CT ABDOMEN PELVIS WITH CONTRAST    CLINICAL HISTORY:  RLQ abdominal pain (Age >= 14y);Bowel obstruction suspected;    TECHNIQUE:  Low dose axial images, sagittal and coronal reformations were obtained from the lung bases to the pubic symphysis following the IV administration of 85 mL of Omnipaque 350 and the oral administration of 15 mL of Omnipaque 300.    COMPARISON:  None.    FINDINGS:  Postop posterior spinal fusion, with pedicular screws and vertical stabilization bars and laminectomy L5-S1, placement of interbody disc spacer, degenerative disc spondylosis with chronic sclerotic erosive changes T11-T12 L1-L2, L2-L3, L 4 and L5 disc levels, primary anterior subluxation L3 on L2, L4 on L5, L5 on S1.    Status of bilateral breast prosthetic surgery with calcification of capsule of prosthesis.  Focal tiny ossification anterior to linea alba above level of umbilicus.  Some diastasis of linea alba above and at and below umbilicus.  Subcutaneous edema inferior, medial, posterior buttocks.    Calcified plaquing aorta major branches particularly distal aorta and common iliac arteries.    Liver, gallbladder, spleen, adrenal glands, kidneys, pancreas biliary tree normal.    Retroaortic left renal vein.  No adenopathy.  Mild amount of ascites posterior pelvis presacral particularly on right, 6.2 x 4.8 cm.  Urinary bladder small.  7 mm midline  calcification at level of introitus of uncertain etiology.    Mild distension gastric cardia with fluid, distal stomach, intact.  Minimal mild distension duodenal with air.  Minimal mild distension proximal small bowel jejunum left flank with suggestion of some bowel wall thickening.  Moderate severe distension mid distal small bowel, iliac loop 3.3 diameter central pelvis.  No nondistended terminal ileum confirmed, nondistended large bowel, suture line postop bowel resection sigmoid at left lateral central pelvic region.  Appendix not identified.    Postop hysterectomy.  No adnexal mass.

## 2023-07-23 NOTE — ASSESSMENT & PLAN NOTE
"63F with h/o depression, fibromyalgia, htn, prior abdominal surgeries admitted 7/14 with progressive intractable abdominal pain, decreased appetite, nausea. Found to have sbo, thought to be secondary to adhesions and kinking with multiple enterotomies, s/p ex lap, small bowl resection on 7/16. Afebrile. Day 6 zosyn/flagyl. Wbc rising. Admit Bcx negative. S/p drain placement of a 14 x 13 x 7 cm abdominal fluid collection, drain with serosanguinous output, cultures candida albicans.  Gi consulted for tarry stools; anemia. ID consulted for "worsening leukocytosis; s/p bowel resection on 7/16    Unclear etiology of worsening leukocytosis- d/dx source control issue (leak vs post op abscess?) vs reactive (gi bleed?) vs inadequate coverage of abx (fungal?)    Recommendations:  - continue zosyn. Change micafungin --> fluconazole  - concerned about ongoing severe abdominal pain, would strongly consider repeating CT abd with oral contrast to r/o leak  - worried about adequacy of source control of intrabdominal fluid collection. If surgical washout not done, expect prolonged course of antibiotics followed by serial imaging; duration of abx until radiographic resolution of abscess.   - not clear that diarrhea is infectious. Suspect related to blood, GI following. No odor, c.diff unlikely  "

## 2023-07-23 NOTE — PLAN OF CARE
Problem: Skin Injury Risk Increased  Goal: Skin Health and Integrity  Outcome: Ongoing, Progressing     Problem: Infection  Goal: Absence of Infection Signs and Symptoms  Outcome: Ongoing, Progressing     Problem: Infection (Surgery Nonspecified)  Goal: Absence of Infection Signs and Symptoms  Outcome: Ongoing, Progressing

## 2023-07-23 NOTE — SUBJECTIVE & OBJECTIVE
Interval History:  Patient still with bowel movements.    Still feeling some crampy abdominal pain.    Tolerating diet but still not great appetite.    Continue to encourage out of bed and ambulation.    Continue drain management.        Medications:  Continuous Infusions:   Amino acid 5% - dextrose 15% (CLINIMIX-E) solution with additives. CENTRAL LINE ONLY (1395 mOsm/L). 1L provides 50 gm AA, 150 gm CHO (510 kcal/L dextrose), Na 35, K 30, Mg 5, Ca 4.5, Acetate 80, Cl 39, Phos 15 75 mL/hr at 07/22/23 2147    Amino acid 5% - dextrose 15% (CLINIMIX-E) solution with additives. CENTRAL LINE ONLY (1395 mOsm/L). 1L provides 50 gm AA, 150 gm CHO (510 kcal/L dextrose), Na 35, K 30, Mg 5, Ca 4.5, Acetate 80, Cl 39, Phos 15       Scheduled Meds:   acetaminophen  1,000 mg Oral Q8H    amLODIPine  5 mg Oral Daily    buPROPion  150 mg Oral BID    fat emulsion  250 mL Intravenous Daily    heparin (porcine)  5,000 Units Subcutaneous Q8H    micafungin (MYCAMINE) IVPB  100 mg Intravenous Q24H    pantoprazole  40 mg Intravenous BID    piperacillin-tazobactam (Zosyn) IV (PEDS and ADULTS) (extended infusion is not appropriate)  4.5 g Intravenous Q8H    raloxifene  60 mg Oral Daily    sertraline  100 mg Oral QAM    sodium chloride 0.9%  10 mL Intravenous Q6H     PRN Meds:sodium chloride, acetaminophen, acetaminophen, dextrose 50%, dextrose 50%, diatrizoate meglumineand-diatrizoate sodium, glucagon (human recombinant), glucose, glucose, HYDROmorphone, HYDROmorphone, insulin aspart U-100, naloxone, ondansetron, sodium chloride 0.9%, Flushing PICC Protocol **AND** sodium chloride 0.9% **AND** sodium chloride 0.9%     Review of patient's allergies indicates:   Allergen Reactions    Morphine Nausea And Vomiting     Objective:     Vital Signs (Most Recent):  Temp: 98.8 °F (37.1 °C) (07/23/23 1147)  Pulse: 95 (07/23/23 1147)  Resp: 18 (07/23/23 1147)  BP: (!) 156/88 (07/23/23 1147)  SpO2: 98 % (07/23/23 1147) Vital Signs (24h Range):  Temp:   [97.9 °F (36.6 °C)-99.3 °F (37.4 °C)] 98.8 °F (37.1 °C)  Pulse:  [90-97] 95  Resp:  [16-20] 18  SpO2:  [93 %-98 %] 98 %  BP: (136-156)/(63-88) 156/88     Weight: 45.4 kg (100 lb)  Body mass index is 18.29 kg/m².    Intake/Output - Last 3 Shifts         07/21 0700 07/22 0659 07/22 0700 07/23 0659 07/23 0700 07/24 0659    P.O. 600 600     Blood 343.8      IV Piggyback 323.9 392.2     TPN 1851.4 2026.1     Total Intake(mL/kg) 3119 (68.7) 3018.3 (66.5)     Urine (mL/kg/hr) 0 (0) 600 (0.6)     Drains 60 25     Stool 0 0     Total Output 60 625     Net +3059 +2393.3            Urine Occurrence 3 x 6 x     Stool Occurrence 3 x 4 x 1 x             Physical Exam  Vitals and nursing note reviewed.   Constitutional:       Appearance: She is well-developed.   HENT:      Head: Normocephalic and atraumatic.   Cardiovascular:      Rate and Rhythm: Normal rate.      Heart sounds: Normal heart sounds.   Pulmonary:      Effort: Pulmonary effort is normal.   Abdominal:      General: Bowel sounds are normal. There is distension (mild).      Palpations: Abdomen is soft.      Tenderness: There is no abdominal tenderness.   Musculoskeletal:         General: Normal range of motion.      Cervical back: Normal range of motion.   Skin:     General: Skin is warm and dry.      Capillary Refill: Capillary refill takes less than 2 seconds.   Neurological:      Mental Status: She is alert and oriented to person, place, and time.   Psychiatric:         Behavior: Behavior normal.        Significant Labs:  I have reviewed all pertinent lab results within the past 24 hours.  CBC:   Recent Labs   Lab 07/23/23 0424   WBC 14.86*   RBC 2.77*   HGB 8.6*   HCT 25.5*      MCV 92   MCH 31.0   MCHC 33.7     CMP:   Recent Labs   Lab 07/23/23 0424   *   CALCIUM 7.9*   ALBUMIN 1.9*   PROT 5.7*   *   K 4.0   CO2 22*      BUN 11   CREATININE 0.5   ALKPHOS 64   ALT 18   AST 21   BILITOT 0.2

## 2023-07-23 NOTE — CONSULTS
"[facility] [place] [facility]  Infectious Disease  Consult Note    Patient Name: Marilee Townsend  MRN: [id number]  Admission Date: 7/14/2023  Hospital Length of Stay: 9 days  Attending Physician: Adriane Jang DO  Primary Care Provider: Claudia Hopper DO     Isolation Status: No active isolations    Patient information was obtained from patient and ER records.      Consults  Assessment/Plan:     Oncology  Leukocytosis  63F with h/o depression, fibromyalgia, htn, prior abdominal surgeries admitted 7/14 with progressive intractable abdominal pain, decreased appetite, nausea. Found to have sbo, thought to be secondary to adhesions and kinking with multiple enterotomies, s/p ex lap, small bowl resection on 7/16. Afebrile. Day 6 zosyn/flagyl. Wbc rising. Admit Bcx negative. S/p drain placement of a 14 x 13 x 7 cm abdominal fluid collection, drain with serosanguinous output, cultures candida albicans.  Gi consulted for tarry stools; anemia. ID consulted for "worsening leukocytosis; s/p bowel resection on 7/16    Unclear etiology of worsening leukocytosis- d/dx source control issue (leak vs post op abscess?) vs reactive (gi bleed?) vs inadequate coverage of abx (fungal?)    Recommendations:  - continue zosyn. Change micafungin --> fluconazole  - concerned about ongoing severe abdominal pain, would strongly consider repeating CT abd with oral contrast to r/o leak  - worried about adequacy of source control of intrabdominal fluid collection. If surgical washout not done, expect prolonged course of antibiotics followed by serial imaging; duration of abx until radiographic resolution of abscess.   - not clear that diarrhea is infectious. Suspect related to blood, GI following. No odor, c.diff unlikely    Addendum: would update EKG prior to making switch from micafungin to fluconazole (last ekg 2017 and she is on other qtc prolongating psych meds)    Thank you for your consult. I will follow-up with patient. Please contact us if " you have any additional questions.    Cara Lara MD  Infectious Disease  [facility] - Med Surg    Subjective:     Principal Problem: Small bowel obstruction    HPI: 63F with h/o depression, fibromyalgia, htn, prior abdominal surgeries admitted 7/14 with progressive intractable abdominal pain, decreased appetite, nausea. Reports OSH ED visit for the same, progressed, so returned. Denies fever. Found to have SBO s/p surgical repair. Reports abdomen tender, but better than admit. Reports black tarry stools. Denies issues with iv lines. Denies dysuria. About to go down to IR    Afebrile    Day 6 zosyn/flagyl    Wbc rising    Bcx negative    On 7/16 taken for   LAPAROSCOPY, DIAGNOSTIC (N/A)  LAPAROTOMY, EXPLORATORY (N/A)  lysis of adhesions, small bowel resection repair of enterotomies    Found to have sbo, thought to be secondary to adhesions and kinking with multiple enterotomies      Ct repeated today  Interval resection of abnormal pelvic small bowel with diminished but not fully resolved small bowel dilatation.  A new small bowel transition point is questioned in the vicinity of the new intrapelvic enteroenteric anastomosis.  Correlate clinically, and with follow-up imaging.     There is a moderate quantity of intraperitoneal fluid in the pelvis.  Its attenuation of up to 26 HU suggests proteinaceous fluid (DDX: Purulent debris, dilute blood products, extraluminal succus entericus etc.).  New diffuse enhancement of the adjacent portions of the parietal peritoneum raises suspicion for peritonitis.  Correlate clinically.     Gas with urinary bladder lumen is most likely related to recent catheterization.  In the absence of any recent instrumentation, a gas-forming infection or enterocystic fistula might also be considerations.  Correlate clinically.     New small bilateral pleural effusions.  Opacities in the adjacent dorsal aspect of either lower lobe most likely represent atelectasis.  Pneumonia not fully  "excluded.  Correlate clinically, and with follow-up imaging.     New patchy hazy ground-glass opacities in the lingula and right middle lobe possibly represent pulmonary edema, small airways disease, atypical pneumonia, aspiration, or other abnormality.  Correlate clinically, and with follow-up imaging.      IR consulted for abdominal drain placement for complex fluid collection, cultures ordered    Gi consulted for tarry stools; anemia    ID consulted for "worsening leukocytosis; s/p bowel resection on 7/16      Interval history: c/o ongoing abdominal pain, tarry diarrhea (reports 30 episodes of diarrhea a day)    S/p IR drain placement of a loculated complex pelvic fluid collection with surrounding thin enhancing rind measuring up to 14.4 x 13.2 x 7.0-cm in maximal AP x TV x CC diameters - cultures candida          Past Surgical History:   Procedure Laterality Date    BREAST SURGERY Bilateral     augmentation-Implants    DIAGNOSTIC LAPAROSCOPY N/A 7/16/2023    Procedure: LAPAROSCOPY, DIAGNOSTIC;  Surgeon: Bora Quevedo MD;  Location: [facility] OR;  Service: General;  Laterality: N/A;    epidural steroid injection  01/09/2017    twice-11/2017  & 1/9/17 to neck    EXCISION, SMALL INTESTINE  7/16/2023    Procedure: EXCISION, SMALL INTESTINE;  Surgeon: Bora Quevedo MD;  Location: [facility] OR;  Service: General;;    HERNIA REPAIR      umbilical    HYSTERECTOMY      LAPAROTOMY, EXPLORATORY N/A 7/16/2023    Procedure: LAPAROTOMY, EXPLORATORY;  Surgeon: Bora Quevedo MD;  Location: [facility] OR;  Service: General;  Laterality: N/A;    ROTATOR CUFF REPAIR Bilateral     TONSILLECTOMY      TOTAL SHOULDER ARTHROPLASTY Bilateral     WRIST FRACTURE SURGERY Right     with hardware placed       Review of patient's allergies indicates:   Allergen Reactions    Morphine Nausea And Vomiting       No current facility-administered medications on file prior to encounter.     Current Outpatient Medications on File Prior to Encounter "   Medication Sig    amLODIPine (NORVASC) 5 MG tablet once daily.    buPROPion (WELLBUTRIN SR) 150 MG TBSR 12 hr tablet Take 150 mg by mouth 2 (two) times daily.    ergocalciferol (ERGOCALCIFEROL) 50,000 unit Cap Take 1 capsule (50,000 Units total) by mouth every Sunday.    HYDROcodone-acetaminophen (NORCO)  mg per tablet Take 1 tablet by mouth every 6 (six) hours as needed.    lisinopriL-hydrochlorothiazide (PRINZIDE,ZESTORETIC) 10-12.5 mg per tablet Take 1 tablet by mouth.    meloxicam (MOBIC) 15 MG tablet Take 15 mg by mouth once daily. Instructed to stop until after procedure 1-9-17.    raloxifene (EVISTA) 60 mg tablet Take 60 mg by mouth once daily.    sertraline (ZOLOFT) 100 MG tablet Take 100 mg by mouth every morning.      Family History    None       Tobacco Use    Smoking status: Former     Packs/day: 1.00     Years: 20.00     Pack years: 20.00     Types: Cigarettes     Start date: 5/12/2014    Smokeless tobacco: Never    Tobacco comments:     Stopped smoking greater than 5 years   Substance and Sexual Activity    Alcohol use: Yes     Comment: rarely    Drug use: Not Currently     Types: Hydromorphone     Comment: 3 - 4 tabs daily as needed    Sexual activity: Not Currently     Review of Systems   Constitutional:  Negative for chills and fever.   Respiratory:  Negative for cough and shortness of breath.    Cardiovascular:  Negative for leg swelling.   Gastrointestinal:  Positive for abdominal distention, abdominal pain and blood in stool.   Genitourinary:  Negative for dysuria.   Skin:  Positive for wound.   Psychiatric/Behavioral:  Negative for confusion.    All other systems reviewed and are negative.  Objective:     Vital Signs (Most Recent):  Temp: 98.8 °F (37.1 °C) (07/23/23 1147)  Pulse: 95 (07/23/23 1147)  Resp: 18 (07/23/23 1147)  BP: (!) 156/88 (07/23/23 1147)  SpO2: 98 % (07/23/23 1147) Vital Signs (24h Range):  Temp:  [97.9 °F (36.6 °C)-99.3 °F (37.4 °C)] 98.8 °F (37.1 °C)  Pulse:  [90-97]  95  Resp:  [16-20] 18  SpO2:  [93 %-98 %] 98 %  BP: (136-156)/(63-88) 156/88     Weight: 45.4 kg (100 lb)  Body mass index is 18.29 kg/m².     Physical Exam  Vitals reviewed.   Constitutional:       General: She is not in acute distress (moderate distress).  HENT:      Head: Normocephalic and atraumatic.      Mouth/Throat:      Mouth: Mucous membranes are dry.      Pharynx: Oropharynx is clear.   Eyes:      General: No scleral icterus.     Extraocular Movements: Extraocular movements intact.   Cardiovascular:      Rate and Rhythm: Normal rate and regular rhythm.   Pulmonary:      Effort: Pulmonary effort is normal. No respiratory distress.      Breath sounds: Normal breath sounds. No wheezing.   Abdominal:      Palpations: Abdomen is soft.      Tenderness: There is abdominal tenderness (diffuse ttp).      Comments: Post op scar on abdomen, tender. No drainage   Musculoskeletal:         General: No swelling or tenderness.      Cervical back: Neck supple. No rigidity.   Skin:     General: Skin is warm and dry.   Neurological:      General: No focal deficit present.      Mental Status: She is alert and oriented to person, place, and time.   Psychiatric:         Mood and Affect: Mood normal.         Behavior: Behavior normal.              Significant Labs: All pertinent labs within the past 24 hours have been reviewed.    Significant Imaging: I have reviewed all pertinent imaging results/findings within the past 24 hours.

## 2023-07-23 NOTE — PROGRESS NOTES
[facility]  Progress Note    Patient Name: Marilee Townsend  MRN: [id number]  Patient Class: IP- Inpatient   Admission Date: 7/14/2023  Length of Stay: 9 days  Attending Physician: Adriane Jang DO  Primary Care Provider: Claudia Hopper DO        Subjective:     Principal Problem:Small bowel obstruction        HPI:  63F with pmh of depression, fibromyalgia, htn, osteoporosis who presents due to several day h/o abd pain and nausea with anorexia. Pt was evaluated at outside emergency department patient was seen in the day prompting the ED for evaluation.  Patient denies any recent flatulence or bowel movements unclear on the last date.  Patient states pain is diffuse in the with minimal improvement with pain medications given in the ED. CT scan in the emergency department with demonstration of small bowel obstruction and surgery consulted who had NG tube placed.  Patient denies chest pain, shortness a breath, fever, chills.  Patient states she had a shoulder surgery about 3 weeks prior to arrival and tolerated the anesthesia well.  Patient is a former smoker, but denies alcohol or drug use.      Overview/Hospital Course:  63F with pmh of depression, fibromyalgia, htn, osteoporosis admitted on 07/07/2023 for small bowel obstruction. presented with a several day history of abdominal pain, nausea, vomiting, and PO intolerance.  CT scan in ED with demonstration of small bowel obstruction and surgery consulted who had NG tube placed. Attempts at gastrografin with persistent obstruction. Pt taken to OR on 7/16. NG tube remained after operation and surgery managing. She is now status post exploratory laparotomy on 07/16/2023 with multiple small bowel resections and anastomosis, evidence of enterotomy with over-sewing. Replace electrolytes as needed. Had BM on 07/21/2023. PT/OT consulted for evaluation. NGT removed on 07/21/2023. Patient with worsening leukocytosis on 07/21. Repeat CT abd  showed moderate quantity of intraperitoneal fluid in the pelvis. IR consulted-s/p transgluteal-approach drainage catheter into the complex pelvic fluid collection. Fluid cx ordered. ID consulted. Repeat blood cx ordered.     WBC# trending down, now 14.9k, continuing zosyn and keron. Candida albicans in abscess.       Interval History:    NAEON.  A bit upset about her overall care, calm in the end however  All questions answered and updates on care given.       Review of Systems   Respiratory:  Negative for shortness of breath.    Cardiovascular:  Negative for chest pain.   Gastrointestinal:  Negative for abdominal pain.               Objective:     Vital Signs (Most Recent):  Temp: 99.3 °F (37.4 °C) (07/23/23 0726)  Pulse: 91 (07/23/23 0726)  Resp: 20 (07/23/23 0726)  BP: (!) 141/79 (07/23/23 0726)  SpO2: 96 % (07/23/23 0726) Vital Signs (24h Range):  Temp:  [97.9 °F (36.6 °C)-99.3 °F (37.4 °C)] 99.3 °F (37.4 °C)  Pulse:  [88-97] 91  Resp:  [16-20] 20  SpO2:  [93 %-96 %] 96 %  BP: (131-143)/(63-87) 141/79     Weight: 45.4 kg (100 lb)  Body mass index is 18.29 kg/m².    Intake/Output Summary (Last 24 hours) at 7/23/2023 0836  Last data filed at 7/23/2023 0657  Gross per 24 hour   Intake 1656.7 ml   Output 625 ml   Net 1031.7 ml           Physical Exam  Vitals and nursing note reviewed.   Constitutional:       General: She is not in acute distress.     Appearance: She is ill-appearing.   HENT:      Mouth/Throat:      Mouth: Mucous membranes are dry.   Cardiovascular:      Rate and Rhythm: Normal rate and regular rhythm.   Pulmonary:      Effort: Pulmonary effort is normal. No respiratory distress.      Breath sounds: No wheezing or rales.   Abdominal:      Comments: Abdomen soft, mildly distended  Midline incision with dressing in place  Musculoskeletal:         General: Normal range of motion.      Right lower leg: No edema.      Left lower leg: No edema.   Skin:     General: Skin is warm and dry.   Neurological:       General: No focal deficit present.      Mental Status: She is alert and oriented to person, place, and time.   Psychiatric:         Mood and Affect: Mood normal.         Thought Content: Thought content normal.               Recent Results (from the past 24 hour(s))   POCT glucose    Collection Time: 07/22/23 11:53 AM   Result Value Ref Range    POCT Glucose 133 (H) 70 - 110 mg/dL   POCT glucose    Collection Time: 07/22/23  6:03 PM   Result Value Ref Range    POCT Glucose 133 (H) 70 - 110 mg/dL   POCT glucose    Collection Time: 07/22/23 11:29 PM   Result Value Ref Range    POCT Glucose 129 (H) 70 - 110 mg/dL   Comprehensive Metabolic Panel    Collection Time: 07/23/23  4:24 AM   Result Value Ref Range    Sodium 133 (L) 136 - 145 mmol/L    Potassium 4.0 3.5 - 5.1 mmol/L    Chloride 102 95 - 110 mmol/L    CO2 22 (L) 23 - 29 mmol/L    Glucose 208 (H) 70 - 110 mg/dL    BUN 11 8 - 23 mg/dL    Creatinine 0.5 0.5 - 1.4 mg/dL    Calcium 7.9 (L) 8.7 - 10.5 mg/dL    Total Protein 5.7 (L) 6.0 - 8.4 g/dL    Albumin 1.9 (L) 3.5 - 5.2 g/dL    Total Bilirubin 0.2 0.1 - 1.0 mg/dL    Alkaline Phosphatase 64 55 - 135 U/L    AST 21 10 - 40 U/L    ALT 18 10 - 44 U/L    eGFR >60 >60 mL/min/1.73 m^2    Anion Gap 9 8 - 16 mmol/L   Magnesium    Collection Time: 07/23/23  4:24 AM   Result Value Ref Range    Magnesium 1.9 1.6 - 2.6 mg/dL   Phosphorus    Collection Time: 07/23/23  4:24 AM   Result Value Ref Range    Phosphorus 3.2 2.7 - 4.5 mg/dL   CBC auto differential    Collection Time: 07/23/23  4:24 AM   Result Value Ref Range    WBC 14.86 (H) 3.90 - 12.70 K/uL    RBC 2.77 (L) 4.00 - 5.40 M/uL    Hemoglobin 8.6 (L) 12.0 - 16.0 g/dL    Hematocrit 25.5 (L) 37.0 - 48.5 %    MCV 92 82 - 98 fL    MCH 31.0 27.0 - 31.0 pg    MCHC 33.7 32.0 - 36.0 g/dL    RDW 14.4 11.5 - 14.5 %    Platelets 288 150 - 450 K/uL    MPV 10.2 9.2 - 12.9 fL    Immature Granulocytes 1.9 (H) 0.0 - 0.5 %    Gran # (ANC) 13.0 (H) 1.8 - 7.7 K/uL    Immature Grans (Abs)  0.28 (H) 0.00 - 0.04 K/uL    Lymph # 1.0 1.0 - 4.8 K/uL    Mono # 0.5 0.3 - 1.0 K/uL    Eos # 0.1 0.0 - 0.5 K/uL    Baso # 0.03 0.00 - 0.20 K/uL    nRBC 0 0 /100 WBC    Gran % 87.4 (H) 38.0 - 73.0 %    Lymph % 6.5 (L) 18.0 - 48.0 %    Mono % 3.3 (L) 4.0 - 15.0 %    Eosinophil % 0.7 0.0 - 8.0 %    Basophil % 0.2 0.0 - 1.9 %    Differential Method Automated    POCT glucose    Collection Time: 07/23/23  6:50 AM   Result Value Ref Range    POCT Glucose 158 (H) 70 - 110 mg/dL       Microbiology Results (last 7 days)       Procedure Component Value Units Date/Time    Aerobic culture [[id number]]  (Abnormal) Collected: 07/21/23 1413    Order Status: Completed Specimen: Abscess Updated: 07/23/23 0741     Aerobic Bacterial Culture CANDIDA ALBICANS  Moderate      Culture, Anaerobe [[id number]] Collected: 07/21/23 1414    Order Status: Completed Specimen: Abscess from Peritoneal Fluid Updated: 07/23/23 0644     Anaerobic Culture Culture in progress    Narrative:      peritoneal abscess    Blood culture [[id number]] Collected: 07/22/23 0620    Order Status: Completed Specimen: Blood from Peripheral, Right Wrist Updated: 07/22/23 1512     Blood Culture, Routine No Growth to date    Blood culture [[id number]] Collected: 07/22/23 0620    Order Status: Completed Specimen: Blood from Peripheral, Left Hand Updated: 07/22/23 1512     Blood Culture, Routine No Growth to date    Gram stain [[id number]] Collected: 07/21/23 1414    Order Status: Completed Specimen: Abscess from Peritoneal Fluid Updated: 07/21/23 1508     Gram Stain Result Many WBC's      No organisms seen    Narrative:      Peritoneal abscess    Blood culture (site 1) [[id number]] Collected: 07/14/23 1308    Order Status: Completed Specimen: Blood from Peripheral, Antecubital, Left Updated: 07/18/23 1503     Blood Culture, Routine No Growth after 4 days.    Narrative:      Site # 1, aerobic and anaerobic    Blood culture (site 2) [[id number]] Collected: 07/14/23 1308    Order  Status: Completed Specimen: Blood from Peripheral, Forearm, Left Updated: 07/18/23 1503     Blood Culture, Routine No Growth after 4 days.    Narrative:      Site # 2, aerobic only             Imaging Results              X-Ray Abdomen AP 1 View (Final result)  Result time 07/14/23 15:04:30      Final result by Manuel Rey MD (07/14/23 15:04:30)                   Impression:      As above      Electronically signed by: Manuel Rey MD  Date:    07/14/2023  Time:    15:04               Narrative:    EXAMINATION:  XR ABDOMEN AP 1 VIEW    CLINICAL HISTORY:  ng tube eval;    TECHNIQUE:  Single AP View of the abdomen was performed.    COMPARISON:  07/14/2023 at 14:06    FINDINGS:  NG tube has been advanced, and now in good position with the tip and proximal port both subdiaphragmatic.    Otherwise stable exam, noting a few dilated loops of small bowel.  No obvious free air.                                       X-Ray Abdomen AP 1 View (KUB) (Final result)  Result time 07/14/23 14:15:20      Final result by Jose Rosales MD (07/14/23 14:15:20)                   Impression:      Recommended advancement of nasogastric tube.      Electronically signed by: Jose Rosales MD  Date:    07/14/2023  Time:    14:15               Narrative:    EXAMINATION:  XR ABDOMEN AP 1 VIEW    TECHNIQUE:  XR ABDOMEN AP 1 VIEW    FINDINGS:  The bowel gas pattern is non-specific.    No airspace consolidation at the lung bases.No acute bony abnormality.No abnormal soft tissue masses.No abnormal calcific densities.Contrast present within the kidneys.  Postoperative changes lower lumbar spine.  Nasogastric tube present with side hole at the level of the GE junction can be advanced.                                        CT Abdomen Pelvis With Contrast (Final result)  Result time 07/14/23 12:01:03      Final result by Sulaiman Ford MD (07/14/23 12:01:03)                   Impression:      Status post hysterectomy and sigmoid  surgical therapy.    Abnormal fluid, air distension small bowel, termination point at or near ileocecal valve, not well-defined, associated less distension proximal small bowel with some bowel wall thickening.  Adhesion etiology of obstructive process favored.    Posterior pelvic ascites, mesenteric edema.  Addition remote findings above.    This report was flagged in Epic as abnormal.      Electronically signed by: Sulaiman Ford MD  Date:    07/14/2023  Time:    12:01               Narrative:    EXAMINATION:  CT ABDOMEN PELVIS WITH CONTRAST    CLINICAL HISTORY:  RLQ abdominal pain (Age >= 14y);Bowel obstruction suspected;    TECHNIQUE:  Low dose axial images, sagittal and coronal reformations were obtained from the lung bases to the pubic symphysis following the IV administration of 85 mL of Omnipaque 350 and the oral administration of 15 mL of Omnipaque 300.    COMPARISON:  None.    FINDINGS:  Postop posterior spinal fusion, with pedicular screws and vertical stabilization bars and laminectomy L5-S1, placement of interbody disc spacer, degenerative disc spondylosis with chronic sclerotic erosive changes T11-T12 L1-L2, L2-L3, L 4 and L5 disc levels, primary anterior subluxation L3 on L2, L4 on L5, L5 on S1.    Status of bilateral breast prosthetic surgery with calcification of capsule of prosthesis.  Focal tiny ossification anterior to linea alba above level of umbilicus.  Some diastasis of linea alba above and at and below umbilicus.  Subcutaneous edema inferior, medial, posterior buttocks.    Calcified plaquing aorta major branches particularly distal aorta and common iliac arteries.    Liver, gallbladder, spleen, adrenal glands, kidneys, pancreas biliary tree normal.    Retroaortic left renal vein.  No adenopathy.  Mild amount of ascites posterior pelvis presacral particularly on right, 6.2 x 4.8 cm.  Urinary bladder small.  7 mm midline calcification at level of introitus of uncertain etiology.    Mild  distension gastric cardia with fluid, distal stomach, intact.  Minimal mild distension duodenal with air.  Minimal mild distension proximal small bowel jejunum left flank with suggestion of some bowel wall thickening.  Moderate severe distension mid distal small bowel, iliac loop 3.3 diameter central pelvis.  No nondistended terminal ileum confirmed, nondistended large bowel, suture line postop bowel resection sigmoid at left lateral central pelvic region.  Appendix not identified.    Postop hysterectomy.  No adnexal mass.                                            Assessment/Plan:      * Small bowel obstruction  63F presenting with several days of worsening abd pain, anorexia, and nausea.  CT abd/pelvis with Abnormal fluid, air distension small bowel, termination point at or near ileocecal valve, not well-defined, associated less distension proximal small bowel with some bowel wall thickening.  Adhesion etiology of obstructive process favored.    -Surgery team consulted: s/p exploratory laparotomy on 07/16/2023 with multiple small bowel resections and anastomosis, evidence of enterotomy with over-sewing.  -NG tube in place to LIWS  -continue TPN per surgery   -Pt with worsening leukocytosis, CT abdomen repeated  -CT abdomen: there is now a moderate to large quantity of intraperitoneal fluid, with new enhancement of the adjacent portions of the parietal peritoneum, suspicious for peritonitis.   -IR consulted for drainage of pelvic collection  -ID consulted  -Analgesics ordered prn   -Advance diet as tolerated  -Pt with BM on 07/21      Body mass index (BMI) less than 19  Body mass index is 18.29 kg/m².  Continue TPN per surgery       Weakness  -PT/OT consulted      Atelectasis  -CT abdomen:  Opacities in the adjacent dorsal aspect of either lower lobe most likely represent atelectasis.   -Encourage IS   -PT/OT consulted      Advanced care planning/counseling discussion  Advance Care Planning     Date:  07/19/2023    Code Status  I engaged the the patient in a voluntary conversation about the patient's preferences for care  at the very end of life. The patient wishes to have CPR and other invasive treatments performed when her heart and/or breathing stops. I communicated to the patient that her wishes align with full code status.  I spent a total of 16 minutes engaging the patient in this advance care planning discussion.        Code Status: Full Code          Hypokalemia  -Replace as needed  -TPN per surgery       Osteoporosis  Continue home medications      Depression  Restart home medications as indicated- setraline 100mg         Leukocytosis  -In setting of acute small-bowel obstruction and no definitive findings of infection appears likely reactive, but low threshold for starting antibiotics.   -LA negative x2.  -procal mildly elevated, WBC# trended up to 14 on 07/20 and to 17 on 07/21  -Repeat CT abdomen with fluid in the pelvic  -IR consulted: s/p transgluteal-approach drainage catheter into the complex pelvic fluid collection on 07/21, cultures pending   -ID consulted: Add micafungin 07/21 and repeat blood culture  -Repeat blood cx with NGTD  -Continue on antibiotics   -tx as stated above for sbo      HTN (hypertension)  Currently controlled.  Continue home [facility]  Will continue to monitor        VTE Risk Mitigation (From admission, onward)         Ordered     heparin (porcine) injection 5,000 Units  Every 8 hours         07/14/23 1428     IP VTE LOW RISK PATIENT  Once         07/14/23 1236     Place sequential compression device  Until discontinued         07/14/23 1236                Discharge Planning   WIL: 7/23/2023     Code Status: Full Code   Is the patient medically ready for discharge?:     Reason for patient still in hospital (select all that apply): Patient trending condition and Treatment  Discharge Plan A: Home   Discharge Delays: None known at this time              Kyrie Colvin  MD  Department of Hospital Medicine   [facility] [place] - Med Surg

## 2023-07-23 NOTE — PT/OT/SLP PROGRESS
Physical Therapy Treatment    Patient Name:  Marilee Townsend   MRN:  [id number]    Recommendations:     Discharge Recommendations: home health PT  Discharge Equipment Recommendations: bedside commode  Barriers to discharge: None    Assessment:     Marilee Townsend is a 63 y.o. female admitted with a medical diagnosis of Small bowel obstruction.  She presents with the following impairments/functional limitations: weakness, impaired endurance, impaired self care skills, impaired functional mobility, gait instability, impaired balance, pain, decreased safety awareness, impaired skin, decreased lower extremity function, decreased ROM.    Pt tolerated treatment good today and continues to make progress towards goals. Pt ambulated ~200ft with RW, SBA. Pt remains appropriate for HHPT at time of discharge.     Rehab Prognosis: Good; patient would benefit from acute skilled PT services to address these deficits and reach maximum level of function.    Recent Surgery: Procedure(s) (LRB):  LAPAROSCOPY, DIAGNOSTIC (N/A)  LAPAROTOMY, EXPLORATORY (N/A)  EXCISION, SMALL INTESTINE 7 Days Post-Op    Plan:     During this hospitalization, patient to be seen  (2-3/wk) to address the identified rehab impairments via gait training, therapeutic activities, therapeutic exercises and progress toward the following goals:    Plan of Care Expires:  08/05/23    Subjective     Chief Complaint: having diarrhea   Patient/Family Comments/goals: Pt agreeable to therapy session. Pt verbalized having to use the bathroom first.   Pain/Comfort:  Pain Rating 1:  (yes, with movement (did not rate))  Location 1: abdomen  Pain Addressed 1: Reposition, Pre-medicate for activity, Distraction, Cessation of Activity      Objective:     Communicated with pt's nurse, Heydi/Soledad prior to session.  Patient found  HOB elevated with sister present  with bed alarm, peripheral IV (R back accordian drain) upon PT entry to room.     General Precautions: Standard,  fall (abdominal precautions- pt educated on precautions and log rolling for bed mobility)  Orthopedic Precautions: N/A  Braces: N/A  Respiratory Status: Room air (O2 sats 95%)   Donned back gown, non-skid socks and gait belt prior to OOB activity.    Functional Mobility:  Bed Mobility:     Scooting: SBA to scoot to EOB for foot placement  Supine to Sit: SBA with use of HR (log roll)  Transfers: from EOB and BScommode (x1 trial ea)     Sit to Stand:  stand by assistance and contact guard assistance with no AD  Bed to BSCommode: contact guard assistance with  no AD  using  Step Transfer  Gait: Pt ambulated ~200ft with RW, SBA on level tile with cues for upright posture, RW mgt and safety. Decreased izabella, step length, velocity of limb, and postural control  Balance: good sitting and fair + dynamic standing    - Static Standing no AD: SBA   - Dynamic Standing no AD: CGA-SBA as pt was able to perform pericare to self in standing. Pt required Mod A for doffing and donning of brief.       AM-PAC 6 CLICK MOBILITY  Turning over in bed (including adjusting bedclothes, sheets and blankets)?: 3  Sitting down on and standing up from a chair with arms (e.g., wheelchair, bedside commode, etc.): 3  Moving from lying on back to sitting on the side of the bed?: 3  Moving to and from a bed to a chair (including a wheelchair)?: 3  Need to walk in hospital room?: 3  Climbing 3-5 steps with a railing?: 3  Basic Mobility Total Score: 18       Treatment & Education:  Pt performed Bed mobility, transfers, toileting and ambulation.     Pt educated on PTA role/POC.   Importance of OOB activity with staff assistance.  Importance of sitting up in the chair throughout the day as tolerated, especially for meals   Safety during functional t/f and mobility with use of AD and staff  White board updated   Multiple self-care tasks/functional mobility completed- assistance level noted above   All questions/concerns answered within scope of  practice      Patient left  reclined in BSchair with pressure cushion, B heels elevated, tray table and all needs within reach  with all lines intact, call button in reach, pt's nurse, Soledad notified, and sister present..    GOALS:   Multidisciplinary Problems       Physical Therapy Goals          Problem: Physical Therapy    Goal Priority Disciplines Outcome Goal Variances Interventions   Physical Therapy Goal     PT, PT/OT Ongoing, Progressing     Description: Goals to be met by: 23     Patient will increase functional independence with mobility by performin. Supine to sit with Modified [place]  2. Rolling to Left and Right with Modified [place].  3. Sit to stand transfer with Modified [place]  4. Gait  x 100 feet with Modified [place] using Rolling Walker.   5. Lower extremity exercise program x10 reps per handout, with independence                         Time Tracking:     PT Received On: 23  PT Start Time: 949     PT Stop Time: 1023  PT Total Time (min): 34 min     Billable Minutes: Gait Training 20 and Therapeutic Activity 14    Treatment Type: Treatment  PT/PTA: PTA     Number of PTA visits since last PT visit: 2023

## 2023-07-23 NOTE — PLAN OF CARE
Problem: Adult Inpatient Plan of Care  Goal: Plan of Care Review  Outcome: Ongoing, Progressing     Problem: Skin Injury Risk Increased  Goal: Skin Health and Integrity  Outcome: Ongoing, Progressing     Problem: Glycemic Control Impaired (Surgery Nonspecified)  Goal: Blood Glucose Level Within Targeted Range  Outcome: Ongoing, Progressing     Problem: Fall Injury Risk  Goal: Absence of Fall and Fall-Related Injury  Outcome: Ongoing, Progressing

## 2023-07-23 NOTE — ASSESSMENT & PLAN NOTE
Marilee Townsend is a 63yoF with a history of HTN and rectal prolapse who presents with a several day history of abdominal pain, nausea, vomiting, and PO intolerance. Her work-up is consistent with a small bowel obstruction. General surgery consulted for evaluation.  She is now status post exploratory laparotomy on 07/16/2023 with multiple small bowel resections and anastomosis, evidence of enterotomy with over-sewing.    Postop day 2. Status post IR drained pelvis.    Mostly serous sanguinous fluid.    Patient's pain is okay.    Tolerating liquids.    Continue to monitor

## 2023-07-23 NOTE — SUBJECTIVE & OBJECTIVE
Interval history: c/o ongoing abdominal pain, tarry diarrhea (reports 30 episodes of diarrhea a day)    S/p IR drain placement of a loculated complex pelvic fluid collection with surrounding thin enhancing rind measuring up to 14.4 x 13.2 x 7.0-cm in maximal AP x TV x CC diameters - cultures candida          Past Surgical History:   Procedure Laterality Date    BREAST SURGERY Bilateral     augmentation-Implants    DIAGNOSTIC LAPAROSCOPY N/A 7/16/2023    Procedure: LAPAROSCOPY, DIAGNOSTIC;  Surgeon: Bora Quevedo MD;  Location: [facility] OR;  Service: General;  Laterality: N/A;    epidural steroid injection  01/09/2017    twice-11/2017  & 1/9/17 to neck    EXCISION, SMALL INTESTINE  7/16/2023    Procedure: EXCISION, SMALL INTESTINE;  Surgeon: Bora Quevedo MD;  Location: [facility] OR;  Service: General;;    HERNIA REPAIR      umbilical    HYSTERECTOMY      LAPAROTOMY, EXPLORATORY N/A 7/16/2023    Procedure: LAPAROTOMY, EXPLORATORY;  Surgeon: Bora Quevedo MD;  Location: [facility] OR;  Service: General;  Laterality: N/A;    ROTATOR CUFF REPAIR Bilateral     TONSILLECTOMY      TOTAL SHOULDER ARTHROPLASTY Bilateral     WRIST FRACTURE SURGERY Right     with hardware placed       Review of patient's allergies indicates:   Allergen Reactions    Morphine Nausea And Vomiting       No current facility-administered medications on file prior to encounter.     Current Outpatient Medications on File Prior to Encounter   Medication Sig    amLODIPine (NORVASC) 5 MG tablet once daily.    buPROPion (WELLBUTRIN SR) 150 MG TBSR 12 hr tablet Take 150 mg by mouth 2 (two) times daily.    ergocalciferol (ERGOCALCIFEROL) 50,000 unit Cap Take 1 capsule (50,000 Units total) by mouth every Sunday.    HYDROcodone-acetaminophen (NORCO)  mg per tablet Take 1 tablet by mouth every 6 (six) hours as needed.    lisinopriL-hydrochlorothiazide (PRINZIDE,ZESTORETIC) 10-12.5 mg per tablet Take 1 tablet by mouth.    meloxicam (MOBIC) 15 MG tablet Take  15 mg by mouth once daily. Instructed to stop until after procedure 1-9-17.    raloxifene (EVISTA) 60 mg tablet Take 60 mg by mouth once daily.    sertraline (ZOLOFT) 100 MG tablet Take 100 mg by mouth every morning.      Family History    None       Tobacco Use    Smoking status: Former     Packs/day: 1.00     Years: 20.00     Pack years: 20.00     Types: Cigarettes     Start date: 5/12/2014    Smokeless tobacco: Never    Tobacco comments:     Stopped smoking greater than 5 years   Substance and Sexual Activity    Alcohol use: Yes     Comment: rarely    Drug use: Not Currently     Types: Hydromorphone     Comment: 3 - 4 tabs daily as needed    Sexual activity: Not Currently     Review of Systems   Constitutional:  Negative for chills and fever.   Respiratory:  Negative for cough and shortness of breath.    Cardiovascular:  Negative for leg swelling.   Gastrointestinal:  Positive for abdominal distention, abdominal pain and blood in stool.   Genitourinary:  Negative for dysuria.   Skin:  Positive for wound.   Psychiatric/Behavioral:  Negative for confusion.    All other systems reviewed and are negative.  Objective:     Vital Signs (Most Recent):  Temp: 98.8 °F (37.1 °C) (07/23/23 1147)  Pulse: 95 (07/23/23 1147)  Resp: 18 (07/23/23 1147)  BP: (!) 156/88 (07/23/23 1147)  SpO2: 98 % (07/23/23 1147) Vital Signs (24h Range):  Temp:  [97.9 °F (36.6 °C)-99.3 °F (37.4 °C)] 98.8 °F (37.1 °C)  Pulse:  [90-97] 95  Resp:  [16-20] 18  SpO2:  [93 %-98 %] 98 %  BP: (136-156)/(63-88) 156/88     Weight: 45.4 kg (100 lb)  Body mass index is 18.29 kg/m².     Physical Exam  Vitals reviewed.   Constitutional:       General: She is not in acute distress (moderate distress).  HENT:      Head: Normocephalic and atraumatic.      Mouth/Throat:      Mouth: Mucous membranes are dry.      Pharynx: Oropharynx is clear.   Eyes:      General: No scleral icterus.     Extraocular Movements: Extraocular movements intact.   Cardiovascular:      Rate  and Rhythm: Normal rate and regular rhythm.   Pulmonary:      Effort: Pulmonary effort is normal. No respiratory distress.      Breath sounds: Normal breath sounds. No wheezing.   Abdominal:      Palpations: Abdomen is soft.      Tenderness: There is abdominal tenderness (diffuse ttp).      Comments: Post op scar on abdomen, tender. No drainage   Musculoskeletal:         General: No swelling or tenderness.      Cervical back: Neck supple. No rigidity.   Skin:     General: Skin is warm and dry.   Neurological:      General: No focal deficit present.      Mental Status: She is alert and oriented to person, place, and time.   Psychiatric:         Mood and Affect: Mood normal.         Behavior: Behavior normal.              Significant Labs: All pertinent labs within the past 24 hours have been reviewed.    Significant Imaging: I have reviewed all pertinent imaging results/findings within the past 24 hours.

## 2023-07-23 NOTE — NURSING
[facility], [facility] - [place]  Nurses Note -- 4 Eyes        Date:07/23/2023        Skin assessed on: Q Shift        [x] No Pressure Injuries Present                 []Prevention Measures Documented     [] Yes LDA Previously documented      [] Yes New Pressure Injury Discovered              [] LDA Added        Attending RN:  MICHAEL Hinds     Second RN:  CORTEZ Soliz

## 2023-07-23 NOTE — NURSING
[facility], [facility] - [place]  Nurses Note -- 4 Eyes      7/22/2023       Skin assessed on: Q Shift      [x] No Pressure Injuries Present    []Prevention Measures Documented    [] Yes LDA  for Pressure Injury Previously documented     [] Yes New Pressure Injury Discovered   [] LDA for New Pressure Injury Added      Attending RN:  Juventino Gamble RN     Second RN:  CORTEZ Turner

## 2023-07-23 NOTE — PLAN OF CARE
Problem: Physical Therapy  Goal: Physical Therapy Goal  Description: Goals to be met by: 23     Patient will increase functional independence with mobility by performin. Supine to sit with Modified [place]  2. Rolling to Left and Right with Modified [place].  3. Sit to stand transfer with Modified [place]  4. Gait  x 100 feet with Modified [place] using Rolling Walker.   5. Lower extremity exercise program x10 reps per handout, with independence    Outcome: Ongoing, Progressing     Pt tolerated treatment good today and continues to make progress towards goals. Pt ambulated ~200ft with RW, SBA. Pt remains appropriate for HHPT at time of discharge.

## 2023-07-23 NOTE — PROGRESS NOTES
[facility]  General Surgery  Progress Note    Subjective:     History of Present Illness:  Marilee Townsend is a 63yoF with a history of hypertension, rectal prolapse s/p rectopexy who presents to [facility] with complaints of nausea, vomiting and worsening abdominal pain. Of note, she presented to an outside hospital with the same complaints yesterday and was discharged with instructions to follow up with her primary care provider. The patient describes a three-day history of abdominal pain, nausea, vomiting and PO intolerance. This has persistently worsened throughout this time, which prompted both presentations. She states that she did have a small bowel movement yesterday, described as hard pellets. On work-up, her vital signs are stable. She has an elevated leukocytosis to 18. Lactate normal at 1. Repeat ordered. Her CT scan demonstrates dilated small bowel consistent with a small bowel obstruction. There is normal caliber small intestine distally. She is admitted to the hospital medicine service. General surgery consulted for evaluation.     Of note, her WBC has increased from 12.8 to 18 over the last 24hrs. Imaging at the outside hospital, per the official read, did not demonstrate any degree of obstruction, which is a rather burk contrast from her CT scan at our facility. Plan for NG tube insertion with low threshold to proceed to operating room for diagnostic laparoscopy. Discussed this plan with the patient.       Post-Op Info:  Procedure(s) (LRB):  LAPAROSCOPY, DIAGNOSTIC (N/A)  LAPAROTOMY, EXPLORATORY (N/A)  EXCISION, SMALL INTESTINE   7 Days Post-Op     Interval History:  Patient still with bowel movements.    Still feeling some crampy abdominal pain.    Tolerating diet but still not great appetite.    Continue to encourage out of bed and ambulation.    Continue drain management.        Medications:  Continuous Infusions:   Amino acid 5% - dextrose 15% (CLINIMIX-E) solution with additives.  CENTRAL LINE ONLY (1395 mOsm/L). 1L provides 50 gm AA, 150 gm CHO (510 kcal/L dextrose), Na 35, K 30, Mg 5, Ca 4.5, Acetate 80, Cl 39, Phos 15 75 mL/hr at 07/22/23 2147    Amino acid 5% - dextrose 15% (CLINIMIX-E) solution with additives. CENTRAL LINE ONLY (1395 mOsm/L). 1L provides 50 gm AA, 150 gm CHO (510 kcal/L dextrose), Na 35, K 30, Mg 5, Ca 4.5, Acetate 80, Cl 39, Phos 15       Scheduled Meds:   acetaminophen  1,000 mg Oral Q8H    amLODIPine  5 mg Oral Daily    buPROPion  150 mg Oral BID    fat emulsion  250 mL Intravenous Daily    heparin (porcine)  5,000 Units Subcutaneous Q8H    micafungin (MYCAMINE) IVPB  100 mg Intravenous Q24H    pantoprazole  40 mg Intravenous BID    piperacillin-tazobactam (Zosyn) IV (PEDS and ADULTS) (extended infusion is not appropriate)  4.5 g Intravenous Q8H    raloxifene  60 mg Oral Daily    sertraline  100 mg Oral QAM    sodium chloride 0.9%  10 mL Intravenous Q6H     PRN Meds:sodium chloride, acetaminophen, acetaminophen, dextrose 50%, dextrose 50%, diatrizoate meglumineand-diatrizoate sodium, glucagon (human recombinant), glucose, glucose, HYDROmorphone, HYDROmorphone, insulin aspart U-100, naloxone, ondansetron, sodium chloride 0.9%, Flushing PICC Protocol **AND** sodium chloride 0.9% **AND** sodium chloride 0.9%     Review of patient's allergies indicates:   Allergen Reactions    Morphine Nausea And Vomiting     Objective:     Vital Signs (Most Recent):  Temp: 98.8 °F (37.1 °C) (07/23/23 1147)  Pulse: 95 (07/23/23 1147)  Resp: 18 (07/23/23 1147)  BP: (!) 156/88 (07/23/23 1147)  SpO2: 98 % (07/23/23 1147) Vital Signs (24h Range):  Temp:  [97.9 °F (36.6 °C)-99.3 °F (37.4 °C)] 98.8 °F (37.1 °C)  Pulse:  [90-97] 95  Resp:  [16-20] 18  SpO2:  [93 %-98 %] 98 %  BP: (136-156)/(63-88) 156/88     Weight: 45.4 kg (100 lb)  Body mass index is 18.29 kg/m².    Intake/Output - Last 3 Shifts         07/21 0700 07/22 0659 07/22 0700 07/23 0659 07/23 0700 07/24 0659    P.O.  600 600     Blood 343.8      IV Piggyback 323.9 392.2     TPN 1851.4 2026.1     Total Intake(mL/kg) 3119 (68.7) 3018.3 (66.5)     Urine (mL/kg/hr) 0 (0) 600 (0.6)     Drains 60 25     Stool 0 0     Total Output 60 625     Net +3059 +2393.3            Urine Occurrence 3 x 6 x     Stool Occurrence 3 x 4 x 1 x             Physical Exam  Vitals and nursing note reviewed.   Constitutional:       Appearance: She is well-developed.   HENT:      Head: Normocephalic and atraumatic.   Cardiovascular:      Rate and Rhythm: Normal rate.      Heart sounds: Normal heart sounds.   Pulmonary:      Effort: Pulmonary effort is normal.   Abdominal:      General: Bowel sounds are normal. There is distension (mild).      Palpations: Abdomen is soft.      Tenderness: There is no abdominal tenderness.   Musculoskeletal:         General: Normal range of motion.      Cervical back: Normal range of motion.   Skin:     General: Skin is warm and dry.      Capillary Refill: Capillary refill takes less than 2 seconds.   Neurological:      Mental Status: She is alert and oriented to person, place, and time.   Psychiatric:         Behavior: Behavior normal.        Significant Labs:  I have reviewed all pertinent lab results within the past 24 hours.  CBC:   Recent Labs   Lab 07/23/23  0424   WBC 14.86*   RBC 2.77*   HGB 8.6*   HCT 25.5*      MCV 92   MCH 31.0   MCHC 33.7     CMP:   Recent Labs   Lab 07/23/23  0424   *   CALCIUM 7.9*   ALBUMIN 1.9*   PROT 5.7*   *   K 4.0   CO2 22*      BUN 11   CREATININE 0.5   ALKPHOS 64   ALT 18   AST 21   BILITOT 0.2         Assessment/Plan:     * Small bowel obstruction  Marilee Townsend is a 63yoF with a history of HTN and rectal prolapse who presents with a several day history of abdominal pain, nausea, vomiting, and PO intolerance. Her work-up is consistent with a small bowel obstruction. General surgery consulted for evaluation.  She is now status post exploratory laparotomy on  07/16/2023 with multiple small bowel resections and anastomosis, evidence of enterotomy with over-sewing.    Postop day 2. Status post IR drained pelvis.    Mostly serous sanguinous fluid.    Patient's pain is okay.    Tolerating liquids.    Continue to monitor            Kamaljit Graham MD  General Surgery  [facility]

## 2023-07-24 LAB
ALBUMIN SERPL BCP-MCNC: 1.8 G/DL (ref 3.5–5.2)
ALP SERPL-CCNC: 60 U/L (ref 55–135)
ALT SERPL W/O P-5'-P-CCNC: 18 U/L (ref 10–44)
ANION GAP SERPL CALC-SCNC: 6 MMOL/L (ref 8–16)
AST SERPL-CCNC: 19 U/L (ref 10–40)
BASOPHILS # BLD AUTO: 0.02 K/UL (ref 0–0.2)
BASOPHILS NFR BLD: 0.2 % (ref 0–1.9)
BILIRUB SERPL-MCNC: 0.2 MG/DL (ref 0.1–1)
BUN SERPL-MCNC: 11 MG/DL (ref 8–23)
CALCIUM SERPL-MCNC: 8.2 MG/DL (ref 8.7–10.5)
CHLORIDE SERPL-SCNC: 105 MMOL/L (ref 95–110)
CO2 SERPL-SCNC: 24 MMOL/L (ref 23–29)
CREAT SERPL-MCNC: 0.5 MG/DL (ref 0.5–1.4)
DIFFERENTIAL METHOD: ABNORMAL
EOSINOPHIL # BLD AUTO: 0.1 K/UL (ref 0–0.5)
EOSINOPHIL NFR BLD: 0.8 % (ref 0–8)
ERYTHROCYTE [DISTWIDTH] IN BLOOD BY AUTOMATED COUNT: 14.6 % (ref 11.5–14.5)
EST. GFR  (NO RACE VARIABLE): >60 ML/MIN/1.73 M^2
GLUCOSE SERPL-MCNC: 141 MG/DL (ref 70–110)
HCT VFR BLD AUTO: 25.4 % (ref 37–48.5)
HGB BLD-MCNC: 8.5 G/DL (ref 12–16)
IMM GRANULOCYTES # BLD AUTO: 0.16 K/UL (ref 0–0.04)
IMM GRANULOCYTES NFR BLD AUTO: 1.5 % (ref 0–0.5)
LYMPHOCYTES # BLD AUTO: 0.9 K/UL (ref 1–4.8)
LYMPHOCYTES NFR BLD: 9 % (ref 18–48)
MAGNESIUM SERPL-MCNC: 2 MG/DL (ref 1.6–2.6)
MCH RBC QN AUTO: 30.2 PG (ref 27–31)
MCHC RBC AUTO-ENTMCNC: 33.5 G/DL (ref 32–36)
MCV RBC AUTO: 90 FL (ref 82–98)
MONOCYTES # BLD AUTO: 0.5 K/UL (ref 0.3–1)
MONOCYTES NFR BLD: 4.6 % (ref 4–15)
NEUTROPHILS # BLD AUTO: 8.8 K/UL (ref 1.8–7.7)
NEUTROPHILS NFR BLD: 83.9 % (ref 38–73)
NRBC BLD-RTO: 0 /100 WBC
PHOSPHATE SERPL-MCNC: 3.2 MG/DL (ref 2.7–4.5)
PLATELET # BLD AUTO: 350 K/UL (ref 150–450)
PMV BLD AUTO: 9.6 FL (ref 9.2–12.9)
POCT GLUCOSE: 165 MG/DL (ref 70–110)
POTASSIUM SERPL-SCNC: 4.2 MMOL/L (ref 3.5–5.1)
PROT SERPL-MCNC: 5.7 G/DL (ref 6–8.4)
RBC # BLD AUTO: 2.81 M/UL (ref 4–5.4)
SODIUM SERPL-SCNC: 135 MMOL/L (ref 136–145)
WBC # BLD AUTO: 10.5 K/UL (ref 3.9–12.7)

## 2023-07-24 PROCEDURE — 80053 COMPREHEN METABOLIC PANEL: CPT | Performed by: STUDENT IN AN ORGANIZED HEALTH CARE EDUCATION/TRAINING PROGRAM

## 2023-07-24 PROCEDURE — B4185 PARENTERAL SOL 10 GM LIPIDS: HCPCS | Performed by: SURGERY

## 2023-07-24 PROCEDURE — A4216 STERILE WATER/SALINE, 10 ML: HCPCS | Performed by: STUDENT IN AN ORGANIZED HEALTH CARE EDUCATION/TRAINING PROGRAM

## 2023-07-24 PROCEDURE — 25000003 PHARM REV CODE 250: Performed by: STUDENT IN AN ORGANIZED HEALTH CARE EDUCATION/TRAINING PROGRAM

## 2023-07-24 PROCEDURE — 63600175 PHARM REV CODE 636 W HCPCS: Performed by: STUDENT IN AN ORGANIZED HEALTH CARE EDUCATION/TRAINING PROGRAM

## 2023-07-24 PROCEDURE — 97535 SELF CARE MNGMENT TRAINING: CPT | Mod: CO

## 2023-07-24 PROCEDURE — 93005 ELECTROCARDIOGRAM TRACING: CPT

## 2023-07-24 PROCEDURE — 97530 THERAPEUTIC ACTIVITIES: CPT | Mod: CO

## 2023-07-24 PROCEDURE — 93010 ELECTROCARDIOGRAM REPORT: CPT | Mod: ,,, | Performed by: INTERNAL MEDICINE

## 2023-07-24 PROCEDURE — 25000003 PHARM REV CODE 250: Performed by: INTERNAL MEDICINE

## 2023-07-24 PROCEDURE — 25000003 PHARM REV CODE 250: Performed by: SURGERY

## 2023-07-24 PROCEDURE — 36415 COLL VENOUS BLD VENIPUNCTURE: CPT | Performed by: STUDENT IN AN ORGANIZED HEALTH CARE EDUCATION/TRAINING PROGRAM

## 2023-07-24 PROCEDURE — 94799 UNLISTED PULMONARY SVC/PX: CPT

## 2023-07-24 PROCEDURE — 63600175 PHARM REV CODE 636 W HCPCS: Performed by: INTERNAL MEDICINE

## 2023-07-24 PROCEDURE — 85025 COMPLETE CBC W/AUTO DIFF WBC: CPT | Performed by: STUDENT IN AN ORGANIZED HEALTH CARE EDUCATION/TRAINING PROGRAM

## 2023-07-24 PROCEDURE — 99233 PR SUBSEQUENT HOSPITAL CARE,LEVL III: ICD-10-PCS | Mod: ,,, | Performed by: NURSE PRACTITIONER

## 2023-07-24 PROCEDURE — 99223 1ST HOSP IP/OBS HIGH 75: CPT | Mod: ,,, | Performed by: INTERNAL MEDICINE

## 2023-07-24 PROCEDURE — 99233 SBSQ HOSP IP/OBS HIGH 50: CPT | Mod: ,,, | Performed by: NURSE PRACTITIONER

## 2023-07-24 PROCEDURE — 84100 ASSAY OF PHOSPHORUS: CPT | Performed by: STUDENT IN AN ORGANIZED HEALTH CARE EDUCATION/TRAINING PROGRAM

## 2023-07-24 PROCEDURE — 99223 PR INITIAL HOSPITAL CARE,LEVL III: ICD-10-PCS | Mod: ,,, | Performed by: INTERNAL MEDICINE

## 2023-07-24 PROCEDURE — 93010 EKG 12-LEAD: ICD-10-PCS | Mod: ,,, | Performed by: INTERNAL MEDICINE

## 2023-07-24 PROCEDURE — C9113 INJ PANTOPRAZOLE SODIUM, VIA: HCPCS | Performed by: INTERNAL MEDICINE

## 2023-07-24 PROCEDURE — 63600175 PHARM REV CODE 636 W HCPCS: Performed by: SURGERY

## 2023-07-24 PROCEDURE — 83735 ASSAY OF MAGNESIUM: CPT | Performed by: STUDENT IN AN ORGANIZED HEALTH CARE EDUCATION/TRAINING PROGRAM

## 2023-07-24 PROCEDURE — 11000001 HC ACUTE MED/SURG PRIVATE ROOM

## 2023-07-24 RX ADMIN — Medication 10 ML: at 06:07

## 2023-07-24 RX ADMIN — Medication 10 ML: at 05:07

## 2023-07-24 RX ADMIN — LEUCINE, PHENYLALANINE, LYSINE, METHIONINE, ISOLEUCINE, VALINE, HISTIDINE, THREONINE, TRYPTOPHAN, ALANINE, GLYCINE, ARGININE, PROLINE, SERINE, TYROSINE, SODIUM ACETATE, DIBASIC POTASSIUM PHOSPHATE, MAGNESIUM CHLORIDE, SODIUM CHLORIDE, CALCIUM CHLORIDE, DEXTROSE
365; 280; 290; 200; 300; 290; 240; 210; 90; 1035; 515; 575; 340; 250; 20; 340; 261; 51; 59; 33; 15 INJECTION INTRAVENOUS at 10:07

## 2023-07-24 RX ADMIN — PIPERACILLIN AND TAZOBACTAM 4.5 G: 4; .5 INJECTION, POWDER, LYOPHILIZED, FOR SOLUTION INTRAVENOUS; PARENTERAL at 09:07

## 2023-07-24 RX ADMIN — AMLODIPINE BESYLATE 5 MG: 5 TABLET ORAL at 09:07

## 2023-07-24 RX ADMIN — HYDROMORPHONE HYDROCHLORIDE 1 MG: 1 INJECTION, SOLUTION INTRAMUSCULAR; INTRAVENOUS; SUBCUTANEOUS at 10:07

## 2023-07-24 RX ADMIN — RALOXIFENE HYDROCHLORIDE 60 MG: 60 TABLET, FILM COATED ORAL at 09:07

## 2023-07-24 RX ADMIN — MICAFUNGIN SODIUM 100 MG: 100 INJECTION, POWDER, LYOPHILIZED, FOR SOLUTION INTRAVENOUS at 12:07

## 2023-07-24 RX ADMIN — ACETAMINOPHEN 1000 MG: 500 TABLET, FILM COATED ORAL at 06:07

## 2023-07-24 RX ADMIN — BUPROPION HYDROCHLORIDE 150 MG: 150 TABLET, EXTENDED RELEASE ORAL at 09:07

## 2023-07-24 RX ADMIN — PIPERACILLIN AND TAZOBACTAM 4.5 G: 4; .5 INJECTION, POWDER, LYOPHILIZED, FOR SOLUTION INTRAVENOUS; PARENTERAL at 01:07

## 2023-07-24 RX ADMIN — PANTOPRAZOLE SODIUM 40 MG: 40 INJECTION, POWDER, FOR SOLUTION INTRAVENOUS at 10:07

## 2023-07-24 RX ADMIN — BUPROPION HYDROCHLORIDE 150 MG: 150 TABLET, EXTENDED RELEASE ORAL at 10:07

## 2023-07-24 RX ADMIN — HYDROMORPHONE HYDROCHLORIDE 1 MG: 1 INJECTION, SOLUTION INTRAMUSCULAR; INTRAVENOUS; SUBCUTANEOUS at 07:07

## 2023-07-24 RX ADMIN — SOYBEAN OIL 250 ML: 20 INJECTION, SOLUTION INTRAVENOUS at 10:07

## 2023-07-24 RX ADMIN — PIPERACILLIN AND TAZOBACTAM 4.5 G: 4; .5 INJECTION, POWDER, LYOPHILIZED, FOR SOLUTION INTRAVENOUS; PARENTERAL at 04:07

## 2023-07-24 RX ADMIN — HYDROMORPHONE HYDROCHLORIDE 1 MG: 1 INJECTION, SOLUTION INTRAMUSCULAR; INTRAVENOUS; SUBCUTANEOUS at 04:07

## 2023-07-24 RX ADMIN — SERTRALINE HYDROCHLORIDE 100 MG: 50 TABLET ORAL at 10:07

## 2023-07-24 RX ADMIN — PANTOPRAZOLE SODIUM 40 MG: 40 INJECTION, POWDER, FOR SOLUTION INTRAVENOUS at 09:07

## 2023-07-24 RX ADMIN — ACETAMINOPHEN 1000 MG: 500 TABLET, FILM COATED ORAL at 10:07

## 2023-07-24 RX ADMIN — Medication 10 ML: at 01:07

## 2023-07-24 RX ADMIN — HYDROMORPHONE HYDROCHLORIDE 1 MG: 1 INJECTION, SOLUTION INTRAMUSCULAR; INTRAVENOUS; SUBCUTANEOUS at 01:07

## 2023-07-24 RX ADMIN — ONDANSETRON 4 MG: 2 INJECTION INTRAMUSCULAR; INTRAVENOUS at 07:07

## 2023-07-24 RX ADMIN — HEPARIN SODIUM 5000 UNITS: 5000 INJECTION INTRAVENOUS; SUBCUTANEOUS at 02:07

## 2023-07-24 RX ADMIN — HEPARIN SODIUM 5000 UNITS: 5000 INJECTION INTRAVENOUS; SUBCUTANEOUS at 10:07

## 2023-07-24 NOTE — SUBJECTIVE & OBJECTIVE
Interval History:   No acute events overnight   White count is normal, afebrile, normotensive, not tachycardic  Appetite is okay, eating a little bit of meals  Multiple bowel movements    Medications:  Continuous Infusions:   Amino acid 5% - dextrose 15% (CLINIMIX-E) solution with additives. CENTRAL LINE ONLY (1395 mOsm/L). 1L provides 50 gm AA, 150 gm CHO (510 kcal/L dextrose), Na 35, K 30, Mg 5, Ca 4.5, Acetate 80, Cl 39, Phos 15 75 mL/hr at 07/23/23 2326     Scheduled Meds:   acetaminophen  1,000 mg Oral Q8H    amLODIPine  5 mg Oral Daily    buPROPion  150 mg Oral BID    fat emulsion  250 mL Intravenous Daily    heparin (porcine)  5,000 Units Subcutaneous Q8H    micafungin (MYCAMINE) IVPB  100 mg Intravenous Q24H    pantoprazole  40 mg Intravenous BID    piperacillin-tazobactam (Zosyn) IV (PEDS and ADULTS) (extended infusion is not appropriate)  4.5 g Intravenous Q8H    raloxifene  60 mg Oral Daily    sertraline  100 mg Oral QAM    sodium chloride 0.9%  10 mL Intravenous Q6H     PRN Meds:sodium chloride, acetaminophen, acetaminophen, dextrose 50%, dextrose 50%, diatrizoate meglumineand-diatrizoate sodium, glucagon (human recombinant), glucose, glucose, HYDROmorphone, HYDROmorphone, insulin aspart U-100, loperamide, naloxone, ondansetron, sodium chloride 0.9%, Flushing PICC Protocol **AND** sodium chloride 0.9% **AND** sodium chloride 0.9%     Review of patient's allergies indicates:   Allergen Reactions    Morphine Nausea And Vomiting     Objective:     Vital Signs (Most Recent):  Temp: 98.9 °F (37.2 °C) (07/24/23 0720)  Pulse: 84 (07/24/23 0720)  Resp: 20 (07/24/23 1012)  BP: (!) 145/73 (07/24/23 0720)  SpO2: 96 % (07/24/23 0720) Vital Signs (24h Range):  Temp:  [98.2 °F (36.8 °C)-99.2 °F (37.3 °C)] 98.9 °F (37.2 °C)  Pulse:  [84-99] 84  Resp:  [16-20] 20  SpO2:  [94 %-98 %] 96 %  BP: (129-156)/(71-95) 145/73     Weight: 45.4 kg (100 lb)  Body mass index is 18.29 kg/m².    Intake/Output - Last 3 Shifts          07/22 0700 07/23 0659 07/23 0700 07/24 0659 07/24 0700 07/25 0659    P.O. 600 480 240    Blood       IV Piggyback 392.2      TPN 2026.1      Total Intake(mL/kg) 3018.3 (66.5) 480 (10.6) 240 (5.3)    Urine (mL/kg/hr) 600 (0.6) 0 (0)     Drains 25 40     Stool 0 0     Total Output 625 40     Net +2393.3 +440 +240           Urine Occurrence 6 x 3 x 1 x    Stool Occurrence 4 x 6 x 0 x             Physical Exam  Vitals and nursing note reviewed.   Constitutional:       Appearance: She is well-developed.   HENT:      Head: Normocephalic and atraumatic.   Cardiovascular:      Rate and Rhythm: Normal rate.      Heart sounds: Normal heart sounds.   Pulmonary:      Effort: Pulmonary effort is normal.   Abdominal:      General: Bowel sounds are normal. There is distension (mild).      Palpations: Abdomen is soft.      Tenderness: There is no abdominal tenderness.   Musculoskeletal:         General: Normal range of motion.      Cervical back: Normal range of motion.   Skin:     General: Skin is warm and dry.      Capillary Refill: Capillary refill takes less than 2 seconds.   Neurological:      Mental Status: She is alert and oriented to person, place, and time.   Psychiatric:         Behavior: Behavior normal.        Significant Labs:  I have reviewed all pertinent lab results within the past 24 hours.  CBC:   Recent Labs   Lab 07/24/23  0509   WBC 10.50   RBC 2.81*   HGB 8.5*   HCT 25.4*      MCV 90   MCH 30.2   MCHC 33.5     CMP:   Recent Labs   Lab 07/24/23  0509   *   CALCIUM 8.2*   ALBUMIN 1.8*   PROT 5.7*   *   K 4.2   CO2 24      BUN 11   CREATININE 0.5   ALKPHOS 60   ALT 18   AST 19   BILITOT 0.2       Significant Diagnostics:  I have reviewed all pertinent imaging results/findings within the past 24 hours.

## 2023-07-24 NOTE — ASSESSMENT & PLAN NOTE
Currently controlled.  Continue home [facility]  Will continue to monitor

## 2023-07-24 NOTE — PT/OT/SLP PROGRESS
Occupational Therapy   Treatment    Name: Marilee Townsend  MRN: [id number]  Admitting Diagnosis:  Small bowel obstruction  8 Days Post-Op    Recommendations:     Discharge Recommendations: Home with HH and Family support.   Discharge Equipment Recommendations:  bedside commode  Barriers to discharge: none      Assessment:     Marilee Townsend is a 63 y.o. female with a medical diagnosis of Small bowel obstruction.  She presents with the following performance deficits affecting function: weakness, impaired functional mobility, gait instability, impaired endurance, impaired balance, impaired self care skills, decreased coordination, decreased upper extremity function, decreased lower extremity function, decreased ROM, pain, orthopedic precautions.     Rehab Prognosis:  Good; patient would benefit from acute skilled OT services to address these deficits and reach maximum level of function.       Plan:     Patient to be seen  x3-5/week to address the above listed problems via self-care/home management, therapeutic activities, therapeutic exercises  Plan of Care Expires: 08/11/23  Plan of Care Reviewed with:Patient     Subjective     Chief Complaint: pain  Patient/Family Comments/goals: Pt pleasant and agreeable to therapy.  Pain/Comfort:  Pain Rating 1: 10/10  Location 1: abdomen  Pain Addressed 1: Pre-medicate for activity, Reposition, Distraction, Cessation of Activity    Objective:     Communicated with: Nurse Juarez prior to session.  Patient found HOB elevated with PICC line upon OT entry to room.    General Precautions: Standard, fall (R flank drain.)    Orthopedic Precautions:N/A  Braces: N/A  Respiratory Status: Room air     Occupational Performance:     Bed Mobility:    Patient completed Rolling/Turning to Left with  stand by assistance  Patient completed Scooting/Bridging with stand by assistance  Patient completed Supine to Sit with stand by assistance     Functional Mobility/Transfers: Pt with recent L TSA  on 6/13/23.  Patient completed Sit <> Stand Transfer with stand by assistance  with  no assistive device from EOB and BSC  Patient completed Bed>BSC Transfer Step Transfer technique with stand by assistance with  no AD  Functional Mobility: Pt able to complete household and community mobility in room and hallway with CGA for safety, no AD. Pt tolerated well without complaints. SPO2 on RA 95% and  bpm.     Activities of Daily Living:  Upper Body Dressing: minimum assistance due to multiple lines  Toileting: supervision for pericare in standing      [facility] 6 Click ADL: 21    Treatment & Education:  Bed mobility, functional transfer/mobility , and ADL completed as noted above.   Pt educated on safety awareness with all OOB mobility and ADL .   Encouraged increased OOB activity throughout day to maximize recovery. OOB for all meals.     Patient left up in chair with all lines intact, call button in reach, and nurse Ann notified and lunch tray set up. Pt without boost on lunch tray and requesting something lighter for lunch Notified nursing who will contact dietary.    GOALS:   Multidisciplinary Problems       Occupational Therapy Goals          Problem: Occupational Therapy    Goal Priority Disciplines Outcome Interventions   Occupational Therapy Goal     OT, PT/OT Ongoing, Progressing    Description: Goals to be met by: 8/11/2023     Patient will increase functional independence with ADLs by performing:    UE Dressing with Modified [place].  LE Dressing with Set-up Assistance.  Grooming while standing at sink with [place].  Toileting from toilet with Modified [place] for hygiene and clothing management.   Bathing seated UB and baldo care sponge with Modified [place].  Toilet transfer to toilet with Supervision.  Upper extremity exercise program x10 reps per handout, with assistance as needed for form constancy, proper respiration and energy conservation construct verbal recitation.                           Time Tracking:     OT Date of Treatment: 07/24/23  OT Start Time: 1138  OT Stop Time: 1210  OT Total Time (min): 32 min    Billable Minutes:Self Care/Home Management 10  Therapeutic Activity 22 7/24/2023

## 2023-07-24 NOTE — NURSING
[facility], [facility] - [place]  Nurses Note -- 4 Eyes      7/24/2023       Skin assessed on: Q Shift      [x] No Pressure Injuries Present    []Prevention Measures Documented    [] Yes LDA  for Pressure Injury Previously documented     [] Yes New Pressure Injury Discovered   [] LDA for New Pressure Injury Added      Attending RN:  Sari Lacey, RN     Second RN:  Radha RODRIGUEZ

## 2023-07-24 NOTE — PROGRESS NOTES
"[facility] [place] [facility]  Infectious Disease  Progress Note    Patient Name: Marilee Townsend  MRN: [id number]  Admission Date: 7/14/2023  Length of Stay: 10 days  Attending Physician: Adriane Jang DO  Primary Care Provider: Claudia Hopper DO    Isolation Status: No active isolations  Assessment/Plan:      Oncology  Leukocytosis  63F with h/o depression, fibromyalgia, htn, prior abdominal surgeries admitted 7/14 with progressive intractable abdominal pain, decreased appetite, nausea, found to have sbo, thought to be secondary to adhesions and kinking with multiple enterotomies, s/p ex lap, small bowl resection on 7/16.  Gi consulted for tarry stools; anemia. ID consulted for "worsening leukocytosis; s/p bowel resection on 7/16.  CT 7/21 revealed mod intraperitoneal fluid (likely proteinaceous fluid), pelvic fluid collection. No contrast extravasation noted.  S/p drain placement 7/21 to a 14 x 13 x 7 cm abdominal fluid collection, drain with serosanguinous output, cultures candida albicans.    WBC now downtrending. Currently on keron and zosyn.     Recommendations:  - continue zosyn.   - switch micafungin to fluconazole pending EKG  - duration tbd; anticipate atleast 4 wks with repeat CT a/p prior to discontinuation of abx         Anticipated Disposition: tbd    Thank you for your consult. I will follow-up with patient. Please contact us if you have any additional questions.    Beverly Ortiz MD  Infectious Disease  [facility] [place] [facility]    Subjective:     Principal Problem:Small bowel obstruction    HPI: 63F with h/o depression, fibromyalgia, htn, prior abdominal surgeries admitted 7/14 with progressive intractable abdominal pain, decreased appetite, nausea. Reports OSH ED visit for the same, progressed, so returned. Denies fever. Found to have SBO s/p surgical repair. Reports abdomen tender, but better than admit. Reports black tarry stools. Denies issues with iv lines. Denies dysuria. About to go down to " "IR    Afebrile    Day 6 zosyn/flagyl    Wbc rising    Bcx negative    On 7/16 taken for   LAPAROSCOPY, DIAGNOSTIC (N/A)  LAPAROTOMY, EXPLORATORY (N/A)  lysis of adhesions, small bowel resection repair of enterotomies    Found to have sbo, thought to be secondary to adhesions and kinking with multiple enterotomies      Ct repeated today  Interval resection of abnormal pelvic small bowel with diminished but not fully resolved small bowel dilatation.  A new small bowel transition point is questioned in the vicinity of the new intrapelvic enteroenteric anastomosis.  Correlate clinically, and with follow-up imaging.     There is a moderate quantity of intraperitoneal fluid in the pelvis.  Its attenuation of up to 26 HU suggests proteinaceous fluid (DDX: Purulent debris, dilute blood products, extraluminal succus entericus etc.).  New diffuse enhancement of the adjacent portions of the parietal peritoneum raises suspicion for peritonitis.  Correlate clinically.     Gas with urinary bladder lumen is most likely related to recent catheterization.  In the absence of any recent instrumentation, a gas-forming infection or enterocystic fistula might also be considerations.  Correlate clinically.     New small bilateral pleural effusions.  Opacities in the adjacent dorsal aspect of either lower lobe most likely represent atelectasis.  Pneumonia not fully excluded.  Correlate clinically, and with follow-up imaging.     New patchy hazy ground-glass opacities in the lingula and right middle lobe possibly represent pulmonary edema, small airways disease, atypical pneumonia, aspiration, or other abnormality.  Correlate clinically, and with follow-up imaging.      IR consulted for abdominal drain placement for complex fluid collection, cultures ordered    Gi consulted for tarry stools; anemia    ID consulted for "worsening leukocytosis; s/p bowel resection on 7/16    Interval History: No acute events overnight. WBC downtrendng and " pain improving.     Review of Systems   Constitutional:  Negative for chills, diaphoresis, fatigue and fever.   Respiratory:  Negative for cough and shortness of breath.    Cardiovascular:  Negative for chest pain and leg swelling.   Gastrointestinal:  Positive for abdominal pain (improving). Negative for nausea and vomiting. Diarrhea: resolved.  Genitourinary:  Negative for difficulty urinating and dysuria.   Musculoskeletal:  Negative for arthralgias and back pain.   Psychiatric/Behavioral:  Negative for agitation and confusion.    All other systems reviewed and are negative.  Objective:     Vital Signs (Most Recent):  Temp: 98.8 °F (37.1 °C) (07/24/23 1116)  Pulse: 87 (07/24/23 1116)  Resp: 20 (07/24/23 1314)  BP: 135/73 (07/24/23 1116)  SpO2: 97 % (07/24/23 1116) Vital Signs (24h Range):  Temp:  [98.2 °F (36.8 °C)-99.2 °F (37.3 °C)] 98.8 °F (37.1 °C)  Pulse:  [84-99] 87  Resp:  [16-20] 20  SpO2:  [94 %-97 %] 97 %  BP: (129-150)/(71-95) 135/73     Weight: 45.4 kg (100 lb)  Body mass index is 18.29 kg/m².    Estimated Creatinine Clearance: 80.4 mL/min (based on SCr of 0.5 mg/dL).     Physical Exam  Vitals reviewed.   HENT:      Head: Normocephalic and atraumatic.   Eyes:      Conjunctiva/sclera: Conjunctivae normal.      Pupils: Pupils are equal, round, and reactive to light.   Pulmonary:      Effort: Pulmonary effort is normal. No respiratory distress.      Breath sounds: Normal breath sounds.   Abdominal:      General: Bowel sounds are normal.      Palpations: Abdomen is soft.      Comments: Vertical incision- minimal erythema, no drainage.   Drain at superior aspect of rt buttocks region- serosanguineous output.    Musculoskeletal:      Right lower leg: No edema.      Left lower leg: No edema.   Skin:     Findings: No lesion or rash.   Neurological:      General: No focal deficit present.      Mental Status: She is oriented to person, place, and time.        Significant Labs: Wound Culture:   Recent Labs   Lab  07/21/23  1413   LABAERO CANDIDA ALBICANS  Moderate  *     All pertinent labs within the past 24 hours have been reviewed.    Significant Imaging: I have reviewed all pertinent imaging results/findings within the past 24 hours.

## 2023-07-24 NOTE — PLAN OF CARE
Problem: Occupational Therapy  Goal: Occupational Therapy Goal  Description: Goals to be met by: 8/11/2023     Patient will increase functional independence with ADLs by performing:    UE Dressing with Modified Johnston.  LE Dressing with Set-up Assistance.  Grooming while standing at sink with Johnston.  Toileting from toilet with Modified Johnston for hygiene and clothing management.   Bathing seated UB and baldo care sponge with Modified Johnston.  Toilet transfer to toilet with Supervision.  Upper extremity exercise program x10 reps per handout, with assistance as needed for form constancy, proper respiration and energy conservation construct verbal recitation.     Outcome: Ongoing, Progressing

## 2023-07-24 NOTE — ASSESSMENT & PLAN NOTE
-In setting of acute small-bowel obstruction and no definitive findings of infection appears likely reactive, but low threshold for starting antibiotics.   -LA negative x2.  -procal mildly elevated, WBC# trended up to 14 on 07/20 and to 17 on 07/21  -Repeat CT abdomen with fluid in the pelvic  -IR consulted: s/p transgluteal-approach drainage catheter into the complex pelvic fluid collection on 07/21, cultures with candida albicans  -ID consulted: Added micafungin 07/21 and repeat blood culture  -Repeat blood cx with NGTD  -Continue on antibiotics   -Leukocytosis resolved on 07/24  -tx as stated above for sbo

## 2023-07-24 NOTE — ASSESSMENT & PLAN NOTE
63F presenting with several days of worsening abd pain, anorexia, and nausea.  CT abd/pelvis with Abnormal fluid, air distension small bowel, termination point at or near ileocecal valve, not well-defined, associated less distension proximal small bowel with some bowel wall thickening.  Adhesion etiology of obstructive process favored.    -Surgery team consulted: s/p exploratory laparotomy on 07/16/2023 with multiple small bowel resections and anastomosis, evidence of enterotomy with over-sewing.  -NG tube in place to LIWS  -continue TPN per surgery   -Pt with worsening leukocytosis, CT abdomen repeated  -CT abdomen: there is now a moderate to large quantity of intraperitoneal fluid, with new enhancement of the adjacent portions of the parietal peritoneum, suspicious for peritonitis.   -IR consulted: s/p transgluteal-approach drainage catheter into the complex pelvic fluid collection 07/21  -ID consulted: added micafungin on 07/21. Plan to switch to fluconazole, pending EKG  -Analgesics ordered prn   -Advance diet as tolerated  -Pt with BM on 07/21

## 2023-07-24 NOTE — ASSESSMENT & PLAN NOTE
"63F with h/o depression, fibromyalgia, htn, prior abdominal surgeries admitted 7/14 with progressive intractable abdominal pain, decreased appetite, nausea, found to have sbo, thought to be secondary to adhesions and kinking with multiple enterotomies, s/p ex lap, small bowl resection on 7/16.  Gi consulted for tarry stools; anemia. ID consulted for "worsening leukocytosis; s/p bowel resection on 7/16.  CT 7/21 revealed mod intraperitoneal fluid (likely proteinaceous fluid), pelvic fluid collection. No contrast extravasation noted.  S/p drain placement 7/21 to a 14 x 13 x 7 cm abdominal fluid collection, drain with serosanguinous output, cultures candida albicans.    WBC now downtrending. Currently on ekron and zosyn.     Recommendations:  - continue zosyn.   - switch micafungin to fluconazole pending EKG  - duration tbd; anticipate atleast 4 wks with repeat CT a/p prior to discontinuation of abx   "

## 2023-07-24 NOTE — PROGRESS NOTES
[facility]  General Surgery  Progress Note    Subjective:     History of Present Illness:  Marilee Townsend is a 63yoF with a history of hypertension, rectal prolapse s/p rectopexy who presents to [facility] with complaints of nausea, vomiting and worsening abdominal pain. Of note, she presented to an outside hospital with the same complaints yesterday and was discharged with instructions to follow up with her primary care provider. The patient describes a three-day history of abdominal pain, nausea, vomiting and PO intolerance. This has persistently worsened throughout this time, which prompted both presentations. She states that she did have a small bowel movement yesterday, described as hard pellets. On work-up, her vital signs are stable. She has an elevated leukocytosis to 18. Lactate normal at 1. Repeat ordered. Her CT scan demonstrates dilated small bowel consistent with a small bowel obstruction. There is normal caliber small intestine distally. She is admitted to the hospital medicine service. General surgery consulted for evaluation.     Of note, her WBC has increased from 12.8 to 18 over the last 24hrs. Imaging at the outside hospital, per the official read, did not demonstrate any degree of obstruction, which is a rather burk contrast from her CT scan at our facility. Plan for NG tube insertion with low threshold to proceed to operating room for diagnostic laparoscopy. Discussed this plan with the patient.       Post-Op Info:  Procedure(s) (LRB):  LAPAROSCOPY, DIAGNOSTIC (N/A)  LAPAROTOMY, EXPLORATORY (N/A)  EXCISION, SMALL INTESTINE   8 Days Post-Op     Interval History:   No acute events overnight   White count is normal, afebrile, normotensive, not tachycardic  Appetite is okay, eating a little bit of meals  Multiple bowel movements    Medications:  Continuous Infusions:   Amino acid 5% - dextrose 15% (CLINIMIX-E) solution with additives. CENTRAL LINE ONLY (1395 mOsm/L). 1L provides 50 gm AA,  150 gm CHO (510 kcal/L dextrose), Na 35, K 30, Mg 5, Ca 4.5, Acetate 80, Cl 39, Phos 15 75 mL/hr at 07/23/23 2326     Scheduled Meds:   acetaminophen  1,000 mg Oral Q8H    amLODIPine  5 mg Oral Daily    buPROPion  150 mg Oral BID    fat emulsion  250 mL Intravenous Daily    heparin (porcine)  5,000 Units Subcutaneous Q8H    micafungin (MYCAMINE) IVPB  100 mg Intravenous Q24H    pantoprazole  40 mg Intravenous BID    piperacillin-tazobactam (Zosyn) IV (PEDS and ADULTS) (extended infusion is not appropriate)  4.5 g Intravenous Q8H    raloxifene  60 mg Oral Daily    sertraline  100 mg Oral QAM    sodium chloride 0.9%  10 mL Intravenous Q6H     PRN Meds:sodium chloride, acetaminophen, acetaminophen, dextrose 50%, dextrose 50%, diatrizoate meglumineand-diatrizoate sodium, glucagon (human recombinant), glucose, glucose, HYDROmorphone, HYDROmorphone, insulin aspart U-100, loperamide, naloxone, ondansetron, sodium chloride 0.9%, Flushing PICC Protocol **AND** sodium chloride 0.9% **AND** sodium chloride 0.9%     Review of patient's allergies indicates:   Allergen Reactions    Morphine Nausea And Vomiting     Objective:     Vital Signs (Most Recent):  Temp: 98.9 °F (37.2 °C) (07/24/23 0720)  Pulse: 84 (07/24/23 0720)  Resp: 20 (07/24/23 1012)  BP: (!) 145/73 (07/24/23 0720)  SpO2: 96 % (07/24/23 0720) Vital Signs (24h Range):  Temp:  [98.2 °F (36.8 °C)-99.2 °F (37.3 °C)] 98.9 °F (37.2 °C)  Pulse:  [84-99] 84  Resp:  [16-20] 20  SpO2:  [94 %-98 %] 96 %  BP: (129-156)/(71-95) 145/73     Weight: 45.4 kg (100 lb)  Body mass index is 18.29 kg/m².    Intake/Output - Last 3 Shifts         07/22 0700 07/23 0659 07/23 0700 07/24 0659 07/24 0700 07/25 0659    P.O. 600 480 240    Blood       IV Piggyback 392.2      TPN 2026.1      Total Intake(mL/kg) 3018.3 (66.5) 480 (10.6) 240 (5.3)    Urine (mL/kg/hr) 600 (0.6) 0 (0)     Drains 25 40     Stool 0 0     Total Output 625 40     Net +2393.3 +440 +240           Urine  Occurrence 6 x 3 x 1 x    Stool Occurrence 4 x 6 x 0 x             Physical Exam  Vitals and nursing note reviewed.   Constitutional:       Appearance: She is well-developed.   HENT:      Head: Normocephalic and atraumatic.   Cardiovascular:      Rate and Rhythm: Normal rate.      Heart sounds: Normal heart sounds.   Pulmonary:      Effort: Pulmonary effort is normal.   Abdominal:      General: Bowel sounds are normal. There is distension (mild).      Palpations: Abdomen is soft.      Tenderness: There is no abdominal tenderness.   Musculoskeletal:         General: Normal range of motion.      Cervical back: Normal range of motion.   Skin:     General: Skin is warm and dry.      Capillary Refill: Capillary refill takes less than 2 seconds.   Neurological:      Mental Status: She is alert and oriented to person, place, and time.   Psychiatric:         Behavior: Behavior normal.        Significant Labs:  I have reviewed all pertinent lab results within the past 24 hours.  CBC:   Recent Labs   Lab 07/24/23  0509   WBC 10.50   RBC 2.81*   HGB 8.5*   HCT 25.4*      MCV 90   MCH 30.2   MCHC 33.5     CMP:   Recent Labs   Lab 07/24/23  0509   *   CALCIUM 8.2*   ALBUMIN 1.8*   PROT 5.7*   *   K 4.2   CO2 24      BUN 11   CREATININE 0.5   ALKPHOS 60   ALT 18   AST 19   BILITOT 0.2       Significant Diagnostics:  I have reviewed all pertinent imaging results/findings within the past 24 hours.    Assessment/Plan:     * Small bowel obstruction  Marilee Townsend is a 63yoF with a history of HTN and rectal prolapse who presents with a several day history of abdominal pain, nausea, vomiting, and PO intolerance. Her work-up is consistent with a small bowel obstruction. General surgery consulted for evaluation.  She is now status post exploratory laparotomy on 07/16/2023 with multiple small bowel resections and anastomosis, evidence of enterotomy with over-sewing.    Patient seen and examined   Having  multiple bowel movements  Abdominal exam is benign   Continue IR drain to suction and flush with 5 cc of saline q shift  Tolerating diet without issue, appetite could be better-will add boost shakes today as she needs is much protein as she can get to continue to heal  Continue to monitor            Jesus Adams MD  General Surgery  [facility] Surg

## 2023-07-24 NOTE — PLAN OF CARE
Problem: Adult Inpatient Plan of Care  Goal: Plan of Care Review  7/24/2023 0401 by Sari Lacey RN  Outcome: Ongoing, Progressing  7/24/2023 0353 by Sari Lacey RN  Outcome: Ongoing, Progressing  Goal: Patient-Specific Goal (Individualized)  Outcome: Ongoing, Progressing  Goal: Absence of Hospital-Acquired Illness or Injury  Outcome: Ongoing, Progressing  Goal: Optimal Comfort and Wellbeing  7/24/2023 0401 by Sari Lacey RN  Outcome: Ongoing, Progressing  7/24/2023 0353 by Sari Lacey RN  Outcome: Ongoing, Progressing

## 2023-07-24 NOTE — SUBJECTIVE & OBJECTIVE
Interval History:  No acute overnight events.  Patient remained afebrile.  Admits nausea improving but no vomiting.  WBC continued to rise ID consulted. She is s/p transgluteal-approach drainage catheter into the complex pelvic fluid collection yesterday.  Pt had NGT dc yesterday.     Review of Systems   Constitutional:  Negative for chills, fatigue and fever.   Respiratory:  Negative for cough and shortness of breath.    Cardiovascular:  Negative for chest pain, palpitations and leg swelling.   Gastrointestinal:  Positive for abdominal pain (mild). Negative for diarrhea, nausea and vomiting.   Genitourinary:  Negative for difficulty urinating and dysuria.   Musculoskeletal:  Negative for joint swelling.   Neurological:  Negative for dizziness, weakness and headaches.     Objective:     Vital Signs (Most Recent):  Temp: 98.8 °F (37.1 °C) (07/24/23 1116)  Pulse: 87 (07/24/23 1116)  Resp: 20 (07/24/23 1314)  BP: 135/73 (07/24/23 1116)  SpO2: 97 % (07/24/23 1116) Vital Signs (24h Range):  Temp:  [98.2 °F (36.8 °C)-99.2 °F (37.3 °C)] 98.8 °F (37.1 °C)  Pulse:  [84-99] 87  Resp:  [16-20] 20  SpO2:  [94 %-97 %] 97 %  BP: (129-150)/(71-95) 135/73     Weight: 45.4 kg (100 lb)  Body mass index is 18.29 kg/m².    Intake/Output Summary (Last 24 hours) at 7/24/2023 1409  Last data filed at 7/24/2023 1321  Gross per 24 hour   Intake 480 ml   Output 490 ml   Net -10 ml           Physical Exam  Vitals and nursing note reviewed.   Constitutional:       General: She is not in acute distress.     Appearance: She is ill-appearing.   HENT:      Nose:      Comments: NG tube removed on 07/21/2023     Mouth/Throat:      Mouth: Mucous membranes are dry.   Cardiovascular:      Rate and Rhythm: Normal rate and regular rhythm.   Pulmonary:      Effort: Pulmonary effort is normal. No respiratory distress.      Breath sounds: No wheezing or rales.   Abdominal:      General: There is distension.      Palpations: Abdomen is soft.      Tenderness:  There is abdominal tenderness (mild tenderenss).      Comments: Abdomen soft, mildly distended  Midline incision appears clean and intact. No bleeding or drainage at this time.  Abdomen mildly tender but improving    Musculoskeletal:         General: Normal range of motion.      Right lower leg: No edema.      Left lower leg: No edema.   Skin:     General: Skin is warm and dry.      Comments: Has drainage catheter  near right gluteal from recent transgluteal-approach drainage catheter into the complex pelvic fluid collection (07/21/23)   Neurological:      General: No focal deficit present.      Mental Status: She is alert and oriented to person, place, and time.   Psychiatric:         Mood and Affect: Mood normal.         Thought Content: Thought content normal.           Significant Labs: All pertinent labs within the past 24 hours have been reviewed.    Significant Imaging: I have reviewed all pertinent imaging results/findings within the past 24 hours.

## 2023-07-24 NOTE — SUBJECTIVE & OBJECTIVE
Interval History: No acute events overnight. WBC downtrendng and pain improving.     Review of Systems   Constitutional:  Negative for chills, diaphoresis, fatigue and fever.   Respiratory:  Negative for cough and shortness of breath.    Cardiovascular:  Negative for chest pain and leg swelling.   Gastrointestinal:  Positive for abdominal pain (improving). Negative for nausea and vomiting. Diarrhea: resolved.  Genitourinary:  Negative for difficulty urinating and dysuria.   Musculoskeletal:  Negative for arthralgias and back pain.   Psychiatric/Behavioral:  Negative for agitation and confusion.    All other systems reviewed and are negative.  Objective:     Vital Signs (Most Recent):  Temp: 98.8 °F (37.1 °C) (07/24/23 1116)  Pulse: 87 (07/24/23 1116)  Resp: 20 (07/24/23 1314)  BP: 135/73 (07/24/23 1116)  SpO2: 97 % (07/24/23 1116) Vital Signs (24h Range):  Temp:  [98.2 °F (36.8 °C)-99.2 °F (37.3 °C)] 98.8 °F (37.1 °C)  Pulse:  [84-99] 87  Resp:  [16-20] 20  SpO2:  [94 %-97 %] 97 %  BP: (129-150)/(71-95) 135/73     Weight: 45.4 kg (100 lb)  Body mass index is 18.29 kg/m².    Estimated Creatinine Clearance: 80.4 mL/min (based on SCr of 0.5 mg/dL).     Physical Exam  Vitals reviewed.   HENT:      Head: Normocephalic and atraumatic.   Eyes:      Conjunctiva/sclera: Conjunctivae normal.      Pupils: Pupils are equal, round, and reactive to light.   Pulmonary:      Effort: Pulmonary effort is normal. No respiratory distress.      Breath sounds: Normal breath sounds.   Abdominal:      General: Bowel sounds are normal.      Palpations: Abdomen is soft.      Comments: Vertical incision- minimal erythema, no drainage.   Drain at superior aspect of rt buttocks region- serosanguineous output.    Musculoskeletal:      Right lower leg: No edema.      Left lower leg: No edema.   Skin:     Findings: No lesion or rash.   Neurological:      General: No focal deficit present.      Mental Status: She is oriented to person, place, and  time.        Significant Labs: Wound Culture:   Recent Labs   Lab 07/21/23  1413   LABAERO CANDIDA ALBICANS  Moderate  *     All pertinent labs within the past 24 hours have been reviewed.    Significant Imaging: I have reviewed all pertinent imaging results/findings within the past 24 hours.

## 2023-07-24 NOTE — PROGRESS NOTES
[facility] Medicine  Progress Note    Patient Name: Marilee Townsend  MRN: [id number]  Patient Class: IP- Inpatient   Admission Date: 7/14/2023  Length of Stay: 10 days  Attending Physician: Adriane Jang DO  Primary Care Provider: Claudia Hopper DO        Subjective:     Principal Problem:Small bowel obstruction        HPI:  63F with pmh of depression, fibromyalgia, htn, osteoporosis who presents due to several day h/o abd pain and nausea with anorexia. Pt was evaluated at outside emergency department patient was seen in the day prompting the ED for evaluation.  Patient denies any recent flatulence or bowel movements unclear on the last date.  Patient states pain is diffuse in the with minimal improvement with pain medications given in the ED. CT scan in the emergency department with demonstration of small bowel obstruction and surgery consulted who had NG tube placed.  Patient denies chest pain, shortness a breath, fever, chills.  Patient states she had a shoulder surgery about 3 weeks prior to arrival and tolerated the anesthesia well.  Patient is a former smoker, but denies alcohol or drug use.      Overview/Hospital Course:  63F with pmh of depression, fibromyalgia, htn, osteoporosis admitted on 07/07/2023 for small bowel obstruction. presented with a several day history of abdominal pain, nausea, vomiting, and PO intolerance.  CT scan in ED with demonstration of small bowel obstruction and surgery consulted who had NG tube placed. Attempts at gastrografin with persistent obstruction. Pt taken to OR on 7/16. NG tube remained after operation and surgery managing. She is now status post exploratory laparotomy on 07/16/2023 with multiple small bowel resections and anastomosis, evidence of enterotomy with over-sewing. Replace electrolytes as needed. Had BM on 07/21/2023. PT/OT consulted for evaluation. NGT removed on 07/21/2023. Patient with worsening leukocytosis on 07/21. Repeat CT abd  showed moderate quantity of intraperitoneal fluid in the pelvis. IR consulted-s/p transgluteal-approach drainage catheter into the complex pelvic fluid collection. Fluid cx with candida albicans. ID consulted-continued on zosyn and micafungin. Repeat blood cx with NGTD. Leukocytosis resolved on 07/24/23. Continue to monitor          Interval History:  No acute overnight events.  Patient remained afebrile.  Admits nausea improving but no vomiting.  WBC continued to rise ID consulted. She is s/p transgluteal-approach drainage catheter into the complex pelvic fluid collection yesterday.  Pt had NGT dc yesterday.     Review of Systems   Constitutional:  Negative for chills, fatigue and fever.   Respiratory:  Negative for cough and shortness of breath.    Cardiovascular:  Negative for chest pain, palpitations and leg swelling.   Gastrointestinal:  Positive for abdominal pain (mild). Negative for diarrhea, nausea and vomiting.   Genitourinary:  Negative for difficulty urinating and dysuria.   Musculoskeletal:  Negative for joint swelling.   Neurological:  Negative for dizziness, weakness and headaches.     Objective:     Vital Signs (Most Recent):  Temp: 98.8 °F (37.1 °C) (07/24/23 1116)  Pulse: 87 (07/24/23 1116)  Resp: 20 (07/24/23 1314)  BP: 135/73 (07/24/23 1116)  SpO2: 97 % (07/24/23 1116) Vital Signs (24h Range):  Temp:  [98.2 °F (36.8 °C)-99.2 °F (37.3 °C)] 98.8 °F (37.1 °C)  Pulse:  [84-99] 87  Resp:  [16-20] 20  SpO2:  [94 %-97 %] 97 %  BP: (129-150)/(71-95) 135/73     Weight: 45.4 kg (100 lb)  Body mass index is 18.29 kg/m².    Intake/Output Summary (Last 24 hours) at 7/24/2023 1409  Last data filed at 7/24/2023 1321  Gross per 24 hour   Intake 480 ml   Output 490 ml   Net -10 ml           Physical Exam  Vitals and nursing note reviewed.   Constitutional:       General: She is not in acute distress.     Appearance: She is ill-appearing.   HENT:      Nose:      Comments: NG tube removed on 07/21/2023      Mouth/Throat:      Mouth: Mucous membranes are dry.   Cardiovascular:      Rate and Rhythm: Normal rate and regular rhythm.   Pulmonary:      Effort: Pulmonary effort is normal. No respiratory distress.      Breath sounds: No wheezing or rales.   Abdominal:      General: There is distension.      Palpations: Abdomen is soft.      Tenderness: There is abdominal tenderness (mild tenderenss).      Comments: Abdomen soft, mildly distended  Midline incision appears clean and intact. No bleeding or drainage at this time.  Abdomen mildly tender but improving    Musculoskeletal:         General: Normal range of motion.      Right lower leg: No edema.      Left lower leg: No edema.   Skin:     General: Skin is warm and dry.      Comments: Has drainage catheter  near right gluteal from recent transgluteal-approach drainage catheter into the complex pelvic fluid collection (07/21/23)   Neurological:      General: No focal deficit present.      Mental Status: She is alert and oriented to person, place, and time.   Psychiatric:         Mood and Affect: Mood normal.         Thought Content: Thought content normal.           Significant Labs: All pertinent labs within the past 24 hours have been reviewed.    Significant Imaging: I have reviewed all pertinent imaging results/findings within the past 24 hours.      Assessment/Plan:      * Small bowel obstruction  63F presenting with several days of worsening abd pain, anorexia, and nausea.  CT abd/pelvis with Abnormal fluid, air distension small bowel, termination point at or near ileocecal valve, not well-defined, associated less distension proximal small bowel with some bowel wall thickening.  Adhesion etiology of obstructive process favored.    -Surgery team consulted: s/p exploratory laparotomy on 07/16/2023 with multiple small bowel resections and anastomosis, evidence of enterotomy with over-sewing.  -NG tube in place to LIWS  -continue TPN per surgery   -Pt with worsening  leukocytosis, CT abdomen repeated  -CT abdomen: there is now a moderate to large quantity of intraperitoneal fluid, with new enhancement of the adjacent portions of the parietal peritoneum, suspicious for peritonitis.   -IR consulted: s/p transgluteal-approach drainage catheter into the complex pelvic fluid collection 07/21  -ID consulted: added micafungin on 07/21. Plan to switch to fluconazole, pending EKG  -Analgesics ordered prn   -Advance diet as tolerated  -Pt with BM on 07/21      HTN (hypertension)  Currently controlled.  Continue home norvasc  Will continue to monitor      Leukocytosis  -In setting of acute small-bowel obstruction and no definitive findings of infection appears likely reactive, but low threshold for starting antibiotics.   -LA negative x2.  -procal mildly elevated, WBC# trended up to 14 on 07/20 and to 17 on 07/21  -Repeat CT abdomen with fluid in the pelvic  -IR consulted: s/p transgluteal-approach drainage catheter into the complex pelvic fluid collection on 07/21, cultures with candida albicans  -ID consulted: Added micafungin 07/21 and repeat blood culture  -Repeat blood cx with NGTD  -Continue on antibiotics   -Leukocytosis resolved on 07/24  -tx as stated above for sbo      Depression  Restart home medications as indicated- setraline 100mg         Osteoporosis  Continue home medications      Hypokalemia  -Replace as needed  -TPN per surgery       Atelectasis  -CT abdomen:  Opacities in the adjacent dorsal aspect of either lower lobe most likely represent atelectasis.   -Encourage IS   -PT/OT consulted      Weakness  -PT/OT consulted: home health       Advanced care planning/counseling discussion  Advance Care Planning     Date: 07/19/2023    Code Status  I engaged the the patient in a voluntary conversation about the patient's preferences for care  at the very end of life. The patient wishes to have CPR and other invasive treatments performed when her heart and/or breathing stops. I  communicated to the patient that her wishes align with full code status.  I spent a total of 16 minutes engaging the patient in this advance care planning discussion.        Code Status: Full Code          Body mass index (BMI) less than 19  Body mass index is 18.29 kg/m².  Continue TPN per surgery         VTE Risk Mitigation (From admission, onward)         Ordered     heparin (porcine) injection 5,000 Units  Every 8 hours         07/14/23 1428     IP VTE LOW RISK PATIENT  Once         07/14/23 1236     Place sequential compression device  Until discontinued         07/14/23 1236                Discharge Planning   WIL: 7/23/2023     Code Status: Full Code   Is the patient medically ready for discharge?:     Reason for patient still in hospital (select all that apply): Patient trending condition, Treatment, Consult recommendations and PT / OT recommendations  Discharge Plan A: Home   Discharge Delays: None known at this time              Adriane Jang DO  Department of Hospital Medicine   [facility] [place] [facility]

## 2023-07-24 NOTE — PLAN OF CARE
Problem: Adult Inpatient Plan of Care  Goal: Plan of Care Review  Outcome: Ongoing, Progressing  Goal: Patient-Specific Goal (Individualized)  Outcome: Ongoing, Progressing  Goal: Absence of Hospital-Acquired Illness or Injury  Outcome: Ongoing, Progressing  Goal: Optimal Comfort and Wellbeing  Outcome: Ongoing, Progressing  Goal: Readiness for Transition of Care  Outcome: Ongoing, Progressing     Problem: Skin Injury Risk Increased  Goal: Skin Health and Integrity  Outcome: Ongoing, Progressing     Problem: Infection  Goal: Absence of Infection Signs and Symptoms  Outcome: Ongoing, Progressing     Problem: Bleeding (Surgery Nonspecified)  Goal: Absence of Bleeding  Outcome: Ongoing, Progressing     Problem: Bowel Motility Impaired (Surgery Nonspecified)  Goal: Effective Bowel Elimination  Outcome: Ongoing, Progressing     Problem: Fluid and Electrolyte Imbalance (Surgery Nonspecified)  Goal: Fluid and Electrolyte Balance  Outcome: Ongoing, Progressing     Problem: Glycemic Control Impaired (Surgery Nonspecified)  Goal: Blood Glucose Level Within Targeted Range  Outcome: Ongoing, Progressing     Problem: Infection (Surgery Nonspecified)  Goal: Absence of Infection Signs and Symptoms  Outcome: Ongoing, Progressing     Problem: Ongoing Anesthesia Effects (Surgery Nonspecified)  Goal: Anesthesia/Sedation Recovery  Outcome: Ongoing, Progressing     Problem: Pain (Surgery Nonspecified)  Goal: Acceptable Pain Control  Outcome: Ongoing, Progressing     Problem: Postoperative Nausea and Vomiting (Surgery Nonspecified)  Goal: Nausea and Vomiting Relief  Outcome: Ongoing, Progressing     Problem: Postoperative Urinary Retention (Surgery Nonspecified)  Goal: Effective Urinary Elimination  Outcome: Ongoing, Progressing     Problem: Respiratory Compromise (Surgery Nonspecified)  Goal: Effective Oxygenation and Ventilation  Outcome: Ongoing, Progressing     Problem: Fall Injury Risk  Goal: Absence of Fall and Fall-Related  Injury  Outcome: Ongoing, Progressing     Problem: Pain Acute  Goal: Acceptable Pain Control and Functional Ability  Outcome: Ongoing, Progressing

## 2023-07-24 NOTE — PLAN OF CARE
Case Management Re-assessment      PCP: Claudia Hopper  Pharmacy: [facility] on [address]     Patient Arrived From: home  Existing Help at Home: none     Barriers to Discharge: none     Discharge Plan:               A. Home Health/BSC              B. Home   ID following. Per gen surgery, continue IR drain to suction. TN to continue to follow for dc needs.    07/24/23 1512   Discharge Reassessment   Assessment Type Discharge Planning Reassessment   Did the patient's condition or plan change since previous assessment? Yes   Transition of Care Barriers None   Why the patient remains in the hospital Requires continued medical care   Post-Acute Status   Post-Acute Authorization Home Health   Home Health Status Pending medical clearance/testing   Discharge Delays None known at this time

## 2023-07-24 NOTE — PROGRESS NOTES
[facility] Progress Note    Patient Complaint: tarry stools    PCP:   Claudia Hopper       LOS: 10        Initial History of Present Illness (HPI):  This is a 63 y.o. female consulted to GI service for concern for GI bleed, dark tarry stools, low hgb. PMH hypertension, rectal prolapse s/p rectopexy. Patient complaint of acute onset of painless dark tarry stools with associated symptoms of constipation that began on last night. She reports she had not had a stool since Wednesday of last week prior to her hospitalization and this was her 1st stool post op from her bowel procedure on 7/16. Denies vomiting, hematemesis, acid reflux, BRBPR. Denies smoking or drinking. Long term NSAID use of Mobic daily. Reports having endoscopy more that 10 yrs ago. Reports EGD showing PUD, more than 10 yrs ago, denies taking PPI at home. Reports a hx of bowel obstructions.    Admit labs wbc 18.3, hgb 12.9  7/16 Post op labs wbc 2.2, hgb 11.0  7/18 wbc 7.4, hgb 8.7  Today labs wbc 17.5, hgb 7.0  CT- retroperitoneal fluid collection    Interval Hx  Hgb stable, reports no stools since Friday.    Medical History:  has a past medical history of Arthritis, Depression, Encounter for blood transfusion, Fibromyalgia, Neck pain, and Rectal prolapse.    Surgical History:  has a past surgical history that includes Hysterectomy; Tonsillectomy; Hernia repair; epidural steroid injection (01/09/2017); Breast surgery (Bilateral); Wrist fracture surgery (Right); Total shoulder arthroplasty (Bilateral); Rotator cuff repair (Bilateral); Diagnostic laparoscopy (N/A, 7/16/2023); laparotomy, exploratory (N/A, 7/16/2023); and excision, small intestine (7/16/2023).      Objective Findings:    Vital Signs:  Temp:  [98.2 °F (36.8 °C)-99.2 °F (37.3 °C)]   Pulse:  [84-99]   Resp:  [16-20]   BP: (129-150)/(71-95)   SpO2:  [94 %-97 %]   Body mass index is 18.29 kg/m².      Physical Exam  Vitals and nursing note reviewed.   Constitutional:        Appearance: She is underweight.   HENT:      Head: Normocephalic.   Pulmonary:      Effort: Pulmonary effort is normal.   Abdominal:      General: Bowel sounds are normal.      Palpations: Abdomen is soft.   Skin:     General: Skin is warm and dry.   Neurological:      Mental Status: She is alert and oriented to person, place, and time.   Psychiatric:         Mood and Affect: Mood normal.         Behavior: Behavior normal.         Thought Content: Thought content normal.         Judgment: Judgment normal.             Labs:  Lab Results   Component Value Date    WBC 10.50 07/24/2023    HGB 8.5 (L) 07/24/2023    HCT 25.4 (L) 07/24/2023     07/24/2023    CHOL 266 (H) 03/08/2023    TRIG 76 03/08/2023    HDL 78 (H) 03/08/2023    ALT 18 07/24/2023    AST 19 07/24/2023     (L) 07/24/2023    K 4.2 07/24/2023     07/24/2023    CREATININE 0.5 07/24/2023    BUN 11 07/24/2023    CO2 24 07/24/2023    TSH 3.396 03/08/2023    HGBA1C 5.6 03/08/2023       Imaging: CT abdomen pelvis-Interval resection of abnormal pelvic small bowel with diminished but not fully resolved small bowel dilatation.  A new small bowel transition point is questioned in the vicinity of the new intrapelvic enteroenteric anastomosis.   There is a moderate quantity of intraperitoneal fluid in the pelvis.  Its attenuation of up to 26 HU suggests proteinaceous fluid.Gas with urinary bladder lumen is most likely related to recent catheterization.     I have independently reviewed and interpreted the imaging above           Acute blood loss anemia. SBO-S/p colon resection. GI bleed. Tarry stools. S/p drainage of retroperitoneal fluid collection. Long term NSAID use.  Plan/ Recommendations:  1.  Hgb acutely decreased post op, now s/p transfusion and with retroperitoneal drain. Hgb stable. monitor counts closely. Transfuse if below 7. On Friday reports tarry stools that began Thursday night. Today reports she has not had a stool since. Hold NSAIDs,  agree with IV PPI.   Planned for EGD Friday, however deferred d/t Dr Quevedo's rec to hold d/t recent resection.   Will sign off, please contact GI team with any question or concerns.        Thank you so much for allowing us to participate in the care of Marilee Townsend . Please contact us if you have any additional questions.    Ghada Rodríguez NP  Gastroenterology  [facility]

## 2023-07-24 NOTE — ASSESSMENT & PLAN NOTE
Marilee Townsend is a 63yoF with a history of HTN and rectal prolapse who presents with a several day history of abdominal pain, nausea, vomiting, and PO intolerance. Her work-up is consistent with a small bowel obstruction. General surgery consulted for evaluation.  She is now status post exploratory laparotomy on 07/16/2023 with multiple small bowel resections and anastomosis, evidence of enterotomy with over-sewing.    Patient seen and examined   Having multiple bowel movements  Abdominal exam is benign   Continue IR drain to suction and flush with 5 cc of saline q shift  Tolerating diet without issue, appetite could be better-will add boost shakes today as she needs is much protein as she can get to continue to heal  Continue to monitor

## 2023-07-25 LAB
BASOPHILS # BLD AUTO: 0.03 K/UL (ref 0–0.2)
BASOPHILS NFR BLD: 0.3 % (ref 0–1.9)
DIFFERENTIAL METHOD: ABNORMAL
EOSINOPHIL # BLD AUTO: 0.1 K/UL (ref 0–0.5)
EOSINOPHIL NFR BLD: 1 % (ref 0–8)
ERYTHROCYTE [DISTWIDTH] IN BLOOD BY AUTOMATED COUNT: 14.6 % (ref 11.5–14.5)
HCT VFR BLD AUTO: 23.5 % (ref 37–48.5)
HGB BLD-MCNC: 7.8 G/DL (ref 12–16)
IMM GRANULOCYTES # BLD AUTO: 0.09 K/UL (ref 0–0.04)
IMM GRANULOCYTES NFR BLD AUTO: 0.9 % (ref 0–0.5)
LYMPHOCYTES # BLD AUTO: 1 K/UL (ref 1–4.8)
LYMPHOCYTES NFR BLD: 9.3 % (ref 18–48)
MCH RBC QN AUTO: 29.8 PG (ref 27–31)
MCHC RBC AUTO-ENTMCNC: 33.2 G/DL (ref 32–36)
MCV RBC AUTO: 90 FL (ref 82–98)
MONOCYTES # BLD AUTO: 0.6 K/UL (ref 0.3–1)
MONOCYTES NFR BLD: 5.4 % (ref 4–15)
NEUTROPHILS # BLD AUTO: 8.6 K/UL (ref 1.8–7.7)
NEUTROPHILS NFR BLD: 83.1 % (ref 38–73)
NRBC BLD-RTO: 0 /100 WBC
PLATELET # BLD AUTO: 388 K/UL (ref 150–450)
PMV BLD AUTO: 9.3 FL (ref 9.2–12.9)
POCT GLUCOSE: 118 MG/DL (ref 70–110)
POCT GLUCOSE: 120 MG/DL (ref 70–110)
POCT GLUCOSE: 129 MG/DL (ref 70–110)
POCT GLUCOSE: 131 MG/DL (ref 70–110)
RBC # BLD AUTO: 2.62 M/UL (ref 4–5.4)
WBC # BLD AUTO: 10.35 K/UL (ref 3.9–12.7)

## 2023-07-25 PROCEDURE — A4216 STERILE WATER/SALINE, 10 ML: HCPCS | Performed by: STUDENT IN AN ORGANIZED HEALTH CARE EDUCATION/TRAINING PROGRAM

## 2023-07-25 PROCEDURE — 99233 PR SUBSEQUENT HOSPITAL CARE,LEVL III: ICD-10-PCS | Mod: ,,, | Performed by: INTERNAL MEDICINE

## 2023-07-25 PROCEDURE — 63600175 PHARM REV CODE 636 W HCPCS: Performed by: INTERNAL MEDICINE

## 2023-07-25 PROCEDURE — 63600175 PHARM REV CODE 636 W HCPCS: Performed by: STUDENT IN AN ORGANIZED HEALTH CARE EDUCATION/TRAINING PROGRAM

## 2023-07-25 PROCEDURE — 11000001 HC ACUTE MED/SURG PRIVATE ROOM

## 2023-07-25 PROCEDURE — 97535 SELF CARE MNGMENT TRAINING: CPT

## 2023-07-25 PROCEDURE — 63600175 PHARM REV CODE 636 W HCPCS: Performed by: SURGERY

## 2023-07-25 PROCEDURE — 94760 N-INVAS EAR/PLS OXIMETRY 1: CPT

## 2023-07-25 PROCEDURE — 25000003 PHARM REV CODE 250: Performed by: INTERNAL MEDICINE

## 2023-07-25 PROCEDURE — B4185 PARENTERAL SOL 10 GM LIPIDS: HCPCS | Performed by: SURGERY

## 2023-07-25 PROCEDURE — 97110 THERAPEUTIC EXERCISES: CPT | Mod: CQ

## 2023-07-25 PROCEDURE — 25000003 PHARM REV CODE 250: Performed by: STUDENT IN AN ORGANIZED HEALTH CARE EDUCATION/TRAINING PROGRAM

## 2023-07-25 PROCEDURE — 25000003 PHARM REV CODE 250: Performed by: SURGERY

## 2023-07-25 PROCEDURE — 94799 UNLISTED PULMONARY SVC/PX: CPT

## 2023-07-25 PROCEDURE — 99233 SBSQ HOSP IP/OBS HIGH 50: CPT | Mod: ,,, | Performed by: INTERNAL MEDICINE

## 2023-07-25 PROCEDURE — 85025 COMPLETE CBC W/AUTO DIFF WBC: CPT | Performed by: STUDENT IN AN ORGANIZED HEALTH CARE EDUCATION/TRAINING PROGRAM

## 2023-07-25 PROCEDURE — 97116 GAIT TRAINING THERAPY: CPT | Mod: CQ

## 2023-07-25 PROCEDURE — C9113 INJ PANTOPRAZOLE SODIUM, VIA: HCPCS | Performed by: INTERNAL MEDICINE

## 2023-07-25 RX ADMIN — PIPERACILLIN AND TAZOBACTAM 4.5 G: 4; .5 INJECTION, POWDER, LYOPHILIZED, FOR SOLUTION INTRAVENOUS; PARENTERAL at 08:07

## 2023-07-25 RX ADMIN — LEUCINE, PHENYLALANINE, LYSINE, METHIONINE, ISOLEUCINE, VALINE, HISTIDINE, THREONINE, TRYPTOPHAN, ALANINE, GLYCINE, ARGININE, PROLINE, SERINE, TYROSINE, SODIUM ACETATE, DIBASIC POTASSIUM PHOSPHATE, MAGNESIUM CHLORIDE, SODIUM CHLORIDE, CALCIUM CHLORIDE, DEXTROSE
365; 280; 290; 200; 300; 290; 240; 210; 90; 1035; 515; 575; 340; 250; 20; 340; 261; 51; 59; 33; 15 INJECTION INTRAVENOUS at 10:07

## 2023-07-25 RX ADMIN — HYDROMORPHONE HYDROCHLORIDE 1 MG: 1 INJECTION, SOLUTION INTRAMUSCULAR; INTRAVENOUS; SUBCUTANEOUS at 06:07

## 2023-07-25 RX ADMIN — FLUCONAZOLE 800 MG: 50 TABLET ORAL at 08:07

## 2023-07-25 RX ADMIN — PIPERACILLIN AND TAZOBACTAM 4.5 G: 4; .5 INJECTION, POWDER, LYOPHILIZED, FOR SOLUTION INTRAVENOUS; PARENTERAL at 01:07

## 2023-07-25 RX ADMIN — AMPICILLIN AND SULBACTAM 3 G: 2; 1 INJECTION, POWDER, FOR SOLUTION INTRAVENOUS at 04:07

## 2023-07-25 RX ADMIN — ACETAMINOPHEN 1000 MG: 500 TABLET, FILM COATED ORAL at 05:07

## 2023-07-25 RX ADMIN — HYDROMORPHONE HYDROCHLORIDE 1 MG: 1 INJECTION, SOLUTION INTRAMUSCULAR; INTRAVENOUS; SUBCUTANEOUS at 07:07

## 2023-07-25 RX ADMIN — BUPROPION HYDROCHLORIDE 150 MG: 150 TABLET, EXTENDED RELEASE ORAL at 08:07

## 2023-07-25 RX ADMIN — AMLODIPINE BESYLATE 5 MG: 5 TABLET ORAL at 09:07

## 2023-07-25 RX ADMIN — HEPARIN SODIUM 5000 UNITS: 5000 INJECTION INTRAVENOUS; SUBCUTANEOUS at 06:07

## 2023-07-25 RX ADMIN — HYDROMORPHONE HYDROCHLORIDE 1 MG: 1 INJECTION, SOLUTION INTRAMUSCULAR; INTRAVENOUS; SUBCUTANEOUS at 04:07

## 2023-07-25 RX ADMIN — AMPICILLIN AND SULBACTAM 3 G: 2; 1 INJECTION, POWDER, FOR SOLUTION INTRAVENOUS at 09:07

## 2023-07-25 RX ADMIN — HEPARIN SODIUM 5000 UNITS: 5000 INJECTION INTRAVENOUS; SUBCUTANEOUS at 01:07

## 2023-07-25 RX ADMIN — HEPARIN SODIUM 5000 UNITS: 5000 INJECTION INTRAVENOUS; SUBCUTANEOUS at 09:07

## 2023-07-25 RX ADMIN — RALOXIFENE HYDROCHLORIDE 60 MG: 60 TABLET, FILM COATED ORAL at 08:07

## 2023-07-25 RX ADMIN — BUPROPION HYDROCHLORIDE 150 MG: 150 TABLET, EXTENDED RELEASE ORAL at 09:07

## 2023-07-25 RX ADMIN — SOYBEAN OIL 250 ML: 20 INJECTION, SOLUTION INTRAVENOUS at 10:07

## 2023-07-25 RX ADMIN — ACETAMINOPHEN 1000 MG: 500 TABLET, FILM COATED ORAL at 09:07

## 2023-07-25 RX ADMIN — HYDROMORPHONE HYDROCHLORIDE 1 MG: 1 INJECTION, SOLUTION INTRAMUSCULAR; INTRAVENOUS; SUBCUTANEOUS at 01:07

## 2023-07-25 RX ADMIN — Medication 10 ML: at 06:07

## 2023-07-25 RX ADMIN — PANTOPRAZOLE SODIUM 40 MG: 40 INJECTION, POWDER, FOR SOLUTION INTRAVENOUS at 09:07

## 2023-07-25 RX ADMIN — PANTOPRAZOLE SODIUM 40 MG: 40 INJECTION, POWDER, FOR SOLUTION INTRAVENOUS at 08:07

## 2023-07-25 RX ADMIN — HYDROMORPHONE HYDROCHLORIDE 1 MG: 1 INJECTION, SOLUTION INTRAMUSCULAR; INTRAVENOUS; SUBCUTANEOUS at 02:07

## 2023-07-25 RX ADMIN — HYDROMORPHONE HYDROCHLORIDE 1 MG: 1 INJECTION, SOLUTION INTRAMUSCULAR; INTRAVENOUS; SUBCUTANEOUS at 09:07

## 2023-07-25 RX ADMIN — Medication 10 ML: at 11:07

## 2023-07-25 RX ADMIN — SERTRALINE HYDROCHLORIDE 100 MG: 50 TABLET ORAL at 06:07

## 2023-07-25 RX ADMIN — HYDROMORPHONE HYDROCHLORIDE 1 MG: 1 INJECTION, SOLUTION INTRAMUSCULAR; INTRAVENOUS; SUBCUTANEOUS at 11:07

## 2023-07-25 RX ADMIN — Medication 10 ML: at 01:07

## 2023-07-25 NOTE — PT/OT/SLP PROGRESS
Physical Therapy Treatment    Patient Name:  Marilee Townsend   MRN:  [id number]    Recommendations:     Discharge Recommendations: home health PT  Discharge Equipment Recommendations: bedside commode  Barriers to discharge: None    Assessment:     Marilee Townsend is a 63 y.o. female admitted with a medical diagnosis of Small bowel obstruction.  She presents with the following impairments/functional limitations: weakness, impaired endurance, impaired functional mobility, gait instability, impaired balance, decreased safety awareness, pain, decreased ROM, impaired skin, orthopedic precautions.    Rehab Prognosis: Good; patient would benefit from acute skilled PT services to address these deficits and reach maximum level of function.    Recent Surgery: Procedure(s) (LRB):  LAPAROSCOPY, DIAGNOSTIC (N/A)  LAPAROTOMY, EXPLORATORY (N/A)  EXCISION, SMALL INTESTINE 9 Days Post-Op    Plan:     During this hospitalization, patient to be seen  (2-3/wk) to address the identified rehab impairments via gait training, therapeutic activities, therapeutic exercises and progress toward the following goals:    Plan of Care Expires:  08/05/23    Subjective     Chief Complaint: pain to incision  Patient/Family Comments/goals: Pt agreed to sit [facility] post treatment  Pain/Comfort:  Pain Rating 1:  (pt did not rate)  Location 1: abdomen  Pain Addressed 1: Reposition, Distraction, Cessation of Activity  Pain Rating Post-Intervention 1:  (pt did not rate)      Objective:     Communicated with nurse Juarez prior to session.  Patient found sitting edge of bed with PICC line (suction drain), older sister present upon PT entry to room.     General Precautions: Standard, fall (abdominal precautions)  Orthopedic Precautions: N/A  Braces: N/A  Respiratory Status: Room air     Functional Mobility:  Transfers:  back gown donned prior OOB activity   Sit to Stand: from EOB with stand by assistance with no AD  Gait: Pt ambulated ~100 ft with SBA, pt  pushing IV pole, ~100 ft with HHA CGA, and ~300 ft with SBA/CGA no AD, PTA helped managing IV pole. Pt with min unsteadiness but no LOB, decreased izabella/step length; v/c for , increase step length  Balance: Good Sitting; Fair/Fair+ Standing      AM-PAC 6 CLICK MOBILITY  Turning over in bed (including adjusting bedclothes, sheets and blankets)?: 3  Sitting down on and standing up from a chair with arms (e.g., wheelchair, bedside commode, etc.): 3  Moving from lying on back to sitting on the side of the bed?: 3  Moving to and from a bed to a chair (including a wheelchair)?: 3  Need to walk in hospital room?: 3  Climbing 3-5 steps with a railing?: 3  Basic Mobility Total Score: 18       Treatment & Education:  Educated/provided pt handout of BLE ex; encouraged/educated pt on abdominal precautions, benefits of OOB activity with hospital staff assistance and performing BLE ex throughout the day, pt verbalized understanding.    BLE ex in seated 20 reps AP, 10 reps GS, QS, LAQ    Patient left up in chair on cushion seat with tray table near by,  all lines intact, call button in reach, nurse notified, and Sister  present.    GOALS:   Multidisciplinary Problems       Physical Therapy Goals          Problem: Physical Therapy    Goal Priority Disciplines Outcome Goal Variances Interventions   Physical Therapy Goal     PT, PT/OT Ongoing, Progressing     Description: Goals to be met by: 23     Patient will increase functional independence with mobility by performin. Supine to sit with Modified [place]  2. Rolling to Left and Right with Modified [place].  3. Sit to stand transfer with Modified [place]  4. Gait  x 100 feet with Modified [place] using Rolling Walker.   5. Lower extremity exercise program x10 reps per handout, with independence                         Time Tracking:     PT Received On: 23  PT Start Time: 0945     PT Stop Time: 1015  PT Total Time (min): 30 min      Billable Minutes: Gait Training 17 min and Therapeutic Exercise 13 min    Treatment Type: Treatment  PT/PTA: PTA     Number of PTA visits since last PT visit: 2     07/25/2023

## 2023-07-25 NOTE — PT/OT/SLP PROGRESS
Occupational Therapy   Treatment    Name: Marilee Townsend  MRN: [id number]  Admitting Diagnosis:  Small bowel obstruction  9 Days Post-Op    Recommendations:     Discharge Recommendations: home health OT  Discharge Equipment Recommendations:  bedside commode  Barriers to discharge:  None    Assessment:     Marilee Townsend is a 63 y.o. female with a medical diagnosis of Small bowel obstruction.  She presents with on [facility] with family member present and mild abdominal pain. Performance deficits affecting function are weakness, impaired endurance, impaired self care skills, impaired functional mobility, gait instability, impaired balance, decreased upper extremity function, decreased lower extremity function, decreased safety awareness.     Rehab Prognosis:  Good; patient would benefit from acute skilled OT services to address these deficits and reach maximum level of function.       Plan:     Patient to be seen 3 x/week to address the above listed problems via self-care/home management, therapeutic activities, therapeutic exercises  Plan of Care Expires: 08/11/23  Plan of Care Reviewed with: patient, family    Subjective     Chief Complaint: mild abdominal pain   Patient/Family Comments/goals: return home   Pain/Comfort:  Pain Rating 1: 0/10  Location 1: abdomen    Objective:     Communicated with: RNAnn, prior to session.  Patient found  on BSC   with PICC line, peripheral IV, drain, upon OT entry to room.    General Precautions: Standard, fall    Orthopedic Precautions:N/A  Braces: N/A  Respiratory Status: Room air     Occupational Performance:     Bed Mobility:    Patient completed Scooting/Bridging with contact guard assistance after patient returned to bed from [facility]   Patient completed Sit to Supine with contact guard assistance     Functional Mobility/Transfers:  Patient completed Sit <> Stand Transfer with minimum assistance  with  rolling walker   Patient completed Toilet Transfer walking from [facility] across  room and then needing to sit on BSC again due to increase fluid intake technique with minimum assistance with  rolling walker  Functional Mobility: Patient walked initially from BSC toward bed with RW and CGA approx. 3' and then needed to sit on BSC again due to increased fluid intake. She them walked 2' from BSC to bed with no RW and CGA.      Activities of Daily Living:  Lower Body Dressing: maximal assistance to don socks and hold brief and drain while walking   Toileting: minimum assistance to affix brief; she completed hygiene herself       [facility] 6 Click ADL:      Treatment & Education:  Occupational therapy educated her re: need for BSC at home due to recent RTC and abdominal surgeries to prevent t/f to low surfaces   Occupational therapy also educated her re: need for reacher to prevent multiple attempts to reach for items on floor due to new abdominal surgery     Patient left HOB elevated with all lines intact, call button in reach, RN  notified, and family and RN  present    GOALS:   Multidisciplinary Problems       Occupational Therapy Goals          Problem: Occupational Therapy    Goal Priority Disciplines Outcome Interventions   Occupational Therapy Goal     OT, PT/OT Ongoing, Progressing    Description: Goals to be met by: 8/11/2023     Patient will increase functional independence with ADLs by performing:    UE Dressing with Modified [place].  LE Dressing with Set-up Assistance.  Grooming while standing at sink with [place].  Toileting from toilet with Modified [place] for hygiene and clothing management.   Bathing seated UB and baldo care sponge with Modified [place].  Toilet transfer to toilet with Supervision.  Upper extremity exercise program x10 reps per handout, with assistance as needed for form constancy, proper respiration and energy conservation construct verbal recitation.                          Time Tracking:     OT Date of Treatment: 07/25/23  OT Start Time: 1055  OT  Stop Time: 1118  OT Total Time (min): 23 min    Billable Minutes:Self Care/Home Management 23     OT/IVANA: OT          7/25/2023

## 2023-07-25 NOTE — ASSESSMENT & PLAN NOTE
"63F with h/o  prior abdominal surgeries admitted 7/14 with progressive intractable abdominal pain, decreased appetite, nausea, found to have sbo, thought to be secondary to adhesions and kinking with multiple enterotomies, s/p ex lap, small bowl resection on 7/16.  Gi consulted for tarry stools; anemia. ID consulted for "worsening leukocytosis; s/p bowel resection on 7/16.  CT 7/21 revealed mod intraperitoneal fluid (likely proteinaceous fluid), pelvic fluid collection. No contrast extravasation noted.  S/p drain placement 7/21 to a 14 x 13 x 7 cm abdominal fluid collection, drain with serosanguinous output, cultures candida albicans.    WBC now downtrending. Currently on keron and zosyn.     Recommendations:  - switch zosyn to unasyn  - continue fluconazole    - duration tbd- until resolution of fluid colelction; anticipate atleast 4 wks with repeat CT a/p prior to discontinuation of abx   "

## 2023-07-25 NOTE — PLAN OF CARE
Recommendations     1. Continue to monitor and encourage PO intake of meals and ONS  2. Continue to monitor weight changes; check daily weights  3. Collaboration with medical providers    Goals: 1. Pt to meet % EEN/EPN by RD follow up  Nutrition Goal Status: new  Communication of RD Recs:  (POC)    Assessment and Plan    Nutrition Problem  Inadequate energy intake    Related to (etiology):   Diagnosis related symptoms    Signs and Symptoms (as evidenced by):   BMI 18.29     Interventions/Recommendations (treatment strategy):  Collaboration with medical providers    Nutrition Diagnosis Status:   New

## 2023-07-25 NOTE — PROGRESS NOTES
"  [facility] - Med Surg  Adult Nutrition  Consult Note    SUMMARY     Recommendations     1. Continue to monitor and encourage PO intake of meals and ONS  2. Continue to monitor weight changes; check daily weights  3. Collaboration with medical providers    Goals: 1. Pt to meet % EEN/EPN by RD follow up  Nutrition Goal Status: new  Communication of RD Recs:  (POC)    Assessment and Plan    Nutrition Problem  Inadequate energy intake    Related to (etiology):   Diagnosis related symptoms    Signs and Symptoms (as evidenced by):   BMI 18.29     Interventions/Recommendations (treatment strategy):  Collaboration with medical providers    Nutrition Diagnosis Status:   New     Reason for Assessment    Reason For Assessment: length of stay  Diagnosis:  (small bowel obstruction)  Relevant Medical History:   Past Medical History:   Diagnosis Date    Arthritis     Depression     Encounter for blood transfusion     as a child    Fibromyalgia     Neck pain     Rectal prolapse     General Information Comments: RD consult for LOS. Pt currently on TPN, regular diet supplemented with Boost Glucose Control. Pt intake 25-75% since admit. BMI 18.29. Pt underweight. Continue to monitor PO intake and weight changes. Pt with no significant weight changes per chart review. LBM 7/23/23. Pt with other care at time of visits. NFPE to be performed at follow ups as appropriate.  Interdisciplinary Rounds: did not attend  Nutrition Discharge Planning: pending medical course    Nutrition Risk Screen    Nutrition Risk Screen: no indicators present    Nutrition/Diet History    Spiritual, Cultural Beliefs, Protestant Practices, Values that Affect Care: no  Food Allergies: NKFA    Anthropometrics    Temp: 98.2 °F (36.8 °C)  Height Method: Stated  Height: 5' 2" (157.5 cm)  Height (inches): 62 in  Weight Method: Bed Scale  Weight: 45.4 kg (100 lb)  Weight (lb): 100 lb  Ideal Body Weight (IBW), Female: 110 lb  % Ideal Body Weight, Female (lb): 90.91 " %  BMI (Calculated): 18.3  BMI Grade: 18.5-24.9 - normal       Lab/Procedures/Meds    Pertinent Labs Reviewed: reviewed  BMP  Lab Results   Component Value Date     (L) 07/24/2023    K 4.2 07/24/2023     07/24/2023    CO2 24 07/24/2023    BUN 11 07/24/2023    CREATININE 0.5 07/24/2023    CALCIUM 8.2 (L) 07/24/2023    ANIONGAP 6 (L) 07/24/2023    EGFRNORACEVR >60 07/24/2023      Pertinent Medications Reviewed: reviewed  Current Outpatient Medications   Medication Instructions    amLODIPine (NORVASC) 5 MG tablet Daily    buPROPion (WELLBUTRIN SR) 150 mg, Oral, 2 times daily    ergocalciferol (ERGOCALCIFEROL) 50,000 Units, Oral, Every Sunday    HYDROcodone-acetaminophen (NORCO)  mg per tablet 1 tablet, Oral, Every 6 hours PRN    lisinopriL-hydrochlorothiazide (PRINZIDE,ZESTORETIC) 10-12.5 mg per tablet 1 tablet, Oral    meloxicam (MOBIC) 15 mg, Oral, Daily, Instructed to stop until after procedure 1-9-17.    raloxifene (EVISTA) 60 mg, Oral, Daily    sertraline (ZOLOFT) 100 mg, Oral, Every morning      Estimated/Assessed Needs    Weight Used For Calorie Calculations: 49.9 kg (110 lb)  Energy Calorie Requirements (kcal): 1261 kcal  Energy Need Method: [place]-St Jeor (ibw x 1.3)  Protein Requirements: 50 g  Weight Used For Protein Calculations: 49.9 kg (110 lb)     Estimated Fluid Requirement Method: RDA Method  RDA Method (mL): 1261         Nutrition Prescription Ordered    Current Diet Order: regular diet    Evaluation of Received Nutrient/Fluid Intake    I/O: +6L  Energy Calories Required: not meeting needs  Protein Required: not meeting needs  Fluid Required: not meeting needs  Comments: lbm 7/23/23  Tolerance: not tolerating  % Intake of Estimated Energy Needs: 25 - 50 %  % Meal Intake: 25 - 50 %    Nutrition Risk    Level of Risk/Frequency of Follow-up: low       Monitor and Evaluation    Food and Nutrient Intake: food and beverage intake  Food and Nutrient Adminstration: diet  order  Knowledge/Beliefs/Attitudes: food and nutrition knowledge/skill, beliefs and attitudes  Physical Activity and Function: nutrition-related ADLs and IADLs, factors affecting access to physical activity  Anthropometric Measurements: weight, weight change, body mass index  Biochemical Data, Medical Tests and Procedures: gastrointestinal profile  Nutrition-Focused Physical Findings: overall appearance       Nutrition Follow-Up    RD Follow-up?: Yes    Alisa Hammer, Registration Eligible, Provisional LDN

## 2023-07-25 NOTE — SUBJECTIVE & OBJECTIVE
Interval History: No acute events overnight. Feeling better today.    Review of Systems   Constitutional:  Negative for chills, diaphoresis, fatigue and fever.   Respiratory:  Negative for cough and shortness of breath.    Cardiovascular:  Negative for chest pain and leg swelling.   Gastrointestinal:  Positive for abdominal pain (improving). Negative for nausea and vomiting. Diarrhea: resolved.  Genitourinary:  Negative for difficulty urinating and dysuria.   Musculoskeletal:  Negative for arthralgias and back pain.   Psychiatric/Behavioral:  Negative for agitation and confusion.    All other systems reviewed and are negative.  Objective:     Vital Signs (Most Recent):  Temp: 98.2 °F (36.8 °C) (07/25/23 1132)  Pulse: 93 (07/25/23 1132)  Resp: 20 (07/25/23 1438)  BP: (!) 143/73 (07/25/23 1132)  SpO2: 98 % (07/25/23 1132) Vital Signs (24h Range):  Temp:  [98.2 °F (36.8 °C)-98.9 °F (37.2 °C)] 98.2 °F (36.8 °C)  Pulse:  [76-97] 93  Resp:  [17-20] 20  SpO2:  [96 %-98 %] 98 %  BP: (119-143)/(72-77) 143/73     Weight: 45.4 kg (100 lb)  Body mass index is 18.29 kg/m².    Estimated Creatinine Clearance: 80.4 mL/min (based on SCr of 0.5 mg/dL).     Physical Exam  Vitals reviewed.   HENT:      Head: Normocephalic and atraumatic.   Eyes:      Conjunctiva/sclera: Conjunctivae normal.      Pupils: Pupils are equal, round, and reactive to light.   Pulmonary:      Effort: Pulmonary effort is normal. No respiratory distress.      Breath sounds: Normal breath sounds.   Abdominal:      General: Bowel sounds are normal.      Palpations: Abdomen is soft.      Comments: Vertical incision- minimal erythema, no drainage.   Drain at superior aspect of rt buttocks region- serosanguineous output.    Musculoskeletal:      Right lower leg: No edema.      Left lower leg: No edema.   Skin:     Findings: No lesion or rash.   Neurological:      General: No focal deficit present.      Mental Status: She is oriented to person, place, and time.         Significant Labs: Blood Culture:   Recent Labs   Lab 07/14/23  1308 07/22/23  0620   LABBLOO No Growth after 4 days.  No Growth after 4 days. No Growth to date  No Growth to date  No Growth to date  No Growth to date  No Growth to date  No Growth to date  No Growth to date  No Growth to date     Wound Culture:   Recent Labs   Lab 07/21/23  1413   LABAERO CANDIDA ALBICANS  Moderate  *     All pertinent labs within the past 24 hours have been reviewed.    Significant Imaging: I have reviewed all pertinent imaging results/findings within the past 24 hours.

## 2023-07-25 NOTE — PLAN OF CARE
Problem: Physical Therapy  Goal: Physical Therapy Goal  Description: Goals to be met by: 23     Patient will increase functional independence with mobility by performin. Supine to sit with Modified [place]  2. Rolling to Left and Right with Modified [place].  3. Sit to stand transfer with Modified [place]  4. Gait  x 100 feet with Modified [place] using Rolling Walker.   5. Lower extremity exercise program x10 reps per handout, with independence    Outcome: Ongoing, Progressing

## 2023-07-25 NOTE — ASSESSMENT & PLAN NOTE
Marilee Townsend is a 63yoF with a history of HTN and rectal prolapse who presents with a several day history of abdominal pain, nausea, vomiting, and PO intolerance. Her work-up is consistent with a small bowel obstruction. General surgery consulted for evaluation.  She is now status post exploratory laparotomy on 07/16/2023 with multiple small bowel resections and anastomosis, evidence of enterotomy with over-sewing.    Patient seen and examined   Having multiple bowel movements  Abdominal exam is benign   Continue IR drain to suction and flush with 5 cc of saline q shift  Tolerating diet without issue, appetite could be better- discussed that boost shakes will help increase her protein intake   - Believe if that we were to send her home in her current condition, she would require continued TPN as her most recent albumin was 1.8.    - Recommend advancing to ad mildred diet from home to increase protein intake  Continue to monitor

## 2023-07-25 NOTE — SUBJECTIVE & OBJECTIVE
Interval History:   No acute issues overnight. Afebrile. VSS  Pain controlled well  no nausea / vomiting  (+) bowel function  Making adequate urine  Taking in okay PO, did not like boosts, thinks that she would eat more with home cooking      Medications:  Continuous Infusions:   Amino acid 5% - dextrose 15% (CLINIMIX-E) solution with additives. CENTRAL LINE ONLY (1395 mOsm/L). 1L provides 50 gm AA, 150 gm CHO (510 kcal/L dextrose), Na 35, K 30, Mg 5, Ca 4.5, Acetate 80, Cl 39, Phos 15 75 mL/hr at 07/24/23 2202     Scheduled Meds:   acetaminophen  1,000 mg Oral Q8H    amLODIPine  5 mg Oral Daily    buPROPion  150 mg Oral BID    fat emulsion  250 mL Intravenous Once    [START ON 7/26/2023] fluconazole  400 mg Oral Daily    fluconazole  800 mg Oral Daily    heparin (porcine)  5,000 Units Subcutaneous Q8H    pantoprazole  40 mg Intravenous BID    piperacillin-tazobactam (Zosyn) IV (PEDS and ADULTS) (extended infusion is not appropriate)  4.5 g Intravenous Q8H    raloxifene  60 mg Oral Daily    sertraline  100 mg Oral QAM    sodium chloride 0.9%  10 mL Intravenous Q6H     PRN Meds:sodium chloride, acetaminophen, acetaminophen, dextrose 50%, dextrose 50%, diatrizoate meglumineand-diatrizoate sodium, glucagon (human recombinant), glucose, glucose, HYDROmorphone, HYDROmorphone, insulin aspart U-100, loperamide, naloxone, ondansetron, sodium chloride 0.9%, Flushing PICC Protocol **AND** sodium chloride 0.9% **AND** sodium chloride 0.9%     Review of patient's allergies indicates:   Allergen Reactions    Morphine Nausea And Vomiting     Objective:     Vital Signs (Most Recent):  Temp: 98.7 °F (37.1 °C) (07/25/23 0500)  Pulse: 84 (07/25/23 0500)  Resp: 18 (07/25/23 0500)  BP: 129/74 (07/25/23 0500)  SpO2: 96 % (07/25/23 0012) Vital Signs (24h Range):  Temp:  [98.6 °F (37 °C)-98.9 °F (37.2 °C)] 98.7 °F (37.1 °C)  Pulse:  [84-97] 84  Resp:  [18-20] 18  SpO2:  [96 %-97 %] 96 %  BP: (119-141)/(72-77) 129/74     Weight: 45.4 kg  (100 lb)  Body mass index is 18.29 kg/m².    Intake/Output - Last 3 Shifts         07/23 0700 07/24 0659 07/24 0700 07/25 0659 07/25 0700 07/26 0659    P.O. 480 720     IV Piggyback  100     TPN  104.4     Total Intake(mL/kg) 480 (10.6) 924.4 (20.4)     Urine (mL/kg/hr) 0 (0) 2200 (2)     Drains 40 10     Stool 0 0     Total Output 40 2210     Net +440 -1285.7            Urine Occurrence 3 x 1 x     Stool Occurrence 6 x 0 x              Physical Exam  Vitals and nursing note reviewed.   Constitutional:       Appearance: She is well-developed.   HENT:      Head: Normocephalic and atraumatic.   Cardiovascular:      Rate and Rhythm: Normal rate.      Heart sounds: Normal heart sounds.   Pulmonary:      Effort: Pulmonary effort is normal.   Abdominal:      General: Bowel sounds are normal. Distension: mild.      Palpations: Abdomen is soft.      Tenderness: There is no abdominal tenderness.   Musculoskeletal:         General: Normal range of motion.      Cervical back: Normal range of motion.   Skin:     General: Skin is warm and dry.      Capillary Refill: Capillary refill takes less than 2 seconds.   Neurological:      Mental Status: She is alert and oriented to person, place, and time.   Psychiatric:         Behavior: Behavior normal.        Significant Labs:  I have reviewed all pertinent lab results within the past 24 hours.  CBC:   Recent Labs   Lab 07/25/23  0417   WBC 10.35   RBC 2.62*   HGB 7.8*   HCT 23.5*      MCV 90   MCH 29.8   MCHC 33.2     CMP:   Recent Labs   Lab 07/24/23  0509   *   CALCIUM 8.2*   ALBUMIN 1.8*   PROT 5.7*   *   K 4.2   CO2 24      BUN 11   CREATININE 0.5   ALKPHOS 60   ALT 18   AST 19   BILITOT 0.2       Significant Diagnostics:  I have reviewed all pertinent imaging results/findings within the past 24 hours.

## 2023-07-25 NOTE — PLAN OF CARE
Problem: Occupational Therapy  Goal: Occupational Therapy Goal  Description: Goals to be met by: 8/11/2023     Patient will increase functional independence with ADLs by performing:    UE Dressing with Modified [place].  LE Dressing with Set-up Assistance.  Grooming while standing at sink with [place].  Toileting from toilet with Modified [place] for hygiene and clothing management.   Bathing seated UB and baldo care sponge with Modified [place].  Toilet transfer to toilet with Supervision.  Upper extremity exercise program x10 reps per handout, with assistance as needed for form constancy, proper respiration and energy conservation construct verbal recitation.     Outcome: Ongoing, Progressing   Patient used BSC in room with min assistance needed to maneuver RW around BSC, IV, and drain. Patient did toileting with min assist to hold brief in place, but did hygiene herself.

## 2023-07-25 NOTE — PROGRESS NOTES
[facility] Medicine  Progress Note    Patient Name: Marilee Townsend  MRN: [id number]  Patient Class: IP- Inpatient   Admission Date: 7/14/2023  Length of Stay: 11 days  Attending Physician: Kyrie Colvin MD  Primary Care Provider: Claudia Hopper DO        Subjective:     Principal Problem:Small bowel obstruction        HPI:  63F with pmh of depression, fibromyalgia, htn, osteoporosis who presents due to several day h/o abd pain and nausea with anorexia. Pt was evaluated at outside emergency department patient was seen in the day prompting the ED for evaluation.  Patient denies any recent flatulence or bowel movements unclear on the last date.  Patient states pain is diffuse in the with minimal improvement with pain medications given in the ED. CT scan in the emergency department with demonstration of small bowel obstruction and surgery consulted who had NG tube placed.  Patient denies chest pain, shortness a breath, fever, chills.  Patient states she had a shoulder surgery about 3 weeks prior to arrival and tolerated the anesthesia well.  Patient is a former smoker, but denies alcohol or drug use.      Overview/Hospital Course:  63F with pmh of depression, fibromyalgia, htn, osteoporosis admitted on 07/07/2023 for small bowel obstruction. presented with a several day history of abdominal pain, nausea, vomiting, and PO intolerance.  CT scan in ED with demonstration of small bowel obstruction and surgery consulted who had NG tube placed. Attempts at gastrografin with persistent obstruction. Pt taken to OR on 7/16. NG tube remained after operation and surgery managing. She is now status post exploratory laparotomy on 07/16/2023 with multiple small bowel resections and anastomosis, evidence of enterotomy with over-sewing. Replace electrolytes as needed. Had BM on 07/21/2023. PT/OT consulted for evaluation. NGT removed on 07/21/2023. Patient with worsening leukocytosis on 07/21. Repeat CT abd showed  moderate quantity of intraperitoneal fluid in the pelvis. IR consulted-s/p transgluteal-approach drainage catheter into the complex pelvic fluid collection. Fluid cx with candida albicans. ID consulted-continued on zosyn and micafungin. Repeat blood cx with NGTD. Leukocytosis resolved on 07/24/23. Continue to monitor          Interval History:  NAEON.  No new issues.   Denies complaints, ready to advance diet to regular.  All questions answered and updates on care given.       ROS:  General: Negative for fevers or chills.  Cardiac: Negative for chest pain or orthopnea   Pulmonary: Negative for dyspnea or wheezing.  GI: Negative for abdominal distention or pain     Vitals:    07/25/23 0434 07/25/23 0500 07/25/23 0734 07/25/23 0743   BP:  129/74 139/75    BP Location:  Left arm Left arm    Patient Position:  Lying Lying    Pulse:  84 76    Resp: 18 18 18 20   Temp:  98.7 °F (37.1 °C) 98.5 °F (36.9 °C)    TempSrc:  Oral Oral    SpO2:   97%    Weight:       Height:              Body mass index is 18.29 kg/m².      PHYSICAL EXAM:  GENERAL APPEARANCE: alert and cooperative, and appears to be in no acute distress.  HEENT:     HEAD: NC/AT     EYES: PERRL, EOMI.  Vision is grossly intact.  NECK: Neck supple, non-tender without LAD, masses or thyromegaly.  CARDIAC: There is no peripheral edema, cyanosis or pallor.   LUNGS: Clear to auscultation and percussion without rales or wheezing  ABDOMEN: Non-distended. No guarding. Drain in place  MSK: No joint erythema or tenderness.   EXTREMITIES: No significant deformity or joint abnormality. No edema.   NEUROLOGICAL: CN II-XII grossly intact.   SKIN: Skin normal color, texture and turgor with no lesions or eruptions.  PSYCHIATRIC: Oriented. No tangential speech. No Hyperactive features.        Recent Results (from the past 24 hour(s))   POCT glucose    Collection Time: 07/24/23 11:25 AM   Result Value Ref Range    POCT Glucose 165 (H) 70 - 110 mg/dL   POCT glucose    Collection  Time: 07/25/23 12:13 AM   Result Value Ref Range    POCT Glucose 129 (H) 70 - 110 mg/dL   CBC auto differential    Collection Time: 07/25/23  4:17 AM   Result Value Ref Range    WBC 10.35 3.90 - 12.70 K/uL    RBC 2.62 (L) 4.00 - 5.40 M/uL    Hemoglobin 7.8 (L) 12.0 - 16.0 g/dL    Hematocrit 23.5 (L) 37.0 - 48.5 %    MCV 90 82 - 98 fL    MCH 29.8 27.0 - 31.0 pg    MCHC 33.2 32.0 - 36.0 g/dL    RDW 14.6 (H) 11.5 - 14.5 %    Platelets 388 150 - 450 K/uL    MPV 9.3 9.2 - 12.9 fL    Immature Granulocytes 0.9 (H) 0.0 - 0.5 %    Gran # (ANC) 8.6 (H) 1.8 - 7.7 K/uL    Immature Grans (Abs) 0.09 (H) 0.00 - 0.04 K/uL    Lymph # 1.0 1.0 - 4.8 K/uL    Mono # 0.6 0.3 - 1.0 K/uL    Eos # 0.1 0.0 - 0.5 K/uL    Baso # 0.03 0.00 - 0.20 K/uL    nRBC 0 0 /100 WBC    Gran % 83.1 (H) 38.0 - 73.0 %    Lymph % 9.3 (L) 18.0 - 48.0 %    Mono % 5.4 4.0 - 15.0 %    Eosinophil % 1.0 0.0 - 8.0 %    Basophil % 0.3 0.0 - 1.9 %    Differential Method Automated        Microbiology Results (last 7 days)     Procedure Component Value Units Date/Time    Blood culture [[id number]] Collected: 07/22/23 0620    Order Status: Completed Specimen: Blood from Peripheral, Right Wrist Updated: 07/25/23 0903     Blood Culture, Routine No Growth to date      No Growth to date      No Growth to date      No Growth to date    Blood culture [[id number]] Collected: 07/22/23 0620    Order Status: Completed Specimen: Blood from Peripheral, Left Hand Updated: 07/25/23 0903     Blood Culture, Routine No Growth to date      No Growth to date      No Growth to date      No Growth to date    Aerobic culture [[id number]]  (Abnormal) Collected: 07/21/23 1413    Order Status: Completed Specimen: Abscess Updated: 07/23/23 0741     Aerobic Bacterial Culture CANDIDA ALBICANS  Moderate      Culture, Anaerobe [[id number]] Collected: 07/21/23 1414    Order Status: Completed Specimen: Abscess from Peritoneal Fluid Updated: 07/23/23 0644     Anaerobic Culture Culture in progress     Narrative:      peritoneal abscess    Gram stain [[id number]] Collected: 07/21/23 1414    Order Status: Completed Specimen: Abscess from Peritoneal Fluid Updated: 07/21/23 1508     Gram Stain Result Many WBC's      No organisms seen    Narrative:      Peritoneal abscess    Blood culture (site 1) [[id number]] Collected: 07/14/23 1308    Order Status: Completed Specimen: Blood from Peripheral, Antecubital, Left Updated: 07/18/23 1503     Blood Culture, Routine No Growth after 4 days.    Narrative:      Site # 1, aerobic and anaerobic    Blood culture (site 2) [[id number]] Collected: 07/14/23 1308    Order Status: Completed Specimen: Blood from Peripheral, Forearm, Left Updated: 07/18/23 1503     Blood Culture, Routine No Growth after 4 days.    Narrative:      Site # 2, aerobic only           Imaging Results          X-Ray Abdomen AP 1 View (Final result)  Result time 07/14/23 15:04:30    Final result by Manuel Rey MD (07/14/23 15:04:30)                 Impression:      As above      Electronically signed by: Manuel Rey MD  Date:    07/14/2023  Time:    15:04             Narrative:    EXAMINATION:  XR ABDOMEN AP 1 VIEW    CLINICAL HISTORY:  ng tube eval;    TECHNIQUE:  Single AP View of the abdomen was performed.    COMPARISON:  07/14/2023 at 14:06    FINDINGS:  NG tube has been advanced, and now in good position with the tip and proximal port both subdiaphragmatic.    Otherwise stable exam, noting a few dilated loops of small bowel.  No obvious free air.                               X-Ray Abdomen AP 1 View (KUB) (Final result)  Result time 07/14/23 14:15:20    Final result by Jose Rosales MD (07/14/23 14:15:20)                 Impression:      Recommended advancement of nasogastric tube.      Electronically signed by: Jose Rosales MD  Date:    07/14/2023  Time:    14:15             Narrative:    EXAMINATION:  XR ABDOMEN AP 1 VIEW    TECHNIQUE:  XR ABDOMEN AP 1 VIEW    FINDINGS:  The bowel gas  pattern is non-specific.    No airspace consolidation at the lung bases.No acute bony abnormality.No abnormal soft tissue masses.No abnormal calcific densities.Contrast present within the kidneys.  Postoperative changes lower lumbar spine.  Nasogastric tube present with side hole at the level of the GE junction can be advanced.                                CT Abdomen Pelvis With Contrast (Final result)  Result time 07/14/23 12:01:03    Final result by Sulaiman Ford MD (07/14/23 12:01:03)                 Impression:      Status post hysterectomy and sigmoid surgical therapy.    Abnormal fluid, air distension small bowel, termination point at or near ileocecal valve, not well-defined, associated less distension proximal small bowel with some bowel wall thickening.  Adhesion etiology of obstructive process favored.    Posterior pelvic ascites, mesenteric edema.  Addition remote findings above.    This report was flagged in Epic as abnormal.      Electronically signed by: Sulaiman Ford MD  Date:    07/14/2023  Time:    12:01             Narrative:    EXAMINATION:  CT ABDOMEN PELVIS WITH CONTRAST    CLINICAL HISTORY:  RLQ abdominal pain (Age >= 14y);Bowel obstruction suspected;    TECHNIQUE:  Low dose axial images, sagittal and coronal reformations were obtained from the lung bases to the pubic symphysis following the IV administration of 85 mL of Omnipaque 350 and the oral administration of 15 mL of Omnipaque 300.    COMPARISON:  None.    FINDINGS:  Postop posterior spinal fusion, with pedicular screws and vertical stabilization bars and laminectomy L5-S1, placement of interbody disc spacer, degenerative disc spondylosis with chronic sclerotic erosive changes T11-T12 L1-L2, L2-L3, L 4 and L5 disc levels, primary anterior subluxation L3 on L2, L4 on L5, L5 on S1.    Status of bilateral breast prosthetic surgery with calcification of capsule of prosthesis.  Focal tiny ossification anterior to linea alba  above level of umbilicus.  Some diastasis of linea alba above and at and below umbilicus.  Subcutaneous edema inferior, medial, posterior buttocks.    Calcified plaquing aorta major branches particularly distal aorta and common iliac arteries.    Liver, gallbladder, spleen, adrenal glands, kidneys, pancreas biliary tree normal.    Retroaortic left renal vein.  No adenopathy.  Mild amount of ascites posterior pelvis presacral particularly on right, 6.2 x 4.8 cm.  Urinary bladder small.  7 mm midline calcification at level of introitus of uncertain etiology.    Mild distension gastric cardia with fluid, distal stomach, intact.  Minimal mild distension duodenal with air.  Minimal mild distension proximal small bowel jejunum left flank with suggestion of some bowel wall thickening.  Moderate severe distension mid distal small bowel, iliac loop 3.3 diameter central pelvis.  No nondistended terminal ileum confirmed, nondistended large bowel, suture line postop bowel resection sigmoid at left lateral central pelvic region.  Appendix not identified.    Postop hysterectomy.  No adnexal mass.                                       Assessment/Plan:      * Small bowel obstruction  63F presenting with several days of worsening abd pain, anorexia, and nausea.  CT abd/pelvis with Abnormal fluid, air distension small bowel, termination point at or near ileocecal valve, not well-defined, associated less distension proximal small bowel with some bowel wall thickening.  Adhesion etiology of obstructive process favored.    -Surgery team consulted: s/p exploratory laparotomy on 07/16/2023 with multiple small bowel resections and anastomosis, evidence of enterotomy with over-sewing.  -NG tube in place to LIWS  -continue TPN per surgery   -Pt with worsening leukocytosis, CT abdomen repeated  -CT abdomen: there is now a moderate to large quantity of intraperitoneal fluid, with new enhancement of the adjacent portions of the parietal  peritoneum, suspicious for peritonitis.   -IR consulted: s/p transgluteal-approach drainage catheter into the complex pelvic fluid collection 07/21  -ID consulted: added micafungin on 07/21. Plan to switch to fluconazole, pending EKG  -Analgesics ordered prn   -Advance diet as tolerated  -Pt with BM on 07/21      Body mass index (BMI) less than 19  Body mass index is 18.29 kg/m².  Continue TPN per surgery       Weakness  -PT/OT consulted: home health       Atelectasis  -CT abdomen:  Opacities in the adjacent dorsal aspect of either lower lobe most likely represent atelectasis.   -Encourage IS   -PT/OT consulted      Advanced care planning/counseling discussion  Advance Care Planning     Date: 07/19/2023    Code Status  I engaged the the patient in a voluntary conversation about the patient's preferences for care  at the very end of life. The patient wishes to have CPR and other invasive treatments performed when her heart and/or breathing stops. I communicated to the patient that her wishes align with full code status.  I spent a total of 16 minutes engaging the patient in this advance care planning discussion.        Code Status: Full Code          Hypokalemia  -Replace as needed  -TPN per surgery       Osteoporosis  Continue home medications      Depression  Restart home medications as indicated- setraline 100mg         Leukocytosis  -In setting of acute small-bowel obstruction and no definitive findings of infection appears likely reactive, but low threshold for starting antibiotics.   -LA negative x2.  -procal mildly elevated, WBC# trended up to 14 on 07/20 and to 17 on 07/21  -Repeat CT abdomen with fluid in the pelvic  -IR consulted: s/p transgluteal-approach drainage catheter into the complex pelvic fluid collection on 07/21, cultures with candida albicans  -ID consulted: Added micafungin 07/21 and repeat blood culture  -Repeat blood cx with NGTD  -Continue on antibiotics   -Leukocytosis resolved on  07/24  -tx as stated above for sbo      HTN (hypertension)  Currently controlled.  Continue home [facility]  Will continue to monitor        VTE Risk Mitigation (From admission, onward)         Ordered     heparin (porcine) injection 5,000 Units  Every 8 hours         07/14/23 1428     IP VTE LOW RISK PATIENT  Once         07/14/23 1236     Place sequential compression device  Until discontinued         07/14/23 1236                Discharge Planning   WIL: 7/26/2023     Code Status: Full Code   Is the patient medically ready for discharge?:     Reason for patient still in hospital (select all that apply): Patient trending condition and Treatment  Discharge Plan A: Home   Discharge Delays: None known at this time              Kyrie Colvin MD  Department of Hospital Medicine   [facility] Surg

## 2023-07-25 NOTE — PLAN OF CARE
Problem: Adult Inpatient Plan of Care  Goal: Plan of Care Review  Outcome: Ongoing, Progressing  Flowsheets (Taken 7/25/2023 0352)  Plan of Care Reviewed With: patient    Patient remains free from injury and falls this shift. Abdominal incision open to air. Redness noted around incision. Bowel sounds active. No BM this shift. IV antibiotics administered as ordered. TPN and lipids infusing as ordered. Patient medicated every 3 hours for pain with some relief. Patient able to get up to BSC independently. Drain flushed with 10ml per order. Patient with pain when flushing drain. Patient able to make needs known. Plan of care continued.

## 2023-07-25 NOTE — PROGRESS NOTES
"[facility] [place] [facility]  Infectious Disease  Progress Note    Patient Name: Marilee Townsend  MRN: [id number]  Admission Date: 7/14/2023  Length of Stay: 11 days  Attending Physician: Kyrie Colvin MD  Primary Care Provider: Claudia Hopper DO    Isolation Status: No active isolations  Assessment/Plan:      Oncology  Leukocytosis  63F with h/o  prior abdominal surgeries admitted 7/14 with progressive intractable abdominal pain, decreased appetite, nausea, found to have sbo, thought to be secondary to adhesions and kinking with multiple enterotomies, s/p ex lap, small bowl resection on 7/16.  Gi consulted for tarry stools; anemia. ID consulted for "worsening leukocytosis; s/p bowel resection on 7/16.  CT 7/21 revealed mod intraperitoneal fluid (likely proteinaceous fluid), pelvic fluid collection. No contrast extravasation noted.  S/p drain placement 7/21 to a 14 x 13 x 7 cm abdominal fluid collection, drain with serosanguinous output, cultures candida albicans.    WBC now downtrending. Currently on keron and zosyn.     Recommendations:  - switch zosyn to unasyn  - continue fluconazole    - duration tbd- until resolution of fluid colelction; anticipate atleast 4 wks with repeat CT a/p prior to discontinuation of abx         Anticipated Disposition: tbd    Thank you for your consult. I will follow-up with patient. Please contact us if you have any additional questions.    Beverly Ortiz MD  Infectious Disease  [facility] [place] [facility]    Subjective:     Principal Problem:Small bowel obstruction    HPI: 63F with h/o depression, fibromyalgia, htn, prior abdominal surgeries admitted 7/14 with progressive intractable abdominal pain, decreased appetite, nausea. Reports OSH ED visit for the same, progressed, so returned. Denies fever. Found to have SBO s/p surgical repair. Reports abdomen tender, but better than admit. Reports black tarry stools. Denies issues with iv lines. Denies dysuria. About to go down to " "IR    Afebrile    Day 6 zosyn/flagyl    Wbc rising    Bcx negative    On 7/16 taken for   LAPAROSCOPY, DIAGNOSTIC (N/A)  LAPAROTOMY, EXPLORATORY (N/A)  lysis of adhesions, small bowel resection repair of enterotomies    Found to have sbo, thought to be secondary to adhesions and kinking with multiple enterotomies      Ct repeated today  Interval resection of abnormal pelvic small bowel with diminished but not fully resolved small bowel dilatation.  A new small bowel transition point is questioned in the vicinity of the new intrapelvic enteroenteric anastomosis.  Correlate clinically, and with follow-up imaging.     There is a moderate quantity of intraperitoneal fluid in the pelvis.  Its attenuation of up to 26 HU suggests proteinaceous fluid (DDX: Purulent debris, dilute blood products, extraluminal succus entericus etc.).  New diffuse enhancement of the adjacent portions of the parietal peritoneum raises suspicion for peritonitis.  Correlate clinically.     Gas with urinary bladder lumen is most likely related to recent catheterization.  In the absence of any recent instrumentation, a gas-forming infection or enterocystic fistula might also be considerations.  Correlate clinically.     New small bilateral pleural effusions.  Opacities in the adjacent dorsal aspect of either lower lobe most likely represent atelectasis.  Pneumonia not fully excluded.  Correlate clinically, and with follow-up imaging.     New patchy hazy ground-glass opacities in the lingula and right middle lobe possibly represent pulmonary edema, small airways disease, atypical pneumonia, aspiration, or other abnormality.  Correlate clinically, and with follow-up imaging.      IR consulted for abdominal drain placement for complex fluid collection, cultures ordered    Gi consulted for tarry stools; anemia    ID consulted for "worsening leukocytosis; s/p bowel resection on 7/16    Interval History: No acute events overnight. Feeling better " today.    Review of Systems   Constitutional:  Negative for chills, diaphoresis, fatigue and fever.   Respiratory:  Negative for cough and shortness of breath.    Cardiovascular:  Negative for chest pain and leg swelling.   Gastrointestinal:  Positive for abdominal pain (improving). Negative for nausea and vomiting. Diarrhea: resolved.  Genitourinary:  Negative for difficulty urinating and dysuria.   Musculoskeletal:  Negative for arthralgias and back pain.   Psychiatric/Behavioral:  Negative for agitation and confusion.    All other systems reviewed and are negative.  Objective:     Vital Signs (Most Recent):  Temp: 98.2 °F (36.8 °C) (07/25/23 1132)  Pulse: 93 (07/25/23 1132)  Resp: 20 (07/25/23 1438)  BP: (!) 143/73 (07/25/23 1132)  SpO2: 98 % (07/25/23 1132) Vital Signs (24h Range):  Temp:  [98.2 °F (36.8 °C)-98.9 °F (37.2 °C)] 98.2 °F (36.8 °C)  Pulse:  [76-97] 93  Resp:  [17-20] 20  SpO2:  [96 %-98 %] 98 %  BP: (119-143)/(72-77) 143/73     Weight: 45.4 kg (100 lb)  Body mass index is 18.29 kg/m².    Estimated Creatinine Clearance: 80.4 mL/min (based on SCr of 0.5 mg/dL).     Physical Exam  Vitals reviewed.   HENT:      Head: Normocephalic and atraumatic.   Eyes:      Conjunctiva/sclera: Conjunctivae normal.      Pupils: Pupils are equal, round, and reactive to light.   Pulmonary:      Effort: Pulmonary effort is normal. No respiratory distress.      Breath sounds: Normal breath sounds.   Abdominal:      General: Bowel sounds are normal.      Palpations: Abdomen is soft.      Comments: Vertical incision- minimal erythema, no drainage.   Drain at superior aspect of rt buttocks region- serosanguineous output.    Musculoskeletal:      Right lower leg: No edema.      Left lower leg: No edema.   Skin:     Findings: No lesion or rash.   Neurological:      General: No focal deficit present.      Mental Status: She is oriented to person, place, and time.        Significant Labs: Blood Culture:   Recent Labs   Lab  07/14/23  1308 07/22/23  0620   LABBLOO No Growth after 4 days.  No Growth after 4 days. No Growth to date  No Growth to date  No Growth to date  No Growth to date  No Growth to date  No Growth to date  No Growth to date  No Growth to date     Wound Culture:   Recent Labs   Lab 07/21/23  1413   LABAERO CANDIDA ALBICANS  Moderate  *     All pertinent labs within the past 24 hours have been reviewed.    Significant Imaging: I have reviewed all pertinent imaging results/findings within the past 24 hours.

## 2023-07-25 NOTE — PROGRESS NOTES
[facility]  General Surgery  Progress Note    Subjective:     History of Present Illness:  Marilee Townsend is a 63yoF with a history of hypertension, rectal prolapse s/p rectopexy who presents to [facility] with complaints of nausea, vomiting and worsening abdominal pain. Of note, she presented to an outside hospital with the same complaints yesterday and was discharged with instructions to follow up with her primary care provider. The patient describes a three-day history of abdominal pain, nausea, vomiting and PO intolerance. This has persistently worsened throughout this time, which prompted both presentations. She states that she did have a small bowel movement yesterday, described as hard pellets. On work-up, her vital signs are stable. She has an elevated leukocytosis to 18. Lactate normal at 1. Repeat ordered. Her CT scan demonstrates dilated small bowel consistent with a small bowel obstruction. There is normal caliber small intestine distally. She is admitted to the hospital medicine service. General surgery consulted for evaluation.     Of note, her WBC has increased from 12.8 to 18 over the last 24hrs. Imaging at the outside hospital, per the official read, did not demonstrate any degree of obstruction, which is a rather burk contrast from her CT scan at our facility. Plan for NG tube insertion with low threshold to proceed to operating room for diagnostic laparoscopy. Discussed this plan with the patient.       Post-Op Info:  Procedure(s) (LRB):  LAPAROSCOPY, DIAGNOSTIC (N/A)  LAPAROTOMY, EXPLORATORY (N/A)  EXCISION, SMALL INTESTINE   9 Days Post-Op     Interval History:   No acute issues overnight. Afebrile. VSS  Pain controlled well  no nausea / vomiting  (+) bowel function  Making adequate urine  Taking in okay PO, did not like boosts, thinks that she would eat more with home cooking      Medications:  Continuous Infusions:   Amino acid 5% - dextrose 15% (CLINIMIX-E) solution with additives.  CENTRAL LINE ONLY (1395 mOsm/L). 1L provides 50 gm AA, 150 gm CHO (510 kcal/L dextrose), Na 35, K 30, Mg 5, Ca 4.5, Acetate 80, Cl 39, Phos 15 75 mL/hr at 07/24/23 2202     Scheduled Meds:   acetaminophen  1,000 mg Oral Q8H    amLODIPine  5 mg Oral Daily    buPROPion  150 mg Oral BID    fat emulsion  250 mL Intravenous Once    [START ON 7/26/2023] fluconazole  400 mg Oral Daily    fluconazole  800 mg Oral Daily    heparin (porcine)  5,000 Units Subcutaneous Q8H    pantoprazole  40 mg Intravenous BID    piperacillin-tazobactam (Zosyn) IV (PEDS and ADULTS) (extended infusion is not appropriate)  4.5 g Intravenous Q8H    raloxifene  60 mg Oral Daily    sertraline  100 mg Oral QAM    sodium chloride 0.9%  10 mL Intravenous Q6H     PRN Meds:sodium chloride, acetaminophen, acetaminophen, dextrose 50%, dextrose 50%, diatrizoate meglumineand-diatrizoate sodium, glucagon (human recombinant), glucose, glucose, HYDROmorphone, HYDROmorphone, insulin aspart U-100, loperamide, naloxone, ondansetron, sodium chloride 0.9%, Flushing PICC Protocol **AND** sodium chloride 0.9% **AND** sodium chloride 0.9%     Review of patient's allergies indicates:   Allergen Reactions    Morphine Nausea And Vomiting     Objective:     Vital Signs (Most Recent):  Temp: 98.7 °F (37.1 °C) (07/25/23 0500)  Pulse: 84 (07/25/23 0500)  Resp: 18 (07/25/23 0500)  BP: 129/74 (07/25/23 0500)  SpO2: 96 % (07/25/23 0012) Vital Signs (24h Range):  Temp:  [98.6 °F (37 °C)-98.9 °F (37.2 °C)] 98.7 °F (37.1 °C)  Pulse:  [84-97] 84  Resp:  [18-20] 18  SpO2:  [96 %-97 %] 96 %  BP: (119-141)/(72-77) 129/74     Weight: 45.4 kg (100 lb)  Body mass index is 18.29 kg/m².    Intake/Output - Last 3 Shifts         07/23 0700 07/24 0659 07/24 0700 07/25 0659 07/25 0700 07/26 0659    P.O. 480 720     IV Piggyback  100     TPN  104.4     Total Intake(mL/kg) 480 (10.6) 924.4 (20.4)     Urine (mL/kg/hr) 0 (0) 2200 (2)     Drains 40 10     Stool 0 0     Total Output  40 2210     Net +440 -1285.7            Urine Occurrence 3 x 1 x     Stool Occurrence 6 x 0 x              Physical Exam  Vitals and nursing note reviewed.   Constitutional:       Appearance: She is well-developed.   HENT:      Head: Normocephalic and atraumatic.   Cardiovascular:      Rate and Rhythm: Normal rate.      Heart sounds: Normal heart sounds.   Pulmonary:      Effort: Pulmonary effort is normal.   Abdominal:      General: Bowel sounds are normal. Distension: mild.      Palpations: Abdomen is soft.      Tenderness: There is no abdominal tenderness.   Musculoskeletal:         General: Normal range of motion.      Cervical back: Normal range of motion.   Skin:     General: Skin is warm and dry.      Capillary Refill: Capillary refill takes less than 2 seconds.   Neurological:      Mental Status: She is alert and oriented to person, place, and time.   Psychiatric:         Behavior: Behavior normal.        Significant Labs:  I have reviewed all pertinent lab results within the past 24 hours.  CBC:   Recent Labs   Lab 07/25/23  0417   WBC 10.35   RBC 2.62*   HGB 7.8*   HCT 23.5*      MCV 90   MCH 29.8   MCHC 33.2     CMP:   Recent Labs   Lab 07/24/23  0509   *   CALCIUM 8.2*   ALBUMIN 1.8*   PROT 5.7*   *   K 4.2   CO2 24      BUN 11   CREATININE 0.5   ALKPHOS 60   ALT 18   AST 19   BILITOT 0.2       Significant Diagnostics:  I have reviewed all pertinent imaging results/findings within the past 24 hours.    Assessment/Plan:     * Small bowel obstruction  Marilee Townsend is a 63yoF with a history of HTN and rectal prolapse who presents with a several day history of abdominal pain, nausea, vomiting, and PO intolerance. Her work-up is consistent with a small bowel obstruction. General surgery consulted for evaluation.  She is now status post exploratory laparotomy on 07/16/2023 with multiple small bowel resections and anastomosis, evidence of enterotomy with over-sewing.    Patient  seen and examined   Having multiple bowel movements  Abdominal exam is benign   Continue IR drain to suction and flush with 5 cc of saline q shift  Tolerating diet without issue, appetite could be better- discussed that boost shakes will help increase her protein intake   - Believe if that we were to send her home in her current condition, she would require continued TPN as her most recent albumin was 1.8.    - Recommend advancing to ad mildred diet from home to increase protein intake  Continue to monitor            Jesus Adams MD  General Surgery  [facility]

## 2023-07-25 NOTE — NURSING
[facility]  Nurses Note -- 4 Eyes      7/24/2023       Skin assessed on: Q Shift      [x] No Pressure Injuries Present    []Prevention Measures Documented    [] Yes LDA  for Pressure Injury Previously documented     [] Yes New Pressure Injury Discovered   [] LDA for New Pressure Injury Added      Attending RN:  Adali Mora RN     Second RN:  Ann Marroquin RN

## 2023-07-26 VITALS
RESPIRATION RATE: 18 BRPM | HEART RATE: 96 BPM | WEIGHT: 100 LBS | HEIGHT: 62 IN | DIASTOLIC BLOOD PRESSURE: 76 MMHG | OXYGEN SATURATION: 97 % | TEMPERATURE: 98 F | SYSTOLIC BLOOD PRESSURE: 133 MMHG | BODY MASS INDEX: 18.4 KG/M2

## 2023-07-26 LAB
BACTERIA BLD CULT: NORMAL
BACTERIA BLD CULT: NORMAL
BASOPHILS # BLD AUTO: 0.03 K/UL (ref 0–0.2)
BASOPHILS NFR BLD: 0.3 % (ref 0–1.9)
DIFFERENTIAL METHOD: ABNORMAL
EOSINOPHIL # BLD AUTO: 0.1 K/UL (ref 0–0.5)
EOSINOPHIL NFR BLD: 0.9 % (ref 0–8)
ERYTHROCYTE [DISTWIDTH] IN BLOOD BY AUTOMATED COUNT: 14.6 % (ref 11.5–14.5)
HCT VFR BLD AUTO: 23.4 % (ref 37–48.5)
HGB BLD-MCNC: 7.7 G/DL (ref 12–16)
IMM GRANULOCYTES # BLD AUTO: 0.09 K/UL (ref 0–0.04)
IMM GRANULOCYTES NFR BLD AUTO: 1 % (ref 0–0.5)
LYMPHOCYTES # BLD AUTO: 1.1 K/UL (ref 1–4.8)
LYMPHOCYTES NFR BLD: 12 % (ref 18–48)
MCH RBC QN AUTO: 29.8 PG (ref 27–31)
MCHC RBC AUTO-ENTMCNC: 32.9 G/DL (ref 32–36)
MCV RBC AUTO: 91 FL (ref 82–98)
MONOCYTES # BLD AUTO: 0.6 K/UL (ref 0.3–1)
MONOCYTES NFR BLD: 6.4 % (ref 4–15)
NEUTROPHILS # BLD AUTO: 7.3 K/UL (ref 1.8–7.7)
NEUTROPHILS NFR BLD: 79.4 % (ref 38–73)
NRBC BLD-RTO: 0 /100 WBC
PLATELET # BLD AUTO: 446 K/UL (ref 150–450)
PMV BLD AUTO: 9.2 FL (ref 9.2–12.9)
POCT GLUCOSE: 115 MG/DL (ref 70–110)
POCT GLUCOSE: 131 MG/DL (ref 70–110)
RBC # BLD AUTO: 2.58 M/UL (ref 4–5.4)
WBC # BLD AUTO: 9.24 K/UL (ref 3.9–12.7)

## 2023-07-26 PROCEDURE — 63600175 PHARM REV CODE 636 W HCPCS: Performed by: INTERNAL MEDICINE

## 2023-07-26 PROCEDURE — 25000003 PHARM REV CODE 250: Performed by: STUDENT IN AN ORGANIZED HEALTH CARE EDUCATION/TRAINING PROGRAM

## 2023-07-26 PROCEDURE — A4216 STERILE WATER/SALINE, 10 ML: HCPCS | Performed by: STUDENT IN AN ORGANIZED HEALTH CARE EDUCATION/TRAINING PROGRAM

## 2023-07-26 PROCEDURE — 85025 COMPLETE CBC W/AUTO DIFF WBC: CPT | Performed by: STUDENT IN AN ORGANIZED HEALTH CARE EDUCATION/TRAINING PROGRAM

## 2023-07-26 PROCEDURE — 94761 N-INVAS EAR/PLS OXIMETRY MLT: CPT

## 2023-07-26 PROCEDURE — 25000003 PHARM REV CODE 250: Performed by: INTERNAL MEDICINE

## 2023-07-26 PROCEDURE — 63600175 PHARM REV CODE 636 W HCPCS: Performed by: STUDENT IN AN ORGANIZED HEALTH CARE EDUCATION/TRAINING PROGRAM

## 2023-07-26 PROCEDURE — C9113 INJ PANTOPRAZOLE SODIUM, VIA: HCPCS | Performed by: INTERNAL MEDICINE

## 2023-07-26 PROCEDURE — 99233 SBSQ HOSP IP/OBS HIGH 50: CPT | Mod: ,,, | Performed by: INTERNAL MEDICINE

## 2023-07-26 PROCEDURE — 99233 PR SUBSEQUENT HOSPITAL CARE,LEVL III: ICD-10-PCS | Mod: ,,, | Performed by: INTERNAL MEDICINE

## 2023-07-26 PROCEDURE — 63700000 PHARM REV CODE 250 ALT 637 W/O HCPCS: Performed by: STUDENT IN AN ORGANIZED HEALTH CARE EDUCATION/TRAINING PROGRAM

## 2023-07-26 RX ORDER — OXYCODONE HYDROCHLORIDE 5 MG/1
10 CAPSULE ORAL EVERY 4 HOURS PRN
Qty: 60 CAPSULE | Refills: 0 | Status: SHIPPED | OUTPATIENT
Start: 2023-07-26 | End: 2023-07-26 | Stop reason: HOSPADM

## 2023-07-26 RX ORDER — OXYCODONE HYDROCHLORIDE 5 MG/1
10 TABLET ORAL EVERY 4 HOURS PRN
Qty: 60 TABLET | Refills: 0 | Status: SHIPPED | OUTPATIENT
Start: 2023-07-26 | End: 2023-08-01 | Stop reason: SDUPTHER

## 2023-07-26 RX ORDER — FLUCONAZOLE 200 MG/1
200 TABLET ORAL DAILY
Qty: 7 TABLET | Refills: 0 | Status: SHIPPED | OUTPATIENT
Start: 2023-07-26 | End: 2023-08-02

## 2023-07-26 RX ORDER — AMOXICILLIN AND CLAVULANATE POTASSIUM 875; 125 MG/1; MG/1
1 TABLET, FILM COATED ORAL 2 TIMES DAILY
Qty: 14 TABLET | Refills: 0 | Status: SHIPPED | OUTPATIENT
Start: 2023-07-27 | End: 2023-08-03

## 2023-07-26 RX ORDER — HYDROCODONE BITARTRATE AND ACETAMINOPHEN 5; 325 MG/1; MG/1
1-2 TABLET ORAL EVERY 4 HOURS PRN
Qty: 60 TABLET | Refills: 0 | Status: SHIPPED | OUTPATIENT
Start: 2023-07-26 | End: 2023-07-26 | Stop reason: HOSPADM

## 2023-07-26 RX ADMIN — AMPICILLIN AND SULBACTAM 3 G: 2; 1 INJECTION, POWDER, FOR SOLUTION INTRAVENOUS at 10:07

## 2023-07-26 RX ADMIN — FLUCONAZOLE 400 MG: 150 TABLET ORAL at 08:07

## 2023-07-26 RX ADMIN — HYDROMORPHONE HYDROCHLORIDE 1 MG: 1 INJECTION, SOLUTION INTRAMUSCULAR; INTRAVENOUS; SUBCUTANEOUS at 02:07

## 2023-07-26 RX ADMIN — RALOXIFENE HYDROCHLORIDE 60 MG: 60 TABLET, FILM COATED ORAL at 08:07

## 2023-07-26 RX ADMIN — PANTOPRAZOLE SODIUM 40 MG: 40 INJECTION, POWDER, FOR SOLUTION INTRAVENOUS at 08:07

## 2023-07-26 RX ADMIN — AMPICILLIN AND SULBACTAM 3 G: 2; 1 INJECTION, POWDER, FOR SOLUTION INTRAVENOUS at 03:07

## 2023-07-26 RX ADMIN — HEPARIN SODIUM 5000 UNITS: 5000 INJECTION INTRAVENOUS; SUBCUTANEOUS at 01:07

## 2023-07-26 RX ADMIN — HYDROMORPHONE HYDROCHLORIDE 1 MG: 1 INJECTION, SOLUTION INTRAMUSCULAR; INTRAVENOUS; SUBCUTANEOUS at 03:07

## 2023-07-26 RX ADMIN — HYDROMORPHONE HYDROCHLORIDE 1 MG: 1 INJECTION, SOLUTION INTRAMUSCULAR; INTRAVENOUS; SUBCUTANEOUS at 10:07

## 2023-07-26 RX ADMIN — ACETAMINOPHEN 1000 MG: 500 TABLET, FILM COATED ORAL at 01:07

## 2023-07-26 RX ADMIN — ACETAMINOPHEN 1000 MG: 500 TABLET, FILM COATED ORAL at 05:07

## 2023-07-26 RX ADMIN — Medication 10 ML: at 06:07

## 2023-07-26 RX ADMIN — HYDROMORPHONE HYDROCHLORIDE 1 MG: 1 INJECTION, SOLUTION INTRAMUSCULAR; INTRAVENOUS; SUBCUTANEOUS at 06:07

## 2023-07-26 RX ADMIN — Medication 10 ML: at 12:07

## 2023-07-26 RX ADMIN — BUPROPION HYDROCHLORIDE 150 MG: 150 TABLET, EXTENDED RELEASE ORAL at 08:07

## 2023-07-26 RX ADMIN — AMLODIPINE BESYLATE 5 MG: 5 TABLET ORAL at 08:07

## 2023-07-26 RX ADMIN — SERTRALINE HYDROCHLORIDE 100 MG: 50 TABLET ORAL at 05:07

## 2023-07-26 RX ADMIN — HYDROMORPHONE HYDROCHLORIDE 1 MG: 1 INJECTION, SOLUTION INTRAMUSCULAR; INTRAVENOUS; SUBCUTANEOUS at 12:07

## 2023-07-26 RX ADMIN — HEPARIN SODIUM 5000 UNITS: 5000 INJECTION INTRAVENOUS; SUBCUTANEOUS at 05:07

## 2023-07-26 NOTE — DISCHARGE INSTRUCTIONS
Your blood pressure has been at goal with just amlodipine 5 mg  If not at goal, can increase this to 10 mg with your PCP  Would discontinue lisinopril and hydrochlorothiazide combination pill  Would hold off on taking meloxicam, Aleve, ibuprofen, NSAIDs while your on antibiotics  Take Augmentin and Diflucan both for 7 days total, follow instructions on bottle  You can take 1000 mg tylenol every 8 hours with the OXY-IR  Sixty pain pills (Oxy-IR) ordered, Take 1 pill for moderate pain, take 2 pills for more severe pain  Follow-up with PCP, General surgery, and Infectious Disease  Will try to get home health set up for you, with wound care  But generally, he are wound care instructions:  Surgical incision care:   Use a normal saline solution (salt water) or mild soapy water.   Soak the gauze or cloth in the saline solution or soapy water, and gently dab or wipe the skin with it.   Try to remove all drainage and any dried blood or other matter that may have built up on the skin.  Air-dry the incision or pat it dry with a clean, fresh towel before reapplying the dressing.  Do not scrub or soak the wound.   Do not use rubbing alcohol, hydrogen peroxide, or iodine, which can harm the tissue and slow wound healing.       Thank you for trusting [facility] and me with your care.  We are honored that you entrusted us with your healthcare needs. Your satisfaction is very important to us and we hope you have been very pleased with your experience at [facility]. After your discharge you may receive a survey asking you to rate your hospital experience. We encourage you to take the time to complete the survey as your feedback allows us to identify areas for improvement as well as recognize our staff for their care. We hope that you have received the very best care possible during your hospitalization at [facility], as your satisfaction is our top priority.    Let me know if there is anything more I can  do!!        Kyrie Colvin MD  Internal Medicine Staff        PATIENT GENERAL DISCHARGE INFORMATION   Things that YOU are responsible for to Manage Your Care At Home:  1. Getting your prescriptions filled.  2. Taking you medications as directed. (DO NOT MISS ANY DOSES!)  3. Going to your follow-up doctor appointments.                 *This is important because it allows the doctor to monitor your progress and make changes.      If you are unable to make your follow up appointments, please call the number listed and reschedule this appointment.   After discharge, if you need assistance, you can call [facility] On Call Nurse Care Line for 24/7 assistance at [phone]  If you are experience any signs or symptoms, Call your doctor or Call 911 and come to your nearest Emergency Room.    You should receive a call from [facility] within 48-72 hours to help manage your care after discharge.   Please try to make sure that you answer your phone for this important phone call.

## 2023-07-26 NOTE — PROGRESS NOTES
"[facility] - Med Surg  Infectious Disease  Progress Note    Patient Name: Marilee Townsend  MRN: [id number]  Admission Date: 7/14/2023  Length of Stay: 12 days  Attending Physician: Kyrie Colvin MD  Primary Care Provider: Claudia Hopper DO    Isolation Status: No active isolations  Assessment/Plan:      Oncology  Leukocytosis  IA abscess  63F with h/o  prior abdominal surgeries admitted 7/14 with progressive intractable abdominal pain, decreased appetite, nausea, found to have sbo, thought to be secondary to adhesions and kinking with multiple enterotomies, s/p ex lap, small bowl resection on 7/16.  Gi consulted for tarry stools; anemia. ID consulted for "worsening leukocytosis; s/p bowel resection on 7/16.  CT 7/21 revealed mod intraperitoneal fluid (likely proteinaceous fluid), pelvic fluid collection. No contrast extravasation noted.  S/p drain placement 7/21 to a 14 x 13 x 7 cm abdominal fluid collection, drain with serosanguinous output, cultures candida albicans.    WBC downtrending on unasyn and fluc. Minimal output from drain. Gen sx planning on drain removal today.    Recommendations:  - given adequate source control and continued clinical improvement will plan to treat x 1 wk from drain removal  - transition from unasyn to augmentin on discharge x 7 days  - continue fluconazole x 7 days  - counseled on signs and symptoms of worsening infection. Would recommend repeat CT a/p if these were to arise to r/o drainable collection/abscess.  - f/u with gen sx  -a/p discussed with primary team        Anticipated Disposition: tbd    Thank you for your consult. I will sign off. Please contact us if you have any additional questions.    Beverly Ortiz MD  Infectious Disease  [facility] - Med Surg    Subjective:     Principal Problem:Small bowel obstruction    HPI: 63F with h/o depression, fibromyalgia, htn, prior abdominal surgeries admitted 7/14 with progressive intractable abdominal pain, decreased " appetite, nausea. Reports OSH ED visit for the same, progressed, so returned. Denies fever. Found to have SBO s/p surgical repair. Reports abdomen tender, but better than admit. Reports black tarry stools. Denies issues with iv lines. Denies dysuria. About to go down to IR    Afebrile    Day 6 zosyn/flagyl    Wbc rising    Bcx negative    On 7/16 taken for   LAPAROSCOPY, DIAGNOSTIC (N/A)  LAPAROTOMY, EXPLORATORY (N/A)  lysis of adhesions, small bowel resection repair of enterotomies    Found to have sbo, thought to be secondary to adhesions and kinking with multiple enterotomies      Ct repeated today  Interval resection of abnormal pelvic small bowel with diminished but not fully resolved small bowel dilatation.  A new small bowel transition point is questioned in the vicinity of the new intrapelvic enteroenteric anastomosis.  Correlate clinically, and with follow-up imaging.     There is a moderate quantity of intraperitoneal fluid in the pelvis.  Its attenuation of up to 26 HU suggests proteinaceous fluid (DDX: Purulent debris, dilute blood products, extraluminal succus entericus etc.).  New diffuse enhancement of the adjacent portions of the parietal peritoneum raises suspicion for peritonitis.  Correlate clinically.     Gas with urinary bladder lumen is most likely related to recent catheterization.  In the absence of any recent instrumentation, a gas-forming infection or enterocystic fistula might also be considerations.  Correlate clinically.     New small bilateral pleural effusions.  Opacities in the adjacent dorsal aspect of either lower lobe most likely represent atelectasis.  Pneumonia not fully excluded.  Correlate clinically, and with follow-up imaging.     New patchy hazy ground-glass opacities in the lingula and right middle lobe possibly represent pulmonary edema, small airways disease, atypical pneumonia, aspiration, or other abnormality.  Correlate clinically, and with follow-up imaging.      IR  "consulted for abdominal drain placement for complex fluid collection, cultures ordered    Gi consulted for tarry stools; anemia    ID consulted for "worsening leukocytosis; s/p bowel resection on 7/16    Interval History: No acute events overnight. Feeling better today. WBC continuing to downtrend.    Review of Systems   Constitutional:  Negative for chills, diaphoresis, fatigue and fever.   Respiratory:  Negative for cough and shortness of breath.    Cardiovascular:  Negative for chest pain and leg swelling.   Gastrointestinal:  Positive for abdominal pain (improving). Negative for nausea and vomiting. Diarrhea: resolved.  Genitourinary:  Negative for difficulty urinating and dysuria.   Musculoskeletal:  Negative for arthralgias and back pain.   Psychiatric/Behavioral:  Negative for agitation and confusion.    All other systems reviewed and are negative.  Objective:     Vital Signs (Most Recent):  Temp: 98.4 °F (36.9 °C) (07/26/23 1201)  Pulse: 96 (07/26/23 1201)  Resp: 18 (07/26/23 1405)  BP: 133/76 (07/26/23 1201)  SpO2: 97 % (07/26/23 1201) Vital Signs (24h Range):  Temp:  [98.3 °F (36.8 °C)-98.9 °F (37.2 °C)] 98.4 °F (36.9 °C)  Pulse:  [80-96] 96  Resp:  [15-20] 18  SpO2:  [94 %-97 %] 97 %  BP: (107-133)/(63-76) 133/76     Weight: 45.4 kg (100 lb)  Body mass index is 18.29 kg/m².    Estimated Creatinine Clearance: 80.4 mL/min (based on SCr of 0.5 mg/dL).     Physical Exam  Vitals reviewed.   HENT:      Head: Normocephalic and atraumatic.   Eyes:      Conjunctiva/sclera: Conjunctivae normal.      Pupils: Pupils are equal, round, and reactive to light.   Pulmonary:      Effort: Pulmonary effort is normal. No respiratory distress.      Breath sounds: Normal breath sounds.   Abdominal:      General: Bowel sounds are normal.      Palpations: Abdomen is soft.      Comments: Vertical incision- minimal erythema, no drainage.   Drain at superior aspect of rt buttocks region- serosanguineous output.    Musculoskeletal: "      Right lower leg: No edema.      Left lower leg: No edema.   Skin:     Findings: No lesion or rash.   Neurological:      General: No focal deficit present.      Mental Status: She is oriented to person, place, and time.        Significant Labs: Blood Culture:   Recent Labs   Lab 07/14/23  1308 07/22/23  0620   LABBLOO No Growth after 4 days.  No Growth after 4 days. No Growth after 4 days.  No Growth after 4 days.       Wound Culture:   Recent Labs   Lab 07/21/23  1413   LABAERO CANDIDA ALBICANS  Moderate  *       All pertinent labs within the past 24 hours have been reviewed.    Significant Imaging: I have reviewed all pertinent imaging results/findings within the past 24 hours.

## 2023-07-26 NOTE — ASSESSMENT & PLAN NOTE
Marilee Townsend is a 63yoF with a history of HTN and rectal prolapse who presents with a several day history of abdominal pain, nausea, vomiting, and PO intolerance. Her work-up is consistent with a small bowel obstruction. General surgery consulted for evaluation.  She is now status post exploratory laparotomy on 07/16/2023 with multiple small bowel resections and anastomosis, evidence of enterotomy with over-sewing.    Patient seen and examined   Having much better formed bowel movements  Abdominal exam is benign   Continue IR drain to suction and flush with 5 cc of saline q shift - drainage had very little output, she is now out 1 week from drain placement-will discuss with staff today about removal  Tolerating diet without issue, appetite is continuing to improve- discussed that boost shakes will help increase her protein intake   - Believe if that we were to send her home in her current condition, she would require continued TPN as her most recent albumin was 1.8. - I have ordered a CMP today to see her most recent albumin

## 2023-07-26 NOTE — PLAN OF CARE
[facility] - Med Surg  Discharge Final Note    Patient clear to discharge from case management stand point. Follow up appointment for gen surgery scheduled listed on avs. Referral placed by physician and message sent for ID and pcp to contact patient to schedule. Home health orders and referral faxed to [facility] to be arranged with in network provider.     Primary Care Provider: Claudia Hopper DO    Expected Discharge Date: 7/26/2023    Final Discharge Note (most recent)       Final Note - 07/26/23 1500          Final Note    Assessment Type Final Discharge Note (P)      Anticipated Discharge Disposition Home-Health Care Svc (P)      Hospital Resources/Appts/Education Provided Provided patient/caregiver with written discharge plan information;Appointments scheduled and added to AVS (P)         Post-Acute Status    Post-Acute Authorization Home Health (P)      Discharge Delays None known at this time (P)                      Important Message from Medicare  Important Message from Medicare regarding Discharge Appeal Rights: Given to patient/caregiver, Explained to patient/caregiver, Signed/date by patient/caregiver     Date IMM was signed: 07/26/23  Time IMM was signed: 1500    Contact [facility]    [address]  [place] [address]   Phone: [phone]       Next Steps: Follow up    Instructions: Home health to be arranged with in network provider    Claudia Hopper DO   Specialty: Family Medicine   Relationship: PCP - General    [address] [facility] [address]   Phone: [phone]       Next Steps: Schedule an appointment as soon as possible for a visit in 1 week(s)    Instructions: Message sent for clinic to contact patient to schedule follow up    [facility] [place] - Infectious Diseases   Specialty: Infectious Diseases    [address] [facility] [address]  [place] [address]   Phone: [phone]       Next Steps: Schedule an appointment as soon as  possible for a visit in 1 week(s)    Instructions: Message sent for clinic to contact

## 2023-07-26 NOTE — PLAN OF CARE
07/25/23 1500   Medicare Message   Important Message from Medicare regarding Discharge Appeal Rights Given to patient/caregiver;Explained to patient/caregiver;Signed/date by patient/caregiver   Date IMM was signed 07/26/23   Time IMM was signed 1500

## 2023-07-26 NOTE — PLAN OF CARE
Problem: Adult Inpatient Plan of Care  Goal: Plan of Care Review  Outcome: Ongoing, Progressing  Flowsheets (Taken 7/26/2023 0429)  Plan of Care Reviewed With: patient    Patient remains free from injury and falls this shift. Abdominal incision open to air. Redness noted around incision. Bowel sounds active. Patient had BM this shift. IV antibiotic administered as ordered. TPN and lipids infusing. Patient medicated every 3 hours for pain with some relief. Patient able to get up to BSC independently. Drain flushed with 10ml per order. Patient with pain when flushing drain. Patient able to make needs known. Plan of care continued.

## 2023-07-26 NOTE — PROGRESS NOTES
[facility] Surg  General Surgery  Progress Note    Subjective:     History of Present Illness:  Marilee Townsend is a 63yoF with a history of hypertension, rectal prolapse s/p rectopexy who presents to [facility] with complaints of nausea, vomiting and worsening abdominal pain. Of note, she presented to an outside hospital with the same complaints yesterday and was discharged with instructions to follow up with her primary care provider. The patient describes a three-day history of abdominal pain, nausea, vomiting and PO intolerance. This has persistently worsened throughout this time, which prompted both presentations. She states that she did have a small bowel movement yesterday, described as hard pellets. On work-up, her vital signs are stable. She has an elevated leukocytosis to 18. Lactate normal at 1. Repeat ordered. Her CT scan demonstrates dilated small bowel consistent with a small bowel obstruction. There is normal caliber small intestine distally. She is admitted to the hospital medicine service. General surgery consulted for evaluation.     Of note, her WBC has increased from 12.8 to 18 over the last 24hrs. Imaging at the outside hospital, per the official read, did not demonstrate any degree of obstruction, which is a rather burk contrast from her CT scan at our facility. Plan for NG tube insertion with low threshold to proceed to operating room for diagnostic laparoscopy. Discussed this plan with the patient.       Post-Op Info:  Procedure(s) (LRB):  LAPAROSCOPY, DIAGNOSTIC (N/A)  LAPAROTOMY, EXPLORATORY (N/A)  EXCISION, SMALL INTESTINE   10 Days Post-Op     Interval History:   Doing great.  In great spirits this morning visiting with her sister.    Drain is pretty minimal output it is serous in quality about 5 cc in the last 24 hours.    WBC within normal limits   Abdominal exam is benign   Having more normal bowel function    Medications:  Continuous Infusions:   Amino acid 5% - dextrose 15%  (CLINIMIX-E) solution with additives. CENTRAL LINE ONLY (1395 mOsm/L). 1L provides 50 gm AA, 150 gm CHO (510 kcal/L dextrose), Na 35, K 30, Mg 5, Ca 4.5, Acetate 80, Cl 39, Phos 15 75 mL/hr at 07/25/23 2229     Scheduled Meds:   acetaminophen  1,000 mg Oral Q8H    amLODIPine  5 mg Oral Daily    ampicillin-sulbactim (UNASYN) IVPB  3 g Intravenous Q6H    buPROPion  150 mg Oral BID    fluconazole  400 mg Oral Daily    heparin (porcine)  5,000 Units Subcutaneous Q8H    pantoprazole  40 mg Intravenous BID    raloxifene  60 mg Oral Daily    sertraline  100 mg Oral QAM    sodium chloride 0.9%  10 mL Intravenous Q6H     PRN Meds:sodium chloride, acetaminophen, acetaminophen, dextrose 50%, dextrose 50%, diatrizoate meglumineand-diatrizoate sodium, glucagon (human recombinant), glucose, glucose, HYDROmorphone, HYDROmorphone, insulin aspart U-100, loperamide, naloxone, ondansetron, sodium chloride 0.9%, Flushing PICC Protocol **AND** sodium chloride 0.9% **AND** sodium chloride 0.9%     Review of patient's allergies indicates:   Allergen Reactions    Morphine Nausea And Vomiting     Objective:     Vital Signs (Most Recent):  Temp: 98.4 °F (36.9 °C) (07/26/23 0726)  Pulse: 86 (07/26/23 0726)  Resp: 18 (07/26/23 1053)  BP: 125/67 (07/26/23 0726)  SpO2: 95 % (07/26/23 0832) Vital Signs (24h Range):  Temp:  [98.2 °F (36.8 °C)-98.9 °F (37.2 °C)] 98.4 °F (36.9 °C)  Pulse:  [80-99] 86  Resp:  [15-20] 18  SpO2:  [93 %-98 %] 95 %  BP: (107-143)/(63-76) 125/67     Weight: 45.4 kg (100 lb)  Body mass index is 18.29 kg/m².    Intake/Output - Last 3 Shifts         07/24 0700  07/25 0659 07/25 0700 07/26 0659 07/26 0700 07/27 0659    P.O. 720 120 240    IV Piggyback 100 196.4     .4 826.3     Total Intake(mL/kg) 924.4 (20.4) 1142.7 (25.2) 240 (5.3)    Urine (mL/kg/hr) 2200 (2) 2 (0)     Drains 10 0     Stool 0 0     Total Output 2210 2     Net -1285.7 +1140.7 +240           Urine Occurrence 1 x 9 x 1 x    Stool  Occurrence 0 x 2 x              Physical Exam  Vitals and nursing note reviewed.   Constitutional:       Appearance: She is well-developed.   HENT:      Head: Normocephalic and atraumatic.   Cardiovascular:      Rate and Rhythm: Normal rate.      Heart sounds: Normal heart sounds.   Pulmonary:      Effort: Pulmonary effort is normal.   Abdominal:      General: Bowel sounds are normal. There is no distension.      Palpations: Abdomen is soft.      Tenderness: There is no abdominal tenderness.      Comments: Abdominal incision is well healed   Abdominal drain with minimal serosanguineous output.   Musculoskeletal:         General: Normal range of motion.      Cervical back: Normal range of motion.   Skin:     General: Skin is warm and dry.      Capillary Refill: Capillary refill takes less than 2 seconds.   Neurological:      Mental Status: She is alert and oriented to person, place, and time.   Psychiatric:         Behavior: Behavior normal.        Significant Labs:  I have reviewed all pertinent lab results within the past 24 hours.  CBC:   Recent Labs   Lab 07/26/23  0406   WBC 9.24   RBC 2.58*   HGB 7.7*   HCT 23.4*      MCV 91   MCH 29.8   MCHC 32.9     CMP:   Recent Labs   Lab 07/24/23  0509   *   CALCIUM 8.2*   ALBUMIN 1.8*   PROT 5.7*   *   K 4.2   CO2 24      BUN 11   CREATININE 0.5   ALKPHOS 60   ALT 18   AST 19   BILITOT 0.2       Significant Diagnostics:  I have reviewed all pertinent imaging results/findings within the past 24 hours.    Assessment/Plan:     * Small bowel obstruction  Marilee Townsend is a 63yoF with a history of HTN and rectal prolapse who presents with a several day history of abdominal pain, nausea, vomiting, and PO intolerance. Her work-up is consistent with a small bowel obstruction. General surgery consulted for evaluation.  She is now status post exploratory laparotomy on 07/16/2023 with multiple small bowel resections and anastomosis, evidence of  enterotomy with over-sewing.    Patient seen and examined   Having much better formed bowel movements  Abdominal exam is benign   Continue IR drain to suction and flush with 5 cc of saline q shift - drainage had very little output, she is now out 1 week from drain placement-will discuss with staff today about removal  Tolerating diet without issue, appetite is continuing to improve- discussed that boost shakes will help increase her protein intake   - Believe if that we were to send her home in her current condition, she would require continued TPN as her most recent albumin was 1.8. - I have ordered a CMP today to see her most recent albumin            eJsus Adams MD  General Surgery  [facility] Surg

## 2023-07-26 NOTE — NURSING
[facility], [facility] [place], [place]  Nurses Note -- 4 Eyes      7/26/2023       Skin assessed on: Q Shift      [x] No Pressure Injuries Present    [x]Prevention Measures Documented    [] Yes LDA  for Pressure Injury Previously documented     [] Yes New Pressure Injury Discovered   [] LDA for New Pressure Injury Added      Attending RN:  Lorena Harden, RN     Second RN:  nurse vasyl Farmer

## 2023-07-26 NOTE — PT/OT/SLP PROGRESS
Occupational Therapy      Patient Name:  Marilee Townsend   MRN:  [id number]    Patient not seen today secondary to discharge to home.     7/26/2023

## 2023-07-26 NOTE — NURSING
[facility], [facility]  Nurses Note -- 4 Eyes      7/26/2023       Skin assessed on: Q Shift      [x] No Pressure Injuries Present    []Prevention Measures Documented    [] Yes LDA  for Pressure Injury Previously documented     [] Yes New Pressure Injury Discovered   [] LDA for New Pressure Injury Added      Attending RN:  Adali Mora RN     Second RN:  Ann Marroquin RN

## 2023-07-26 NOTE — PROGRESS NOTES
[facility] Medicine  Progress Note    Patient Name: Marilee Townsend  MRN: [id number]  Patient Class: IP- Inpatient   Admission Date: 7/14/2023  Length of Stay: 12 days  Attending Physician: Kyrie Colvin MD  Primary Care Provider: Claudia Hopper DO        Subjective:     Principal Problem:Small bowel obstruction        HPI:  63F with pmh of depression, fibromyalgia, htn, osteoporosis who presents due to several day h/o abd pain and nausea with anorexia. Pt was evaluated at outside emergency department patient was seen in the day prompting the ED for evaluation.  Patient denies any recent flatulence or bowel movements unclear on the last date.  Patient states pain is diffuse in the with minimal improvement with pain medications given in the ED. CT scan in the emergency department with demonstration of small bowel obstruction and surgery consulted who had NG tube placed.  Patient denies chest pain, shortness a breath, fever, chills.  Patient states she had a shoulder surgery about 3 weeks prior to arrival and tolerated the anesthesia well.  Patient is a former smoker, but denies alcohol or drug use.      Overview/Hospital Course:  63F with pmh of depression, fibromyalgia, htn, osteoporosis admitted on 07/07/2023 for small bowel obstruction. presented with a several day history of abdominal pain, nausea, vomiting, and PO intolerance.  CT scan in ED with demonstration of small bowel obstruction and surgery consulted who had NG tube placed. Attempts at gastrografin with persistent obstruction. Pt taken to OR on 7/16. NG tube remained after operation and surgery managing. She is now status post exploratory laparotomy on 07/16/2023 with multiple small bowel resections and anastomosis, evidence of enterotomy with over-sewing. Replace electrolytes as needed. Had BM on 07/21/2023. PT/OT consulted for evaluation. NGT removed on 07/21/2023. Patient with worsening leukocytosis on 07/21. Repeat CT abd showed  moderate quantity of intraperitoneal fluid in the pelvis. IR consulted-s/p transgluteal-approach drainage catheter into the complex pelvic fluid collection. Fluid cx with candida albicans. ID consulted-continued on zosyn and micafungin. Repeat blood cx with NGTD. Leukocytosis resolved on 07/24/23. Continue to monitor      Scant fluid in drain, may be able to remove, will discuss with [facility]. Likely discharge home with HH if so.       Interval History:  NAEON.  No new issues. Had formed BM.  Denies complaints, eating reg diet.  All questions answered and updates on care given.       ROS:  General: Negative for fevers or chills.  Cardiac: Negative for chest pain or orthopnea   Pulmonary: Negative for dyspnea or wheezing.  GI: Negative for abdominal distention or pain     Vitals:    07/26/23 0449 07/26/23 0651 07/26/23 0726 07/26/23 0832   BP: 114/63  125/67    BP Location:   Left arm    Patient Position: Lying  Lying    Pulse: 83  86    Resp: 19 18 15    Temp: 98.3 °F (36.8 °C)  98.4 °F (36.9 °C)    TempSrc: Oral  Oral    SpO2: (!) 94%  (!) 94% 95%   Weight:       Height:              Body mass index is 18.29 kg/m².      PHYSICAL EXAM:  GENERAL APPEARANCE: alert and cooperative, and appears to be in no acute distress.  HEENT:     HEAD: NC/AT     EYES: PERRL, EOMI.  Vision is grossly intact.  NECK: Neck supple, non-tender without LAD, masses or thyromegaly.  CARDIAC: There is no peripheral edema, cyanosis or pallor.   LUNGS: Clear to auscultation and percussion without rales or wheezing  ABDOMEN: Non-distended. No guarding. Drain in place  MSK: No joint erythema or tenderness.   EXTREMITIES: No significant deformity or joint abnormality. No edema.   NEUROLOGICAL: CN II-XII grossly intact.   SKIN: Skin normal color, texture and turgor with no lesions or eruptions.  PSYCHIATRIC: Oriented. No tangential speech. No Hyperactive features.        Recent Results (from the past 24 hour(s))   POCT glucose    Collection Time:  07/25/23 11:29 AM   Result Value Ref Range    POCT Glucose 118 (H) 70 - 110 mg/dL   POCT glucose    Collection Time: 07/25/23  4:47 PM   Result Value Ref Range    POCT Glucose 120 (H) 70 - 110 mg/dL   POCT glucose    Collection Time: 07/25/23 11:15 PM   Result Value Ref Range    POCT Glucose 131 (H) 70 - 110 mg/dL   CBC auto differential    Collection Time: 07/26/23  4:06 AM   Result Value Ref Range    WBC 9.24 3.90 - 12.70 K/uL    RBC 2.58 (L) 4.00 - 5.40 M/uL    Hemoglobin 7.7 (L) 12.0 - 16.0 g/dL    Hematocrit 23.4 (L) 37.0 - 48.5 %    MCV 91 82 - 98 fL    MCH 29.8 27.0 - 31.0 pg    MCHC 32.9 32.0 - 36.0 g/dL    RDW 14.6 (H) 11.5 - 14.5 %    Platelets 446 150 - 450 K/uL    MPV 9.2 9.2 - 12.9 fL    Immature Granulocytes 1.0 (H) 0.0 - 0.5 %    Gran # (ANC) 7.3 1.8 - 7.7 K/uL    Immature Grans (Abs) 0.09 (H) 0.00 - 0.04 K/uL    Lymph # 1.1 1.0 - 4.8 K/uL    Mono # 0.6 0.3 - 1.0 K/uL    Eos # 0.1 0.0 - 0.5 K/uL    Baso # 0.03 0.00 - 0.20 K/uL    nRBC 0 0 /100 WBC    Gran % 79.4 (H) 38.0 - 73.0 %    Lymph % 12.0 (L) 18.0 - 48.0 %    Mono % 6.4 4.0 - 15.0 %    Eosinophil % 0.9 0.0 - 8.0 %    Basophil % 0.3 0.0 - 1.9 %    Differential Method Automated    POCT glucose    Collection Time: 07/26/23  4:49 AM   Result Value Ref Range    POCT Glucose 131 (H) 70 - 110 mg/dL       Microbiology Results (last 7 days)       Procedure Component Value Units Date/Time    Culture, Anaerobe [[id number]] Collected: 07/21/23 1414    Order Status: Completed Specimen: Abscess from Peritoneal Fluid Updated: 07/25/23 1029     Anaerobic Culture Culture in progress    Narrative:      peritoneal abscess    Blood culture [[id number]] Collected: 07/22/23 0620    Order Status: Completed Specimen: Blood from Peripheral, Right Wrist Updated: 07/25/23 0903     Blood Culture, Routine No Growth to date      No Growth to date      No Growth to date      No Growth to date    Blood culture [[id number]] Collected: 07/22/23 0620    Order Status: Completed  Specimen: Blood from Peripheral, Left Hand Updated: 07/25/23 0903     Blood Culture, Routine No Growth to date      No Growth to date      No Growth to date      No Growth to date    Aerobic culture [[id number]]  (Abnormal) Collected: 07/21/23 1413    Order Status: Completed Specimen: Abscess Updated: 07/23/23 0741     Aerobic Bacterial Culture CANDIDA ALBICANS  Moderate      Gram stain [[id number]] Collected: 07/21/23 1414    Order Status: Completed Specimen: Abscess from Peritoneal Fluid Updated: 07/21/23 1508     Gram Stain Result Many WBC's      No organisms seen    Narrative:      Peritoneal abscess             Imaging Results              X-Ray Abdomen AP 1 View (Final result)  Result time 07/14/23 15:04:30      Final result by Manuel Rey MD (07/14/23 15:04:30)                   Impression:      As above      Electronically signed by: Manuel Rey MD  Date:    07/14/2023  Time:    15:04               Narrative:    EXAMINATION:  XR ABDOMEN AP 1 VIEW    CLINICAL HISTORY:  ng tube eval;    TECHNIQUE:  Single AP View of the abdomen was performed.    COMPARISON:  07/14/2023 at 14:06    FINDINGS:  NG tube has been advanced, and now in good position with the tip and proximal port both subdiaphragmatic.    Otherwise stable exam, noting a few dilated loops of small bowel.  No obvious free air.                                       X-Ray Abdomen AP 1 View (KUB) (Final result)  Result time 07/14/23 14:15:20      Final result by Jose Rosales MD (07/14/23 14:15:20)                   Impression:      Recommended advancement of nasogastric tube.      Electronically signed by: Jose Rosales MD  Date:    07/14/2023  Time:    14:15               Narrative:    EXAMINATION:  XR ABDOMEN AP 1 VIEW    TECHNIQUE:  XR ABDOMEN AP 1 VIEW    FINDINGS:  The bowel gas pattern is non-specific.    No airspace consolidation at the lung bases.No acute bony abnormality.No abnormal soft tissue masses.No abnormal calcific  densities.Contrast present within the kidneys.  Postoperative changes lower lumbar spine.  Nasogastric tube present with side hole at the level of the GE junction can be advanced.                                        CT Abdomen Pelvis With Contrast (Final result)  Result time 07/14/23 12:01:03      Final result by Sulaiman Ford MD (07/14/23 12:01:03)                   Impression:      Status post hysterectomy and sigmoid surgical therapy.    Abnormal fluid, air distension small bowel, termination point at or near ileocecal valve, not well-defined, associated less distension proximal small bowel with some bowel wall thickening.  Adhesion etiology of obstructive process favored.    Posterior pelvic ascites, mesenteric edema.  Addition remote findings above.    This report was flagged in Epic as abnormal.      Electronically signed by: Sulaiman Ford MD  Date:    07/14/2023  Time:    12:01               Narrative:    EXAMINATION:  CT ABDOMEN PELVIS WITH CONTRAST    CLINICAL HISTORY:  RLQ abdominal pain (Age >= 14y);Bowel obstruction suspected;    TECHNIQUE:  Low dose axial images, sagittal and coronal reformations were obtained from the lung bases to the pubic symphysis following the IV administration of 85 mL of Omnipaque 350 and the oral administration of 15 mL of Omnipaque 300.    COMPARISON:  None.    FINDINGS:  Postop posterior spinal fusion, with pedicular screws and vertical stabilization bars and laminectomy L5-S1, placement of interbody disc spacer, degenerative disc spondylosis with chronic sclerotic erosive changes T11-T12 L1-L2, L2-L3, L 4 and L5 disc levels, primary anterior subluxation L3 on L2, L4 on L5, L5 on S1.    Status of bilateral breast prosthetic surgery with calcification of capsule of prosthesis.  Focal tiny ossification anterior to linea alba above level of umbilicus.  Some diastasis of linea alba above and at and below umbilicus.  Subcutaneous edema inferior, medial, posterior  buttocks.    Calcified plaquing aorta major branches particularly distal aorta and common iliac arteries.    Liver, gallbladder, spleen, adrenal glands, kidneys, pancreas biliary tree normal.    Retroaortic left renal vein.  No adenopathy.  Mild amount of ascites posterior pelvis presacral particularly on right, 6.2 x 4.8 cm.  Urinary bladder small.  7 mm midline calcification at level of introitus of uncertain etiology.    Mild distension gastric cardia with fluid, distal stomach, intact.  Minimal mild distension duodenal with air.  Minimal mild distension proximal small bowel jejunum left flank with suggestion of some bowel wall thickening.  Moderate severe distension mid distal small bowel, iliac loop 3.3 diameter central pelvis.  No nondistended terminal ileum confirmed, nondistended large bowel, suture line postop bowel resection sigmoid at left lateral central pelvic region.  Appendix not identified.    Postop hysterectomy.  No adnexal mass.                                             Assessment/Plan:      * Small bowel obstruction  63F presenting with several days of worsening abd pain, anorexia, and nausea.  CT abd/pelvis with Abnormal fluid, air distension small bowel, termination point at or near ileocecal valve, not well-defined, associated less distension proximal small bowel with some bowel wall thickening.  Adhesion etiology of obstructive process favored.    -Surgery team consulted: s/p exploratory laparotomy on 07/16/2023 with multiple small bowel resections and anastomosis, evidence of enterotomy with over-sewing.  -NG tube in place to LIWS  -continue TPN per surgery   -Pt with worsening leukocytosis, CT abdomen repeated  -CT abdomen: there is now a moderate to large quantity of intraperitoneal fluid, with new enhancement of the adjacent portions of the parietal peritoneum, suspicious for peritonitis.   -IR consulted: s/p transgluteal-approach drainage catheter into the complex pelvic fluid  collection 07/21  -ID consulted: added micafungin on 07/21. Plan to switch to fluconazole, pending EKG  -Analgesics ordered prn   -Advance diet as tolerated  -Pt with BM on 07/21      Body mass index (BMI) less than 19  Body mass index is 18.29 kg/m².  Continue TPN per surgery       Weakness  -PT/OT consulted: home health       Atelectasis  -CT abdomen:  Opacities in the adjacent dorsal aspect of either lower lobe most likely represent atelectasis.   -Encourage IS   -PT/OT consulted      Advanced care planning/counseling discussion  Advance Care Planning     Date: 07/19/2023    Code Status  I engaged the the patient in a voluntary conversation about the patient's preferences for care  at the very end of life. The patient wishes to have CPR and other invasive treatments performed when her heart and/or breathing stops. I communicated to the patient that her wishes align with full code status.  I spent a total of 16 minutes engaging the patient in this advance care planning discussion.        Code Status: Full Code          Hypokalemia  -Replace as needed  -TPN per surgery       Osteoporosis  Continue home medications      Depression  Restart home medications as indicated- setraline 100mg         Leukocytosis  -In setting of acute small-bowel obstruction and no definitive findings of infection appears likely reactive, but low threshold for starting antibiotics.   -LA negative x2.  -procal mildly elevated, WBC# trended up to 14 on 07/20 and to 17 on 07/21  -Repeat CT abdomen with fluid in the pelvic  -IR consulted: s/p transgluteal-approach drainage catheter into the complex pelvic fluid collection on 07/21, cultures with candida albicans  -ID consulted: Added micafungin 07/21 and repeat blood culture  -Repeat blood cx with NGTD  -Continue on antibiotics   -Leukocytosis resolved on 07/24  -tx as stated above for sbo      HTN (hypertension)  Currently controlled.  Continue home norvasc  Will continue to  monitor        VTE Risk Mitigation (From admission, onward)           Ordered     heparin (porcine) injection 5,000 Units  Every 8 hours         07/14/23 1428     IP VTE LOW RISK PATIENT  Once         07/14/23 1236     Place sequential compression device  Until discontinued         07/14/23 1236                    Discharge Planning   WIL: 7/27/2023     Code Status: Full Code   Is the patient medically ready for discharge?:     Reason for patient still in hospital (select all that apply): Patient trending condition and Treatment  Discharge Plan A: Home   Discharge Delays: None known at this time              Kyrie Colvin MD  Department of Hospital Medicine   [facility] Surg

## 2023-07-26 NOTE — DISCHARGE SUMMARY
[facility]  Discharge Summary      Patient Name: Marilee Townsend  MRN: [id number]  MARK ANTHONY: [id number]  Patient Class: IP- Inpatient  Admission Date: 7/14/2023  Hospital Length of Stay: 12 days  Discharge Date and Time:  07/26/2023 1:28 PM  Attending Physician: Kyrie Colvin MD   Discharging Provider: Kyrie Colvin MD  Primary Care Provider: Claudia Hopper DO    Primary Care Team: Networked reference to record PCT     HPI:   63F with pmh of depression, fibromyalgia, htn, osteoporosis who presents due to several day h/o abd pain and nausea with anorexia. Pt was evaluated at outside emergency department patient was seen in the day prompting the ED for evaluation.  Patient denies any recent flatulence or bowel movements unclear on the last date.  Patient states pain is diffuse in the with minimal improvement with pain medications given in the ED. CT scan in the emergency department with demonstration of small bowel obstruction and surgery consulted who had NG tube placed.  Patient denies chest pain, shortness a breath, fever, chills.  Patient states she had a shoulder surgery about 3 weeks prior to arrival and tolerated the anesthesia well.  Patient is a former smoker, but denies alcohol or drug use.      Procedure(s) (LRB):  LAPAROSCOPY, DIAGNOSTIC (N/A)  LAPAROTOMY, EXPLORATORY (N/A)  EXCISION, SMALL INTESTINE      Hospital Course:   63F with pmh of depression, fibromyalgia, htn, osteoporosis admitted on 07/07/2023 for small bowel obstruction. presented with a several day history of abdominal pain, nausea, vomiting, and PO intolerance.  CT scan in ED with demonstration of small bowel obstruction and surgery consulted who had NG tube placed. Attempts at gastrografin with persistent obstruction. Pt taken to OR on 7/16. NG tube remained after operation and surgery managing. She is now status post exploratory laparotomy on 07/16/2023 with multiple small bowel resections and anastomosis, evidence  of enterotomy with over-sewing. Replace electrolytes as needed. Had BM on 07/21/2023. PT/OT consulted for evaluation. NGT removed on 07/21/2023. Patient with worsening leukocytosis on 07/21. Repeat CT abd showed moderate quantity of intraperitoneal fluid in the pelvis. IR consulted-s/p transgluteal-approach drainage catheter into the complex pelvic fluid collection. Fluid cx with candida albicans. ID consulted-continued on zosyn and micafungin. Repeat blood cx with NGTD. Leukocytosis resolved on 07/24/23. Continue to monitor      Scant fluid in drain, may be able to remove, will discuss with [facility]. Likely discharge home with HH if so.        Your blood pressure has been at goal with just amlodipine 5 mg  If not at goal, can increase this to 10 mg with your PCP  Would discontinue lisinopril and hydrochlorothiazide combination pill  Would hold off on taking meloxicam, Aleve, ibuprofen, NSAIDs while your on antibiotics  Take Augmentin and Diflucan both for 7 days total, follow instructions on bottle  Do not take Tylenol with the Norco, as you will already be at the upper limit of maximum daily amount  Sixty pain pills ordered, Take 1 pill for moderate pain, take 2 pills for more severe pain  Follow-up with PCP, General surgery, and Infectious Disease  Will try to get home health set up for you, with wound care  But generally, he are wound care instructions:  Surgical incision care:   Use a normal saline solution (salt water) or mild soapy water.   Soak the gauze or cloth in the saline solution or soapy water, and gently dab or wipe the skin with it.   Try to remove all drainage and any dried blood or other matter that may have built up on the skin.  Air-dry the incision or pat it dry with a clean, fresh towel before reapplying the dressing.  Do not scrub or soak the wound.   Do not use rubbing alcohol, hydrogen peroxide, or iodine, which can harm the tissue and slow wound healing.       Thank you for trusting [facility] and me with your care.  We are honored that you entrusted us with your healthcare needs. Your satisfaction is very important to us and we hope you have been very pleased with your experience at [facility]. After your discharge you may receive a survey asking you to rate your hospital experience. We encourage you to take the time to complete the survey as your feedback allows us to identify areas for improvement as well as recognize our staff for their care. We hope that you have received the very best care possible during your hospitalization at [facility], as your satisfaction is our top priority.    Let me know if there is anything more I can do!!        NOTE: initially ordered 7d supply of norco, but pt requested OXY IR, so exchanged, then her insurance did not pay for the capsules, so ordered a 3rd time- oxy IR tabs.    Goals of Care Treatment Preferences:  Code Status: Full Code      Consults:   Consults (From admission, onward)          Status Ordering Provider     Inpatient consult to Gastroenterology  Once        Provider:  Ghada Rodríguez NP    Completed BARRY VU     Inpatient consult to Infectious Diseases  Once        Provider:  Cara Lara MD    Completed BARRY VU     Inpatient consult to Interventional Radiology  Once        Provider:  Steven Minor MD    Completed YESIKA XIONG     Inpatient consult to PICC team ([facility])  Once        Provider:  (Not yet assigned)    Acknowledged YESIKA XIONG     Inpatient consult to General Surgery  Once        Provider:  Yesika Xiong MD    Completed TYLOR GRANADOS III            No new Assessment & Plan notes have been filed under this hospital service since the last note was generated.  Service: Hospital Medicine    Final Active Diagnoses:    Diagnosis Date Noted POA    PRINCIPAL PROBLEM:  Small bowel obstruction [K56.609] 07/14/2023 Yes    Atelectasis [J98.11] 07/21/2023 Yes    Weakness [R53.1]  07/21/2023 Yes    Body mass index (BMI) less than 19 [Z68.1] 07/21/2023 Not Applicable    Advanced care planning/counseling discussion [Z71.89] 07/19/2023 Not Applicable    Hypokalemia [E87.6] 07/18/2023 Yes    HTN (hypertension) [I10] 07/14/2023 Yes    Leukocytosis [D72.829] 07/14/2023 Yes    Depression [F32.A] 07/14/2023 Yes    Osteoporosis [M81.0] 07/14/2023 Yes      Problems Resolved During this Admission:       Discharged Condition: good    Disposition: home     Follow Up:   Follow-up Information       Bora Quevedo MD Follow up in 2 week(s).    Specialties: General Surgery, Oncology  Contact information:  [address]  [address] [place] [id number]  [phone]                           Patient Instructions:      Ambulatory referral/consult to Internal Medicine   Standing Status: Future   Referral Priority: Routine Referral Type: Consultation   Referral Reason: Specialty Services Required   Requested Specialty: Internal Medicine   Number of Visits Requested: 1     Ambulatory referral/consult to General Surgery   Standing Status: Future   Referral Priority: Urgent Referral Type: Consultation   Referral Reason: Specialty Services Required   Requested Specialty: General Surgery   Number of Visits Requested: 1     Ambulatory referral/consult to Infectious Disease   Standing Status: Future   Referral Priority: Routine Referral Type: Consultation   Referral Reason: Specialty Services Required   Requested Specialty: Infectious Diseases   Number of Visits Requested: 1         Recent Results (from the past 100 hour(s))   POCT glucose    Collection Time: 07/22/23 11:53 AM   Result Value Ref Range    POCT Glucose 133 (H) 70 - 110 mg/dL   POCT glucose    Collection Time: 07/22/23  6:03 PM   Result Value Ref Range    POCT Glucose 133 (H) 70 - 110 mg/dL   POCT glucose    Collection Time: 07/22/23 11:29 PM   Result Value Ref Range    POCT Glucose 129 (H) 70 - 110 mg/dL   Comprehensive Metabolic Panel    Collection  Time: 07/23/23  4:24 AM   Result Value Ref Range    Sodium 133 (L) 136 - 145 mmol/L    Potassium 4.0 3.5 - 5.1 mmol/L    Chloride 102 95 - 110 mmol/L    CO2 22 (L) 23 - 29 mmol/L    Glucose 208 (H) 70 - 110 mg/dL    BUN 11 8 - 23 mg/dL    Creatinine 0.5 0.5 - 1.4 mg/dL    Calcium 7.9 (L) 8.7 - 10.5 mg/dL    Total Protein 5.7 (L) 6.0 - 8.4 g/dL    Albumin 1.9 (L) 3.5 - 5.2 g/dL    Total Bilirubin 0.2 0.1 - 1.0 mg/dL    Alkaline Phosphatase 64 55 - 135 U/L    AST 21 10 - 40 U/L    ALT 18 10 - 44 U/L    eGFR >60 >60 mL/min/1.73 m^2    Anion Gap 9 8 - 16 mmol/L   Magnesium    Collection Time: 07/23/23  4:24 AM   Result Value Ref Range    Magnesium 1.9 1.6 - 2.6 mg/dL   Phosphorus    Collection Time: 07/23/23  4:24 AM   Result Value Ref Range    Phosphorus 3.2 2.7 - 4.5 mg/dL   CBC auto differential    Collection Time: 07/23/23  4:24 AM   Result Value Ref Range    WBC 14.86 (H) 3.90 - 12.70 K/uL    RBC 2.77 (L) 4.00 - 5.40 M/uL    Hemoglobin 8.6 (L) 12.0 - 16.0 g/dL    Hematocrit 25.5 (L) 37.0 - 48.5 %    MCV 92 82 - 98 fL    MCH 31.0 27.0 - 31.0 pg    MCHC 33.7 32.0 - 36.0 g/dL    RDW 14.4 11.5 - 14.5 %    Platelets 288 150 - 450 K/uL    MPV 10.2 9.2 - 12.9 fL    Immature Granulocytes 1.9 (H) 0.0 - 0.5 %    Gran # (ANC) 13.0 (H) 1.8 - 7.7 K/uL    Immature Grans (Abs) 0.28 (H) 0.00 - 0.04 K/uL    Lymph # 1.0 1.0 - 4.8 K/uL    Mono # 0.5 0.3 - 1.0 K/uL    Eos # 0.1 0.0 - 0.5 K/uL    Baso # 0.03 0.00 - 0.20 K/uL    nRBC 0 0 /100 WBC    Gran % 87.4 (H) 38.0 - 73.0 %    Lymph % 6.5 (L) 18.0 - 48.0 %    Mono % 3.3 (L) 4.0 - 15.0 %    Eosinophil % 0.7 0.0 - 8.0 %    Basophil % 0.2 0.0 - 1.9 %    Differential Method Automated    POCT glucose    Collection Time: 07/23/23  6:50 AM   Result Value Ref Range    POCT Glucose 158 (H) 70 - 110 mg/dL   POCT glucose    Collection Time: 07/23/23 11:46 AM   Result Value Ref Range    POCT Glucose 143 (H) 70 - 110 mg/dL   POCT glucose    Collection Time: 07/23/23  6:43 PM   Result Value Ref  Range    POCT Glucose 118 (H) 70 - 110 mg/dL   POCT glucose    Collection Time: 07/23/23 11:33 PM   Result Value Ref Range    POCT Glucose 106 70 - 110 mg/dL   Comprehensive Metabolic Panel    Collection Time: 07/24/23  5:09 AM   Result Value Ref Range    Sodium 135 (L) 136 - 145 mmol/L    Potassium 4.2 3.5 - 5.1 mmol/L    Chloride 105 95 - 110 mmol/L    CO2 24 23 - 29 mmol/L    Glucose 141 (H) 70 - 110 mg/dL    BUN 11 8 - 23 mg/dL    Creatinine 0.5 0.5 - 1.4 mg/dL    Calcium 8.2 (L) 8.7 - 10.5 mg/dL    Total Protein 5.7 (L) 6.0 - 8.4 g/dL    Albumin 1.8 (L) 3.5 - 5.2 g/dL    Total Bilirubin 0.2 0.1 - 1.0 mg/dL    Alkaline Phosphatase 60 55 - 135 U/L    AST 19 10 - 40 U/L    ALT 18 10 - 44 U/L    eGFR >60 >60 mL/min/1.73 m^2    Anion Gap 6 (L) 8 - 16 mmol/L   Magnesium    Collection Time: 07/24/23  5:09 AM   Result Value Ref Range    Magnesium 2.0 1.6 - 2.6 mg/dL   Phosphorus    Collection Time: 07/24/23  5:09 AM   Result Value Ref Range    Phosphorus 3.2 2.7 - 4.5 mg/dL   CBC auto differential    Collection Time: 07/24/23  5:09 AM   Result Value Ref Range    WBC 10.50 3.90 - 12.70 K/uL    RBC 2.81 (L) 4.00 - 5.40 M/uL    Hemoglobin 8.5 (L) 12.0 - 16.0 g/dL    Hematocrit 25.4 (L) 37.0 - 48.5 %    MCV 90 82 - 98 fL    MCH 30.2 27.0 - 31.0 pg    MCHC 33.5 32.0 - 36.0 g/dL    RDW 14.6 (H) 11.5 - 14.5 %    Platelets 350 150 - 450 K/uL    MPV 9.6 9.2 - 12.9 fL    Immature Granulocytes 1.5 (H) 0.0 - 0.5 %    Gran # (ANC) 8.8 (H) 1.8 - 7.7 K/uL    Immature Grans (Abs) 0.16 (H) 0.00 - 0.04 K/uL    Lymph # 0.9 (L) 1.0 - 4.8 K/uL    Mono # 0.5 0.3 - 1.0 K/uL    Eos # 0.1 0.0 - 0.5 K/uL    Baso # 0.02 0.00 - 0.20 K/uL    nRBC 0 0 /100 WBC    Gran % 83.9 (H) 38.0 - 73.0 %    Lymph % 9.0 (L) 18.0 - 48.0 %    Mono % 4.6 4.0 - 15.0 %    Eosinophil % 0.8 0.0 - 8.0 %    Basophil % 0.2 0.0 - 1.9 %    Differential Method Automated    POCT glucose    Collection Time: 07/24/23 11:25 AM   Result Value Ref Range    POCT Glucose 165 (H) 70  - 110 mg/dL   POCT glucose    Collection Time: 07/25/23 12:13 AM   Result Value Ref Range    POCT Glucose 129 (H) 70 - 110 mg/dL   CBC auto differential    Collection Time: 07/25/23  4:17 AM   Result Value Ref Range    WBC 10.35 3.90 - 12.70 K/uL    RBC 2.62 (L) 4.00 - 5.40 M/uL    Hemoglobin 7.8 (L) 12.0 - 16.0 g/dL    Hematocrit 23.5 (L) 37.0 - 48.5 %    MCV 90 82 - 98 fL    MCH 29.8 27.0 - 31.0 pg    MCHC 33.2 32.0 - 36.0 g/dL    RDW 14.6 (H) 11.5 - 14.5 %    Platelets 388 150 - 450 K/uL    MPV 9.3 9.2 - 12.9 fL    Immature Granulocytes 0.9 (H) 0.0 - 0.5 %    Gran # (ANC) 8.6 (H) 1.8 - 7.7 K/uL    Immature Grans (Abs) 0.09 (H) 0.00 - 0.04 K/uL    Lymph # 1.0 1.0 - 4.8 K/uL    Mono # 0.6 0.3 - 1.0 K/uL    Eos # 0.1 0.0 - 0.5 K/uL    Baso # 0.03 0.00 - 0.20 K/uL    nRBC 0 0 /100 WBC    Gran % 83.1 (H) 38.0 - 73.0 %    Lymph % 9.3 (L) 18.0 - 48.0 %    Mono % 5.4 4.0 - 15.0 %    Eosinophil % 1.0 0.0 - 8.0 %    Basophil % 0.3 0.0 - 1.9 %    Differential Method Automated    POCT glucose    Collection Time: 07/25/23 11:29 AM   Result Value Ref Range    POCT Glucose 118 (H) 70 - 110 mg/dL   POCT glucose    Collection Time: 07/25/23  4:47 PM   Result Value Ref Range    POCT Glucose 120 (H) 70 - 110 mg/dL   POCT glucose    Collection Time: 07/25/23 11:15 PM   Result Value Ref Range    POCT Glucose 131 (H) 70 - 110 mg/dL   CBC auto differential    Collection Time: 07/26/23  4:06 AM   Result Value Ref Range    WBC 9.24 3.90 - 12.70 K/uL    RBC 2.58 (L) 4.00 - 5.40 M/uL    Hemoglobin 7.7 (L) 12.0 - 16.0 g/dL    Hematocrit 23.4 (L) 37.0 - 48.5 %    MCV 91 82 - 98 fL    MCH 29.8 27.0 - 31.0 pg    MCHC 32.9 32.0 - 36.0 g/dL    RDW 14.6 (H) 11.5 - 14.5 %    Platelets 446 150 - 450 K/uL    MPV 9.2 9.2 - 12.9 fL    Immature Granulocytes 1.0 (H) 0.0 - 0.5 %    Gran # (ANC) 7.3 1.8 - 7.7 K/uL    Immature Grans (Abs) 0.09 (H) 0.00 - 0.04 K/uL    Lymph # 1.1 1.0 - 4.8 K/uL    Mono # 0.6 0.3 - 1.0 K/uL    Eos # 0.1 0.0 - 0.5 K/uL     Baso # 0.03 0.00 - 0.20 K/uL    nRBC 0 0 /100 WBC    Gran % 79.4 (H) 38.0 - 73.0 %    Lymph % 12.0 (L) 18.0 - 48.0 %    Mono % 6.4 4.0 - 15.0 %    Eosinophil % 0.9 0.0 - 8.0 %    Basophil % 0.3 0.0 - 1.9 %    Differential Method Automated    POCT glucose    Collection Time: 07/26/23  4:49 AM   Result Value Ref Range    POCT Glucose 131 (H) 70 - 110 mg/dL   POCT glucose    Collection Time: 07/26/23 12:03 PM   Result Value Ref Range    POCT Glucose 115 (H) 70 - 110 mg/dL       Microbiology Results (last 7 days)       Procedure Component Value Units Date/Time    Blood culture [[id number]] Collected: 07/22/23 0620    Order Status: Completed Specimen: Blood from Peripheral, Right Wrist Updated: 07/26/23 0903     Blood Culture, Routine No Growth after 4 days.    Blood culture [[id number]] Collected: 07/22/23 0620    Order Status: Completed Specimen: Blood from Peripheral, Left Hand Updated: 07/26/23 0903     Blood Culture, Routine No Growth after 4 days.    Culture, Anaerobe [[id number]] Collected: 07/21/23 1414    Order Status: Completed Specimen: Abscess from Peritoneal Fluid Updated: 07/25/23 1029     Anaerobic Culture Culture in progress    Narrative:      peritoneal abscess    Aerobic culture [[id number]]  (Abnormal) Collected: 07/21/23 1413    Order Status: Completed Specimen: Abscess Updated: 07/23/23 0741     Aerobic Bacterial Culture CANDIDA ALBICANS  Moderate      Gram stain [[id number]] Collected: 07/21/23 1414    Order Status: Completed Specimen: Abscess from Peritoneal Fluid Updated: 07/21/23 1508     Gram Stain Result Many WBC's      No organisms seen    Narrative:      Peritoneal abscess            Imaging Results              X-Ray Abdomen AP 1 View (Final result)  Result time 07/14/23 15:04:30      Final result by Manuel Rey MD (07/14/23 15:04:30)                   Impression:      As above      Electronically signed by: Manuel Rey MD  Date:    07/14/2023  Time:    15:04               Narrative:     EXAMINATION:  XR ABDOMEN AP 1 VIEW    CLINICAL HISTORY:  ng tube eval;    TECHNIQUE:  Single AP View of the abdomen was performed.    COMPARISON:  07/14/2023 at 14:06    FINDINGS:  NG tube has been advanced, and now in good position with the tip and proximal port both subdiaphragmatic.    Otherwise stable exam, noting a few dilated loops of small bowel.  No obvious free air.                                       X-Ray Abdomen AP 1 View (KUB) (Final result)  Result time 07/14/23 14:15:20      Final result by Jose Rosales MD (07/14/23 14:15:20)                   Impression:      Recommended advancement of nasogastric tube.      Electronically signed by: Jose Rosales MD  Date:    07/14/2023  Time:    14:15               Narrative:    EXAMINATION:  XR ABDOMEN AP 1 VIEW    TECHNIQUE:  XR ABDOMEN AP 1 VIEW    FINDINGS:  The bowel gas pattern is non-specific.    No airspace consolidation at the lung bases.No acute bony abnormality.No abnormal soft tissue masses.No abnormal calcific densities.Contrast present within the kidneys.  Postoperative changes lower lumbar spine.  Nasogastric tube present with side hole at the level of the GE junction can be advanced.                                        CT Abdomen Pelvis With Contrast (Final result)  Result time 07/14/23 12:01:03      Final result by Sulaiman Ford MD (07/14/23 12:01:03)                   Impression:      Status post hysterectomy and sigmoid surgical therapy.    Abnormal fluid, air distension small bowel, termination point at or near ileocecal valve, not well-defined, associated less distension proximal small bowel with some bowel wall thickening.  Adhesion etiology of obstructive process favored.    Posterior pelvic ascites, mesenteric edema.  Addition remote findings above.    This report was flagged in Epic as abnormal.      Electronically signed by: Sulaiman Ford MD  Date:    07/14/2023  Time:    12:01               Narrative:     EXAMINATION:  CT ABDOMEN PELVIS WITH CONTRAST    CLINICAL HISTORY:  RLQ abdominal pain (Age >= 14y);Bowel obstruction suspected;    TECHNIQUE:  Low dose axial images, sagittal and coronal reformations were obtained from the lung bases to the pubic symphysis following the IV administration of 85 mL of Omnipaque 350 and the oral administration of 15 mL of Omnipaque 300.    COMPARISON:  None.    FINDINGS:  Postop posterior spinal fusion, with pedicular screws and vertical stabilization bars and laminectomy L5-S1, placement of interbody disc spacer, degenerative disc spondylosis with chronic sclerotic erosive changes T11-T12 L1-L2, L2-L3, L 4 and L5 disc levels, primary anterior subluxation L3 on L2, L4 on L5, L5 on S1.    Status of bilateral breast prosthetic surgery with calcification of capsule of prosthesis.  Focal tiny ossification anterior to linea alba above level of umbilicus.  Some diastasis of linea alba above and at and below umbilicus.  Subcutaneous edema inferior, medial, posterior buttocks.    Calcified plaquing aorta major branches particularly distal aorta and common iliac arteries.    Liver, gallbladder, spleen, adrenal glands, kidneys, pancreas biliary tree normal.    Retroaortic left renal vein.  No adenopathy.  Mild amount of ascites posterior pelvis presacral particularly on right, 6.2 x 4.8 cm.  Urinary bladder small.  7 mm midline calcification at level of introitus of uncertain etiology.    Mild distension gastric cardia with fluid, distal stomach, intact.  Minimal mild distension duodenal with air.  Minimal mild distension proximal small bowel jejunum left flank with suggestion of some bowel wall thickening.  Moderate severe distension mid distal small bowel, iliac loop 3.3 diameter central pelvis.  No nondistended terminal ileum confirmed, nondistended large bowel, suture line postop bowel resection sigmoid at left lateral central pelvic region.  Appendix not identified.    Postop hysterectomy.   No adnexal mass.                                          Pending Diagnostic Studies:       Procedure Component Value Units Date/Time    EKG 12-lead [[id number]]     Order Status: Sent Lab Status: No result            Medications:  Reconciled Home Medications:      Medication List        START taking these medications      amoxicillin-clavulanate 875-125mg 875-125 mg per tablet  Commonly known as: AUGMENTIN  Take 1 tablet by mouth 2 (two) times daily. for 7 days  Start taking on: July 27, 2023     fluconazole 200 MG Tab  Commonly known as: DIFLUCAN  Take 1 tablet (200 mg total) by mouth once daily. for 7 days     HYDROcodone-acetaminophen 5-325 mg per tablet  Commonly known as: NORCO  Take 1-2 tablets by mouth every 4 (four) hours as needed for Pain.  Replaces: HYDROcodone-acetaminophen  mg per tablet            CONTINUE taking these medications      amLODIPine 5 MG tablet  Commonly known as: NORVASC  once daily.     buPROPion 150 MG TBSR 12 hr tablet  Commonly known as: WELLBUTRIN SR  Take 150 mg by mouth 2 (two) times daily.     ergocalciferol 50,000 unit Cap  Commonly known as: ERGOCALCIFEROL  Take 1 capsule (50,000 Units total) by mouth every Sunday.     raloxifene 60 mg tablet  Commonly known as: EVISTA  Take 60 mg by mouth once daily.     sertraline 100 MG tablet  Commonly known as: ZOLOFT  Take 100 mg by mouth every morning.            STOP taking these medications      HYDROcodone-acetaminophen  mg per tablet  Commonly known as: NORCO  Replaced by: HYDROcodone-acetaminophen 5-325 mg per tablet     lisinopriL-hydrochlorothiazide 10-12.5 mg per tablet  Commonly known as: PRINZIDE,ZESTORETIC     meloxicam 15 MG tablet  Commonly known as: MOBIC              Indwelling Lines/Drains at time of discharge:   Lines/Drains/Airways       Peripherally Inserted Central Catheter Line  Duration             PICC Double Lumen 07/17/23 1955 right basilic 8 days              Drain  Duration                   Closed/Suction Drain 07/21/23 1418 Inferior;Lateral;Right Back Accordion 10 Fr. 4 days                    Time spent on the discharge of patient: 35 minutes         Kyrie Colvin MD  Department of Hospital Medicine  [facility]

## 2023-07-26 NOTE — PLAN OF CARE
[facility]      HOME HEALTH ORDERS  FACE TO FACE ENCOUNTER    Patient Name: Marilee Townsend  YOB: 1960    PCP: Claudia Hopper DO   PCP Address: [address] [address] [facility] [place] [facility]*  PCP Phone Number: [phone]  PCP Fax: [phone]    Encounter Date: 7/14/23    Admit to Home Health    Diagnoses:  Active Hospital Problems    Diagnosis  POA    *Small bowel obstruction [K56.609]  Yes     Priority: 1 - High    Atelectasis [J98.11]  Yes    Weakness [R53.1]  Yes    Body mass index (BMI) less than 19 [Z68.1]  Not Applicable    Advanced care planning/counseling discussion [Z71.89]  Not Applicable    Hypokalemia [E87.6]  Yes    HTN (hypertension) [I10]  Yes    Leukocytosis [D72.829]  Yes    Depression [F32.A]  Yes    Osteoporosis [M81.0]  Yes      Resolved Hospital Problems   No resolved problems to display.       Follow Up Appointments:  Future Appointments   Date Time Provider Department Center   8/2/2023 10:15 AM Bora Quevedo MD [facility]   9/5/2023  8:30 AM LAB, [facility] LAB [place]   9/6/2023  9:45 AM Cristi Brady MD [facility] PLASTIC [facility]   9/11/2023 10:20 AM Claudia Hopper DO [facility] MED [place]   9/12/2023 10:40 AM Claudia Hopper DO [facility] MED [place]       Allergies:  Review of patient's allergies indicates:   Allergen Reactions    Morphine Nausea And Vomiting       Medications: Review discharge medications with patient and family and provide education.    Current Facility-Administered Medications   Medication Dose Route Frequency Provider Last Rate Last Admin    0.9%  NaCl infusion (for blood administration)   Intravenous Q24H PRN Adriane Jang, DO   New Bag at 07/21/23 1458    acetaminophen tablet 1,000 mg  1,000 mg Oral Q8H Manuel Wilson MD   1,000 mg at 07/26/23 1300    acetaminophen tablet 650 mg  650 mg Oral Q8H PRN Manuel Wilson MD   650 mg at 07/17/23 0500    acetaminophen tablet 650 mg  650 mg  Oral Q4H PRN Manuel Wilson MD        Amino acid 5% - dextrose 15% (CLINIMIX-E) solution with additives. CENTRAL LINE ONLY (1395 mOsm/L). 1L provides 50 gm AA, 150 gm CHO (510 kcal/L dextrose), Na 35, K 30, Mg 5, Ca 4.5, Acetate 80, Cl 39, Phos 15   Intravenous Continuous Bora Quevedo MD 75 mL/hr at 07/25/23 2229 New Bag at 07/25/23 2229    Amino acid 5% - dextrose 15% (CLINIMIX-E) solution with additives. CENTRAL LINE ONLY (1395 mOsm/L). 1L provides 50 gm AA, 150 gm CHO (510 kcal/L dextrose), Na 35, K 30, Mg 5, Ca 4.5, Acetate 80, Cl 39, Phos 15   Intravenous Continuous Jesus Adams MD        amLODIPine tablet 5 mg  5 mg Oral Daily Manuel Wilson MD   5 mg at 07/26/23 0837    ampicillin-sulbactam (UNASYN) 3 g in sodium chloride 0.9 % 100 mL IVPB (MB+)  3 g Intravenous Q6H Beverly Ortiz MD   Stopped at 07/26/23 1128    buPROPion TBSR 12 hr tablet 150 mg  150 mg Oral BID Manuel Wilson MD   150 mg at 07/26/23 0837    dextrose 50% injection 12.5 g  12.5 g Intravenous PRN Manuel Wilson MD        dextrose 50% injection 25 g  25 g Intravenous PRN Manuel Wilson MD        diatrizoate meglumineand-diatrizoate sodium (GASTROVIEW) solution 50 mL  50 mL Oral ONCE PRN Manuel Wilson MD        fat emulsion 20 % infusion 250 mL  250 mL Intravenous Once Jesus Adams MD        fluconazole tablet 400 mg  400 mg Oral Daily Vianney-Rosa Maria ZAMORANO Jang, DO   400 mg at 07/26/23 0837    glucagon (human recombinant) injection 1 mg  1 mg Intramuscular PRN Manuel Wilson MD        glucose chewable tablet 16 g  16 g Oral PRN Manuel Wilson MD        glucose chewable tablet 24 g  24 g Oral PRN Manuel Wilson MD        heparin (porcine) injection 5,000 Units  5,000 Units Subcutaneous Q8H Manuel Wilson MD   5,000 Units at 07/26/23 1300    HYDROmorphone injection 0.5 mg  0.5 mg Intravenous Q3H PRN Manuel Wilson MD        HYDROmorphone injection 1 mg  1 mg Intravenous Q3H PRN  Manuel Wilson MD   1 mg at 07/26/23 1053    insulin aspart U-100 pen 0-5 Units  0-5 Units Subcutaneous Q6H PRN Wesley Vargas III, MD        loperamide capsule 2 mg  2 mg Oral QID PRN Kyrie Colvin MD   2 mg at 07/23/23 1438    naloxone 0.4 mg/mL injection 0.02 mg  0.02 mg Intravenous PRN Manuel Wilson MD        ondansetron injection 4 mg  4 mg Intravenous Q8H PRN Manuel Wilson MD   4 mg at 07/24/23 1932    pantoprazole injection 40 mg  40 mg Intravenous BID Abimbola Diana MD   40 mg at 07/26/23 0836    raloxifene tablet 60 mg  60 mg Oral Daily Manuel Wilson MD   60 mg at 07/26/23 0837    sertraline tablet 100 mg  100 mg Oral QAM Manuel Wilson MD   100 mg at 07/26/23 0527    sodium chloride 0.9% flush 10 mL  10 mL Intravenous PRN Manuel Wilson MD        sodium chloride 0.9% flush 10 mL  10 mL Intravenous Q6H Manuel Wilson MD   10 mL at 07/26/23 0600    And    sodium chloride 0.9% flush 10 mL  10 mL Intravenous PRN Manuel Wilson MD   10 mL at 07/23/23 2338     Current Discharge Medication List        START taking these medications    Details   amoxicillin-clavulanate 875-125mg (AUGMENTIN) 875-125 mg per tablet Take 1 tablet by mouth 2 (two) times daily. for 7 days  Qty: 14 tablet, Refills: 0      fluconazole (DIFLUCAN) 200 MG Tab Take 1 tablet (200 mg total) by mouth once daily. for 7 days  Qty: 7 tablet, Refills: 0      HYDROcodone-acetaminophen (NORCO) 5-325 mg per tablet Take 1-2 tablets by mouth every 4 (four) hours as needed for Pain.  Qty: 60 tablet, Refills: 0    Comments: Quantity prescribed more than 7 day supply? No           CONTINUE these medications which have NOT CHANGED    Details   amLODIPine (NORVASC) 5 MG tablet once daily.      buPROPion (WELLBUTRIN SR) 150 MG TBSR 12 hr tablet Take 150 mg by mouth 2 (two) times daily.      ergocalciferol (ERGOCALCIFEROL) 50,000 unit Cap Take 1 capsule (50,000 Units total) by mouth every Sunday.  Qty: 12 capsule,  Refills: 3    Associated Diagnoses: Age related osteoporosis, unspecified pathological fracture presence      raloxifene (EVISTA) 60 mg tablet Take 60 mg by mouth once daily.      sertraline (ZOLOFT) 100 MG tablet Take 100 mg by mouth every morning.            STOP taking these medications       HYDROcodone-acetaminophen (NORCO)  mg per tablet Comments:   Reason for Stopping:         lisinopriL-hydrochlorothiazide (PRINZIDE,ZESTORETIC) 10-12.5 mg per tablet Comments:   Reason for Stopping:         meloxicam (MOBIC) 15 MG tablet Comments:   Reason for Stopping:                 I have seen and examined this patient within the last 30 days. My clinical findings that support the need for the home health skilled services and home bound status are the following:no   Weakness/numbness causing balance and gait disturbance due to Infection and Weakness/Debility making it taxing to leave home.  Requiring assistive device to leave home due to unsteady gait caused by  Infection and Weakness/Debility.     Diet:   regular diet  Urge Protein boosts with meals    Labs:  N/a    Referrals/ Consults  Physical Therapy to evaluate and treat. Evaluate for home safety and equipment needs; Establish/upgrade home exercise program. Perform / instruct on therapeutic exercises, gait training, transfer training, and Range of Motion.  Occupational Therapy to evaluate and treat. Evaluate home environment for safety and equipment needs. Perform/Instruct on transfers, ADL training, ROM, and therapeutic exercises.   to evaluate for community resources/long-range planning.  Aide to provide assistance with personal care, ADLs, and vital signs.    Activities:   activity as tolerated    Nursing:   Agency to admit patient within 24 hours of hospital discharge unless specified on physician order or at patient request    SN to complete comprehensive assessment including routine vital signs. Instruct on disease process and s/s of  complications to report to MD. Review/verify medication list sent home with the patient at time of discharge  and instruct patient/caregiver as needed. Frequency may be adjusted depending on start of care date.     Skilled nurse to perform up to 3 visits PRN for symptoms related to diagnosis    Notify MD if SBP > 160 or < 90; DBP > 90 or < 50; HR > 120 or < 50; Temp > 101; O2 < 88%;    Ok to schedule additional visits based on staff availability and patient request on consecutive days within the home health episode.    When multiple disciplines ordered:    Start of Care occurs on Sunday - Wednesday schedule remaining discipline evaluations as ordered on separate consecutive days following the start of care.    Thursday SOC -schedule subsequent evaluations Friday and Monday the following week.     Friday - Saturday SOC - schedule subsequent discipline evaluations on consecutive days starting Monday of the following week.    For all post-discharge communication, lab results, vitals, and subsequent orders- please contact patient's PCP.  If unable to get ahold of PCP, and question is about blood pressure, diuresis, or arrhythmia attempt to contact cardiologist.  If unable to get ahold of PCP, and question is about antibiotics or wound care, please contact infectious disease doctor.  If unable to get ahold of PCP or the above physicians in a reasonable time, please contact  on-call for clarification.    Home Health Aide:  Nursing Twice weekly  Physical Therapy Three times weekly  Occupational Therapy Three times weekly  Medical Social Work Weekly  Home Health Aide Twice weekly    Wound Care Orders  Yes, abdo surgical wound (and small drain wound):  Surgical incision care:   Use a normal saline solution (salt water) or mild soapy water.   Soak the gauze or cloth in the saline solution or soapy water, and gently dab or wipe the skin with it.   Try to remove all drainage and any dried blood or other matter that may have  built up on the skin.  Air-dry the incision or pat it dry with a clean, fresh towel before reapplying the dressing.  Do not scrub or soak the wound.   Do not use rubbing alcohol, hydrogen peroxide, or iodine, which can harm the tissue and slow wound healing.        I certify that this patient is confined to her home and needs intermittent skilled nursing care, physical therapy, and occupational therapy.        Kyrie Colvin MD  Internal Medicine Staff

## 2023-07-26 NOTE — SUBJECTIVE & OBJECTIVE
Interval History: No acute events overnight. Feeling better today. WBC continuing to downtrend.    Review of Systems   Constitutional:  Negative for chills, diaphoresis, fatigue and fever.   Respiratory:  Negative for cough and shortness of breath.    Cardiovascular:  Negative for chest pain and leg swelling.   Gastrointestinal:  Positive for abdominal pain (improving). Negative for nausea and vomiting. Diarrhea: resolved.  Genitourinary:  Negative for difficulty urinating and dysuria.   Musculoskeletal:  Negative for arthralgias and back pain.   Psychiatric/Behavioral:  Negative for agitation and confusion.    All other systems reviewed and are negative.  Objective:     Vital Signs (Most Recent):  Temp: 98.4 °F (36.9 °C) (07/26/23 1201)  Pulse: 96 (07/26/23 1201)  Resp: 18 (07/26/23 1405)  BP: 133/76 (07/26/23 1201)  SpO2: 97 % (07/26/23 1201) Vital Signs (24h Range):  Temp:  [98.3 °F (36.8 °C)-98.9 °F (37.2 °C)] 98.4 °F (36.9 °C)  Pulse:  [80-96] 96  Resp:  [15-20] 18  SpO2:  [94 %-97 %] 97 %  BP: (107-133)/(63-76) 133/76     Weight: 45.4 kg (100 lb)  Body mass index is 18.29 kg/m².    Estimated Creatinine Clearance: 80.4 mL/min (based on SCr of 0.5 mg/dL).     Physical Exam  Vitals reviewed.   HENT:      Head: Normocephalic and atraumatic.   Eyes:      Conjunctiva/sclera: Conjunctivae normal.      Pupils: Pupils are equal, round, and reactive to light.   Pulmonary:      Effort: Pulmonary effort is normal. No respiratory distress.      Breath sounds: Normal breath sounds.   Abdominal:      General: Bowel sounds are normal.      Palpations: Abdomen is soft.      Comments: Vertical incision- minimal erythema, no drainage.   Drain at superior aspect of rt buttocks region- serosanguineous output.    Musculoskeletal:      Right lower leg: No edema.      Left lower leg: No edema.   Skin:     Findings: No lesion or rash.   Neurological:      General: No focal deficit present.      Mental Status: She is oriented to  person, place, and time.        Significant Labs: Blood Culture:   Recent Labs   Lab 07/14/23  1308 07/22/23  0620   LABBLOO No Growth after 4 days.  No Growth after 4 days. No Growth after 4 days.  No Growth after 4 days.       Wound Culture:   Recent Labs   Lab 07/21/23  1413   LABAERO CANDIDA ALBICANS  Moderate  *       All pertinent labs within the past 24 hours have been reviewed.    Significant Imaging: I have reviewed all pertinent imaging results/findings within the past 24 hours.

## 2023-07-26 NOTE — ASSESSMENT & PLAN NOTE
"IA abscess  63F with h/o  prior abdominal surgeries admitted 7/14 with progressive intractable abdominal pain, decreased appetite, nausea, found to have sbo, thought to be secondary to adhesions and kinking with multiple enterotomies, s/p ex lap, small bowl resection on 7/16.  Gi consulted for tarry stools; anemia. ID consulted for "worsening leukocytosis; s/p bowel resection on 7/16.  CT 7/21 revealed mod intraperitoneal fluid (likely proteinaceous fluid), pelvic fluid collection. No contrast extravasation noted.  S/p drain placement 7/21 to a 14 x 13 x 7 cm abdominal fluid collection, drain with serosanguinous output, cultures candida albicans.    WBC downtrending on unasyn and fluc. Minimal output from drain. Gen sx planning on drain removal today.    Recommendations:  - given presumed source control and continued clinical improvement will plan to treat x 1 wk from drain removal  - transition from unasyn to augmentin on discharge x 7 days  - continue fluconazole x 7 days  - counseled on signs and symptoms of worsening infection. Would recommend repeat CT a/p if these were to arise to r/o residual abscess.  - f/u with gen sx  "

## 2023-07-26 NOTE — SUBJECTIVE & OBJECTIVE
Interval History:   Doing great.  In great spirits this morning visiting with her sister.    Drain is pretty minimal output it is serous in quality about 5 cc in the last 24 hours.    WBC within normal limits   Abdominal exam is benign   Having more normal bowel function    Medications:  Continuous Infusions:   Amino acid 5% - dextrose 15% (CLINIMIX-E) solution with additives. CENTRAL LINE ONLY (1395 mOsm/L). 1L provides 50 gm AA, 150 gm CHO (510 kcal/L dextrose), Na 35, K 30, Mg 5, Ca 4.5, Acetate 80, Cl 39, Phos 15 75 mL/hr at 07/25/23 2229     Scheduled Meds:   acetaminophen  1,000 mg Oral Q8H    amLODIPine  5 mg Oral Daily    ampicillin-sulbactim (UNASYN) IVPB  3 g Intravenous Q6H    buPROPion  150 mg Oral BID    fluconazole  400 mg Oral Daily    heparin (porcine)  5,000 Units Subcutaneous Q8H    pantoprazole  40 mg Intravenous BID    raloxifene  60 mg Oral Daily    sertraline  100 mg Oral QAM    sodium chloride 0.9%  10 mL Intravenous Q6H     PRN Meds:sodium chloride, acetaminophen, acetaminophen, dextrose 50%, dextrose 50%, diatrizoate meglumineand-diatrizoate sodium, glucagon (human recombinant), glucose, glucose, HYDROmorphone, HYDROmorphone, insulin aspart U-100, loperamide, naloxone, ondansetron, sodium chloride 0.9%, Flushing PICC Protocol **AND** sodium chloride 0.9% **AND** sodium chloride 0.9%     Review of patient's allergies indicates:   Allergen Reactions    Morphine Nausea And Vomiting     Objective:     Vital Signs (Most Recent):  Temp: 98.4 °F (36.9 °C) (07/26/23 0726)  Pulse: 86 (07/26/23 0726)  Resp: 18 (07/26/23 1053)  BP: 125/67 (07/26/23 0726)  SpO2: 95 % (07/26/23 0832) Vital Signs (24h Range):  Temp:  [98.2 °F (36.8 °C)-98.9 °F (37.2 °C)] 98.4 °F (36.9 °C)  Pulse:  [80-99] 86  Resp:  [15-20] 18  SpO2:  [93 %-98 %] 95 %  BP: (107-143)/(63-76) 125/67     Weight: 45.4 kg (100 lb)  Body mass index is 18.29 kg/m².    Intake/Output - Last 3 Shifts         07/24 0700 07/25 0659 07/25 0700 07/26  0659 07/26 0700  07/27 0659    P.O. 720 120 240    IV Piggyback 100 196.4     .4 826.3     Total Intake(mL/kg) 924.4 (20.4) 1142.7 (25.2) 240 (5.3)    Urine (mL/kg/hr) 2200 (2) 2 (0)     Drains 10 0     Stool 0 0     Total Output 2210 2     Net -1285.7 +1140.7 +240           Urine Occurrence 1 x 9 x 1 x    Stool Occurrence 0 x 2 x              Physical Exam  Vitals and nursing note reviewed.   Constitutional:       Appearance: She is well-developed.   HENT:      Head: Normocephalic and atraumatic.   Cardiovascular:      Rate and Rhythm: Normal rate.      Heart sounds: Normal heart sounds.   Pulmonary:      Effort: Pulmonary effort is normal.   Abdominal:      General: Bowel sounds are normal. There is no distension.      Palpations: Abdomen is soft.      Tenderness: There is no abdominal tenderness.      Comments: Abdominal incision is well healed   Abdominal drain with minimal serosanguineous output.   Musculoskeletal:         General: Normal range of motion.      Cervical back: Normal range of motion.   Skin:     General: Skin is warm and dry.      Capillary Refill: Capillary refill takes less than 2 seconds.   Neurological:      Mental Status: She is alert and oriented to person, place, and time.   Psychiatric:         Behavior: Behavior normal.        Significant Labs:  I have reviewed all pertinent lab results within the past 24 hours.  CBC:   Recent Labs   Lab 07/26/23  0406   WBC 9.24   RBC 2.58*   HGB 7.7*   HCT 23.4*      MCV 91   MCH 29.8   MCHC 32.9     CMP:   Recent Labs   Lab 07/24/23  0509   *   CALCIUM 8.2*   ALBUMIN 1.8*   PROT 5.7*   *   K 4.2   CO2 24      BUN 11   CREATININE 0.5   ALKPHOS 60   ALT 18   AST 19   BILITOT 0.2       Significant Diagnostics:  I have reviewed all pertinent imaging results/findings within the past 24 hours.

## 2023-07-26 NOTE — NURSING
Discharge instructions given. Pt verbalized understanding of instructions. PICC line discontinued. 35cm in length, length verified with insertion documentation.

## 2023-07-27 ENCOUNTER — PATIENT OUTREACH (OUTPATIENT)
Dept: ADMINISTRATIVE | Facility: CLINIC | Age: 63
End: 2023-07-27
Payer: MEDICARE

## 2023-07-27 ENCOUNTER — TELEPHONE (OUTPATIENT)
Dept: FAMILY MEDICINE | Facility: CLINIC | Age: 63
End: 2023-07-27
Payer: MEDICARE

## 2023-07-27 LAB — BACTERIA SPEC ANAEROBE CULT: ABNORMAL

## 2023-07-27 NOTE — PROGRESS NOTES
C3 nurse attempted to contact Marilee Townsend  for a TCC post hospital discharge follow up call. No answer. Left voicemail with callback information. The patient does not have a scheduled HOSFU appointment. Message sent to PCP staff for assistance with scheduling visit with patient.       Subjective    Patient: Mango Diaz   MRN: [id number]  : 1992    Reason For Visit  Patient presents today with New Patient (hospital follow up)      HPI  Pt was in [place] ER x 2 for bounding HR. Pt reports she was told workup negative (labs and EKG, CXR). Pt reports second time had fluctuating HR. Pt feeling a \"bubble pop\" or skipped Heartbeat. No CP.  Pt feels she has to yawn or take a deep breathing. No change w/ cough, sneeze. Pt has anxiety, but doesn't feel her anxiety has led to palpitations. Pt has never been treated for anxiety. Last episode last pm. No changes in BP. Does occasioanly feel lightheaded. Was smoking marijuana regularly, but stopped it 2 months ago and still having palpitations. Drinks 1 cup of coffee QD. No triggers to palpitations.Last doctor's visit >5 yrs.     Review of Systems   HENT:        Pt has significant hay fever. Uses flonase w/ some relief. Antihistamines don't work. No cough, trouble breathing. Has itchy eyes.   All other systems reviewed and are negative.      ALLERGIES:  No Known Allergies    Current Outpatient Medications   Medication Sig Dispense Refill   • Multiple Vitamins-Minerals (MULTIVITAMIN ADULT PO) Take 1 tablet by mouth daily.       No current facility-administered medications for this visit.        Patient Active Problem List   Diagnosis   • Palpitations   • Environmental allergies   • Anxiety       History reviewed. No pertinent past medical history.    Past Surgical History:   Procedure Laterality Date   • Cosmetic surgery      cyst removed from above left eye at 3 yo       Family History   Problem Relation Age of Onset   • Diabetes Mother    • Rheumatoid Arthritis Mother        Social History     Socioeconomic History   • Marital status: Single     Spouse name: None   • Number of children: None   • Years of education: None   • Highest education level: None   Social Needs   • Financial resource strain: None   • Food insecurity - worry: None   •  Food insecurity - inability: None   • Transportation needs - medical: None   • Transportation needs - non-medical: None   Occupational History   • Occupation: nail tech   Tobacco Use   • Smoking status: Former Smoker     Packs/day: 0.50     Years: 3.00     Pack years: 1.50     Types: Cigarettes     Last attempt to quit: 2013     Years since quittin.2   • Smokeless tobacco: Never Used   • Tobacco comment: patient currently uses vape pen   Substance and Sexual Activity   • Alcohol use: Yes     Alcohol/week: 4.2 oz     Types: 5 Cans of beer, 2 Standard drinks or equivalent per week     Comment: bi monthly   • Drug use: Yes     Frequency: 3.0 times per week     Types: Marijuana   • Sexual activity: Yes     Partners: Female     Birth control/protection: None   Other Topics Concern   • None   Social History Narrative   • None       Past medical history, problem list, family medical history, surgical history, and social history have all been reviewed    Objective    Visit Vitals  /84 (BP Location: [facility], Patient Position: Sitting, Cuff Size: Regular)   Pulse 68   Temp 100 °F (37.8 °C) (Tympanic)   Resp 16   Ht 5' 1\" (1.549 m)   Wt 91.6 kg (202 lb)   BMI 38.17 kg/m²       Physical Exam   Constitutional: She is oriented to person, place, and time. She appears well-developed and well-nourished. No distress.   HENT:   Head: Normocephalic and atraumatic.   Tympanic membranes clear, nares with increased bogginess of the nasal turbinates with clear postnasal drip.  No posterior pharyngeal lesions.   Neck: Normal range of motion. Neck supple. No JVD present. No thyromegaly present.   No carotid bruits bilaterally   Cardiovascular: Normal rate and regular rhythm.   No murmur heard.  Occasional ectopy   Pulmonary/Chest: Effort normal and breath sounds normal. No respiratory distress. She has no wheezes.   Abdominal: Soft. Bowel sounds are normal. She exhibits no distension and no mass. There is no tenderness. There is no  rebound and no guarding.   Musculoskeletal: Normal range of motion. She exhibits no edema or deformity.   Lymphadenopathy:     She has no cervical adenopathy.   Neurological: She is alert and oriented to person, place, and time. No cranial nerve deficit.   Skin: Skin is warm and dry. She is not diaphoretic. No erythema.   Psychiatric: She has a normal mood and affect. Her behavior is normal. Judgment and thought content normal.   Nursing note and vitals reviewed.      Assessment & Plan  diagnosis of    Palpitations  EKG performed in the office on March 13, 2019 showed a sinus arrhythmia with a rate of 72 bpm and normal axis and intervals with no acute ST-T wave abnormalities.  We will check a TSH and try and obtain labs from [facility].  Reviewed with the patient she will need a Holter monitor.  Referral will be done for 48-hour Holter.  The patient was instructed to perform her normal activities including exercising while wearing the Holter to see if she is having any significant arrhythmias.  Reviewed with the patient to decrease or discontinue all caffeine and marijuana usage and to get plenty of sleep.  She is to avoid dehydration.  Further recommendations pending labs and further workup.  If workup is negative, may need to treat the patient for underlying anxiety.    Environmental allergies  Reviewed avoiding irritants and triggers.  Reviewed using antihistamines and over-the-counter nasal sprays.    Anxiety  Reviewed stress relieving techniques and journaling with the patient.  All of her questions were answered.  The patient was instructed to monitor her cardiac symptoms and record.      Jessy Mendez DO

## 2023-07-27 NOTE — TELEPHONE ENCOUNTER
----- Message from Lynn Wilks RN sent at 7/26/2023  1:35 PM CDT -----  Regarding: Hospital follow up  Please contact patient to schedule hospital follow up appointment. Patient discharge today. Thank you

## 2023-07-28 NOTE — TELEPHONE ENCOUNTER
Malini (sister)    Requesting appetite stimulant or other care advice.    @ [phone] secure high priority message sent to Bora Quevedo MD regarding Malini's stated questions/concerns. Malini advised of messages to PCP and Dr. Quevedo. Request to please f/u on messages if no response. Malini ortega.

## 2023-07-28 NOTE — PROGRESS NOTES
C3 nurse spoke with Marilee Townsend's sister, Malini for a TCC post hospital discharge follow up call. The patient has a scheduled HOSFU appointment with Katlyn Rice NP on 08/04/23 @ 0900.    Scheduled on date Malini requested.

## 2023-07-28 NOTE — PROGRESS NOTES
2nd Attempt made to reach patient for TCC call. Left voicemail please call [phone] leave first name, last name, and  Sammi will return your call.

## 2023-07-31 NOTE — PROGRESS NOTES
HPI     Chief Complaint:  Hospital follow up    Marilee Townsend is a 63 y.o. female with multiple medical diagnoses as listed in the medical history and problem list that presents for hospital follow up.  Pt is known to me with his her last appointment 5/10/2023.      Pt's primary caregiver is present during exam today.    Recent hospital encounter. See below encounter note from 7/26/2023.    HPI:   63F with pmh of depression, fibromyalgia, htn, osteoporosis who presents due to several day h/o abd pain and nausea with anorexia. Pt was evaluated at outside emergency department patient was seen in the day prompting the ED for evaluation.  Patient denies any recent flatulence or bowel movements unclear on the last date.  Patient states pain is diffuse in the with minimal improvement with pain medications given in the ED. CT scan in the emergency department with demonstration of small bowel obstruction and surgery consulted who had NG tube placed.  Patient denies chest pain, shortness a breath, fever, chills.  Patient states she had a shoulder surgery about 3 weeks prior to arrival and tolerated the anesthesia well.  Patient is a former smoker, but denies alcohol or drug use.        Procedure(s) (LRB):  LAPAROSCOPY, DIAGNOSTIC (N/A)  LAPAROTOMY, EXPLORATORY (N/A)  EXCISION, SMALL INTESTINE       Hospital Course:   63F with pmh of depression, fibromyalgia, htn, osteoporosis admitted on 07/07/2023 for small bowel obstruction. presented with a several day history of abdominal pain, nausea, vomiting, and PO intolerance.  CT scan in ED with demonstration of small bowel obstruction and surgery consulted who had NG tube placed. Attempts at gastrografin with persistent obstruction. Pt taken to OR on 7/16. NG tube remained after operation and surgery managing. She is now status post exploratory laparotomy on 07/16/2023 with multiple small bowel resections and anastomosis, evidence of enterotomy with over-sewing. Replace  electrolytes as needed. Had BM on 07/21/2023. PT/OT consulted for evaluation. NGT removed on 07/21/2023. Patient with worsening leukocytosis on 07/21. Repeat CT abd showed moderate quantity of intraperitoneal fluid in the pelvis. IR consulted-s/p transgluteal-approach drainage catheter into the complex pelvic fluid collection. Fluid cx with candida albicans. ID consulted-continued on zosyn and micafungin. Repeat blood cx with NGTD. Leukocytosis resolved on 07/24/23. Continue to monitor       Scant fluid in drain, may be able to remove, will discuss with [facility]. Likely discharge home with HH if so.         Your blood pressure has been at goal with just amlodipine 5 mg  If not at goal, can increase this to 10 mg with your PCP  Would discontinue lisinopril and hydrochlorothiazide combination pill  Would hold off on taking meloxicam, Aleve, ibuprofen, NSAIDs while your on antibiotics  Take Augmentin and Diflucan both for 7 days total, follow instructions on bottle  Do not take Tylenol with the Norco, as you will already be at the upper limit of maximum daily amount  Sixty pain pills ordered, Take 1 pill for moderate pain, take 2 pills for more severe pain  Follow-up with PCP, General surgery, and Infectious Disease  Will try to get home health set up for you, with wound care  But generally, he are wound care instructions:  Surgical incision care:   Use a normal saline solution (salt water) or mild soapy water.   Soak the gauze or cloth in the saline solution or soapy water, and gently dab or wipe the skin with it.   Try to remove all drainage and any dried blood or other matter that may have built up on the skin.  Air-dry the incision or pat it dry with a clean, fresh towel before reapplying the dressing.  Do not scrub or soak the wound.   Do not use rubbing alcohol, hydrogen peroxide, or iodine, which can harm the tissue and slow wound healing.         Assessment & Plan     Problem List Items Addressed This Visit           Psychiatric    Depression    Her chronic and acute health conditions are having a negative impact on her overall mood. Denies thoughts of self harm.     Continue current medications.     Refer to behavioral health       Cardiac/Vascular    HTN (hypertension)    BP Readings from Last 3 Encounters:   08/01/23 104/80   07/26/23 133/76   05/10/23 112/78       -continue current medication regimen  -DASH diet, regular cardiovascular exercises, portion control  - ?weight loss  -f/u with BP logs in 2 weeks       Relevant Orders    Comprehensive Metabolic Panel    Aortic atherosclerosis    -assessed and addressed all modifiable risk factors.  Continue with appropriate management to prevent complications with regards to lipid and BP monitoring.         Orthopedic    Osteoporosis    The current medical regimen is effective;  continue present plan       Other Visit Diagnoses       Hospital discharge follow-up    -  Primary    Hospital encounter notes, objective/subjective data, diagnostics, and plan of care from 7/26 reviewed.    Denies nausea, emesis, or blood in stool.     She has been compliant with augmentin and fluconazole.     She has a follow up with surgery on 8/2/23    Follow up with home health.    Abd incision clean, dry, and intact. No signs of infection noted. Education provided on wound care.    She is having daily smooth Bms but is incontinent of stool. Bernardo blood in stool or nausea. Continues to experience abd pain.     Transitional Care Note    Family and/or Caretaker present at visit?  Yes.  Diagnostic tests reviewed/disposition: I have reviewed all completed as well as pending diagnostic tests at the time of discharge.  Disease/illness education: completed  Home health/community services discussion/referrals: Patient has home health established at home .   Establishment or re-establishment of referral orders for community resources: No other necessary community resources.   Discussion with other health  care providers: No discussion with other health care providers necessary.     Discussed DDx, condition, and treatment.   Education sent to patient portal/included in after visit summary.  ED precautions given.   Notify provider if symptoms do not resolve or increase in severity.   Patient verbalizes understanding and agrees with plan of care.      Relevant Orders    Comprehensive Metabolic Panel                  --------------------------------------------      Health Maintenance:  Health Maintenance         Date Due Completion Date    TETANUS VACCINE Never done ---    Mammogram Never done ---    High Dose Statin Never done ---    COVID-19 Vaccine (5 - Moderna series) 03/14/2023 11/14/2022    Influenza Vaccine (1) 09/01/2023 11/14/2022    Colorectal Cancer Screening 03/08/2024 3/8/2023    DEXA Scan 07/10/2025 7/10/2023    Lipid Panel 03/08/2028 3/8/2023            Advised patient on the importance of completing overdue health maintenance items    Follow Up:  Follow up in about 2 weeks (around 8/15/2023), or if symptoms worsen or fail to improve.    Exam     Review of Systems:  (as noted above)  Review of Systems   Constitutional:  Negative for fever.   HENT:  Negative for trouble swallowing.    Eyes:  Negative for visual disturbance.   Respiratory:  Negative for chest tightness and shortness of breath.    Cardiovascular:  Negative for chest pain.   Gastrointestinal:  Negative for blood in stool.        Bowel incontinence.    Musculoskeletal:  Positive for arthralgias (bilateral shoulders).   Skin:         Abdominal incision     Psychiatric/Behavioral:  Positive for dysphoric mood. Negative for self-injury.        Physical Exam:   Physical Exam  Constitutional:       General: She is not in acute distress.     Appearance: She is not ill-appearing or diaphoretic.   HENT:      Head: Normocephalic and atraumatic.   Cardiovascular:      Rate and Rhythm: Normal rate and regular rhythm.      Heart sounds: No murmur heard.    "  No friction rub. No gallop.   Pulmonary:      Effort: No respiratory distress.   Chest:      Chest wall: No tenderness.   Musculoskeletal:         General: Tenderness (bilateral shoulders) present.      Cervical back: No rigidity.   Skin:     Capillary Refill: Capillary refill takes 2 to 3 seconds.      Comments: Abdominal incision (see media).    Neurological:      General: No focal deficit present.      Mental Status: She is alert and oriented to person, place, and time.   Psychiatric:         Mood and Affect: Mood is depressed.         Thought Content: Thought content does not include suicidal ideation. Thought content does not include suicidal plan.       Vitals:    08/01/23 1020   BP: 104/80   Pulse: 103   Resp: 17   Temp: 98 °F (36.7 °C)   SpO2: (!) 93%   Weight: 43.6 kg (96 lb 1.9 oz)   Height: 5' 2" (1.575 m)      Body mass index is 17.58 kg/m².        History     Past Medical History:  Past Medical History:   Diagnosis Date    Arthritis     Depression     Encounter for blood transfusion     as a child    Fibromyalgia     Neck pain     Rectal prolapse        Past Surgical History:  Past Surgical History:   Procedure Laterality Date    BREAST SURGERY Bilateral     augmentation-Implants    DIAGNOSTIC LAPAROSCOPY N/A 07/16/2023    Procedure: LAPAROSCOPY, DIAGNOSTIC;  Surgeon: Bora Quevedo MD;  Location: [facility] OR;  Service: General;  Laterality: N/A;    epidural steroid injection  01/09/2017    twice-11/2017  & 1/9/17 to neck    EXCISION, SMALL INTESTINE  07/16/2023    Procedure: EXCISION, SMALL INTESTINE;  Surgeon: Bora Quevedo MD;  Location: [facility] OR;  Service: General;;    HERNIA REPAIR      umbilical    HYSTERECTOMY      LAPAROTOMY, EXPLORATORY N/A 07/16/2023    Procedure: LAPAROTOMY, EXPLORATORY;  Surgeon: Bora Quevedo MD;  Location: [facility] OR;  Service: General;  Laterality: N/A;    ROTATOR CUFF REPAIR Bilateral     TONSILLECTOMY      TOTAL SHOULDER ARTHROPLASTY Bilateral     WRIST FRACTURE " SURGERY Right     with hardware placed       Social History:  Social History     Socioeconomic History    Marital status:    Tobacco Use    Smoking status: Former     Current packs/day: 1.00     Average packs/day: 1 pack/day for 20.0 years (20.0 ttl pk-yrs)     Types: Cigarettes     Start date: 5/12/2014    Smokeless tobacco: Never    Tobacco comments:     Stopped smoking greater than 5 years   Substance and Sexual Activity    Alcohol use: Yes     Comment: rarely    Drug use: Not Currently     Types: Hydromorphone     Comment: 3 - 4 tabs daily as needed    Sexual activity: Not Currently     Social Determinants of Health     Financial Resource Strain: Medium Risk (5/10/2023)    Overall Financial Resource Strain (CARDIA)     Difficulty of Paying Living Expenses: Somewhat hard   Food Insecurity: No Food Insecurity (5/10/2023)    Hunger Vital Sign     Worried About Running Out of Food in the Last Year: Never true     Ran Out of Food in the Last Year: Never true   Transportation Needs: No Transportation Needs (5/10/2023)    PRAPARE - Transportation     Lack of Transportation (Medical): No     Lack of Transportation (Non-Medical): No   Physical Activity: Unknown (5/10/2023)    Exercise Vital Sign     Days of Exercise per Week: 0 days   Stress: No Stress Concern Present (5/10/2023)    [employer] - Occupational Stress Questionnaire     Feeling of Stress : Not at all   Social Connections: Socially Isolated (5/10/2023)    Social Connection and Isolation Panel [NHANES]     Frequency of Communication with Friends and Family: Once a week     Frequency of Social Gatherings with Friends and Family: Once a week     Attends Sikhism Services: Never     Active Member of Clubs or Organizations: No     Attends Club or Organization Meetings: Never     Marital Status:    Housing Stability: Unknown (5/10/2023)    Housing Stability Vital Sign     Unable to Pay for Housing in the Last Year: No      Unstable Housing in the Last Year: No       Family History:  Family History   Problem Relation Age of Onset    Anesthesia problems Neg Hx        Allergies and Medications: (updated and reviewed)  Review of patient's allergies indicates:   Allergen Reactions    Morphine Nausea And Vomiting     Current Outpatient Medications   Medication Sig Dispense Refill    amLODIPine (NORVASC) 5 MG tablet once daily.      amoxicillin-clavulanate 875-125mg (AUGMENTIN) 875-125 mg per tablet Take 1 tablet by mouth 2 (two) times daily. for 7 days 14 tablet 0    buPROPion (WELLBUTRIN SR) 150 MG TBSR 12 hr tablet Take 150 mg by mouth 2 (two) times daily.      ergocalciferol (ERGOCALCIFEROL) 50,000 unit Cap Take 1 capsule (50,000 Units total) by mouth every Sunday. 12 capsule 3    fluconazole (DIFLUCAN) 200 MG Tab Take 1 tablet (200 mg total) by mouth once daily. for 7 days 7 tablet 0    raloxifene (EVISTA) 60 mg tablet Take 60 mg by mouth once daily.      sertraline (ZOLOFT) 100 MG tablet Take 100 mg by mouth every morning.       oxyCODONE (ROXICODONE) 5 MG immediate release tablet Take 2 tablets (10 mg total) by mouth every 4 (four) hours as needed for Pain. 60 tablet 0     No current facility-administered medications for this visit.       Patient Care Team:  Claudia Hopper DO as PCP - General (Family Medicine)         - The patient is given an After Visit Summary that lists all medications with directions, allergies, education, orders placed during this encounter and follow-up instructions.      - I have reviewed the patient's medical information including past medical, family, and social history sections including the medications and allergies.      - We discussed the patient's current medications.     This note was created by combination of typed  and MModal dictation.  Transcription errors may be present.  If there are any questions, please contact me.       Bruno Espsoito NP

## 2023-08-01 ENCOUNTER — HOSPITAL ENCOUNTER (OUTPATIENT)
Dept: RADIOLOGY | Facility: HOSPITAL | Age: 63
Discharge: HOME | End: 2023-08-01
Attending: FAMILY MEDICINE
Payer: MEDICARE

## 2023-08-01 ENCOUNTER — TELEPHONE (OUTPATIENT)
Dept: FAMILY MEDICINE | Facility: CLINIC | Age: 63
End: 2023-08-01
Payer: MEDICARE

## 2023-08-01 ENCOUNTER — PATIENT MESSAGE (OUTPATIENT)
Dept: FAMILY MEDICINE | Facility: CLINIC | Age: 63
End: 2023-08-01

## 2023-08-01 ENCOUNTER — OFFICE VISIT (OUTPATIENT)
Dept: FAMILY MEDICINE | Facility: CLINIC | Age: 63
End: 2023-08-01
Payer: MEDICARE

## 2023-08-01 ENCOUNTER — PATIENT MESSAGE (OUTPATIENT)
Dept: BEHAVIORAL HEALTH | Facility: CLINIC | Age: 63
End: 2023-08-01
Payer: MEDICARE

## 2023-08-01 VITALS
SYSTOLIC BLOOD PRESSURE: 104 MMHG | RESPIRATION RATE: 17 BRPM | OXYGEN SATURATION: 93 % | TEMPERATURE: 98 F | WEIGHT: 96.13 LBS | HEIGHT: 62 IN | HEART RATE: 103 BPM | DIASTOLIC BLOOD PRESSURE: 80 MMHG | BODY MASS INDEX: 17.69 KG/M2

## 2023-08-01 DIAGNOSIS — I70.0 AORTIC ATHEROSCLEROSIS: ICD-10-CM

## 2023-08-01 DIAGNOSIS — I10 HYPERTENSION, UNSPECIFIED TYPE: ICD-10-CM

## 2023-08-01 DIAGNOSIS — I70.0 AORTIC ATHEROSCLEROSIS: Primary | ICD-10-CM

## 2023-08-01 DIAGNOSIS — Q79.9: ICD-10-CM

## 2023-08-01 DIAGNOSIS — M81.0 OSTEOPOROSIS, UNSPECIFIED OSTEOPOROSIS TYPE, UNSPECIFIED PATHOLOGICAL FRACTURE PRESENCE: ICD-10-CM

## 2023-08-01 DIAGNOSIS — Z09 HOSPITAL DISCHARGE FOLLOW-UP: Primary | ICD-10-CM

## 2023-08-01 DIAGNOSIS — F32.A DEPRESSION, UNSPECIFIED DEPRESSION TYPE: ICD-10-CM

## 2023-08-01 PROCEDURE — 99999 PR PBB SHADOW E&M-EST. PATIENT-LVL V: CPT | Mod: PBBFAC,,, | Performed by: NURSE PRACTITIONER

## 2023-08-01 PROCEDURE — 4010F ACE/ARB THERAPY RXD/TAKEN: CPT | Mod: CPTII,S$GLB,, | Performed by: NURSE PRACTITIONER

## 2023-08-01 PROCEDURE — 3288F PR FALLS RISK ASSESSMENT DOCUMENTED: ICD-10-PCS | Mod: CPTII,S$GLB,, | Performed by: NURSE PRACTITIONER

## 2023-08-01 PROCEDURE — 1159F MED LIST DOCD IN RCRD: CPT | Mod: CPTII,S$GLB,, | Performed by: NURSE PRACTITIONER

## 2023-08-01 PROCEDURE — 3079F PR MOST RECENT DIASTOLIC BLOOD PRESSURE 80-89 MM HG: ICD-10-PCS | Mod: CPTII,S$GLB,, | Performed by: NURSE PRACTITIONER

## 2023-08-01 PROCEDURE — 72100 X-RAY EXAM L-S SPINE 2/3 VWS: CPT | Mod: TC,FY,PO

## 2023-08-01 PROCEDURE — 3008F PR BODY MASS INDEX (BMI) DOCUMENTED: ICD-10-PCS | Mod: CPTII,S$GLB,, | Performed by: NURSE PRACTITIONER

## 2023-08-01 PROCEDURE — 1111F DSCHRG MED/CURRENT MED MERGE: CPT | Mod: CPTII,S$GLB,, | Performed by: NURSE PRACTITIONER

## 2023-08-01 PROCEDURE — 99495 TRANSJ CARE MGMT MOD F2F 14D: CPT | Mod: S$GLB,,, | Performed by: NURSE PRACTITIONER

## 2023-08-01 PROCEDURE — 3008F BODY MASS INDEX DOCD: CPT | Mod: CPTII,S$GLB,, | Performed by: NURSE PRACTITIONER

## 2023-08-01 PROCEDURE — 1111F PR DISCHARGE MEDS RECONCILED W/ CURRENT OUTPATIENT MED LIST: ICD-10-PCS | Mod: CPTII,S$GLB,, | Performed by: NURSE PRACTITIONER

## 2023-08-01 PROCEDURE — 3288F FALL RISK ASSESSMENT DOCD: CPT | Mod: CPTII,S$GLB,, | Performed by: NURSE PRACTITIONER

## 2023-08-01 PROCEDURE — 99999 PR PBB SHADOW E&M-EST. PATIENT-LVL V: ICD-10-PCS | Mod: PBBFAC,,, | Performed by: NURSE PRACTITIONER

## 2023-08-01 PROCEDURE — 3044F PR MOST RECENT HEMOGLOBIN A1C LEVEL <7.0%: ICD-10-PCS | Mod: CPTII,S$GLB,, | Performed by: NURSE PRACTITIONER

## 2023-08-01 PROCEDURE — 1101F PT FALLS ASSESS-DOCD LE1/YR: CPT | Mod: CPTII,S$GLB,, | Performed by: NURSE PRACTITIONER

## 2023-08-01 PROCEDURE — 72100 XR LUMBAR SPINE AP AND LATERAL: ICD-10-PCS | Mod: 26,,, | Performed by: RADIOLOGY

## 2023-08-01 PROCEDURE — 3074F PR MOST RECENT SYSTOLIC BLOOD PRESSURE < 130 MM HG: ICD-10-PCS | Mod: CPTII,S$GLB,, | Performed by: NURSE PRACTITIONER

## 2023-08-01 PROCEDURE — 99495 TCM SERVICES (MODERATE COMPLEXITY): ICD-10-PCS | Mod: S$GLB,,, | Performed by: NURSE PRACTITIONER

## 2023-08-01 PROCEDURE — 3074F SYST BP LT 130 MM HG: CPT | Mod: CPTII,S$GLB,, | Performed by: NURSE PRACTITIONER

## 2023-08-01 PROCEDURE — 72100 X-RAY EXAM L-S SPINE 2/3 VWS: CPT | Mod: 26,,, | Performed by: RADIOLOGY

## 2023-08-01 PROCEDURE — 3079F DIAST BP 80-89 MM HG: CPT | Mod: CPTII,S$GLB,, | Performed by: NURSE PRACTITIONER

## 2023-08-01 PROCEDURE — 1159F PR MEDICATION LIST DOCUMENTED IN MEDICAL RECORD: ICD-10-PCS | Mod: CPTII,S$GLB,, | Performed by: NURSE PRACTITIONER

## 2023-08-01 PROCEDURE — 4010F PR ACE/ARB THEARPY RXD/TAKEN: ICD-10-PCS | Mod: CPTII,S$GLB,, | Performed by: NURSE PRACTITIONER

## 2023-08-01 PROCEDURE — 1101F PR PT FALLS ASSESS DOC 0-1 FALLS W/OUT INJ PAST YR: ICD-10-PCS | Mod: CPTII,S$GLB,, | Performed by: NURSE PRACTITIONER

## 2023-08-01 PROCEDURE — 3044F HG A1C LEVEL LT 7.0%: CPT | Mod: CPTII,S$GLB,, | Performed by: NURSE PRACTITIONER

## 2023-08-01 RX ORDER — OXYCODONE HYDROCHLORIDE 5 MG/1
10 TABLET ORAL EVERY 4 HOURS PRN
Qty: 60 TABLET | Refills: 0 | Status: SHIPPED | OUTPATIENT
Start: 2023-08-01 | End: 2023-08-08

## 2023-08-01 RX ORDER — ATORVASTATIN CALCIUM 40 MG/1
40 TABLET, FILM COATED ORAL DAILY
Qty: 30 TABLET | Refills: 11 | Status: SHIPPED | OUTPATIENT
Start: 2023-08-01 | End: 2023-11-16

## 2023-08-01 NOTE — TELEPHONE ENCOUNTER
No care due was identified.  [facility] Embedded Care Due Messages. Reference number: [id number].   8/01/2023 10:49:22 AM CDT

## 2023-08-01 NOTE — TELEPHONE ENCOUNTER
Attempted to contact pt, pt's sister took the call. Sister notified of xray results and instructions from DO in message below, verbalized understanding.

## 2023-08-01 NOTE — TELEPHONE ENCOUNTER
Uncertain if patient uses MyChart. Her lumbar x-ray does not show a compression fracture, only degenerative changes consistent with arthritis. It incidentally shows plaque formation in the vessels in her abdomen, so we need to start cholesterol medication to keep this from getting worse.

## 2023-08-01 NOTE — TELEPHONE ENCOUNTER
----- Message from Maria Alejandra Pizarro sent at 8/1/2023  9:36 AM CDT -----  Regarding: Return Call  .Type:  Patient Returning Call    Who Called: Self     Who Left Message for Patient: not sure     Does the patient know what this is regarding?: Said that she is on her way to the clinic now     Would the patient rather a call back or a response via My [facility]? Call     Best Call Back Number: .[phone]

## 2023-08-01 NOTE — PATIENT INSTRUCTIONS
Medical Fitness--[phone]  Imaging, Xray, CT, MRI, Ultrasound---[phone]  Bariatrics---[phone]  Breast Surgery---[phone]  Case Management---[phone]  Colonoscopy---[phone]  DME---[phone]  Infectious Disease---[phone]  Interventional Radiology---[phone]  Medical Records---[phone]  [facility] On [facility]---[phone]  Optometry/Ophthalmology---[phone]  [facility] Bar---[phone]  Physical Therapy---[phone]  Psychiatry---[phone] or [phone]  Plastic Surgery---[phone]  Recovery--[phone] option 2, or [phone].  Sleep Study---[phone]  Smoking Cessation---[phone]  Wound Care---[phone]  Referral Desk---[phone]

## 2023-08-02 ENCOUNTER — TELEPHONE (OUTPATIENT)
Dept: FAMILY MEDICINE | Facility: CLINIC | Age: 63
End: 2023-08-02
Payer: MEDICARE

## 2023-08-02 ENCOUNTER — OFFICE VISIT (OUTPATIENT)
Dept: SURGERY | Facility: CLINIC | Age: 63
End: 2023-08-02
Payer: MEDICARE

## 2023-08-02 ENCOUNTER — PATIENT MESSAGE (OUTPATIENT)
Dept: BEHAVIORAL HEALTH | Facility: CLINIC | Age: 63
End: 2023-08-02
Payer: MEDICARE

## 2023-08-02 VITALS
BODY MASS INDEX: 17.51 KG/M2 | OXYGEN SATURATION: 97 % | HEART RATE: 110 BPM | DIASTOLIC BLOOD PRESSURE: 80 MMHG | WEIGHT: 95.13 LBS | HEIGHT: 62 IN | SYSTOLIC BLOOD PRESSURE: 115 MMHG

## 2023-08-02 DIAGNOSIS — K56.609 SMALL BOWEL OBSTRUCTION: Primary | ICD-10-CM

## 2023-08-02 PROCEDURE — 99024 POSTOP FOLLOW-UP VISIT: CPT | Mod: S$GLB,,, | Performed by: SURGERY

## 2023-08-02 PROCEDURE — 1159F PR MEDICATION LIST DOCUMENTED IN MEDICAL RECORD: ICD-10-PCS | Mod: CPTII,S$GLB,, | Performed by: SURGERY

## 2023-08-02 PROCEDURE — 3288F FALL RISK ASSESSMENT DOCD: CPT | Mod: CPTII,S$GLB,, | Performed by: SURGERY

## 2023-08-02 PROCEDURE — 3074F SYST BP LT 130 MM HG: CPT | Mod: CPTII,S$GLB,, | Performed by: SURGERY

## 2023-08-02 PROCEDURE — 4010F ACE/ARB THERAPY RXD/TAKEN: CPT | Mod: CPTII,S$GLB,, | Performed by: SURGERY

## 2023-08-02 PROCEDURE — 3044F HG A1C LEVEL LT 7.0%: CPT | Mod: CPTII,S$GLB,, | Performed by: SURGERY

## 2023-08-02 PROCEDURE — 3079F DIAST BP 80-89 MM HG: CPT | Mod: CPTII,S$GLB,, | Performed by: SURGERY

## 2023-08-02 PROCEDURE — 3008F BODY MASS INDEX DOCD: CPT | Mod: CPTII,S$GLB,, | Performed by: SURGERY

## 2023-08-02 PROCEDURE — 4010F PR ACE/ARB THEARPY RXD/TAKEN: ICD-10-PCS | Mod: CPTII,S$GLB,, | Performed by: SURGERY

## 2023-08-02 PROCEDURE — 1159F MED LIST DOCD IN RCRD: CPT | Mod: CPTII,S$GLB,, | Performed by: SURGERY

## 2023-08-02 PROCEDURE — 3288F PR FALLS RISK ASSESSMENT DOCUMENTED: ICD-10-PCS | Mod: CPTII,S$GLB,, | Performed by: SURGERY

## 2023-08-02 PROCEDURE — 99024 PR POST-OP FOLLOW-UP VISIT: ICD-10-PCS | Mod: S$GLB,,, | Performed by: SURGERY

## 2023-08-02 PROCEDURE — 3079F PR MOST RECENT DIASTOLIC BLOOD PRESSURE 80-89 MM HG: ICD-10-PCS | Mod: CPTII,S$GLB,, | Performed by: SURGERY

## 2023-08-02 PROCEDURE — 3044F PR MOST RECENT HEMOGLOBIN A1C LEVEL <7.0%: ICD-10-PCS | Mod: CPTII,S$GLB,, | Performed by: SURGERY

## 2023-08-02 PROCEDURE — 3074F PR MOST RECENT SYSTOLIC BLOOD PRESSURE < 130 MM HG: ICD-10-PCS | Mod: CPTII,S$GLB,, | Performed by: SURGERY

## 2023-08-02 PROCEDURE — 3008F PR BODY MASS INDEX (BMI) DOCUMENTED: ICD-10-PCS | Mod: CPTII,S$GLB,, | Performed by: SURGERY

## 2023-08-02 PROCEDURE — 1101F PR PT FALLS ASSESS DOC 0-1 FALLS W/OUT INJ PAST YR: ICD-10-PCS | Mod: CPTII,S$GLB,, | Performed by: SURGERY

## 2023-08-02 PROCEDURE — 1101F PT FALLS ASSESS-DOCD LE1/YR: CPT | Mod: CPTII,S$GLB,, | Performed by: SURGERY

## 2023-08-02 RX ORDER — NYSTATIN 100000 [USP'U]/ML
4 SUSPENSION ORAL 4 TIMES DAILY
Qty: 160 ML | Refills: 0 | Status: SHIPPED | OUTPATIENT
Start: 2023-08-02 | End: 2023-08-09 | Stop reason: SDUPTHER

## 2023-08-02 NOTE — PROGRESS NOTES
Subjective     Patient ID: Marilee Townsend is a 63 y.o. female.    Chief Complaint: Post-op Evaluation    HPI 64 yo female with exp lap for small bowel obstruction here for follow up with diarrhea and occasional nausea with decrease appetite.  Review of Systems   Constitutional: Negative.    HENT: Negative.     Eyes: Negative.    Respiratory: Negative.     Cardiovascular: Negative.    Gastrointestinal: Negative.    Endocrine: Negative.    Musculoskeletal: Negative.    Integumentary:  Negative.   Allergic/Immunologic: Negative.    Neurological: Negative.    Hematological: Negative.    Psychiatric/Behavioral: Negative.     All other systems reviewed and are negative.         Objective     Physical Exam  Vitals reviewed.   Constitutional:       Appearance: She is well-developed.   HENT:      Head: Normocephalic and atraumatic.      Right Ear: External ear normal.      Left Ear: External ear normal.      Nose: Nose normal.   Eyes:      Conjunctiva/sclera: Conjunctivae normal.      Pupils: Pupils are equal, round, and reactive to light.   Cardiovascular:      Rate and Rhythm: Normal rate and regular rhythm.      Heart sounds: Normal heart sounds.   Pulmonary:      Effort: Pulmonary effort is normal.      Breath sounds: Normal breath sounds.   Abdominal:      General: Bowel sounds are normal.      Palpations: Abdomen is soft.       Musculoskeletal:         General: Normal range of motion.      Cervical back: Normal range of motion and neck supple.   Skin:     General: Skin is warm and dry.   Neurological:      Mental Status: She is alert and oriented to person, place, and time.      Deep Tendon Reflexes: Reflexes are normal and symmetric.   Psychiatric:         Behavior: Behavior normal.         Thought Content: Thought content normal.            Assessment and Plan     1. Small bowel obstruction    Stable post op with oral candidiasis and decreased appetite    I discussed her status and I will start nystatin and  maylin and I will see her back in 2-3 weeks with labs         No follow-ups on file.

## 2023-08-08 ENCOUNTER — TELEPHONE (OUTPATIENT)
Dept: SURGERY | Facility: CLINIC | Age: 63
End: 2023-08-08
Payer: MEDICARE

## 2023-08-08 NOTE — TELEPHONE ENCOUNTER
----- Message from Carlos Javier sent at 8/8/2023  4:26 PM CDT -----  Regarding: pt called  Type: Patient Call Back         Who called: Mirela ([facility])          What is the request in detail: Incision has three spots where blisters have formed. Please Advise          Can the clinic reply by [facility]? No         Would the patient rather a call back or a response via [facility]? Call back          Best call back number: [phone]         Additional Information:         Thank you.

## 2023-08-09 ENCOUNTER — LAB VISIT (OUTPATIENT)
Dept: LAB | Facility: HOSPITAL | Age: 63
End: 2023-08-09
Attending: SURGERY
Payer: MEDICARE

## 2023-08-09 ENCOUNTER — OFFICE VISIT (OUTPATIENT)
Dept: SURGERY | Facility: CLINIC | Age: 63
End: 2023-08-09
Payer: MEDICARE

## 2023-08-09 VITALS
DIASTOLIC BLOOD PRESSURE: 88 MMHG | BODY MASS INDEX: 16.2 KG/M2 | OXYGEN SATURATION: 98 % | SYSTOLIC BLOOD PRESSURE: 127 MMHG | WEIGHT: 88 LBS | HEIGHT: 62 IN | HEART RATE: 114 BPM

## 2023-08-09 DIAGNOSIS — K56.609 SMALL BOWEL OBSTRUCTION: Primary | ICD-10-CM

## 2023-08-09 DIAGNOSIS — K56.609 SMALL BOWEL OBSTRUCTION: ICD-10-CM

## 2023-08-09 LAB
BASOPHILS # BLD AUTO: 0.05 K/UL (ref 0–0.2)
BASOPHILS NFR BLD: 0.8 % (ref 0–1.9)
DIFFERENTIAL METHOD: ABNORMAL
EOSINOPHIL # BLD AUTO: 0.1 K/UL (ref 0–0.5)
EOSINOPHIL NFR BLD: 0.8 % (ref 0–8)
ERYTHROCYTE [DISTWIDTH] IN BLOOD BY AUTOMATED COUNT: 14.6 % (ref 11.5–14.5)
HCT VFR BLD AUTO: 31.2 % (ref 37–48.5)
HGB BLD-MCNC: 9.8 G/DL (ref 12–16)
IMM GRANULOCYTES # BLD AUTO: 0.08 K/UL (ref 0–0.04)
IMM GRANULOCYTES NFR BLD AUTO: 1.3 % (ref 0–0.5)
LYMPHOCYTES # BLD AUTO: 1.4 K/UL (ref 1–4.8)
LYMPHOCYTES NFR BLD: 21.4 % (ref 18–48)
MCH RBC QN AUTO: 27.4 PG (ref 27–31)
MCHC RBC AUTO-ENTMCNC: 31.4 G/DL (ref 32–36)
MCV RBC AUTO: 87 FL (ref 82–98)
MONOCYTES # BLD AUTO: 0.5 K/UL (ref 0.3–1)
MONOCYTES NFR BLD: 7.9 % (ref 4–15)
NEUTROPHILS # BLD AUTO: 4.3 K/UL (ref 1.8–7.7)
NEUTROPHILS NFR BLD: 67.8 % (ref 38–73)
NRBC BLD-RTO: 0 /100 WBC
PLATELET # BLD AUTO: 541 K/UL (ref 150–450)
PLATELET BLD QL SMEAR: ABNORMAL
PMV BLD AUTO: 8.3 FL (ref 9.2–12.9)
RBC # BLD AUTO: 3.58 M/UL (ref 4–5.4)
WBC # BLD AUTO: 6.3 K/UL (ref 3.9–12.7)

## 2023-08-09 PROCEDURE — 1159F MED LIST DOCD IN RCRD: CPT | Mod: CPTII,S$GLB,, | Performed by: SURGERY

## 2023-08-09 PROCEDURE — 3288F FALL RISK ASSESSMENT DOCD: CPT | Mod: CPTII,S$GLB,, | Performed by: SURGERY

## 2023-08-09 PROCEDURE — 3008F BODY MASS INDEX DOCD: CPT | Mod: CPTII,S$GLB,, | Performed by: SURGERY

## 2023-08-09 PROCEDURE — 3288F PR FALLS RISK ASSESSMENT DOCUMENTED: ICD-10-PCS | Mod: CPTII,S$GLB,, | Performed by: SURGERY

## 2023-08-09 PROCEDURE — 3079F DIAST BP 80-89 MM HG: CPT | Mod: CPTII,S$GLB,, | Performed by: SURGERY

## 2023-08-09 PROCEDURE — 3074F PR MOST RECENT SYSTOLIC BLOOD PRESSURE < 130 MM HG: ICD-10-PCS | Mod: CPTII,S$GLB,, | Performed by: SURGERY

## 2023-08-09 PROCEDURE — 99024 PR POST-OP FOLLOW-UP VISIT: ICD-10-PCS | Mod: S$GLB,,, | Performed by: SURGERY

## 2023-08-09 PROCEDURE — 99024 POSTOP FOLLOW-UP VISIT: CPT | Mod: S$GLB,,, | Performed by: SURGERY

## 2023-08-09 PROCEDURE — 1101F PR PT FALLS ASSESS DOC 0-1 FALLS W/OUT INJ PAST YR: ICD-10-PCS | Mod: CPTII,S$GLB,, | Performed by: SURGERY

## 2023-08-09 PROCEDURE — 3044F HG A1C LEVEL LT 7.0%: CPT | Mod: CPTII,S$GLB,, | Performed by: SURGERY

## 2023-08-09 PROCEDURE — 1159F PR MEDICATION LIST DOCUMENTED IN MEDICAL RECORD: ICD-10-PCS | Mod: CPTII,S$GLB,, | Performed by: SURGERY

## 2023-08-09 PROCEDURE — 4010F ACE/ARB THERAPY RXD/TAKEN: CPT | Mod: CPTII,S$GLB,, | Performed by: SURGERY

## 2023-08-09 PROCEDURE — 3008F PR BODY MASS INDEX (BMI) DOCUMENTED: ICD-10-PCS | Mod: CPTII,S$GLB,, | Performed by: SURGERY

## 2023-08-09 PROCEDURE — 3044F PR MOST RECENT HEMOGLOBIN A1C LEVEL <7.0%: ICD-10-PCS | Mod: CPTII,S$GLB,, | Performed by: SURGERY

## 2023-08-09 PROCEDURE — 3079F PR MOST RECENT DIASTOLIC BLOOD PRESSURE 80-89 MM HG: ICD-10-PCS | Mod: CPTII,S$GLB,, | Performed by: SURGERY

## 2023-08-09 PROCEDURE — 36415 COLL VENOUS BLD VENIPUNCTURE: CPT | Performed by: SURGERY

## 2023-08-09 PROCEDURE — 1101F PT FALLS ASSESS-DOCD LE1/YR: CPT | Mod: CPTII,S$GLB,, | Performed by: SURGERY

## 2023-08-09 PROCEDURE — 85025 COMPLETE CBC W/AUTO DIFF WBC: CPT | Performed by: SURGERY

## 2023-08-09 PROCEDURE — 4010F PR ACE/ARB THEARPY RXD/TAKEN: ICD-10-PCS | Mod: CPTII,S$GLB,, | Performed by: SURGERY

## 2023-08-09 PROCEDURE — 3074F SYST BP LT 130 MM HG: CPT | Mod: CPTII,S$GLB,, | Performed by: SURGERY

## 2023-08-09 RX ORDER — NYSTATIN 100000 [USP'U]/ML
4 SUSPENSION ORAL 4 TIMES DAILY
Qty: 160 ML | Refills: 0 | Status: SHIPPED | OUTPATIENT
Start: 2023-08-09 | End: 2023-08-19

## 2023-08-09 NOTE — PROGRESS NOTES
Subjective     Patient ID: Marilee Townsend is a 63 y.o. female.    Chief Complaint: Post-op Evaluation (Incision check )    HPI 64 yo female s/p exploratory lap and SBR without complaints today and feeling better  Review of Systems   Constitutional: Negative.    HENT: Negative.     Eyes: Negative.    Respiratory: Negative.     Cardiovascular: Negative.    Gastrointestinal: Negative.    Endocrine: Negative.    Musculoskeletal: Negative.    Integumentary:  Negative.   Allergic/Immunologic: Negative.    Neurological: Negative.    Hematological: Negative.    Psychiatric/Behavioral: Negative.     All other systems reviewed and are negative.         Objective     Physical Exam  Vitals reviewed.   Constitutional:       Appearance: She is well-developed.   HENT:      Head: Normocephalic and atraumatic.      Right Ear: External ear normal.      Left Ear: External ear normal.      Nose: Nose normal.   Eyes:      Conjunctiva/sclera: Conjunctivae normal.      Pupils: Pupils are equal, round, and reactive to light.   Cardiovascular:      Rate and Rhythm: Normal rate and regular rhythm.      Heart sounds: Normal heart sounds.   Pulmonary:      Effort: Pulmonary effort is normal.      Breath sounds: Normal breath sounds.   Abdominal:      General: Bowel sounds are normal.      Palpations: Abdomen is soft.       Musculoskeletal:         General: Normal range of motion.      Cervical back: Normal range of motion and neck supple.   Skin:     General: Skin is warm and dry.   Neurological:      Mental Status: She is alert and oriented to person, place, and time.      Deep Tendon Reflexes: Reflexes are normal and symmetric.   Psychiatric:         Behavior: Behavior normal.         Thought Content: Thought content normal.            Assessment and Plan     1. Small bowel obstruction    S/p small bowel resection and feeling better today    I did discuss her status and the need for more fluids diet and I will see her back in 1-2  weeks         No follow-ups on file.

## 2023-08-15 ENCOUNTER — OFFICE VISIT (OUTPATIENT)
Dept: SURGERY | Facility: CLINIC | Age: 63
End: 2023-08-15
Payer: MEDICARE

## 2023-08-15 VITALS
HEIGHT: 62 IN | OXYGEN SATURATION: 99 % | HEART RATE: 91 BPM | DIASTOLIC BLOOD PRESSURE: 81 MMHG | BODY MASS INDEX: 16.97 KG/M2 | SYSTOLIC BLOOD PRESSURE: 118 MMHG | WEIGHT: 92.25 LBS

## 2023-08-15 DIAGNOSIS — K56.609 SMALL BOWEL OBSTRUCTION: Primary | ICD-10-CM

## 2023-08-15 PROCEDURE — 3008F PR BODY MASS INDEX (BMI) DOCUMENTED: ICD-10-PCS | Mod: CPTII,S$GLB,, | Performed by: SURGERY

## 2023-08-15 PROCEDURE — 1159F MED LIST DOCD IN RCRD: CPT | Mod: CPTII,S$GLB,, | Performed by: SURGERY

## 2023-08-15 PROCEDURE — 3074F PR MOST RECENT SYSTOLIC BLOOD PRESSURE < 130 MM HG: ICD-10-PCS | Mod: CPTII,S$GLB,, | Performed by: SURGERY

## 2023-08-15 PROCEDURE — 4010F ACE/ARB THERAPY RXD/TAKEN: CPT | Mod: CPTII,S$GLB,, | Performed by: SURGERY

## 2023-08-15 PROCEDURE — 1101F PR PT FALLS ASSESS DOC 0-1 FALLS W/OUT INJ PAST YR: ICD-10-PCS | Mod: CPTII,S$GLB,, | Performed by: SURGERY

## 2023-08-15 PROCEDURE — 1159F PR MEDICATION LIST DOCUMENTED IN MEDICAL RECORD: ICD-10-PCS | Mod: CPTII,S$GLB,, | Performed by: SURGERY

## 2023-08-15 PROCEDURE — 99024 PR POST-OP FOLLOW-UP VISIT: ICD-10-PCS | Mod: S$GLB,,, | Performed by: SURGERY

## 2023-08-15 PROCEDURE — 3079F DIAST BP 80-89 MM HG: CPT | Mod: CPTII,S$GLB,, | Performed by: SURGERY

## 2023-08-15 PROCEDURE — 3044F PR MOST RECENT HEMOGLOBIN A1C LEVEL <7.0%: ICD-10-PCS | Mod: CPTII,S$GLB,, | Performed by: SURGERY

## 2023-08-15 PROCEDURE — 99024 POSTOP FOLLOW-UP VISIT: CPT | Mod: S$GLB,,, | Performed by: SURGERY

## 2023-08-15 PROCEDURE — 3288F PR FALLS RISK ASSESSMENT DOCUMENTED: ICD-10-PCS | Mod: CPTII,S$GLB,, | Performed by: SURGERY

## 2023-08-15 PROCEDURE — 3074F SYST BP LT 130 MM HG: CPT | Mod: CPTII,S$GLB,, | Performed by: SURGERY

## 2023-08-15 PROCEDURE — 3008F BODY MASS INDEX DOCD: CPT | Mod: CPTII,S$GLB,, | Performed by: SURGERY

## 2023-08-15 PROCEDURE — 1101F PT FALLS ASSESS-DOCD LE1/YR: CPT | Mod: CPTII,S$GLB,, | Performed by: SURGERY

## 2023-08-15 PROCEDURE — 4010F PR ACE/ARB THEARPY RXD/TAKEN: ICD-10-PCS | Mod: CPTII,S$GLB,, | Performed by: SURGERY

## 2023-08-15 PROCEDURE — 3044F HG A1C LEVEL LT 7.0%: CPT | Mod: CPTII,S$GLB,, | Performed by: SURGERY

## 2023-08-15 PROCEDURE — 3288F FALL RISK ASSESSMENT DOCD: CPT | Mod: CPTII,S$GLB,, | Performed by: SURGERY

## 2023-08-15 PROCEDURE — 3079F PR MOST RECENT DIASTOLIC BLOOD PRESSURE 80-89 MM HG: ICD-10-PCS | Mod: CPTII,S$GLB,, | Performed by: SURGERY

## 2023-08-15 RX ORDER — LISINOPRIL AND HYDROCHLOROTHIAZIDE 10; 12.5 MG/1; MG/1
1 TABLET ORAL
COMMUNITY

## 2023-08-15 RX ORDER — CEPHALEXIN 500 MG/1
500 CAPSULE ORAL 3 TIMES DAILY
COMMUNITY
Start: 2023-06-13

## 2023-08-15 NOTE — PROGRESS NOTES
Subjective     Patient ID: Marilee Townsend is a 63 y.o. female.    Chief Complaint: Small bowel obstruction (Follow up)    HPI 64 yo female with small bowel obstruction without complaints today except for some occasional oozing from the incision  Review of Systems   Constitutional: Negative.    HENT: Negative.     Eyes: Negative.    Respiratory: Negative.     Cardiovascular: Negative.    Gastrointestinal: Negative.    Endocrine: Negative.    Musculoskeletal: Negative.    Integumentary:  Negative.   Allergic/Immunologic: Negative.    Neurological: Negative.    Hematological: Negative.    Psychiatric/Behavioral: Negative.     All other systems reviewed and are negative.         Objective     Physical Exam  Vitals reviewed.   Constitutional:       Appearance: She is well-developed.   HENT:      Head: Normocephalic and atraumatic.      Right Ear: External ear normal.      Left Ear: External ear normal.      Nose: Nose normal.   Eyes:      Conjunctiva/sclera: Conjunctivae normal.      Pupils: Pupils are equal, round, and reactive to light.   Cardiovascular:      Rate and Rhythm: Normal rate and regular rhythm.      Heart sounds: Normal heart sounds.   Pulmonary:      Effort: Pulmonary effort is normal.      Breath sounds: Normal breath sounds.   Abdominal:      General: Bowel sounds are normal.      Palpations: Abdomen is soft.       Musculoskeletal:         General: Normal range of motion.      Cervical back: Normal range of motion and neck supple.   Skin:     General: Skin is warm and dry.   Neurological:      Mental Status: She is alert and oriented to person, place, and time.      Deep Tendon Reflexes: Reflexes are normal and symmetric.   Psychiatric:         Behavior: Behavior normal.         Thought Content: Thought content normal.            Assessment and Plan     1. Small bowel obstruction    Doing well post op and improving    I discussed her status and the restrictions she has in place.  I answered her  questions and will see her back prn         No follow-ups on file.

## 2023-08-16 ENCOUNTER — PATIENT MESSAGE (OUTPATIENT)
Dept: SURGERY | Facility: CLINIC | Age: 63
End: 2023-08-16
Payer: MEDICARE

## 2023-09-13 NOTE — TELEPHONE ENCOUNTER
----- Message from Zoya Self sent at 9/13/2023  3:18 PM CDT -----  Type: RX Refill Request    Who Called:  self     Have you contacted your pharmacy: yes    Refill or New Rx: refill    RX Name and Strength: raloxifene (EVISTA) 60 mg tablet      Preferred Pharmacy with phone number: [facility] - [address]   Phone:  [phone]  Fax:  [phone]    Local or Mail Order: Mail    Would the patient rather a call back or a response via [facility]?  call    Best Call Back Number: [phone] (home)      Additional Information:

## 2023-09-14 RX ORDER — RALOXIFENE HYDROCHLORIDE 60 MG/1
60 TABLET, FILM COATED ORAL DAILY
Qty: 30 TABLET | Refills: 0 | OUTPATIENT
Start: 2023-09-14

## 2023-09-21 ENCOUNTER — EXTERNAL HOME HEALTH (OUTPATIENT)
Dept: HOME HEALTH SERVICES | Facility: HOSPITAL | Age: 63
End: 2023-09-21
Payer: MEDICARE

## 2023-10-25 NOTE — ASSESSMENT & PLAN NOTE
Restart home medications as indicated       Azithromycin Counseling:  I discussed with the patient the risks of azithromycin including but not limited to GI upset, allergic reaction, drug rash, diarrhea, and yeast infections.

## 2023-11-03 ENCOUNTER — PATIENT MESSAGE (OUTPATIENT)
Dept: ADMINISTRATIVE | Facility: HOSPITAL | Age: 63
End: 2023-11-03
Payer: MEDICARE

## 2023-11-03 ENCOUNTER — PATIENT OUTREACH (OUTPATIENT)
Dept: ADMINISTRATIVE | Facility: HOSPITAL | Age: 63
End: 2023-11-03
Payer: MEDICARE

## 2023-11-16 ENCOUNTER — OFFICE VISIT (OUTPATIENT)
Dept: FAMILY MEDICINE | Facility: CLINIC | Age: 63
End: 2023-11-16
Payer: MEDICARE

## 2023-11-16 VITALS
TEMPERATURE: 98 F | SYSTOLIC BLOOD PRESSURE: 138 MMHG | HEIGHT: 62 IN | WEIGHT: 103.38 LBS | BODY MASS INDEX: 19.02 KG/M2 | HEART RATE: 84 BPM | DIASTOLIC BLOOD PRESSURE: 79 MMHG | OXYGEN SATURATION: 98 %

## 2023-11-16 DIAGNOSIS — E55.9 VITAMIN D DEFICIENCY DISEASE: ICD-10-CM

## 2023-11-16 DIAGNOSIS — G89.29 OTHER CHRONIC PAIN: ICD-10-CM

## 2023-11-16 DIAGNOSIS — M79.7 FIBROMYALGIA: ICD-10-CM

## 2023-11-16 DIAGNOSIS — Z12.31 ENCOUNTER FOR SCREENING MAMMOGRAM FOR BREAST CANCER: ICD-10-CM

## 2023-11-16 DIAGNOSIS — I10 HYPERTENSION, UNSPECIFIED TYPE: ICD-10-CM

## 2023-11-16 DIAGNOSIS — M15.9 OSTEOARTHRITIS OF MULTIPLE JOINTS, UNSPECIFIED OSTEOARTHRITIS TYPE: ICD-10-CM

## 2023-11-16 DIAGNOSIS — I70.0 AORTIC ATHEROSCLEROSIS: ICD-10-CM

## 2023-11-16 DIAGNOSIS — F32.A DEPRESSION, UNSPECIFIED DEPRESSION TYPE: ICD-10-CM

## 2023-11-16 DIAGNOSIS — R15.9 INCONTINENCE OF FECES, UNSPECIFIED FECAL INCONTINENCE TYPE: ICD-10-CM

## 2023-11-16 DIAGNOSIS — Z00.00 ROUTINE MEDICAL EXAM: Primary | ICD-10-CM

## 2023-11-16 DIAGNOSIS — R73.9 HYPERGLYCEMIA: ICD-10-CM

## 2023-11-16 DIAGNOSIS — Z12.11 SCREENING FOR COLORECTAL CANCER: ICD-10-CM

## 2023-11-16 DIAGNOSIS — M81.0 OSTEOPOROSIS, UNSPECIFIED OSTEOPOROSIS TYPE, UNSPECIFIED PATHOLOGICAL FRACTURE PRESENCE: ICD-10-CM

## 2023-11-16 DIAGNOSIS — Z86.010 HISTORY OF COLONIC POLYPS: ICD-10-CM

## 2023-11-16 DIAGNOSIS — Z12.12 SCREENING FOR COLORECTAL CANCER: ICD-10-CM

## 2023-11-16 DIAGNOSIS — M81.0 AGE RELATED OSTEOPOROSIS, UNSPECIFIED PATHOLOGICAL FRACTURE PRESENCE: ICD-10-CM

## 2023-11-16 DIAGNOSIS — E78.5 HYPERLIPIDEMIA, UNSPECIFIED HYPERLIPIDEMIA TYPE: ICD-10-CM

## 2023-11-16 DIAGNOSIS — D64.9 ANEMIA, UNSPECIFIED TYPE: ICD-10-CM

## 2023-11-16 PROCEDURE — 3044F HG A1C LEVEL LT 7.0%: CPT | Mod: CPTII,S$GLB,, | Performed by: INTERNAL MEDICINE

## 2023-11-16 PROCEDURE — 3075F PR MOST RECENT SYSTOLIC BLOOD PRESS GE 130-139MM HG: ICD-10-PCS | Mod: CPTII,S$GLB,, | Performed by: INTERNAL MEDICINE

## 2023-11-16 PROCEDURE — 99999 PR PBB SHADOW E&M-EST. PATIENT-LVL III: ICD-10-PCS | Mod: PBBFAC,,, | Performed by: INTERNAL MEDICINE

## 2023-11-16 PROCEDURE — 1101F PT FALLS ASSESS-DOCD LE1/YR: CPT | Mod: CPTII,S$GLB,, | Performed by: INTERNAL MEDICINE

## 2023-11-16 PROCEDURE — 99215 OFFICE O/P EST HI 40 MIN: CPT | Mod: 25,S$GLB,, | Performed by: INTERNAL MEDICINE

## 2023-11-16 PROCEDURE — 99397 PR PREVENTIVE VISIT,EST,65 & OVER: ICD-10-PCS | Mod: GZ,S$GLB,, | Performed by: INTERNAL MEDICINE

## 2023-11-16 PROCEDURE — 3075F SYST BP GE 130 - 139MM HG: CPT | Mod: CPTII,S$GLB,, | Performed by: INTERNAL MEDICINE

## 2023-11-16 PROCEDURE — 3044F PR MOST RECENT HEMOGLOBIN A1C LEVEL <7.0%: ICD-10-PCS | Mod: CPTII,S$GLB,, | Performed by: INTERNAL MEDICINE

## 2023-11-16 PROCEDURE — 99397 PER PM REEVAL EST PAT 65+ YR: CPT | Mod: GZ,S$GLB,, | Performed by: INTERNAL MEDICINE

## 2023-11-16 PROCEDURE — 99215 PR OFFICE/OUTPT VISIT, EST, LEVL V, 40-54 MIN: ICD-10-PCS | Mod: 25,S$GLB,, | Performed by: INTERNAL MEDICINE

## 2023-11-16 PROCEDURE — 99999 PR PBB SHADOW E&M-EST. PATIENT-LVL III: CPT | Mod: PBBFAC,,, | Performed by: INTERNAL MEDICINE

## 2023-11-16 PROCEDURE — 3288F PR FALLS RISK ASSESSMENT DOCUMENTED: ICD-10-PCS | Mod: CPTII,S$GLB,, | Performed by: INTERNAL MEDICINE

## 2023-11-16 PROCEDURE — 3288F FALL RISK ASSESSMENT DOCD: CPT | Mod: CPTII,S$GLB,, | Performed by: INTERNAL MEDICINE

## 2023-11-16 PROCEDURE — 1101F PR PT FALLS ASSESS DOC 0-1 FALLS W/OUT INJ PAST YR: ICD-10-PCS | Mod: CPTII,S$GLB,, | Performed by: INTERNAL MEDICINE

## 2023-11-16 PROCEDURE — 4010F ACE/ARB THERAPY RXD/TAKEN: CPT | Mod: CPTII,S$GLB,, | Performed by: INTERNAL MEDICINE

## 2023-11-16 PROCEDURE — 1159F MED LIST DOCD IN RCRD: CPT | Mod: CPTII,S$GLB,, | Performed by: INTERNAL MEDICINE

## 2023-11-16 PROCEDURE — 3078F PR MOST RECENT DIASTOLIC BLOOD PRESSURE < 80 MM HG: ICD-10-PCS | Mod: CPTII,S$GLB,, | Performed by: INTERNAL MEDICINE

## 2023-11-16 PROCEDURE — 3008F PR BODY MASS INDEX (BMI) DOCUMENTED: ICD-10-PCS | Mod: CPTII,S$GLB,, | Performed by: INTERNAL MEDICINE

## 2023-11-16 PROCEDURE — 4010F PR ACE/ARB THEARPY RXD/TAKEN: ICD-10-PCS | Mod: CPTII,S$GLB,, | Performed by: INTERNAL MEDICINE

## 2023-11-16 PROCEDURE — 3078F DIAST BP <80 MM HG: CPT | Mod: CPTII,S$GLB,, | Performed by: INTERNAL MEDICINE

## 2023-11-16 PROCEDURE — 3008F BODY MASS INDEX DOCD: CPT | Mod: CPTII,S$GLB,, | Performed by: INTERNAL MEDICINE

## 2023-11-16 PROCEDURE — 1159F PR MEDICATION LIST DOCUMENTED IN MEDICAL RECORD: ICD-10-PCS | Mod: CPTII,S$GLB,, | Performed by: INTERNAL MEDICINE

## 2023-11-16 RX ORDER — EZETIMIBE 10 MG/1
10 TABLET ORAL DAILY
Qty: 90 TABLET | Refills: 3 | Status: SHIPPED | OUTPATIENT
Start: 2023-11-16 | End: 2024-11-15

## 2023-11-16 RX ORDER — SERTRALINE HYDROCHLORIDE 100 MG/1
100 TABLET, FILM COATED ORAL EVERY MORNING
Qty: 90 TABLET | Refills: 90 | Status: SHIPPED | OUTPATIENT
Start: 2023-11-16

## 2023-11-16 RX ORDER — ERGOCALCIFEROL 1.25 MG/1
50000 CAPSULE ORAL
Qty: 12 CAPSULE | Refills: 3 | Status: SHIPPED | OUTPATIENT
Start: 2023-11-19

## 2023-11-16 RX ORDER — BUPROPION HYDROCHLORIDE 150 MG/1
150 TABLET, EXTENDED RELEASE ORAL 2 TIMES DAILY
Qty: 90 TABLET | Refills: 90 | Status: SHIPPED | OUTPATIENT
Start: 2023-11-16

## 2023-11-16 RX ORDER — RALOXIFENE HYDROCHLORIDE 60 MG/1
60 TABLET, FILM COATED ORAL DAILY
Qty: 90 TABLET | Refills: 12 | Status: SHIPPED | OUTPATIENT
Start: 2023-11-16

## 2023-11-16 NOTE — PROGRESS NOTES
Plan: Establish care and physical    Patient is a 63-year-old white female with multiple medical problems new to me.  Reviewed interval medical issues.  From health maintenance standpoint she would not had a mammogram in two years because she has hard breast implants that need to be removed and she is going to see Plastic surgery.  She had a small bowel obstruction surgery and she was told to hold off on any other surgery for six months or so.  She has a history of colon polyps and explained to her that stool testing in [facility] would not be appropriate and she did received notice from [facility] to schedule.  She had a history of rectal prolapse during the last prep but reassured that has been fixed and so she should go through with a colonoscopy and she will consider.        Stool 3/23 -Colon scopes at [facility], polyps. None on last scope- got notice to go  Mammo 12/21 ? - going to plastics for hard implants    ROS:   CONST: weight stable. EYES: no vision change. ENT: no sore throat. CV: no chest pain w/ exertion. RESP: no shortness of breath. GI: no nausea, vomiting, diarrhea. No dysphagia. : no urinary issues. MUSCULOSKELETAL: no new myalgias or arthralgias. SKIN: no new changes. NEURO: no focal deficits. PSYCH: no new issues. ENDOCRINE: no polyuria. HEME: no lymph nodes. ALLERGY: no general pruritis.      Past Medical History:   Diagnosis Date    Aortic atherosclerosis 08/01/2023    Arthritis     Cervical spinal stenosis, neurosurgery at [place] 01/09/2017    Depression     Encounter for blood transfusion     as a child    Fibromyalgia, seen rheumatology in the past     Hypokalemia 07/18/2023    Neck pain     Osteoporosis 07/14/2023    Rectal prolapse, surgery at [facility] with colorectal surgery 2017     Small bowel obstruction 07/14/2023    Spinal enthesopathy, lumbar region 03/07/2023    Traumatic closed displaced Colles' fracture of right radius 05/16/2016   Colon scopes at [facility], polyps. None on  last scope- got notice to go  Chronic neck and low back pain, apparently needs various surgery but trying to defer, followed by pain management at [place] will try injections again      Past Surgical History:   Procedure Laterality Date    BREAST SURGERY Bilateral     augmentation-Implants    DIAGNOSTIC LAPAROSCOPY N/A 07/16/2023    Procedure: LAPAROSCOPY, DIAGNOSTIC;  Surgeon: Bora Quevedo MD;  Location: [facility] OR;  Service: General;  Laterality: N/A;    epidural steroid injection  01/09/2017    twice-11/2017  & 1/9/17 to neck    EXCISION, SMALL INTESTINE  07/16/2023    Procedure: EXCISION, SMALL INTESTINE;  Surgeon: Bora Quevedo MD;  Location: [facility] OR;  Service: General;;    HERNIA REPAIR      umbilical    HYSTERECTOMY      LAPAROTOMY, EXPLORATORY N/A 07/16/2023    Procedure: LAPAROTOMY, EXPLORATORY;  Surgeon: Bora Quevedo MD;  Location: [facility] OR;  Service: General;  Laterality: N/A;    ROTATOR CUFF REPAIR Bilateral     TONSILLECTOMY      TOTAL SHOULDER ARTHROPLASTY Bilateral     WRIST FRACTURE SURGERY Right     with hardware placed     Social History     Socioeconomic History    Marital status:    Tobacco Use    Smoking status: Former     Current packs/day: 1.00     Average packs/day: 1 pack/day for 20.3 years (20.3 ttl pk-yrs)     Types: Cigarettes     Start date: 5/12/2014    Smokeless tobacco: Never    Tobacco comments:     Stopped smoking greater than 5 years   Substance and Sexual Activity    Alcohol use: Yes     Comment: rarely    Drug use: Not Currently     Types: Hydromorphone     Comment: 3 - 4 tabs daily as needed    Sexual activity: Not Currently     Social Determinants of Health     Financial Resource Strain: Medium Risk (5/10/2023)    Overall Financial Resource Strain (CARDIA)     Difficulty of Paying Living Expenses: Somewhat hard   Food Insecurity: No Food Insecurity (5/10/2023)    Hunger Vital Sign     Worried About Running Out of Food in the Last Year: Never true      Ran Out of Food in the Last Year: Never true   Transportation Needs: No Transportation Needs (5/10/2023)    PRAPARE - Transportation     Lack of Transportation (Medical): No     Lack of Transportation (Non-Medical): No   Physical Activity: Unknown (5/10/2023)    Exercise Vital Sign     Days of Exercise per Week: 0 days   Stress: No Stress Concern Present (5/10/2023)    [employer] - Occupational Stress Questionnaire     Feeling of Stress : Not at all   Social Connections: Socially Isolated (5/10/2023)    Social Connection and Isolation Panel [NHANES]     Frequency of Communication with Friends and Family: Once a week     Frequency of Social Gatherings with Friends and Family: Once a week     Attends Jainism Services: Never     Active Member of Clubs or Organizations: No     Attends Club or Organization Meetings: Never     Marital Status:    Housing Stability: Unknown (5/10/2023)    Housing Stability Vital Sign     Unable to Pay for Housing in the Last Year: No     Unstable Housing in the Last Year: No     family history is not on file.    Gen: no distress, some movements precipitate some low back pain down the back of her legs  EYES: conjunctiva clear, non-icteric, PERRL  ENT: nose clear, nasal mucosa normal, oropharynx clear and moist, teeth good  NECK:supple, thyroid non-palpable  RESP: effort is good, lungs clear  CV: heart RRR w/o murmur, gallops or rubs; no carotid bruits, no edema  GI: abdomen soft, non-distended, non-tender, no hepatosplenomegaly  MS: gait normal, no clubbing or cyanosis of the digits  SKIN: no rashes, warm to touch    Marilee was seen today for annual exam.    Diagnoses and all orders for this visit:    Routine medical exam, new to me, overdue for mammogram but that likely will need to be directed by Plastic surgery in the workup of her abnormal breast implants that likely need to be removed.  She will call [facility] to arrange colonoscopy and I will  put in a referral  -     TSH; Future  -     Lipid Panel; Future  -     CBC Auto Differential; Future  -     Iron and TIBC; Future  -     Hemoglobin A1C; Future  -     Ferritin; Future  -     Comprehensive Metabolic Panel; Future  -     Vitamin D; Future    Encounter for screening mammogram for breast cancer    Screening for colorectal cancer                                                        Additional evaluation and management issues:    Additionally patient has numerous other medical issues to address all of which are new to me.  She has hypertension and she monitors at home.  After being in the hospital at [place] she was taken off of one of her blood pressure pills that she is not quite sure so we discussed calling us back as I do not want to sent to mail order the wrong prescription.  She has listed amlodipine as well as lisinopril hydrochlorothiazide and she thinks it might be the lisinopril.      Also reviewed her hyperlipidemia.  Looks like at some point in the past she was given Lipitor 40 mg but her brother does not like any medicines at all and convinced her not to take it.  She has fibromyalgia and chronic pain was concerned about the Lipitor worsening her current pain.  Reassured her that may not happen.  We did discuss less issues with Zetia although limited cholesterol response.  She is willing to start Zetia 10 mg and will defer all of her lab work for 4-6 weeks and she might wait till after the holidays.      She does have osteoporosis and apparently is on Evista    She has history of depression and reports that the Zoloft and Wellbutrin working well so she does not want to change.  We discussed Cymbalta as an option for treating her depression as well as chronic pain and fibromyalgia but she does not wish to adjust medications at this time.    Anemia noted probably postsurgical and so forth superimposed on chronic anemia and it does appear that was getting better as of August but it has  been three months so we would like to reassess but will defer that to when we do all of her blood work     She is on a vitamin-D supplement and will reassess     In the hospital she had some hyperglycemia but about nine months ago or so her A1c was normal but will reassess    She has a history of rectal prolapse and that was treated with surgery and she is had no recurrence of the prolapse but every morning she has loose bowels and sometimes fecal incontinence.  She has already added fiber and discussed adding more fiber in if there is a continued problem I would recommend seeing colon and rectal surgery again as she could have weak muscles and so forth.  She does not wish to pursue that at this time.      Evaluation and management of all the separate issues will be based on medical decision making.  Labs and x-ray report in chart reviewed it both internal and external records reviewed and summarized as above.  Problem list and past medical history list updated by myself.    She does have a lot of chronic pain but did not make any specific request for any pain medicine here which is good.  She is followed by outside pain management    Reviewed history of small-bowel obstruction and she had surgery.  She is had no recurrence of symptoms it is eating better and gaining weight.       Over 70 min  minutes of total time spent on the encounter, which includes face to face time and non-face to face time preparing to see the patient (eg, review of tests), Obtaining and/or reviewing separately obtained history, Documenting clinical information in the electronic or other health record, Independently interpreting results (not separately reported) and communicating results to the patient/family/caregiver, or Care coordination (not separately reported).          Assessment and plan:                 Hypertension, unspecified type, chronic and stable and prescription management done and will get Claritin about which medication she  is on  -     TSH; Future  -     Lipid Panel; Future  -     CBC Auto Differential; Future  -     Hemoglobin A1C; Future  -     Comprehensive Metabolic Panel; Future    Hyperlipidemia, unspecified hyperlipidemia type, did not start Lipitor, she is willing to start Zetia  -     Lipid Panel; Future    Osteoporosis, unspecified osteoporosis type, unspecified pathological fracture presence, check vitamin-D, refill vitamin-D and Evista    Depression, unspecified depression type, chronic and stable on current meds which were refilled    Osteoarthritis of multiple joints, unspecified osteoarthritis type/cervical and lumbar nerve impingement apparently, told she needs surgery, she is trying to avoid surgery and will try epidurals and so forth again and she will follow-up with outside pain management.    Aortic atherosclerosis, chronic and stable    Anemia, unspecified type, likely postoperative and chronic, reassess in the near future with iron studies  -     CBC Auto Differential; Future  -     Iron and TIBC; Future  -     Ferritin; Future    Vitamin D deficiency disease  -     Vitamin D; Future    Hyperglycemia, rule out diabetes  -     Hemoglobin A1C; Future    Fibromyalgia, contributing to pain, discussed Cymbalta    Incontinence of feces, unspecified fecal incontinence type, discussed referral back to colon and rectal surgery when desired    Other chronic pain    Age related osteoporosis, unspecified pathological fracture presence  -     ergocalciferol (ERGOCALCIFEROL) 50,000 unit Cap; Take 1 capsule (50,000 Units total) by mouth every Sunday.    Other orders  -     ezetimibe (ZETIA) 10 mg tablet; Take 1 tablet (10 mg total) by mouth once daily.  -     buPROPion (WELLBUTRIN SR) 150 MG TBSR 12 hr tablet; Take 1 tablet (150 mg total) by mouth 2 (two) times daily.  -     raloxifene (EVISTA) 60 mg tablet; Take 1 tablet (60 mg total) by mouth once daily.  -     sertraline (ZOLOFT) 100 MG tablet; Take 1 tablet (100 mg total)  by mouth every morning.

## 2023-12-07 ENCOUNTER — E-VISIT (OUTPATIENT)
Dept: FAMILY MEDICINE | Facility: CLINIC | Age: 63
End: 2023-12-07
Payer: MEDICARE

## 2023-12-07 ENCOUNTER — TELEPHONE (OUTPATIENT)
Dept: FAMILY MEDICINE | Facility: CLINIC | Age: 63
End: 2023-12-07
Payer: MEDICARE

## 2023-12-07 DIAGNOSIS — N39.0 URINARY TRACT INFECTION WITHOUT HEMATURIA, SITE UNSPECIFIED: Primary | ICD-10-CM

## 2023-12-07 PROCEDURE — 99422 PR E&M, ONLINE DIGIT, EST, < 7 DAYS,  11-20 MINS: ICD-10-PCS | Mod: ,,, | Performed by: STUDENT IN AN ORGANIZED HEALTH CARE EDUCATION/TRAINING PROGRAM

## 2023-12-07 PROCEDURE — 99422 OL DIG E/M SVC 11-20 MIN: CPT | Mod: ,,, | Performed by: STUDENT IN AN ORGANIZED HEALTH CARE EDUCATION/TRAINING PROGRAM

## 2023-12-07 RX ORDER — NITROFURANTOIN 25; 75 MG/1; MG/1
100 CAPSULE ORAL 2 TIMES DAILY
Qty: 10 CAPSULE | Refills: 0 | Status: SHIPPED | OUTPATIENT
Start: 2023-12-07 | End: 2023-12-12

## 2023-12-07 NOTE — TELEPHONE ENCOUNTER
----- Message from Isabel Marroquin sent at 12/7/2023  8:03 AM CST -----  Regarding: self .[phone]  .Type: Patient Call Back    Who called: self     What is the request in detail: Pt stated that she's having burning urination and some back and requesting to get a rx called in for a UTI     [id number] - [place] - [address] [facility]  [address] [place] [address]  Phone: [phone] Fax: [phone]        Can the clinic reply by [facility]? Call back     Would the patient rather a call back or a response via [facility]?  Call back     Best call back number: .[phone]

## 2023-12-07 NOTE — PROGRESS NOTES
Patient ID: Marilee Townsend is a 63 y.o. female.    Chief Complaint: Urinary Tract Infection (Entered automatically based on patient selection in Patient Portal.)          274}  The patient initiated a request through [employer] on 12/7/2023 for evaluation and management with a chief complaint of Urinary Tract Infection (Entered automatically based on patient selection in Patient Portal.)     I evaluated the questionnaire submission on 12/07/2023 .    [facility] Peq Evisit Uti Questionnaire    12/7/2023  9:02 AM CST - Filed by Patient   Do you agree to participate in an E-Visit? Yes   If you have any of the following problems, please present to your local ER or call 911:  I acknowledge   What is the main issue that you would like for your doctor to address today? uti   Are you able to take your vital signs? Yes   Systolic Blood Pressure: 117   Diastolic Blood Pressure: 78   Weight: 103   Height: 62   Pulse:    Temperature: 98   Respiration rate:    Pulse Oxygen:    What symptoms do you currently have? Pain while passing urine   When did your symptoms first appear? 12/5/2023   List what you have done or taken to help your symptoms. take over counter med for burning   Please indicate whether you have had the following symptoms during the past 24 hours     Urgent urination (a sudden and uncontrollable urge to urinate) Moderate   Frequent urination of small amounts of urine (going to the toilet very often) Moderate   Burning pain when urinating Moderate   Incomplete bladder emptying (still feel like you need to urinate again after urination) Moderate   Pain not associated with urination in the lower abdomen below the belly button) None   What does your urine look like? I am not sure   Blood seen in the urine Mild   Flank pain (pain in one or both sides of the lower back) None   Abnormal Vaginal Discharge (abnormal amount, color and/or odor) None   Discharge from the urethra (urinary opening) without urination None   High body  temperature/fever? None-<99.5   Please rate how much discomfort you have experience because of the symptoms in the past 24 hours: Moderate   Please indicate how the symptoms have interfered with your every day activities/work in the past 24 hours: Moderate   Please indicate how these symptoms have interfered with your social activities (visiting people, meeting with friends, etc.) in the past 24 hours? None   Are you a diabetic? No   Please indicate whether you have the following at the time of completion of this questionnaire: None of the above   Provide any information you feel is important to your history not asked above had only twice and feel like i need an antibiotic   Please attach any relevant images or files (if you have performed a home test for UTI, please submit a photo of results)           Active Problem List with Overview Notes    Diagnosis Date Noted    Incontinence of feces 11/16/2023    Fibromyalgia 11/16/2023    Aortic atherosclerosis 08/01/2023    Atelectasis 07/21/2023    Weakness 07/21/2023    Body mass index (BMI) less than 19 07/21/2023    Advanced care planning/counseling discussion 07/19/2023    Hypokalemia 07/18/2023    Small bowel obstruction 07/14/2023    HTN (hypertension) 07/14/2023    Leukocytosis 07/14/2023    Depression 07/14/2023    Osteoporosis 07/14/2023    Spinal enthesopathy, lumbar region 03/07/2023    Rectal prolapse 06/27/2017    Cervical spinal stenosis 01/09/2017    Cervical radiculitis 11/21/2016    Traumatic closed displaced Colles' fracture of right radius 05/16/2016      Recent Labs Obtained:  Lab Results   Component Value Date    WBC 6.30 08/09/2023    HGB 9.8 (L) 08/09/2023    HCT 31.2 (L) 08/09/2023    MCV 87 08/09/2023     (H) 08/09/2023     (L) 08/01/2023    K 4.0 08/01/2023    GLU 95 08/01/2023    CREATININE 0.6 08/01/2023    EGFRNORACEVR >60.0 08/01/2023    HGBA1C 5.6 03/08/2023      Review of patient's allergies indicates:   Allergen Reactions     Morphine Nausea And Vomiting       Encounter Diagnosis   Name Primary?    Urinary tract infection without hematuria, site unspecified Yes        No orders of the defined types were placed in this encounter.     Medications Ordered This Encounter   Medications    nitrofurantoin, macrocrystal-monohydrate, (MACROBID) 100 MG capsule     Sig: Take 1 capsule (100 mg total) by mouth 2 (two) times daily. for 5 days     Dispense:  10 capsule     Refill:  0        E-Visit Time Tracking:    Day 1 Time (in minutes): 12     Total Time (in minutes): 12       274}

## 2023-12-15 ENCOUNTER — PATIENT OUTREACH (OUTPATIENT)
Dept: ADMINISTRATIVE | Facility: HOSPITAL | Age: 63
End: 2023-12-15
Payer: MEDICARE

## 2023-12-15 RX ORDER — OXYCODONE AND ACETAMINOPHEN 5; 325 MG/1; MG/1
1 TABLET ORAL 4 TIMES DAILY PRN
COMMUNITY
Start: 2023-11-07

## 2023-12-15 RX ORDER — EZETIMIBE 10 MG/1
1 TABLET ORAL DAILY
COMMUNITY
Start: 2023-11-16 | End: 2024-11-15

## 2023-12-15 RX ORDER — PREGABALIN 75 MG/1
1 CAPSULE ORAL 2 TIMES DAILY
COMMUNITY
Start: 2023-11-20 | End: 2024-03-11

## 2023-12-15 RX ORDER — OXYCODONE AND ACETAMINOPHEN 10; 325 MG/1; MG/1
TABLET ORAL
COMMUNITY
Start: 2023-11-14

## 2023-12-15 RX ORDER — TRAMADOL HYDROCHLORIDE 50 MG/1
50 TABLET ORAL EVERY 6 HOURS PRN
COMMUNITY
Start: 2023-07-13

## 2023-12-15 NOTE — PROGRESS NOTES
[facility] 1 N BP Gap Report 12.08.23 - the patient has a controlled blood pressure reading of 138/79 documented on 11/16/2023.    Overdue Mammogram - per Dr. Cam note written on 11/16/2023, the patient has hard bilateral implants that need to be addressed. The test will be revisited after her consult w/ plastics.    Overdue Colorectal Screening -  per Dr. Cam note written on 11/16/2023, the patient will follow up with [facility].

## 2023-12-26 ENCOUNTER — TELEPHONE (OUTPATIENT)
Dept: FAMILY MEDICINE | Facility: CLINIC | Age: 63
End: 2023-12-26
Payer: MEDICARE

## 2023-12-26 NOTE — TELEPHONE ENCOUNTER
----- Message from Dereck Husain sent at 12/26/2023  9:38 AM CST -----  Regarding: Marilee  Type: Patient Callback     Who called: Marilee     What is the request in detail: Pt state the medication for her UTI is wearing off and she is starting to burn again. Pt would like for the doctor to prescribe her the medication that she used once before. Please reach out to the patient.     Can the clinic reply by [facility]? Yes     Would the patient rather a call back or a response via [facility]? Callback     Best call back number: [phone]      Additional Information:

## 2023-12-26 NOTE — TELEPHONE ENCOUNTER
Pt urine never tested and had a e-visit on 12/7 of Macrobid 100mg BID for 5 days. Pt states the burning is slight and is gone after drinking a lot of water. Pt told to go to UC and have urine tested.

## 2023-12-26 NOTE — TELEPHONE ENCOUNTER
Please advise,    Pt state the medication for her UTI is wearing off and she is starting to burn again. Pt would like for the doctor to prescribe her the medication that she used once before.

## 2024-01-03 ENCOUNTER — TELEPHONE (OUTPATIENT)
Dept: FAMILY MEDICINE | Facility: CLINIC | Age: 64
End: 2024-01-03
Payer: MEDICARE

## 2024-01-03 NOTE — TELEPHONE ENCOUNTER
----- Message from Bettye Joseph sent at 1/3/2024 10:22 AM CST -----  Regarding: Refil Request  Who Called:VINCE FRANK [[id number]]       New Prescription or Refill : Refill      RX Name and Strength:  amLODIPine (NORVASC) 5 MG tablet       RX Name and Strength:  Meloxicam 15mg       30 day or 90 day RX:       Local or Mail Order :  Mail Order         Preferred Pharmacy:[facility] - [place] [address] [address]    Would the patient rather a call back or a response via [facility]? yes        Best Call Back Number:  [phone]        Additional Information:.PT is out of her meds and really needs this to be call in ASAP please assist

## 2024-01-03 NOTE — TELEPHONE ENCOUNTER
I left a message voicemail informing the patient that the medications request has been forwarded to the physician to approve. I reminded her that Dr. Cam is out of the clinic until 01/08/24.

## 2024-01-05 NOTE — TELEPHONE ENCOUNTER
Attempt to reach patient by phone to get clarity with medication Meloxicam. Left message on answering machine to return call to clinic.

## 2024-01-09 ENCOUNTER — TELEPHONE (OUTPATIENT)
Dept: FAMILY MEDICINE | Facility: CLINIC | Age: 64
End: 2024-01-09
Payer: MEDICARE

## 2024-01-09 RX ORDER — AMLODIPINE BESYLATE 5 MG/1
5 TABLET ORAL DAILY
Qty: 90 TABLET | Refills: 90 | Status: SHIPPED | OUTPATIENT
Start: 2024-01-09

## 2024-01-09 NOTE — TELEPHONE ENCOUNTER
I spoke with the patient who stated she has been taking the meloxicam 15 mg once daily for a long time. I inquired who prescribed the Rx. She stated the bottle does not state it. She is going the pharmacy to obtain the information.

## 2024-01-09 NOTE — TELEPHONE ENCOUNTER
----- Message from Kellie Louis sent at 1/9/2024  2:17 PM CST -----  Type:  Patient Returning Call    Who Called: pt     Who Left Message for Patient: john    Does the patient know what this is regarding?:yes    Would the patient rather a call back or a response via [facility]? call    Best Call Back Number:[phone] (home)       Additional Information:

## 2024-01-09 NOTE — TELEPHONE ENCOUNTER
Refill for the amlodipine sent in and agree, we need to clarify the meloxicam issue since it is not on our chart.      Based on her age it would be safer for her to be on a lower dose of meloxicam and use it intermittently so get back in touch with her today or tomorrow if she can see who prescribed it for initially.    Also looks like when I saw her in November we were going to repeat a long list of labs that do not look like they got done and looks like the orders are still in there     Work on a day patient can return and get all the labs done

## 2024-01-09 NOTE — TELEPHONE ENCOUNTER
Attempt to reach patient again by phone to get clarity with medication Meloxicam. Is the patient taking this medication? Who prescribed it? Left message on answering machine to return call to clinic.

## 2024-01-23 RX ORDER — MELOXICAM 15 MG/1
15 TABLET ORAL DAILY
Qty: 30 TABLET | Refills: 0 | Status: SHIPPED | OUTPATIENT
Start: 2024-01-23 | End: 2024-02-27

## 2024-01-23 NOTE — TELEPHONE ENCOUNTER
Care Due:                  Date            Visit Type   Department     Provider  --------------------------------------------------------------------------------                                [facility]/ INTERNAL  [facility]  Last Visit: 11-      CARE ([facility])   MED/ PEDS      Ehrensing                              [facility]/ INTERNAL  [facility]  Next Visit: 02-      CARE ([facility])   MED/ PEDS      Ehrensing                                                            Last  Test          Frequency    Reason                     Performed    Due Date  --------------------------------------------------------------------------------    Lipid Panel.  12 months..  ezetimibe................  03- 03-    Vitamin D...  15 months..  raloxifene...............  Not Found    Overdue    [employer] Embedded Care Due Messages. Reference number: [id number].   1/23/2024 10:57:45 AM CST

## 2024-01-24 ENCOUNTER — OFFICE VISIT (OUTPATIENT)
Dept: PLASTIC SURGERY | Facility: CLINIC | Age: 64
End: 2024-01-24
Payer: MEDICARE

## 2024-01-24 VITALS
HEIGHT: 62 IN | DIASTOLIC BLOOD PRESSURE: 94 MMHG | SYSTOLIC BLOOD PRESSURE: 166 MMHG | BODY MASS INDEX: 18.95 KG/M2 | WEIGHT: 103 LBS | HEART RATE: 94 BPM

## 2024-01-24 DIAGNOSIS — Z98.82 HISTORY OF BILATERAL BREAST IMPLANTS: ICD-10-CM

## 2024-01-24 PROCEDURE — 99202 OFFICE O/P NEW SF 15 MIN: CPT | Mod: S$GLB,,, | Performed by: SURGERY

## 2024-01-24 PROCEDURE — 99999 PR PBB SHADOW E&M-EST. PATIENT-LVL IV: CPT | Mod: PBBFAC,,, | Performed by: SURGERY

## 2024-01-24 NOTE — LETTER
[facility] 2nd Floor  [address] [facility] [address]  Phone: [phone]  Fax: [phone] February 12, 2024        Breanna Paniagua, FUAD  [address] [facility] [address]    Patient: Marilee Townsend   MR Number: [id number]   YOB: 1960   Date of Visit: 1/24/2024     Dear Ms. Paniagua:    Thank you for referring Marilee Townsend to me for evaluation. Attached are the relevant portions of my assessment and plan of care.    If you have questions, please do not hesitate to call me. I look forward to following Marilee along with you.    Sincerely,    Cristi Brady MD   Section of Plastic Surgery  Department of Surgery  [facility]    CRB/hcr    CC  Fredrick Cam MD

## 2024-01-30 NOTE — PROGRESS NOTES
Patient presents Plastic surgery Clinic with a chief complaint of having hard and implants.  She had implants placed in subpectoral position proximally 30 years ago.  Patient states that she has had 2 mammograms which were completely normal.  No mention of any type of rupture.  I believe they were done at an outside facility.  Patient wanted to have her implants removed and exchanged for new implants.  I discussed with her that although she does have a capsular contracture she according to her mammography does not have any rupture.  We will quote her fees for her procedure.

## 2024-02-09 ENCOUNTER — LAB VISIT (OUTPATIENT)
Dept: LAB | Facility: HOSPITAL | Age: 64
End: 2024-02-09
Attending: INTERNAL MEDICINE
Payer: MEDICARE

## 2024-02-09 DIAGNOSIS — I10 HYPERTENSION, UNSPECIFIED TYPE: ICD-10-CM

## 2024-02-09 DIAGNOSIS — D64.9 ANEMIA, UNSPECIFIED TYPE: ICD-10-CM

## 2024-02-09 DIAGNOSIS — E55.9 VITAMIN D DEFICIENCY DISEASE: ICD-10-CM

## 2024-02-09 DIAGNOSIS — E78.5 HYPERLIPIDEMIA, UNSPECIFIED HYPERLIPIDEMIA TYPE: ICD-10-CM

## 2024-02-09 DIAGNOSIS — Z00.00 ROUTINE MEDICAL EXAM: ICD-10-CM

## 2024-02-09 DIAGNOSIS — R73.9 HYPERGLYCEMIA: ICD-10-CM

## 2024-02-09 LAB
25(OH)D3+25(OH)D2 SERPL-MCNC: 44 NG/ML (ref 30–96)
ALBUMIN SERPL BCP-MCNC: 3.8 G/DL (ref 3.5–5.2)
ALP SERPL-CCNC: 88 U/L (ref 55–135)
ALT SERPL W/O P-5'-P-CCNC: 15 U/L (ref 10–44)
ANION GAP SERPL CALC-SCNC: 6 MMOL/L (ref 8–16)
AST SERPL-CCNC: 18 U/L (ref 10–40)
BASOPHILS # BLD AUTO: 0.04 K/UL (ref 0–0.2)
BASOPHILS NFR BLD: 0.6 % (ref 0–1.9)
BILIRUB SERPL-MCNC: 0.4 MG/DL (ref 0.1–1)
BUN SERPL-MCNC: 18 MG/DL (ref 8–23)
CALCIUM SERPL-MCNC: 9.3 MG/DL (ref 8.7–10.5)
CHLORIDE SERPL-SCNC: 109 MMOL/L (ref 95–110)
CHOLEST SERPL-MCNC: 212 MG/DL (ref 120–199)
CHOLEST/HDLC SERPL: 2.8 {RATIO} (ref 2–5)
CO2 SERPL-SCNC: 25 MMOL/L (ref 23–29)
CREAT SERPL-MCNC: 0.8 MG/DL (ref 0.5–1.4)
DIFFERENTIAL METHOD BLD: ABNORMAL
EOSINOPHIL # BLD AUTO: 0.1 K/UL (ref 0–0.5)
EOSINOPHIL NFR BLD: 0.7 % (ref 0–8)
ERYTHROCYTE [DISTWIDTH] IN BLOOD BY AUTOMATED COUNT: 15.9 % (ref 11.5–14.5)
EST. GFR  (NO RACE VARIABLE): >60 ML/MIN/1.73 M^2
ESTIMATED AVG GLUCOSE: 108 MG/DL (ref 68–131)
FERRITIN SERPL-MCNC: 64 NG/ML (ref 20–300)
GLUCOSE SERPL-MCNC: 95 MG/DL (ref 70–110)
HBA1C MFR BLD: 5.4 % (ref 4–5.6)
HCT VFR BLD AUTO: 42.9 % (ref 37–48.5)
HDLC SERPL-MCNC: 77 MG/DL (ref 40–75)
HDLC SERPL: 36.3 % (ref 20–50)
HGB BLD-MCNC: 13.9 G/DL (ref 12–16)
IMM GRANULOCYTES # BLD AUTO: 0.02 K/UL (ref 0–0.04)
IMM GRANULOCYTES NFR BLD AUTO: 0.3 % (ref 0–0.5)
IRON SERPL-MCNC: 59 UG/DL (ref 30–160)
LDLC SERPL CALC-MCNC: 119 MG/DL (ref 63–159)
LYMPHOCYTES # BLD AUTO: 2.1 K/UL (ref 1–4.8)
LYMPHOCYTES NFR BLD: 29.5 % (ref 18–48)
MCH RBC QN AUTO: 30 PG (ref 27–31)
MCHC RBC AUTO-ENTMCNC: 32.4 G/DL (ref 32–36)
MCV RBC AUTO: 93 FL (ref 82–98)
MONOCYTES # BLD AUTO: 0.3 K/UL (ref 0.3–1)
MONOCYTES NFR BLD: 4.9 % (ref 4–15)
NEUTROPHILS # BLD AUTO: 4.5 K/UL (ref 1.8–7.7)
NEUTROPHILS NFR BLD: 64 % (ref 38–73)
NONHDLC SERPL-MCNC: 135 MG/DL
NRBC BLD-RTO: 0 /100 WBC
PLATELET # BLD AUTO: 374 K/UL (ref 150–450)
PMV BLD AUTO: 8.9 FL (ref 9.2–12.9)
POTASSIUM SERPL-SCNC: 4.1 MMOL/L (ref 3.5–5.1)
PROT SERPL-MCNC: 7.6 G/DL (ref 6–8.4)
RBC # BLD AUTO: 4.64 M/UL (ref 4–5.4)
SATURATED IRON: 13 % (ref 20–50)
SODIUM SERPL-SCNC: 140 MMOL/L (ref 136–145)
TOTAL IRON BINDING CAPACITY: 444 UG/DL (ref 250–450)
TRANSFERRIN SERPL-MCNC: 300 MG/DL (ref 200–375)
TRIGL SERPL-MCNC: 80 MG/DL (ref 30–150)
TSH SERPL DL<=0.005 MIU/L-ACNC: 3.68 UIU/ML (ref 0.4–4)
WBC # BLD AUTO: 7.01 K/UL (ref 3.9–12.7)

## 2024-02-09 PROCEDURE — 84443 ASSAY THYROID STIM HORMONE: CPT | Performed by: INTERNAL MEDICINE

## 2024-02-09 PROCEDURE — 80061 LIPID PANEL: CPT | Performed by: INTERNAL MEDICINE

## 2024-02-09 PROCEDURE — 85025 COMPLETE CBC W/AUTO DIFF WBC: CPT | Performed by: INTERNAL MEDICINE

## 2024-02-09 PROCEDURE — 83036 HEMOGLOBIN GLYCOSYLATED A1C: CPT | Performed by: INTERNAL MEDICINE

## 2024-02-09 PROCEDURE — 83540 ASSAY OF IRON: CPT | Performed by: INTERNAL MEDICINE

## 2024-02-09 PROCEDURE — 82306 VITAMIN D 25 HYDROXY: CPT | Performed by: INTERNAL MEDICINE

## 2024-02-09 PROCEDURE — 36415 COLL VENOUS BLD VENIPUNCTURE: CPT | Mod: PO | Performed by: INTERNAL MEDICINE

## 2024-02-09 PROCEDURE — 82728 ASSAY OF FERRITIN: CPT | Performed by: INTERNAL MEDICINE

## 2024-02-09 PROCEDURE — 80053 COMPREHEN METABOLIC PANEL: CPT | Performed by: INTERNAL MEDICINE

## 2024-02-14 NOTE — PROGRESS NOTES
Chief complaint:  General follow-up, upcoming surgery to discuss      Establish care and physical 11/23    Patient is a 63-year-old white female with multiple medical problems new to me 11/23.  Reviewed interval medical issues. Right sciatica and goiing for surgery again w Dr Nolasco. Doscussed mobic issues, limit prn, no other NSAIDs used       From health maintenance standpoint she would not had a mammogram in two years because she has hard breast implants that need to be removed and she is going to see Plastic surgery.  She had a small bowel obstruction surgery and she was told to hold off on any other surgery for six months or so.  She has a history of colon polyps and explained to [facility] and she did received notice from [facility] to schedule.  She had a history of rectal prolapse during the last prep but reassured that has been fixed and so she should go through with a colonoscopy and she will consider.        Stool 3/23 -Colon scopes at [facility], polyps. None on last scope- got notice to go  Mammo 12/21 ? - going to plastics for hard implants.  Fearful of getting another mammogram to avoid rupture of the implants    All labs back to normal.  to 119        She has hypertension and she monitors at home.  After being in the hospital at [place] she was taken off of one of her blood pressure pills that she is not quite sure so we discussed calling us back as I do not want to sent to mail order the wrong prescription.  She has listed amlodipine as well as lisinopril hydrochlorothiazide and she thinks it might be the lisinopril.      Also reviewed her hyperlipidemia.  Looks like at some point in the past she was given Lipitor 40 mg but her brother does not like any medicines at all and convinced her not to take it.  She has fibromyalgia and chronic pain was concerned about the Lipitor worsening her current pain.  Reassured her that may not happen.  We  did discuss less issues with Zetia although limited cholesterol response.  She is willing to start Zetia 10 mg and will defer all of her lab work for 4-6 weeks and she might wait till after the holidays.      She does have osteoporosis and apparently is on Evista    She has history of depression and reports that the Zoloft and Wellbutrin working well so she does not want to change.  We discussed Cymbalta as an option for treating her depression as well as chronic pain and fibromyalgia but she does not wish to adjust medications at this time.    Anemia noted probably postsurgical and so forth superimposed on chronic anemia and it does appear that was getting better and now normal    She is on a vitamin-D supplement and will reassess     In the hospital she had some hyperglycemia but about nine months ago or so her A1c was normal 2/24    She has a history of rectal prolapse and that was treated with surgery and she is had no recurrence of the prolapse but every morning she has loose bowels and sometimes fecal incontinence.  She has already added fiber and discussed adding more fiber in if there is a continued problem I would recommend seeing colon and rectal surgery again as she could have weak muscles and so forth.  She does not wish to pursue that at this time.          She does have a lot of chronic pain but did not make any specific request for any pain medicine here which is good.  She is followed by outside pain management    Reviewed history of small-bowel obstruction and she had surgery.  She is had no recurrence of symptoms it is eating better and gaining weight.        ROS:   CONST: weight stable. EYES: no vision change. ENT: no sore throat. CV: no chest pain w/ exertion. RESP: no shortness of breath. GI: no nausea, vomiting, diarrhea. No dysphagia. : no urinary issues. MUSCULOSKELETAL: no new myalgias or arthralgias. SKIN: no new changes. NEURO: no focal deficits. PSYCH: no new issues. ENDOCRINE: no  polyuria. HEME: no lymph nodes. ALLERGY: no general pruritis.      Past Medical History:   Diagnosis Date    Aortic atherosclerosis 08/01/2023    Arthritis     Cervical spinal stenosis, neurosurgery at [place] 01/09/2017    Depression     Encounter for blood transfusion     as a child    Fibromyalgia, seen rheumatology in the past     Hypokalemia 07/18/2023    Neck pain     Osteoporosis 07/14/2023    Rectal prolapse, surgery at [facility] with colorectal surgery 2017     Small bowel obstruction 07/14/2023    Spinal enthesopathy, lumbar region 03/07/2023    Traumatic closed displaced Colles' fracture of right radius 05/16/2016   Colon scopes at [facility], polyps. None on last scope- got notice to go  Chronic neck and low back pain, apparently needs various surgery but trying to defer, followed by pain management at [place] will try injections again      Past Surgical History:   Procedure Laterality Date    BREAST SURGERY Bilateral     augmentation-Implants    DIAGNOSTIC LAPAROSCOPY N/A 07/16/2023    Procedure: LAPAROSCOPY, DIAGNOSTIC;  Surgeon: Bora Quevedo MD;  Location: [facility] OR;  Service: General;  Laterality: N/A;    epidural steroid injection  01/09/2017    twice-11/2017  & 1/9/17 to neck    EXCISION, SMALL INTESTINE  07/16/2023    Procedure: EXCISION, SMALL INTESTINE;  Surgeon: Bora Quevedo MD;  Location: [facility] OR;  Service: General;;    HERNIA REPAIR      umbilical    HYSTERECTOMY      LAPAROTOMY, EXPLORATORY N/A 07/16/2023    Procedure: LAPAROTOMY, EXPLORATORY;  Surgeon: Bora Quevedo MD;  Location: [facility] OR;  Service: General;  Laterality: N/A;    ROTATOR CUFF REPAIR Bilateral     TONSILLECTOMY      TOTAL SHOULDER ARTHROPLASTY Bilateral     WRIST FRACTURE SURGERY Right     with hardware placed     Social History     Socioeconomic History    Marital status:    Tobacco Use    Smoking status: Former     Current packs/day: 1.00     Average packs/day: 1 pack/day for 20.6 years (20.6  ttl pk-yrs)     Types: Cigarettes     Start date: 5/12/2014    Smokeless tobacco: Never    Tobacco comments:     Patient Quit Smoking 05/12/2014   Substance and Sexual Activity    Alcohol use: Yes     Comment: rarely    Drug use: Not Currently     Types: Hydromorphone     Comment: 3 - 4 tabs daily as needed    Sexual activity: Not Currently     Social Determinants of Health     Financial Resource Strain: Medium Risk (5/10/2023)    Overall Financial Resource Strain (CARDIA)     Difficulty of Paying Living Expenses: Somewhat hard   Food Insecurity: No Food Insecurity (5/10/2023)    Hunger Vital Sign     Worried About Running Out of Food in the Last Year: Never true     Ran Out of Food in the Last Year: Never true   Transportation Needs: No Transportation Needs (5/10/2023)    PRAPARE - Transportation     Lack of Transportation (Medical): No     Lack of Transportation (Non-Medical): No   Physical Activity: Unknown (5/10/2023)    Exercise Vital Sign     Days of Exercise per Week: 0 days   Stress: No Stress Concern Present (5/10/2023)    [employer] [place] [employer] - Occupational Stress Questionnaire     Feeling of Stress : Not at all   Social Connections: Socially Isolated (5/10/2023)    Social Connection and Isolation Panel [NHANES]     Frequency of Communication with Friends and Family: Once a week     Frequency of Social Gatherings with Friends and Family: Once a week     Attends Rastafari Services: Never     Active Member of Clubs or Organizations: No     Attends Club or Organization Meetings: Never     Marital Status:    Housing Stability: Unknown (5/10/2023)    Housing Stability Vital Sign     Unable to Pay for Housing in the Last Year: No     Unstable Housing in the Last Year: No     family history is not on file.    Gen: no distress, some movements precipitate some low back pain down the back of her legs        Assessment and plan:       Diagnoses and all orders for this  visit:    Hypertension, unspecified type,  ,chronic condition and stable.    Hyperlipidemia, unspecified hyperlipidemia type, better    Anemia, unspecified type, better    Hyperglycemia, nl A1c    Other chronic pain, multifactorial, limit mobic prn    Age related osteoporosis, unspecified pathological fracture presence ,chronic condition and stable.    Aortic atherosclerosis ,chronic condition and stable.

## 2024-02-15 ENCOUNTER — OFFICE VISIT (OUTPATIENT)
Dept: FAMILY MEDICINE | Facility: CLINIC | Age: 64
End: 2024-02-15
Payer: MEDICARE

## 2024-02-15 VITALS
WEIGHT: 103.63 LBS | OXYGEN SATURATION: 98 % | TEMPERATURE: 98 F | DIASTOLIC BLOOD PRESSURE: 72 MMHG | SYSTOLIC BLOOD PRESSURE: 134 MMHG | BODY MASS INDEX: 19.07 KG/M2 | HEIGHT: 62 IN | HEART RATE: 75 BPM

## 2024-02-15 DIAGNOSIS — D64.9 ANEMIA, UNSPECIFIED TYPE: ICD-10-CM

## 2024-02-15 DIAGNOSIS — I10 HYPERTENSION, UNSPECIFIED TYPE: Primary | ICD-10-CM

## 2024-02-15 DIAGNOSIS — R73.9 HYPERGLYCEMIA: ICD-10-CM

## 2024-02-15 DIAGNOSIS — M81.0 AGE RELATED OSTEOPOROSIS, UNSPECIFIED PATHOLOGICAL FRACTURE PRESENCE: ICD-10-CM

## 2024-02-15 DIAGNOSIS — E78.5 HYPERLIPIDEMIA, UNSPECIFIED HYPERLIPIDEMIA TYPE: ICD-10-CM

## 2024-02-15 DIAGNOSIS — I70.0 AORTIC ATHEROSCLEROSIS: ICD-10-CM

## 2024-02-15 DIAGNOSIS — G89.29 OTHER CHRONIC PAIN: ICD-10-CM

## 2024-02-15 PROCEDURE — 99999 PR PBB SHADOW E&M-EST. PATIENT-LVL III: CPT | Mod: PBBFAC,,, | Performed by: INTERNAL MEDICINE

## 2024-02-15 PROCEDURE — 99214 OFFICE O/P EST MOD 30 MIN: CPT | Mod: S$GLB,,, | Performed by: INTERNAL MEDICINE

## 2024-02-15 RX ORDER — BIOTIN 1 MG
1 TABLET ORAL DAILY
COMMUNITY

## 2024-02-15 RX ORDER — OXYCODONE AND ACETAMINOPHEN 7.5; 325 MG/1; MG/1
1 TABLET ORAL EVERY 6 HOURS PRN
COMMUNITY
Start: 2024-01-29

## 2024-02-15 RX ORDER — MULTIVITAMIN
1 TABLET ORAL
COMMUNITY

## 2024-02-27 ENCOUNTER — TELEPHONE (OUTPATIENT)
Dept: FAMILY MEDICINE | Facility: CLINIC | Age: 64
End: 2024-02-27
Payer: MEDICARE

## 2024-02-27 RX ORDER — MELOXICAM 15 MG/1
TABLET ORAL
Qty: 30 TABLET | Refills: 11 | Status: SHIPPED | OUTPATIENT
Start: 2024-02-27

## 2024-02-27 NOTE — TELEPHONE ENCOUNTER
----- Message from Jeronimo Mari sent at 2/27/2024 12:15 PM CST -----  Regarding: Self .[phone]   Type: RX Refill Request    Who Called: Self     Have you contacted your pharmacy:    Refill    RX Name and Strength:ezetimibe (ZETIA) 10 mg tablet    Preferred Pharmacy with phone number: .  [facility] ([facility]) - [address] [address]  Phone: [phone] Fax: [phone]          Local or Mail Order:  mail order     Would the patient rather a call back or a response via [facility]? Call back     Best Call Back Number:.[phone]      Additional Information:     Thank you.

## 2024-02-27 NOTE — TELEPHONE ENCOUNTER
No care due was identified.  [facility] Embedded Care Due Messages. Reference number: [id number].   2/27/2024 1:53:33 PM CST

## 2024-04-19 LAB — CRC RECOMMENDATION EXT: NORMAL

## 2024-04-22 ENCOUNTER — PATIENT OUTREACH (OUTPATIENT)
Dept: ADMINISTRATIVE | Facility: HOSPITAL | Age: 64
End: 2024-04-22
Payer: MEDICARE

## 2024-04-22 ENCOUNTER — PATIENT MESSAGE (OUTPATIENT)
Dept: ADMINISTRATIVE | Facility: HOSPITAL | Age: 64
End: 2024-04-22
Payer: MEDICARE

## 2024-04-23 ENCOUNTER — PATIENT OUTREACH (OUTPATIENT)
Dept: ADMINISTRATIVE | Facility: HOSPITAL | Age: 64
End: 2024-04-23
Payer: MEDICARE

## 2024-05-09 ENCOUNTER — PATIENT OUTREACH (OUTPATIENT)
Dept: ADMINISTRATIVE | Facility: HOSPITAL | Age: 64
End: 2024-05-09
Payer: MEDICARE

## 2024-05-28 DIAGNOSIS — Z00.00 ENCOUNTER FOR MEDICARE ANNUAL WELLNESS EXAM: ICD-10-CM

## 2024-08-23 ENCOUNTER — TELEPHONE (OUTPATIENT)
Dept: ADMINISTRATIVE | Facility: CLINIC | Age: 64
End: 2024-08-23
Payer: MEDICARE

## 2024-08-23 NOTE — TELEPHONE ENCOUNTER
Called pt, informed pt I was calling to remind pt of her in office EAWV on 8/26/24; clinic location provided to patient; pt confirmed appointment and informed to bring all medications to appt

## 2024-08-26 ENCOUNTER — OFFICE VISIT (OUTPATIENT)
Dept: FAMILY MEDICINE | Facility: CLINIC | Age: 64
End: 2024-08-26
Payer: MEDICARE

## 2024-08-26 VITALS
BODY MASS INDEX: 19.13 KG/M2 | TEMPERATURE: 98 F | SYSTOLIC BLOOD PRESSURE: 110 MMHG | DIASTOLIC BLOOD PRESSURE: 66 MMHG | HEIGHT: 62 IN | WEIGHT: 103.94 LBS | OXYGEN SATURATION: 98 % | HEART RATE: 62 BPM

## 2024-08-26 DIAGNOSIS — Z00.00 ENCOUNTER FOR MEDICARE ANNUAL WELLNESS EXAM: ICD-10-CM

## 2024-08-26 DIAGNOSIS — Z00.00 ENCOUNTER FOR PREVENTIVE HEALTH EXAMINATION: Primary | ICD-10-CM

## 2024-08-26 DIAGNOSIS — Z71.89 ADVANCED DIRECTIVES, COUNSELING/DISCUSSION: ICD-10-CM

## 2024-08-26 DIAGNOSIS — F32.A DEPRESSION, UNSPECIFIED DEPRESSION TYPE: ICD-10-CM

## 2024-08-26 PROCEDURE — 1170F FXNL STATUS ASSESSED: CPT | Mod: CPTII,S$GLB,,

## 2024-08-26 PROCEDURE — 1101F PT FALLS ASSESS-DOCD LE1/YR: CPT | Mod: CPTII,S$GLB,,

## 2024-08-26 PROCEDURE — 3078F DIAST BP <80 MM HG: CPT | Mod: CPTII,S$GLB,,

## 2024-08-26 PROCEDURE — 1160F RVW MEDS BY RX/DR IN RCRD: CPT | Mod: CPTII,S$GLB,,

## 2024-08-26 PROCEDURE — 1158F ADVNC CARE PLAN TLK DOCD: CPT | Mod: CPTII,S$GLB,,

## 2024-08-26 PROCEDURE — 1126F AMNT PAIN NOTED NONE PRSNT: CPT | Mod: CPTII,S$GLB,,

## 2024-08-26 PROCEDURE — 1159F MED LIST DOCD IN RCRD: CPT | Mod: CPTII,S$GLB,,

## 2024-08-26 PROCEDURE — G0439 PPPS, SUBSEQ VISIT: HCPCS | Mod: S$GLB,,,

## 2024-08-26 PROCEDURE — 3288F FALL RISK ASSESSMENT DOCD: CPT | Mod: CPTII,S$GLB,,

## 2024-08-26 PROCEDURE — 99999 PR PBB SHADOW E&M-EST. PATIENT-LVL V: CPT | Mod: PBBFAC,,,

## 2024-08-26 PROCEDURE — 3044F HG A1C LEVEL LT 7.0%: CPT | Mod: CPTII,S$GLB,,

## 2024-08-26 PROCEDURE — 3074F SYST BP LT 130 MM HG: CPT | Mod: CPTII,S$GLB,,

## 2024-08-26 NOTE — PATIENT INSTRUCTIONS
Counseling and Referral of Other Preventative  (Italic type indicates deductible and co-insurance are waived)    Patient Name: Marilee Townsend  Today's Date: 8/26/2024    Health Maintenance       Date Due Completion Date    TETANUS VACCINE Never done ---    RSV Vaccine (Age 60+ and Pregnant patients) (1 - 1-dose 60+ series) Never done ---    Mammogram 12/23/2022 12/23/2021    COVID-19 Vaccine (6 - 2023-24 season) 03/30/2024 11/30/2023    Influenza Vaccine (1) 09/01/2024 11/30/2023    DEXA Scan 07/10/2025 7/10/2023    Lipid Panel 02/09/2029 2/9/2024    Colorectal Cancer Screening 04/19/2029 4/19/2024        No orders of the defined types were placed in this encounter.    The following information is provided to all patients.  This information is to help you find resources for any of the problems found today that may be affecting your health:                  Living healthy guide: www.[facility].[place].gov      Understanding Diabetes: www.diabetes.org      Eating healthy: www.cdc.gov/healthyweight      CDC home safety checklist: www.cdc.gov/steadi/patient.html      Agency on Aging: www.goea.[place].gov      Alcoholics anonymous (AA): www.aa.org      Physical Activity: www.mariama.nih.gov/dh6cxuh      Tobacco use: www.quitwithusla.org

## 2024-08-26 NOTE — PROGRESS NOTES
HPI     Chief Complaint:  AWV    Marilee Townsend is a 64 y.o. female with multiple medical diagnoses as listed in the medical history and problem list that presented for a Medicare AWV and comprehensive Health Risk Assessment today.  Pt is new to me but is known to this clinic with her last appointment being Visit date not found.      The following components were reviewed and updated:    Medical history  Family History  Social history  Allergies and Current Medications  Health Risk Assessment  Health Maintenance  Care Team     HPI  Pt presents for AWV.    Assessment & Plan   (all problems are new to me)    Diagnoses and health risks identified today and associated recommendations/orders:    Problem List Items Addressed This Visit          Psychiatric    Depression  Grieving the loss of sister about 1 month ago. Currently on Wellbutrin and Zoloft which helps stabilize mood. Will consider counseling if needed.     Other Visit Diagnoses       Encounter for preventive health examination    -  Primary  Counseled on age appropriate medical preventative services including age appropriate cancer screenings, age appropriate eye and dental exams, over all nutritional health, need for a consistent exercise regimen, and an over all push towards maintaining a vigorous and active lifestyle.  Counseled on age appropriate vaccines and discussed upcoming health care needs based on age/gender. Discussed good sleep hygiene and stress management.      Encounter for Medicare annual wellness exam      Counseled on age appropriate medical preventative services including age appropriate cancer screenings, age appropriate eye and dental exams, over all nutritional health, need for a consistent exercise regimen, and an over all push towards maintaining a vigorous and active lifestyle.  Counseled on age appropriate vaccines and discussed upcoming health care needs based on age/gender. Discussed good sleep hygiene and stress  management.      Advanced directives, counseling/discussion      I offered to discuss advanced care planning, including how to pick a person who would make decisions for you if you were unable to make them for yourself, called a health care power of , and what kind of decisions you might make such as use of life sustaining treatments such as ventilators and tube feeding when faced with a life limiting illness recorded on a living will that they will need to know. (How you want to be cared for as you near the end of your natural life)     X Patient is interested in learning more about how to make advanced directives.  I provided them paperwork and offered to discuss this with them.                --------------------------------------------    ** See Completed Assessments for Annual Wellness Visit within the encounter summary.**    The following assessments were completed:  Living Situation  CAGE  Depression Screening  Timed Get Up and Go  Whisper Test  Cognitive Function Screening  Nutrition Screening  ADL Screening  PAQ Screening      Provided Marilee Townsend  with a 5-10 year written screening schedule and personal prevention plan. Recommendations were developed using the USPSTF age appropriate recommendations. Education, counseling, and referrals were provided as needed. After Visit Summary printed and given to patient which includes a list of additional screenings\tests needed.    Review for Opioid Screening: Pt does not have Rx for Opioids     Review for Substance Use Disorders: Patient does not abuse substances    Exam     Review of Systems:  (as noted above)  Review of Systems    Physical Exam:   Physical Exam  Constitutional:       General: She is not in acute distress.     Appearance: Normal appearance. She is normal weight. She is not ill-appearing.   Cardiovascular:      Rate and Rhythm: Normal rate and regular rhythm.   Pulmonary:      Effort: Pulmonary effort is normal.      Breath sounds:  "Normal breath sounds.   Neurological:      Mental Status: She is alert.       Vitals:    08/26/24 1024   BP: 110/66   BP Location: Right arm   Patient Position: Sitting   BP Method: Large (Manual)   Pulse: 62   Temp: 98.2 °F (36.8 °C)   TempSrc: Oral   SpO2: 98%   Weight: 47.1 kg (103 lb 15.2 oz)   Height: 5' 2" (1.575 m)      Body mass index is 19.01 kg/m².    Clock:              Health Maintenance:  Health Maintenance         Date Due Completion Date    TETANUS VACCINE Never done ---    RSV Vaccine (Age 60+ and Pregnant patients) (1 - 1-dose 60+ series) Never done ---    COVID-19 Vaccine (6 - 2023-24 season) 03/30/2024 11/30/2023    Mammogram 08/26/2025 (Originally 12/23/2022) 12/23/2021    Influenza Vaccine (1) 09/01/2024 11/30/2023    DEXA Scan 07/10/2025 7/10/2023    Lipid Panel 02/09/2029 2/9/2024    Colorectal Cancer Screening 04/19/2029 4/19/2024            Health maintenance reviewed      Follow Up:  No follow-ups on file.    History     Past Medical History:  Past Medical History:   Diagnosis Date    Aortic atherosclerosis 08/01/2023    Arthritis     Cervical spinal stenosis 01/09/2017    Depression     Encounter for blood transfusion     as a child    Fibromyalgia     Hypokalemia 07/18/2023    Neck pain     Osteoporosis 07/14/2023    Rectal prolapse     Small bowel obstruction 07/14/2023    Spinal enthesopathy, lumbar region 03/07/2023    Traumatic closed displaced Colles' fracture of right radius 05/16/2016       Past Surgical History:  Past Surgical History:   Procedure Laterality Date    BREAST SURGERY Bilateral     augmentation-Implants    DIAGNOSTIC LAPAROSCOPY N/A 07/16/2023    Procedure: LAPAROSCOPY, DIAGNOSTIC;  Surgeon: Bora Quevedo MD;  Location: [facility];  Service: General;  Laterality: N/A;    epidural steroid injection  01/09/2017    twice-11/2017  & 1/9/17 to neck    EXCISION, SMALL INTESTINE  07/16/2023    Procedure: EXCISION, SMALL INTESTINE;  Surgeon: Bora Quevedo MD;  Location: " [facility] OR;  Service: General;;    HERNIA REPAIR      umbilical    HYSTERECTOMY      LAPAROTOMY, EXPLORATORY N/A 07/16/2023    Procedure: LAPAROTOMY, EXPLORATORY;  Surgeon: Bora Quevedo MD;  Location: [facility] OR;  Service: General;  Laterality: N/A;    ROTATOR CUFF REPAIR Bilateral     TONSILLECTOMY      TOTAL SHOULDER ARTHROPLASTY Bilateral     WRIST FRACTURE SURGERY Right     with hardware placed       Social History:  Social History     Socioeconomic History    Marital status:    Tobacco Use    Smoking status: Former     Current packs/day: 1.00     Average packs/day: 1 pack/day for 21.1 years (21.1 ttl pk-yrs)     Types: Cigarettes     Start date: 5/12/2014    Smokeless tobacco: Never    Tobacco comments:     Patient Quit Smoking 05/12/2014   Substance and Sexual Activity    Alcohol use: Yes     Comment: rarely    Drug use: Not Currently     Types: Hydromorphone     Comment: 3 - 4 tabs daily as needed    Sexual activity: Not Currently     Social Determinants of Health     Financial Resource Strain: Low Risk  (8/26/2024)    Overall Financial Resource Strain (CARDIA)     Difficulty of Paying Living Expenses: Not very hard   Food Insecurity: No Food Insecurity (8/26/2024)    Hunger Vital Sign     Worried About Running Out of Food in the Last Year: Never true     Ran Out of Food in the Last Year: Never true   Transportation Needs: No Transportation Needs (5/10/2023)    PRAPARE - Transportation     Lack of Transportation (Medical): No     Lack of Transportation (Non-Medical): No   Physical Activity: Inactive (8/26/2024)    Exercise Vital Sign     Days of Exercise per Week: 0 days     Minutes of Exercise per Session: 0 min   Stress: No Stress Concern Present (8/26/2024)    [employer] [place] [employer] - Occupational Stress Questionnaire     Feeling of Stress : Only a little   Housing Stability: Unknown (5/10/2023)    Housing Stability Vital Sign     Unable to Pay for Housing in the Last Year: No      Unstable Housing in the Last Year: No       Family History:  Family History   Problem Relation Name Age of Onset    Anesthesia problems Neg Hx         Allergies and Medications: (updated and reviewed)  Review of patient's allergies indicates:  No Known Allergies  Current Outpatient Medications   Medication Sig Dispense Refill    amLODIPine (NORVASC) 5 MG tablet Take 1 tablet (5 mg total) by mouth once daily. 90 tablet 90    biotin 1 mg tablet Take 1 tablet by mouth once daily.      buPROPion (WELLBUTRIN SR) 150 MG TBSR 12 hr tablet Take 1 tablet (150 mg total) by mouth 2 (two) times daily. 90 tablet 90    ergocalciferol (ERGOCALCIFEROL) 50,000 unit Cap Take 1 capsule (50,000 Units total) by mouth every Sunday. 12 capsule 3    ezetimibe (ZETIA) 10 mg tablet Take 1 tablet (10 mg total) by mouth once daily. 90 tablet 3    multivitamin (THERAGRAN) per tablet Take 1 tablet by mouth.      raloxifene (EVISTA) 60 mg tablet Take 1 tablet (60 mg total) by mouth once daily. 90 tablet 12    sertraline (ZOLOFT) 100 MG tablet Take 1 tablet (100 mg total) by mouth every morning. 90 tablet 90    cephALEXin (KEFLEX) 500 MG capsule Take 500 mg by mouth 3 (three) times daily.      ezetimibe (ZETIA) 10 mg tablet Take 1 tablet by mouth once daily.      lisinopriL-hydrochlorothiazide (PRINZIDE,ZESTORETIC) 10-12.5 mg per tablet Take 1 tablet by mouth.      meloxicam (MOBIC) 15 MG tablet TAKE 1 TABLET BY MOUTH ONCE  DAILY INSTRUCTED TO STOP UNTIL  AFTER PROCEDURE 1-9-17 30 tablet 11    oxyCODONE-acetaminophen (PERCOCET)  mg per tablet PLEASE SEE ATTACHED FOR DETAILED DIRECTIONS      oxyCODONE-acetaminophen (PERCOCET) 5-325 mg per tablet Take 1 tablet by mouth 4 (four) times daily as needed.      oxyCODONE-acetaminophen (PERCOCET) 7.5-325 mg per tablet Take 1 tablet by mouth every 6 (six) hours as needed.      pregabalin (LYRICA) 75 MG capsule Take 1 capsule by mouth 2 (two) times daily.      traMADoL (ULTRAM) 50 mg tablet Take 50 mg  by mouth every 6 (six) hours as needed.       No current facility-administered medications for this visit.           - The patient is given an After Visit Summary that lists all medications with directions, allergies, education, orders placed during this encounter and follow-up instructions.      - I have reviewed the patient's medical information including past medical, family, and social history sections including the medications and allergies.      - We discussed the patient's current medications.     This note was created by combination of typed  and MModal dictation.  Transcription errors may be present.  If there are any questions, please contact me.       Amira Brantley NP

## 2024-09-20 ENCOUNTER — TELEPHONE (OUTPATIENT)
Dept: FAMILY MEDICINE | Facility: CLINIC | Age: 64
End: 2024-09-20
Payer: MEDICARE

## 2024-09-20 RX ORDER — SERTRALINE HYDROCHLORIDE 100 MG/1
150 TABLET, FILM COATED ORAL DAILY
Qty: 135 TABLET | Refills: 11 | Status: SHIPPED | OUTPATIENT
Start: 2024-09-20 | End: 2025-09-20

## 2024-09-20 NOTE — TELEPHONE ENCOUNTER
From your 2/15 notes I don't see mention of increase dose. Zoloft 150mg pend.      ----- Message from Marichuy Hernandez sent at 9/20/2024  9:25 AM CDT -----  Regarding: call back  Type: Patient Call Back    Who called: pt     What is the request in detail: requesting a retransmission of her Zoloft rx to reflect an increased dose of 150mg per day rather than the current 100mg per day, says she increased dosage at advice of provider but her recent refill did not reflect that. Uses [facility] ([facility]) - [place] [address] [address] [facility]   Phone: [phone]. Pt is almost out of medication. Requesting call once refill has been sent.       Can the clinic reply by [facility]?no    Would the patient rather a call back or a response via [facility]? call    Best call back number: [phone]     Additional Information:

## 2024-10-06 RX ORDER — EZETIMIBE 10 MG/1
10 TABLET ORAL
Qty: 90 TABLET | Refills: 1 | Status: SHIPPED | OUTPATIENT
Start: 2024-10-06

## 2024-10-06 NOTE — TELEPHONE ENCOUNTER
Refill Decision Note   Marilee Kristian  is requesting a refill authorization.  Brief Assessment and Rationale for Refill:  Approve     Medication Therapy Plan:        Comments:     Note composed:12:40 PM 10/06/2024

## 2024-10-06 NOTE — TELEPHONE ENCOUNTER
Care Due:                  Date            Visit Type   Department     Provider  --------------------------------------------------------------------------------                                EP -         [facility]                              PRIMARY      MED/ INTERNAL  Fredrick Ghotra  Last Visit: 02-      CARE ([facility])   MED/ PEDS      [place]  Next Visit: None Scheduled  None         None Found                                                            Last  Test          Frequency    Reason                     Performed    Due Date  --------------------------------------------------------------------------------    Mg Level....  12 months..  raloxifene...............  07- 07-    Phosphate...  15 months..  raloxifene...............  07-   10-    [facility] Embedded Care Due Messages. Reference number: [id number].   10/05/2024 9:03:36 PM CDT

## 2024-10-30 RX ORDER — BUPROPION HYDROCHLORIDE 150 MG/1
150 TABLET, EXTENDED RELEASE ORAL 2 TIMES DAILY
Qty: 90 TABLET | Refills: 1 | Status: SHIPPED | OUTPATIENT
Start: 2024-10-30

## 2024-11-05 ENCOUNTER — OFFICE VISIT (OUTPATIENT)
Dept: FAMILY MEDICINE | Facility: CLINIC | Age: 64
End: 2024-11-05
Payer: MEDICARE

## 2024-11-05 ENCOUNTER — TELEPHONE (OUTPATIENT)
Dept: FAMILY MEDICINE | Facility: CLINIC | Age: 64
End: 2024-11-05

## 2024-11-05 VITALS
DIASTOLIC BLOOD PRESSURE: 78 MMHG | TEMPERATURE: 98 F | HEIGHT: 62 IN | HEART RATE: 77 BPM | WEIGHT: 102.75 LBS | OXYGEN SATURATION: 98 % | BODY MASS INDEX: 18.91 KG/M2 | SYSTOLIC BLOOD PRESSURE: 127 MMHG

## 2024-11-05 DIAGNOSIS — M81.0 AGE RELATED OSTEOPOROSIS, UNSPECIFIED PATHOLOGICAL FRACTURE PRESENCE: ICD-10-CM

## 2024-11-05 DIAGNOSIS — T85.848A PAIN FROM BREAST IMPLANT, INITIAL ENCOUNTER: ICD-10-CM

## 2024-11-05 DIAGNOSIS — Z86.0100 HISTORY OF COLONIC POLYPS: ICD-10-CM

## 2024-11-05 DIAGNOSIS — R73.9 HYPERGLYCEMIA: ICD-10-CM

## 2024-11-05 DIAGNOSIS — I70.0 AORTIC ATHEROSCLEROSIS: ICD-10-CM

## 2024-11-05 DIAGNOSIS — Z98.82 HISTORY OF BILATERAL BREAST IMPLANTS: ICD-10-CM

## 2024-11-05 DIAGNOSIS — I10 HYPERTENSION, UNSPECIFIED TYPE: ICD-10-CM

## 2024-11-05 DIAGNOSIS — Z12.31 ENCOUNTER FOR SCREENING MAMMOGRAM FOR BREAST CANCER: ICD-10-CM

## 2024-11-05 DIAGNOSIS — E55.9 VITAMIN D DEFICIENCY DISEASE: ICD-10-CM

## 2024-11-05 DIAGNOSIS — E78.5 HYPERLIPIDEMIA, UNSPECIFIED HYPERLIPIDEMIA TYPE: ICD-10-CM

## 2024-11-05 DIAGNOSIS — G89.29 OTHER CHRONIC PAIN: ICD-10-CM

## 2024-11-05 DIAGNOSIS — Z00.00 ROUTINE MEDICAL EXAM: Primary | ICD-10-CM

## 2024-11-05 DIAGNOSIS — D64.9 ANEMIA, UNSPECIFIED TYPE: ICD-10-CM

## 2024-11-05 DIAGNOSIS — M15.9 OSTEOARTHRITIS OF MULTIPLE JOINTS, UNSPECIFIED OSTEOARTHRITIS TYPE: ICD-10-CM

## 2024-11-05 DIAGNOSIS — M79.7 FIBROMYALGIA: ICD-10-CM

## 2024-11-05 PROCEDURE — 3288F FALL RISK ASSESSMENT DOCD: CPT | Mod: CPTII,S$GLB,, | Performed by: INTERNAL MEDICINE

## 2024-11-05 PROCEDURE — 3044F HG A1C LEVEL LT 7.0%: CPT | Mod: CPTII,S$GLB,, | Performed by: INTERNAL MEDICINE

## 2024-11-05 PROCEDURE — 1125F AMNT PAIN NOTED PAIN PRSNT: CPT | Mod: CPTII,S$GLB,, | Performed by: INTERNAL MEDICINE

## 2024-11-05 PROCEDURE — 3078F DIAST BP <80 MM HG: CPT | Mod: CPTII,S$GLB,, | Performed by: INTERNAL MEDICINE

## 2024-11-05 PROCEDURE — 1101F PT FALLS ASSESS-DOCD LE1/YR: CPT | Mod: CPTII,S$GLB,, | Performed by: INTERNAL MEDICINE

## 2024-11-05 PROCEDURE — 3008F BODY MASS INDEX DOCD: CPT | Mod: CPTII,S$GLB,, | Performed by: INTERNAL MEDICINE

## 2024-11-05 PROCEDURE — 99999 PR PBB SHADOW E&M-EST. PATIENT-LVL IV: CPT | Mod: PBBFAC,,, | Performed by: INTERNAL MEDICINE

## 2024-11-05 PROCEDURE — 3074F SYST BP LT 130 MM HG: CPT | Mod: CPTII,S$GLB,, | Performed by: INTERNAL MEDICINE

## 2024-11-05 PROCEDURE — 99214 OFFICE O/P EST MOD 30 MIN: CPT | Mod: 25,S$GLB,, | Performed by: INTERNAL MEDICINE

## 2024-11-05 PROCEDURE — 1159F MED LIST DOCD IN RCRD: CPT | Mod: CPTII,S$GLB,, | Performed by: INTERNAL MEDICINE

## 2024-11-05 PROCEDURE — 99397 PER PM REEVAL EST PAT 65+ YR: CPT | Mod: S$GLB,,, | Performed by: INTERNAL MEDICINE

## 2024-11-05 NOTE — PROGRESS NOTES
Chief complaint:  Physical     Patient is a 64-year-old white female with multiple medical problems new to me 11/23.  Reviewed interval medical issues.      From health maintenance standpoint she had not had a mammogram in two years because she has hard breast implants that need to be removed and she was going to see Plastic surgery.  She had a small bowel obstruction surgery and she was told to hold off on any other surgery for six months or so.  She has a history of colon polyps and explained to her that stool testing in [facility] would not be appropriate and [facility] -5 y[facility]  She had a history of rectal prolapse during the last prep but reassured that has been fixed and so she should go through with a colonoscopy     Stool 3/23 -Colon scopes at [facility], polyps. None on last scope- colon 4/24 -5 yrs    Mammo 12/21 ? - going to plastics for hard implants.  Fearful of getting another mammogram to avoid rupture of the implants.             ROS:   CONST: weight stable. EYES: no vision change. ENT: no sore throat. CV: no chest pain w/ exertion. RESP: no shortness of breath. GI: no nausea, vomiting, diarrhea. No dysphagia. : no urinary issues. MUSCULOSKELETAL: no new myalgias or arthralgias. SKIN: no new changes. NEURO: no focal deficits. PSYCH: no new issues. ENDOCRINE: no polyuria. HEME: no lymph nodes. ALLERGY: no general pruritis.      Past Medical History:   Diagnosis Date    Aortic atherosclerosis 08/01/2023    Arthritis     Cervical spinal stenosis, neurosurgery at [place] 01/09/2017    Depression     Encounter for blood transfusion     as a child    Fibromyalgia, seen rheumatology in the past     Hypokalemia 07/18/2023    Neck pain     Osteoporosis 07/14/2023    Rectal prolapse, surgery at [facility] with colorectal surgery 2017     Small bowel obstruction 07/14/2023    Spinal enthesopathy, lumbar region 03/07/2023    Traumatic closed displaced Colles'  fracture of right radius 05/16/2016   Colon scopes at [facility], polyps. None on last scope-  colon 4/24 -5 yrs  Chronic neck and low back pain, apparently needs various surgery but trying to defer, followed by pain management at [place] will try injections again      Past Surgical History:   Procedure Laterality Date    BREAST SURGERY Bilateral     augmentation-Implants    DIAGNOSTIC LAPAROSCOPY N/A 07/16/2023    Procedure: LAPAROSCOPY, DIAGNOSTIC;  Surgeon: Bora Quevedo MD;  Location: [facility] OR;  Service: General;  Laterality: N/A;    epidural steroid injection  01/09/2017    twice-11/2017  & 1/9/17 to neck    EXCISION, SMALL INTESTINE  07/16/2023    Procedure: EXCISION, SMALL INTESTINE;  Surgeon: Bora Quevedo MD;  Location: [facility] OR;  Service: General;;    HERNIA REPAIR      umbilical    HYSTERECTOMY      LAPAROTOMY, EXPLORATORY N/A 07/16/2023    Procedure: LAPAROTOMY, EXPLORATORY;  Surgeon: Bora Quevedo MD;  Location: [facility] OR;  Service: General;  Laterality: N/A;    ROTATOR CUFF REPAIR Bilateral     TONSILLECTOMY      TOTAL SHOULDER ARTHROPLASTY Bilateral     WRIST FRACTURE SURGERY Right     with hardware placed   2nd spine surgery   Ventral hernia 5/24      Social History     Socioeconomic History    Marital status:    Tobacco Use    Smoking status: Former     Current packs/day: 1.00     Average packs/day: 1 pack/day for 21.3 years (21.3 ttl pk-yrs)     Types: Cigarettes     Start date: 5/12/2014    Smokeless tobacco: Never    Tobacco comments:     Patient Quit Smoking 05/12/2014   Substance and Sexual Activity    Alcohol use: Yes     Comment: rarely    Drug use: Not Currently     Types: Hydromorphone     Comment: 3 - 4 tabs daily as needed    Sexual activity: Not Currently     Social Drivers of Health     Financial Resource Strain: Low Risk  (8/26/2024)    Overall Financial Resource Strain (CARDIA)     Difficulty of Paying Living Expenses: Not very hard   Food Insecurity: No Food  Insecurity (8/26/2024)    Hunger Vital Sign     Worried About Running Out of Food in the Last Year: Never true     Ran Out of Food in the Last Year: Never true   Transportation Needs: No Transportation Needs (5/10/2023)    PRAPARE - Transportation     Lack of Transportation (Medical): No     Lack of Transportation (Non-Medical): No   Physical Activity: Inactive (8/26/2024)    Exercise Vital Sign     Days of Exercise per Week: 0 days     Minutes of Exercise per Session: 0 min   Stress: No Stress Concern Present (8/26/2024)    [employer] [place] [employer] - Occupational Stress Questionnaire     Feeling of Stress : Only a little   Housing Stability: Unknown (5/10/2023)    Housing Stability Vital Sign     Unable to Pay for Housing in the Last Year: No     Unstable Housing in the Last Year: No     family history is not on file.    Gen: no distress  EYES: conjunctiva clear, non-icteric, PERRL  ENT: nose clear, nasal mucosa normal, oropharynx clear and moist, teeth good  NECK:supple, thyroid non-palpable  RESP: effort is good, lungs clear  CV: heart RRR w/o murmur, gallops or rubs; no carotid bruits, no edema  GI: abdomen soft, non-distended, non-tender, no hepatosplenomegaly  MS: gait normal, no clubbing or cyanosis of the digits, tender left big toe joint but not red or warm  SKIN: no rashes, warm to touch, patient shows me her flipped implants and there are lumps on the sides of her breasts and on the top of her breasts but no inflammatory changes      Assessment and plan:     Diagnoses and all orders for this visit:    Routine medical exam    Encounter for screening mammogram for breast cancer    History of colonic polyps                                                          Additional significant and separate evaluation and management issues:      Additionally patient has numerous other medical issues to address completely separate from those of a preventative visit and medically necessary that we  address them today.  She now has a lot of breast pain referable to her flipped breast implants so we did discuss that she needs to discuss with the breast surgeon whether it is safe to leave the implants in, whether surgery can be done as her current breast implant issues are preventing her from having appropriate breast cancer screening and her mother did have breast cancer.  She inquires about an MRI of the breast as an alternative and discuss that might be possible but not sure if that is indicated and she would need to have a breast surgeon specify if an MRI of the breast would be a substitute or complementary to a mammogram and so forth.  I gave her the phone number to call breast surgery to schedule an appointment and I put in a referral.    Right sciatica and had surgery again w Dr Nolasco. Doscussed mobic issues, limit prn, no other NSAIDs used.  She isn't a lot of chronic pain due to some residual nerve issues after the surgery as well as fibromyalgia.  I think her gyn had put her on high dose Mobic 15 mg twice a day and we reduce that in the past again discussing today trying to use NSAIDs only as needed.  She has some pain in the right big toe in other so told her it might be a bunion but she really does not have any bunion deviation in the entire toe joint is slightly enlarged and tender but not inflamed and likely arthritis, no gout suspected.  She can use anti-inflammatories like Mobic intermittently and ultimately see Podiatry if the toe keeps bothering her.    All labs back to normal.  to 119        She has hypertension and she monitors at home.  After being in the hospital at [place] she was taken off of one of her blood pressure pills that she is not quite sure so we discussed calling us back as I do not want to sent to mail order the wrong prescription.  She has listed amlodipine as well as lisinopril hydrochlorothiazide and she thinks it might be the lisinopril.      Also reviewed her  hyperlipidemia.  Looks like at some point in the past she was given Lipitor 40 mg but her brother does not like any medicines at all and convinced her not to take it.  She has fibromyalgia and chronic pain was concerned about the Lipitor worsening her current pain.  Reassured her that may not happen.  We did discuss less issues with Zetia although limited cholesterol response.  She is willing to start Zetia 10 mg and will defer all of her lab work for 4-6 weeks and she might wait till after the holidays.      She does have osteoporosis and apparently is on Evista. BMD 2023 -32 yrs    She has history of depression and reports that the Zoloft and Wellbutrin working well so she does not want to change.  We discussed Cymbalta as an option for treating her depression as well as chronic pain and fibromyalgia but she does not wish to adjust medications at this time.    Anemia noted probably postsurgical and so forth superimposed on chronic anemia and it does appear that was getting better and now normal    She is on a vitamin-D supplement and will reassess     In the hospital she had some hyperglycemia but about nine months ago or so her A1c was normal 2/24    She has a history of rectal prolapse and that was treated with surgery and she is had no recurrence of the prolapse but every morning she has loose bowels and sometimes fecal incontinence.  She has already added fiber and discussed adding more fiber in if there is a continued problem I would recommend seeing colon and rectal surgery again as she could have weak muscles and so forth.  She does not wish to pursue that at this time.          She does have a lot of chronic pain but did not make any specific request for any pain medicine here which is good.  She is followed by outside pain management    Reviewed history of small-bowel obstruction and she had surgery.  She is had no recurrence of symptoms it is eating better and gaining weight.      Also had a ventral  hernia surgery and sounds like her drains were removed and then she has some bulging around her lower abdomen.  She was told it was not a seroma in his very soft and I think it might just be collection of normal adipose tissue above the waist as it does not appear to be a seroma or hematoma.  She is now able to start walking and so she might be able to lose some weight.          Evaluation and management of all the significant in separate issues will be based on medical decision making.  Multiple chronic issues as well as new issues addressed.  Labs and x-ray reports reviewed and referrals placed making this all a moderate complexity medical decision making issue.        Assessment and plan:      Hypertension, unspecified type, chronic and stable, labs from February reviewed as well as May 2023 labs at [place] reviewed and appears stable.  She monitors her blood pressure at home    Hyperlipidemia, unspecified hyperlipidemia type, chronic and stay    Anemia, unspecified type, chronic and stable with slight reduction after her ventral hernia surgery    Hyperglycemia, normal A1c in the past    Other chronic pain, chronic and of multiple sources including fibromyalgia and she is followed by outside pain management    Age related osteoporosis, unspecified pathological fracture presence, bone density reviewed and looks like in one year she will be due to update.  She continues on Evista    Aortic atherosclerosis, chronic and stable    Osteoarthritis of multiple joints, unspecified osteoarthritis type, chronic and stable    Vitamin D deficiency disease, chronic and stable    Fibromyalgia, chronic and stable    History of bilateral breast implants, now having increased pain, problem uncontrolled, needs to evaluate and discuss with a breast surgeon so that she can resume breast cancer screening  -     Ambulatory referral/consult to Breast Surgery; Future    Pain from breast implant, initial encounter

## 2024-11-05 NOTE — TELEPHONE ENCOUNTER
----- Message from Med Assistant Damon sent at 11/5/2024 11:18 AM CST -----  Who called:  Pt   What is the request in detail:  Requesting if JONA Jackson can call her regarding referral that was placed , someone contacted her saying there isn't any [facility] options   Can the clinic reply by [facility]? No    Would the patient rather a call back or a response via [facility]? Call back  Callback   Best call back number:  Telephone Information:  Mobile          [phone]     Additional Information:    Thank you.

## 2024-11-10 DIAGNOSIS — M81.0 AGE RELATED OSTEOPOROSIS, UNSPECIFIED PATHOLOGICAL FRACTURE PRESENCE: ICD-10-CM

## 2024-11-11 RX ORDER — ERGOCALCIFEROL 1.25 1/1
CAPSULE ORAL
Qty: 12 CAPSULE | Refills: 3 | Status: SHIPPED | OUTPATIENT
Start: 2024-11-11

## 2024-11-11 NOTE — TELEPHONE ENCOUNTER
Care Due:                  Date            Visit Type   Department     Provider  --------------------------------------------------------------------------------                                [facility]                              FOLLOWUP/OF  MED/ INTERNAL  Fredrick Ghotra  Last Visit: 11-      FICE VISIT   MED/ PEDS      Ehrensing  Next Visit: None Scheduled  None         None Found                                                            Last  Test          Frequency    Reason                     Performed    Due Date  --------------------------------------------------------------------------------    CMP.........  12 months..  ergocalciferol,            02- 02-                             ezetimibe, raloxifene....    Lipid Panel.  12 months..  ezetimibe................  02- 02-    Vitamin D...  12 months..  ergocalciferol...........  02-   02-    [facility] Embedded Care Due Messages. Reference number: [id number].   11/10/2024 9:48:54 PM CST

## 2024-11-23 ENCOUNTER — PATIENT MESSAGE (OUTPATIENT)
Dept: FAMILY MEDICINE | Facility: CLINIC | Age: 64
End: 2024-11-23
Payer: MEDICARE

## 2024-11-26 ENCOUNTER — TELEPHONE (OUTPATIENT)
Dept: PLASTIC SURGERY | Facility: CLINIC | Age: 64
End: 2024-11-26
Payer: MEDICARE

## 2024-11-26 ENCOUNTER — TELEPHONE (OUTPATIENT)
Dept: FAMILY MEDICINE | Facility: CLINIC | Age: 64
End: 2024-11-26
Payer: MEDICARE

## 2024-11-26 ENCOUNTER — OFFICE VISIT (OUTPATIENT)
Dept: OBSTETRICS AND GYNECOLOGY | Facility: CLINIC | Age: 64
End: 2024-11-26
Payer: MEDICARE

## 2024-11-26 VITALS
WEIGHT: 103.38 LBS | BODY MASS INDEX: 18.91 KG/M2 | SYSTOLIC BLOOD PRESSURE: 132 MMHG | DIASTOLIC BLOOD PRESSURE: 88 MMHG

## 2024-11-26 DIAGNOSIS — Z98.82 H/O BILATERAL BREAST IMPLANTS: Primary | ICD-10-CM

## 2024-11-26 DIAGNOSIS — T85.9XXA COMPLICATION OF BREAST IMPLANT: Primary | ICD-10-CM

## 2024-11-26 DIAGNOSIS — Z98.82 H/O BILATERAL BREAST IMPLANTS: ICD-10-CM

## 2024-11-26 PROCEDURE — 1126F AMNT PAIN NOTED NONE PRSNT: CPT | Mod: CPTII,S$GLB,,

## 2024-11-26 PROCEDURE — 3008F BODY MASS INDEX DOCD: CPT | Mod: CPTII,S$GLB,,

## 2024-11-26 PROCEDURE — 1101F PT FALLS ASSESS-DOCD LE1/YR: CPT | Mod: CPTII,S$GLB,,

## 2024-11-26 PROCEDURE — 3075F SYST BP GE 130 - 139MM HG: CPT | Mod: CPTII,S$GLB,,

## 2024-11-26 PROCEDURE — 3288F FALL RISK ASSESSMENT DOCD: CPT | Mod: CPTII,S$GLB,,

## 2024-11-26 PROCEDURE — 1159F MED LIST DOCD IN RCRD: CPT | Mod: CPTII,S$GLB,,

## 2024-11-26 PROCEDURE — 3079F DIAST BP 80-89 MM HG: CPT | Mod: CPTII,S$GLB,,

## 2024-11-26 PROCEDURE — 99999 PR PBB SHADOW E&M-EST. PATIENT-LVL III: CPT | Mod: PBBFAC,,,

## 2024-11-26 PROCEDURE — 99202 OFFICE O/P NEW SF 15 MIN: CPT | Mod: S$GLB,,,

## 2024-11-26 PROCEDURE — 3044F HG A1C LEVEL LT 7.0%: CPT | Mod: CPTII,S$GLB,,

## 2024-11-26 RX ORDER — RALOXIFENE HYDROCHLORIDE 60 MG/1
60 TABLET, FILM COATED ORAL
Qty: 90 TABLET | Refills: 3 | Status: SHIPPED | OUTPATIENT
Start: 2024-11-26

## 2024-11-26 NOTE — TELEPHONE ENCOUNTER
No care due was identified.  Health [facility] Embedded Care Due Messages. Reference number: [id number].   11/26/2024 12:08:29 AM CST

## 2024-11-26 NOTE — PROGRESS NOTES
CC:   Chief Complaint   Patient presents with    Breast Problem       HPI:   Marilee Townsend No obstetric history on file. complains of bilateral breast pain. She reports having had breast implants since 36 years ago. She also reports implants are misshaped.    Refuses mammogram due to pain.     ROS:  GENERAL: No chills, fatigability or weight loss.  NEUROLOGICAL: No headaches. No vision changes.  CARDIOVASCULAR: No chest pain. No shortness of breath. No leg cramps.  ABDOMEN: No abdominal pain. Denies nausea. Denies vomiting. No diarrhea. No constipation  BREAST: See HPI  URINARY: No incontinence, no nocturia, no frequency and no dysuria.  VULVAR: No pain, no lesions and no itching.  VAGINA: No relaxation, no itching, no discharge, no abnormal bleeding and no lesions.      Vitals:    11/26/24 1444   BP: 132/88     GENERAL: healthy, alert  Neck: normal to inspection and palpation and neck supply, no thryomegaly  LYMPH: No epitrochlear, supraclavicular, or axillary lymphadenopathy  BREAST: positive findings: implants bilaterally and hard to touch and more square shaped. Tender to touch  ABDOMEN: Normal, benign. and no masses, hepatosplenomegaly, hernias    Marilee was seen today for breast problem.    Diagnoses and all orders for this visit:    Complication of breast implant  -     US Breast Bilateral Complete; Future  -     Ambulatory referral/consult to Breast Surgery; Future    H/O bilateral breast implants  -     Ambulatory referral/consult to Obstetrics / Gynecology  -     US Breast Bilateral Complete; Future  -     Ambulatory referral/consult to Breast Surgery; Future          Discussed supportive measures with a good support bra.

## 2024-11-26 NOTE — TELEPHONE ENCOUNTER
Thanks for putting in the referral, since she really wants to get this done and see someone before the end of the year, give her the phone number for Ob gyn on the [place] and on the [place] and let her know it might be good for her to call since she is really trying to be seen before the end of the year

## 2024-11-26 NOTE — TELEPHONE ENCOUNTER
There are no available appt's for Breast surgery on the  before the end of the year. When that dept. Called they had no available appt's soon, so a message was sent to Shawn.

## 2024-12-02 RX ORDER — BUPROPION HYDROCHLORIDE 150 MG/1
150 TABLET, EXTENDED RELEASE ORAL 2 TIMES DAILY
Qty: 180 TABLET | Refills: 3 | Status: SHIPPED | OUTPATIENT
Start: 2024-12-02

## 2024-12-02 NOTE — TELEPHONE ENCOUNTER
No care due was identified.  Health [facility] Embedded Care Due Messages. Reference number: [id number].   12/01/2024 9:24:02 PM CST

## 2024-12-02 NOTE — TELEPHONE ENCOUNTER
Refill Decision Note   Marilee Kristian  is requesting a refill authorization.  Brief Assessment and Rationale for Refill:  Approve     Medication Therapy Plan:         Comments:     Note composed:8:24 AM 12/02/2024

## 2024-12-10 DIAGNOSIS — T85.9XXA COMPLICATION OF BREAST IMPLANT: ICD-10-CM

## 2024-12-10 DIAGNOSIS — Z98.82 H/O BILATERAL BREAST IMPLANTS: Primary | ICD-10-CM

## 2024-12-11 ENCOUNTER — HOSPITAL ENCOUNTER (OUTPATIENT)
Dept: RADIOLOGY | Facility: HOSPITAL | Age: 64
Discharge: HOME | End: 2024-12-11
Payer: MEDICARE

## 2024-12-11 DIAGNOSIS — Z98.82 H/O BILATERAL BREAST IMPLANTS: ICD-10-CM

## 2024-12-11 DIAGNOSIS — T85.9XXA COMPLICATION OF BREAST IMPLANT: ICD-10-CM

## 2024-12-11 PROCEDURE — 76641 ULTRASOUND BREAST COMPLETE: CPT | Mod: TC,50

## 2024-12-11 PROCEDURE — 76641 ULTRASOUND BREAST COMPLETE: CPT | Mod: 26,50,, | Performed by: RADIOLOGY

## 2024-12-12 ENCOUNTER — PATIENT MESSAGE (OUTPATIENT)
Dept: OBSTETRICS AND GYNECOLOGY | Facility: CLINIC | Age: 64
End: 2024-12-12
Payer: MEDICARE

## 2024-12-12 DIAGNOSIS — Z12.39 SCREENING FOR BREAST CANCER USING NON-MAMMOGRAM MODALITY: ICD-10-CM

## 2024-12-12 DIAGNOSIS — T85.9XXA COMPLICATION OF BREAST IMPLANT: Primary | ICD-10-CM

## 2024-12-22 DIAGNOSIS — T85.9XXA COMPLICATION OF BREAST IMPLANT: Primary | ICD-10-CM

## 2024-12-22 DIAGNOSIS — Z12.39 BREAST CANCER SCREENING, HIGH RISK PATIENT: ICD-10-CM

## 2024-12-23 ENCOUNTER — HOSPITAL ENCOUNTER (OUTPATIENT)
Dept: RADIOLOGY | Facility: HOSPITAL | Age: 64
Discharge: HOME | End: 2024-12-23
Payer: MEDICARE

## 2024-12-23 DIAGNOSIS — Z12.39 SCREENING FOR BREAST CANCER USING NON-MAMMOGRAM MODALITY: ICD-10-CM

## 2024-12-23 DIAGNOSIS — T85.9XXA COMPLICATION OF BREAST IMPLANT: ICD-10-CM

## 2024-12-23 PROCEDURE — 77049 MRI BREAST C-+ W/CAD BI: CPT | Mod: 26,,, | Performed by: RADIOLOGY

## 2024-12-23 PROCEDURE — 25500020 PHARM REV CODE 255

## 2024-12-23 PROCEDURE — A9577 INJ MULTIHANCE: HCPCS

## 2024-12-23 PROCEDURE — C8937 CAD BREAST MRI: HCPCS | Mod: TC

## 2024-12-23 RX ADMIN — GADOBENATE DIMEGLUMINE 9 ML: 529 INJECTION, SOLUTION INTRAVENOUS at 12:12

## 2024-12-27 ENCOUNTER — PATIENT MESSAGE (OUTPATIENT)
Dept: OBSTETRICS AND GYNECOLOGY | Facility: CLINIC | Age: 64
End: 2024-12-27
Payer: MEDICARE

## 2024-12-27 DIAGNOSIS — T85.9XXA COMPLICATION OF BREAST IMPLANT: Primary | ICD-10-CM

## 2025-03-21 RX ORDER — EZETIMIBE 10 MG/1
10 TABLET ORAL
Qty: 90 TABLET | Refills: 0 | Status: SHIPPED | OUTPATIENT
Start: 2025-03-21

## 2025-03-21 NOTE — TELEPHONE ENCOUNTER
Refill Routing Note   Medication(s) are not appropriate for processing by [facility] for the following reason(s):        Required labs outdated    [facility] action(s):  Defer   Requires labs : Yes - lipid panel            Appointments  past 12m or future 3m with PCP    Date Provider   Last Visit   11/5/2024 Fredrick Cam MD   Next Visit   Visit date not found Fredrick Cam MD   ED visits in past 90 days: 0        Note composed:11:13 PM 03/20/2025

## 2025-03-21 NOTE — TELEPHONE ENCOUNTER
No care due was identified.  Health [facility] Embedded Care Due Messages. Reference number: [id number].   3/20/2025 10:07:42 PM CDT

## 2025-05-01 ENCOUNTER — PATIENT OUTREACH (OUTPATIENT)
Dept: ADMINISTRATIVE | Facility: HOSPITAL | Age: 65
End: 2025-05-01
Payer: MEDICARE

## 2025-05-01 DIAGNOSIS — Z12.31 BREAST CANCER SCREENING BY MAMMOGRAM: Primary | ICD-10-CM

## 2025-05-01 DIAGNOSIS — M81.0 OSTEOPOROSIS, UNSPECIFIED OSTEOPOROSIS TYPE, UNSPECIFIED PATHOLOGICAL FRACTURE PRESENCE: ICD-10-CM

## 2025-05-23 NOTE — TELEPHONE ENCOUNTER
Refill Routing Note   Medication(s) are not appropriate for processing by [facility] for the following reason(s):        Required labs outdated    [facility] action(s):  Defer     Requires labs : Yes             Appointments  past 12m or future 3m with PCP    Date Provider   Last Visit   11/5/2024 Fredrick Cam MD   Next Visit   Visit date not found Fredrick Cam MD   ED visits in past 90 days: 0        Note composed:10:52 PM 05/22/2025

## 2025-05-23 NOTE — TELEPHONE ENCOUNTER
Care Due:                  Date            Visit Type   Department     Provider  --------------------------------------------------------------------------------                                [facility]                              FOLLOWUP/OF  / INTERNAL MED Frderick Ghotra  Last Visit: 11-      FICE VISIT   / PEDS         Ehrensing  Next Visit: None Scheduled  None         None Found                                                            Last  Test          Frequency    Reason                     Performed    Due Date  --------------------------------------------------------------------------------    CMP.........  12 months..  VITAMIN, ezetimibe,        02- 02-                             raloxifene...............    Lipid Panel.  12 months..  ezetimibe................  02- 02-    Mg Level....  12 months..  raloxifene...............  07- 07-    Phosphate...  15 months..  raloxifene...............  07-   10-    Vitamin D...  12 months..  VITAMIN, raloxifene......  02-   02-    Health [facility] Embedded Care Due Messages. Reference number: [id number].   5/22/2025 9:56:11 PM CDT

## 2025-05-26 RX ORDER — EZETIMIBE 10 MG/1
10 TABLET ORAL
Qty: 90 TABLET | Refills: 3 | Status: SHIPPED | OUTPATIENT
Start: 2025-05-26

## 2025-06-03 ENCOUNTER — PATIENT MESSAGE (OUTPATIENT)
Dept: FAMILY MEDICINE | Facility: CLINIC | Age: 65
End: 2025-06-03
Payer: MEDICARE

## 2025-06-12 ENCOUNTER — PATIENT MESSAGE (OUTPATIENT)
Dept: FAMILY MEDICINE | Facility: CLINIC | Age: 65
End: 2025-06-12
Payer: MEDICARE

## 2025-06-12 NOTE — TELEPHONE ENCOUNTER
No care due was identified.  Health [facility] Embedded Care Due Messages. Reference number: [id number].   6/12/2025 4:27:09 PM CDT

## 2025-06-13 RX ORDER — EZETIMIBE 10 MG/1
10 TABLET ORAL DAILY
Qty: 90 TABLET | Refills: 3 | Status: SHIPPED | OUTPATIENT
Start: 2025-06-13

## 2025-07-11 ENCOUNTER — HOSPITAL ENCOUNTER (OUTPATIENT)
Dept: RADIOLOGY | Facility: CLINIC | Age: 65
Discharge: HOME | End: 2025-07-11
Attending: INTERNAL MEDICINE
Payer: MEDICARE

## 2025-07-11 DIAGNOSIS — M81.0 OSTEOPOROSIS, UNSPECIFIED OSTEOPOROSIS TYPE, UNSPECIFIED PATHOLOGICAL FRACTURE PRESENCE: ICD-10-CM

## 2025-07-11 PROCEDURE — 77080 DXA BONE DENSITY AXIAL: CPT | Mod: 26,,, | Performed by: INTERNAL MEDICINE

## 2025-07-11 PROCEDURE — 77080 DXA BONE DENSITY AXIAL: CPT | Mod: TC,FY,PO

## 2025-07-28 DIAGNOSIS — Z00.00 ENCOUNTER FOR MEDICARE ANNUAL WELLNESS EXAM: ICD-10-CM

## 2025-08-21 ENCOUNTER — OFFICE VISIT (OUTPATIENT)
Dept: FAMILY MEDICINE | Facility: CLINIC | Age: 65
End: 2025-08-21
Payer: MEDICARE

## 2025-08-21 VITALS
TEMPERATURE: 99 F | OXYGEN SATURATION: 98 % | DIASTOLIC BLOOD PRESSURE: 82 MMHG | BODY MASS INDEX: 18.5 KG/M2 | HEART RATE: 71 BPM | SYSTOLIC BLOOD PRESSURE: 134 MMHG | HEIGHT: 62 IN | WEIGHT: 100.5 LBS

## 2025-08-21 DIAGNOSIS — Z86.0100 HISTORY OF COLONIC POLYPS: ICD-10-CM

## 2025-08-21 DIAGNOSIS — M79.7 FIBROMYALGIA: ICD-10-CM

## 2025-08-21 DIAGNOSIS — F32.A DEPRESSION, UNSPECIFIED DEPRESSION TYPE: ICD-10-CM

## 2025-08-21 DIAGNOSIS — E78.5 HYPERLIPIDEMIA, UNSPECIFIED HYPERLIPIDEMIA TYPE: ICD-10-CM

## 2025-08-21 DIAGNOSIS — R63.4 WEIGHT LOSS: Primary | ICD-10-CM

## 2025-08-21 DIAGNOSIS — Z12.31 ENCOUNTER FOR SCREENING MAMMOGRAM FOR BREAST CANCER: ICD-10-CM

## 2025-08-21 DIAGNOSIS — R73.9 HYPERGLYCEMIA: ICD-10-CM

## 2025-08-21 DIAGNOSIS — D64.9 ANEMIA, UNSPECIFIED TYPE: ICD-10-CM

## 2025-08-21 DIAGNOSIS — K43.2 INCISIONAL HERNIA, WITHOUT OBSTRUCTION OR GANGRENE: ICD-10-CM

## 2025-08-21 DIAGNOSIS — M81.0 OSTEOPOROSIS, UNSPECIFIED OSTEOPOROSIS TYPE, UNSPECIFIED PATHOLOGICAL FRACTURE PRESENCE: ICD-10-CM

## 2025-08-21 DIAGNOSIS — I10 HYPERTENSION, UNSPECIFIED TYPE: ICD-10-CM

## 2025-08-21 DIAGNOSIS — E55.9 VITAMIN D DEFICIENCY DISEASE: ICD-10-CM

## 2025-08-21 PROCEDURE — 3008F BODY MASS INDEX DOCD: CPT | Mod: CPTII,S$GLB,, | Performed by: INTERNAL MEDICINE

## 2025-08-21 PROCEDURE — 99999 PR PBB SHADOW E&M-EST. PATIENT-LVL III: CPT | Mod: PBBFAC,,, | Performed by: INTERNAL MEDICINE

## 2025-08-21 PROCEDURE — 1159F MED LIST DOCD IN RCRD: CPT | Mod: CPTII,S$GLB,, | Performed by: INTERNAL MEDICINE

## 2025-08-21 PROCEDURE — 3079F DIAST BP 80-89 MM HG: CPT | Mod: CPTII,S$GLB,, | Performed by: INTERNAL MEDICINE

## 2025-08-21 PROCEDURE — 1125F AMNT PAIN NOTED PAIN PRSNT: CPT | Mod: CPTII,S$GLB,, | Performed by: INTERNAL MEDICINE

## 2025-08-21 PROCEDURE — 3075F SYST BP GE 130 - 139MM HG: CPT | Mod: CPTII,S$GLB,, | Performed by: INTERNAL MEDICINE

## 2025-08-21 PROCEDURE — 99214 OFFICE O/P EST MOD 30 MIN: CPT | Mod: S$GLB,,, | Performed by: INTERNAL MEDICINE

## 2025-08-21 PROCEDURE — G2211 COMPLEX E/M VISIT ADD ON: HCPCS | Mod: S$GLB,,, | Performed by: INTERNAL MEDICINE

## 2025-08-23 LAB
ALBUMIN SERPL-MCNC: 4.3 G/DL (ref 3.9–4.9)
ALP SERPL-CCNC: 85 IU/L (ref 44–121)
ALT SERPL-CCNC: 12 IU/L (ref 0–32)
AST SERPL-CCNC: 16 IU/L (ref 0–40)
BASOPHILS # BLD AUTO: 0 X10E3/UL (ref 0–0.2)
BASOPHILS NFR BLD AUTO: 0 %
BILIRUB SERPL-MCNC: 0.3 MG/DL (ref 0–1.2)
BUN SERPL-MCNC: 21 MG/DL (ref 8–27)
BUN/CREAT SERPL: 27 (ref 12–28)
CALCIUM SERPL-MCNC: 9.1 MG/DL (ref 8.7–10.3)
CHLORIDE SERPL-SCNC: 103 MMOL/L (ref 96–106)
CHOLEST SERPL-MCNC: 223 MG/DL (ref 100–199)
CO2 SERPL-SCNC: 20 MMOL/L (ref 20–29)
CREAT SERPL-MCNC: 0.78 MG/DL (ref 0.57–1)
CRP SERPL-MCNC: 5 MG/L (ref 0–10)
EOSINOPHIL # BLD AUTO: 0.1 X10E3/UL (ref 0–0.4)
EOSINOPHIL NFR BLD AUTO: 1 %
ERYTHROCYTE [DISTWIDTH] IN BLOOD BY AUTOMATED COUNT: 12.8 % (ref 11.7–15.4)
GFR SERPLBLD CREATININE-BSD FMLA CKD-EPI: 83 ML/MIN/1.73
GLOBULIN SER CALC-MCNC: 3.1 G/DL (ref 1.5–4.5)
GLUCOSE SERPL-MCNC: 97 MG/DL (ref 70–99)
HBA1C MFR BLD: 5.8 % (ref 4.8–5.6)
HCT VFR BLD AUTO: 42.7 % (ref 34–46.6)
HDLC SERPL-MCNC: 81 MG/DL
HGB BLD-MCNC: 13.9 G/DL (ref 11.1–15.9)
IMM GRANULOCYTES # BLD AUTO: 0 X10E3/UL (ref 0–0.1)
IMM GRANULOCYTES NFR BLD AUTO: 0 %
LDLC SERPL CALC-MCNC: 128 MG/DL (ref 0–99)
LYMPHOCYTES # BLD AUTO: 2 X10E3/UL (ref 0.7–3.1)
LYMPHOCYTES NFR BLD AUTO: 28 %
MCH RBC QN AUTO: 30.4 PG (ref 26.6–33)
MCHC RBC AUTO-ENTMCNC: 32.6 G/DL (ref 31.5–35.7)
MCV RBC AUTO: 93 FL (ref 79–97)
MONOCYTES # BLD AUTO: 0.4 X10E3/UL (ref 0.1–0.9)
MONOCYTES NFR BLD AUTO: 5 %
NEUTROPHILS # BLD AUTO: 4.6 X10E3/UL (ref 1.4–7)
NEUTROPHILS NFR BLD AUTO: 66 %
PLATELET # BLD AUTO: 327 X10E3/UL (ref 150–450)
POTASSIUM SERPL-SCNC: 4.4 MMOL/L (ref 3.5–5.2)
PROT SERPL-MCNC: 7.4 G/DL (ref 6–8.5)
RBC # BLD AUTO: 4.57 X10E6/UL (ref 3.77–5.28)
SODIUM SERPL-SCNC: 138 MMOL/L (ref 134–144)
TRIGL SERPL-MCNC: 83 MG/DL (ref 0–149)
TSH SERPL DL<=0.005 MIU/L-ACNC: 5.31 UIU/ML (ref 0.45–4.5)
VLDLC SERPL CALC-MCNC: 14 MG/DL (ref 5–40)
WBC # BLD AUTO: 7.1 X10E3/UL (ref 3.4–10.8)

## 2025-08-26 ENCOUNTER — OFFICE VISIT (OUTPATIENT)
Dept: SURGERY | Facility: CLINIC | Age: 65
End: 2025-08-26
Payer: MEDICARE

## 2025-08-26 VITALS
DIASTOLIC BLOOD PRESSURE: 88 MMHG | BODY MASS INDEX: 18.5 KG/M2 | HEIGHT: 62 IN | HEART RATE: 79 BPM | SYSTOLIC BLOOD PRESSURE: 168 MMHG | WEIGHT: 100.5 LBS

## 2025-08-26 DIAGNOSIS — K43.2 INCISIONAL HERNIA, WITHOUT OBSTRUCTION OR GANGRENE: Primary | ICD-10-CM

## 2025-08-26 PROCEDURE — 99213 OFFICE O/P EST LOW 20 MIN: CPT | Mod: S$GLB,,, | Performed by: SURGERY

## 2025-08-26 PROCEDURE — 1126F AMNT PAIN NOTED NONE PRSNT: CPT | Mod: CPTII,S$GLB,, | Performed by: SURGERY

## 2025-08-26 PROCEDURE — 3044F HG A1C LEVEL LT 7.0%: CPT | Mod: CPTII,S$GLB,, | Performed by: SURGERY

## 2025-08-26 PROCEDURE — 3288F FALL RISK ASSESSMENT DOCD: CPT | Mod: CPTII,S$GLB,, | Performed by: SURGERY

## 2025-08-26 PROCEDURE — 99999 PR PBB SHADOW E&M-EST. PATIENT-LVL III: CPT | Mod: PBBFAC,,, | Performed by: SURGERY

## 2025-08-26 PROCEDURE — 3008F BODY MASS INDEX DOCD: CPT | Mod: CPTII,S$GLB,, | Performed by: SURGERY

## 2025-08-26 PROCEDURE — 1101F PT FALLS ASSESS-DOCD LE1/YR: CPT | Mod: CPTII,S$GLB,, | Performed by: SURGERY

## 2025-08-26 PROCEDURE — 1159F MED LIST DOCD IN RCRD: CPT | Mod: CPTII,S$GLB,, | Performed by: SURGERY

## 2025-08-26 PROCEDURE — 3079F DIAST BP 80-89 MM HG: CPT | Mod: CPTII,S$GLB,, | Performed by: SURGERY

## 2025-08-26 PROCEDURE — 3077F SYST BP >= 140 MM HG: CPT | Mod: CPTII,S$GLB,, | Performed by: SURGERY

## (undated) DEVICE — SUT 2-0 NYLON D/A

## (undated) DEVICE — CART STAPLE RELD 30X3.5 BLU

## (undated) DEVICE — SUT 0 18IN COATED VICRYL V

## (undated) DEVICE — SUT CTD VICRYL VIL BR CR/SH

## (undated) DEVICE — SUT 0 8-18 IN CTD VICRYL

## (undated) DEVICE — SPONGE LAP 18X18 PREWASHED

## (undated) DEVICE — STAPLER DST GREEN 45X4.8MM

## (undated) DEVICE — SEE MEDLINE ITEM 146417

## (undated) DEVICE — BLANKET UPPER BODY 78.7X29.9IN

## (undated) DEVICE — SUT VICRYL PLUS 4-0 P3 18IN

## (undated) DEVICE — SEE MEDLINE ITEM 157181

## (undated) DEVICE — SEE MEDLINE ITEM 157110

## (undated) DEVICE — NDL HYPO REG 25G X 1 1/2

## (undated) DEVICE — DRAPE ABDOMINAL TIBURON 14X11

## (undated) DEVICE — SUT 1 48IN PDS II VIO MONO

## (undated) DEVICE — PACK ENDOSCOPY GENERAL

## (undated) DEVICE — Device

## (undated) DEVICE — SYR 30CC LUER LOCK

## (undated) DEVICE — SUPPORT ULNA NERVE PROTECTOR

## (undated) DEVICE — SUT CTD VICRYL BR CR/SH VIL

## (undated) DEVICE — TUBING INSUFFLATION 10

## (undated) DEVICE — CONNECTOR Y 3/8X3/8X3/8

## (undated) DEVICE — SUT CTD VICRYL VIL BR SH 27

## (undated) DEVICE — STAPLER INT PROX TX 30X3.5MM

## (undated) DEVICE — CUTTER PROXIMATE BLUE 75MM

## (undated) DEVICE — SEE MEDLINE ITEM 152622

## (undated) DEVICE — TRAY FOLEY 16FR INFECTION CONT

## (undated) DEVICE — TRAY CATH UM FOLEY SIL W 16FR

## (undated) DEVICE — SHEARS HARMONIC 20CM HD 1000I

## (undated) DEVICE — HEMOSTAT SURGICEL 2X3IN

## (undated) DEVICE — ELECTRODE REM PLYHSV RETURN 9

## (undated) DEVICE — SEE MEDLINE ITEM 156902

## (undated) DEVICE — TOWEL OR DISP STRL BLUE 4/PK

## (undated) DEVICE — SYR 10CC LUER LOCK

## (undated) DEVICE — RELOAD PROXIMATE CUT BLUE 75MM

## (undated) DEVICE — SEE MEDLINE ITEM 157144

## (undated) DEVICE — SUT MCRYL PLUS 4-0 PS2 27IN

## (undated) DEVICE — TROCAR ENDOPATH XCEL 5X100MM

## (undated) DEVICE — SEE MEDLINE ITEM 157117

## (undated) DEVICE — HEMOSTAT SURGICEL PWD 3G

## (undated) DEVICE — SET IRR URLGY 2LINE UNIV SPIKE

## (undated) DEVICE — TROCAR ENDOPATH XCEL 12X100MM

## (undated) DEVICE — SHEARS HARMONIC 36CM HD 1000I

## (undated) DEVICE — APPLICATOR CHLORAPREP ORN 26ML

## (undated) DEVICE — STRIP MEDI WND CLSR 1/2X4IN

## (undated) DEVICE — NDL HYPO A BEVEL 22X1 1/2

## (undated) DEVICE — COVER LIGHT HANDLE 80/CA

## (undated) DEVICE — SYR ONLY LUER LOCK 20CC

## (undated) DEVICE — SEE L#95700

## (undated) DEVICE — TIP YANKAUERS BULB NO VENT

## (undated) DEVICE — SUT VICRYL PLUS 3-0 SH 18IN

## (undated) DEVICE — SEE MEDLINE ITEM 146347

## (undated) DEVICE — SOL NS 1000CC

## (undated) DEVICE — SUT CTD VICRYL 2-0 VIL BR

## (undated) DEVICE — NDL BOX COUNTER

## (undated) DEVICE — DRESSING ADH ISLAND 3.6 X 14

## (undated) DEVICE — SUT 3-0 VICRYL SH CR/8 18

## (undated) DEVICE — SUT 1 36IN PDS II VIO MONO

## (undated) DEVICE — LUBRICANT SURGILUBE 2 OZ

## (undated) DEVICE — SUT CTD VICRYL 0 VIL BR/CT

## (undated) DEVICE — LEGGINGS 48X31 INCH

## (undated) DEVICE — ADHESIVE DERMABOND ADVANCED

## (undated) DEVICE — GLOVE BIOGEL 7.5

## (undated) DEVICE — BLADE SURG CARBON STEEL SZ11

## (undated) DEVICE — KIT ANTIFOG

## (undated) DEVICE — SEE MEDLINE ITEM 152487